# Patient Record
Sex: FEMALE | Employment: FULL TIME | URBAN - METROPOLITAN AREA
[De-identification: names, ages, dates, MRNs, and addresses within clinical notes are randomized per-mention and may not be internally consistent; named-entity substitution may affect disease eponyms.]

---

## 2024-07-18 ENCOUNTER — HOSPITAL ENCOUNTER (OUTPATIENT)
Dept: RADIOLOGY | Facility: EXTERNAL LOCATION | Age: 49
Discharge: HOME | End: 2024-07-18

## 2024-08-13 ENCOUNTER — HOSPITAL ENCOUNTER (OUTPATIENT)
Dept: RADIOLOGY | Facility: CLINIC | Age: 49
Discharge: HOME | End: 2024-08-13
Payer: COMMERCIAL

## 2024-08-13 VITALS — WEIGHT: 293 LBS | HEIGHT: 66 IN | BODY MASS INDEX: 47.09 KG/M2

## 2024-08-13 DIAGNOSIS — R92.8 OTHER ABNORMAL AND INCONCLUSIVE FINDINGS ON DIAGNOSTIC IMAGING OF BREAST: ICD-10-CM

## 2024-08-13 PROCEDURE — 77062 BREAST TOMOSYNTHESIS BI: CPT

## 2024-08-13 PROCEDURE — 76983 USE EA ADDL TARGET LESION: CPT | Mod: LT

## 2024-08-13 PROCEDURE — 77066 DX MAMMO INCL CAD BI: CPT | Mod: LEFT SIDE | Performed by: STUDENT IN AN ORGANIZED HEALTH CARE EDUCATION/TRAINING PROGRAM

## 2024-08-13 PROCEDURE — 77062 BREAST TOMOSYNTHESIS BI: CPT | Mod: LEFT SIDE | Performed by: STUDENT IN AN ORGANIZED HEALTH CARE EDUCATION/TRAINING PROGRAM

## 2024-08-13 PROCEDURE — 76642 ULTRASOUND BREAST LIMITED: CPT | Mod: LT

## 2024-08-13 PROCEDURE — 76642 ULTRASOUND BREAST LIMITED: CPT | Mod: LEFT SIDE | Performed by: STUDENT IN AN ORGANIZED HEALTH CARE EDUCATION/TRAINING PROGRAM

## 2024-08-27 ENCOUNTER — HOSPITAL ENCOUNTER (EMERGENCY)
Facility: HOSPITAL | Age: 49
Discharge: HOME | End: 2024-08-27
Attending: EMERGENCY MEDICINE
Payer: COMMERCIAL

## 2024-08-27 ENCOUNTER — APPOINTMENT (OUTPATIENT)
Dept: RADIOLOGY | Facility: HOSPITAL | Age: 49
End: 2024-08-27
Payer: COMMERCIAL

## 2024-08-27 ENCOUNTER — APPOINTMENT (OUTPATIENT)
Dept: CARDIOLOGY | Facility: HOSPITAL | Age: 49
End: 2024-08-27
Payer: COMMERCIAL

## 2024-08-27 VITALS
HEIGHT: 66 IN | DIASTOLIC BLOOD PRESSURE: 70 MMHG | OXYGEN SATURATION: 99 % | HEART RATE: 86 BPM | SYSTOLIC BLOOD PRESSURE: 134 MMHG | WEIGHT: 293 LBS | RESPIRATION RATE: 20 BRPM | BODY MASS INDEX: 47.09 KG/M2 | TEMPERATURE: 97.2 F

## 2024-08-27 DIAGNOSIS — R06.02 SHORTNESS OF BREATH: Primary | ICD-10-CM

## 2024-08-27 LAB
ALBUMIN SERPL BCP-MCNC: 4 G/DL (ref 3.4–5)
ALP SERPL-CCNC: 66 U/L (ref 33–110)
ALT SERPL W P-5'-P-CCNC: 8 U/L (ref 7–45)
ANION GAP SERPL CALC-SCNC: 16 MMOL/L (ref 10–20)
AST SERPL W P-5'-P-CCNC: 8 U/L (ref 9–39)
ATRIAL RATE: 116 BPM
BASOPHILS # BLD AUTO: 0.03 X10*3/UL (ref 0–0.1)
BASOPHILS NFR BLD AUTO: 0.4 %
BILIRUB SERPL-MCNC: 0.6 MG/DL (ref 0–1.2)
BNP SERPL-MCNC: 9 PG/ML (ref 0–99)
BUN SERPL-MCNC: 14 MG/DL (ref 6–23)
CALCIUM SERPL-MCNC: 9.3 MG/DL (ref 8.6–10.3)
CARDIAC TROPONIN I PNL SERPL HS: 3 NG/L (ref 0–13)
CARDIAC TROPONIN I PNL SERPL HS: <3 NG/L (ref 0–13)
CHLORIDE SERPL-SCNC: 103 MMOL/L (ref 98–107)
CO2 SERPL-SCNC: 21 MMOL/L (ref 21–32)
CREAT SERPL-MCNC: 0.86 MG/DL (ref 0.5–1.05)
D DIMER PPP FEU-MCNC: 499 NG/ML FEU
EGFRCR SERPLBLD CKD-EPI 2021: 83 ML/MIN/1.73M*2
EOSINOPHIL # BLD AUTO: 0.18 X10*3/UL (ref 0–0.7)
EOSINOPHIL NFR BLD AUTO: 2.2 %
ERYTHROCYTE [DISTWIDTH] IN BLOOD BY AUTOMATED COUNT: 14.6 % (ref 11.5–14.5)
GLUCOSE SERPL-MCNC: 147 MG/DL (ref 74–99)
HCT VFR BLD AUTO: 36.7 % (ref 36–46)
HGB BLD-MCNC: 11.8 G/DL (ref 12–16)
IMM GRANULOCYTES # BLD AUTO: 0.03 X10*3/UL (ref 0–0.7)
IMM GRANULOCYTES NFR BLD AUTO: 0.4 % (ref 0–0.9)
LYMPHOCYTES # BLD AUTO: 2.54 X10*3/UL (ref 1.2–4.8)
LYMPHOCYTES NFR BLD AUTO: 31.5 %
MAGNESIUM SERPL-MCNC: 1.81 MG/DL (ref 1.6–2.4)
MCH RBC QN AUTO: 26.2 PG (ref 26–34)
MCHC RBC AUTO-ENTMCNC: 32.2 G/DL (ref 32–36)
MCV RBC AUTO: 81 FL (ref 80–100)
MONOCYTES # BLD AUTO: 0.63 X10*3/UL (ref 0.1–1)
MONOCYTES NFR BLD AUTO: 7.8 %
NEUTROPHILS # BLD AUTO: 4.65 X10*3/UL (ref 1.2–7.7)
NEUTROPHILS NFR BLD AUTO: 57.7 %
NRBC BLD-RTO: 0 /100 WBCS (ref 0–0)
P AXIS: 43 DEGREES
P OFFSET: 193 MS
P ONSET: 137 MS
PLATELET # BLD AUTO: 202 X10*3/UL (ref 150–450)
POTASSIUM SERPL-SCNC: 3.2 MMOL/L (ref 3.5–5.3)
PR INTERVAL: 158 MS
PROT SERPL-MCNC: 7.1 G/DL (ref 6.4–8.2)
Q ONSET: 216 MS
QRS COUNT: 20 BEATS
QRS DURATION: 80 MS
QT INTERVAL: 328 MS
QTC CALCULATION(BAZETT): 455 MS
QTC FREDERICIA: 408 MS
R AXIS: -32 DEGREES
RBC # BLD AUTO: 4.51 X10*6/UL (ref 4–5.2)
SARS-COV-2 RNA RESP QL NAA+PROBE: NOT DETECTED
SODIUM SERPL-SCNC: 137 MMOL/L (ref 136–145)
T AXIS: 64 DEGREES
T OFFSET: 380 MS
VENTRICULAR RATE: 116 BPM
WBC # BLD AUTO: 8.1 X10*3/UL (ref 4.4–11.3)

## 2024-08-27 PROCEDURE — 83880 ASSAY OF NATRIURETIC PEPTIDE: CPT

## 2024-08-27 PROCEDURE — 84484 ASSAY OF TROPONIN QUANT: CPT

## 2024-08-27 PROCEDURE — 85379 FIBRIN DEGRADATION QUANT: CPT

## 2024-08-27 PROCEDURE — 71045 X-RAY EXAM CHEST 1 VIEW: CPT | Performed by: RADIOLOGY

## 2024-08-27 PROCEDURE — 87635 SARS-COV-2 COVID-19 AMP PRB: CPT

## 2024-08-27 PROCEDURE — 36415 COLL VENOUS BLD VENIPUNCTURE: CPT

## 2024-08-27 PROCEDURE — 93005 ELECTROCARDIOGRAM TRACING: CPT

## 2024-08-27 PROCEDURE — 99284 EMERGENCY DEPT VISIT MOD MDM: CPT

## 2024-08-27 PROCEDURE — 80053 COMPREHEN METABOLIC PANEL: CPT

## 2024-08-27 PROCEDURE — 85025 COMPLETE CBC W/AUTO DIFF WBC: CPT

## 2024-08-27 PROCEDURE — 71045 X-RAY EXAM CHEST 1 VIEW: CPT

## 2024-08-27 PROCEDURE — 2500000002 HC RX 250 W HCPCS SELF ADMINISTERED DRUGS (ALT 637 FOR MEDICARE OP, ALT 636 FOR OP/ED): Performed by: EMERGENCY MEDICINE

## 2024-08-27 PROCEDURE — 83735 ASSAY OF MAGNESIUM: CPT

## 2024-08-27 RX ORDER — POTASSIUM CHLORIDE 20 MEQ/1
40 TABLET, EXTENDED RELEASE ORAL ONCE
Status: COMPLETED | OUTPATIENT
Start: 2024-08-27 | End: 2024-08-27

## 2024-08-27 RX ORDER — PREDNISONE 50 MG/1
50 TABLET ORAL DAILY
Qty: 5 TABLET | Refills: 0 | Status: SHIPPED | OUTPATIENT
Start: 2024-08-27 | End: 2024-09-01

## 2024-08-27 ASSESSMENT — PAIN SCALES - GENERAL
PAINLEVEL_OUTOF10: 0 - NO PAIN
PAINLEVEL_OUTOF10: 0 - NO PAIN

## 2024-08-27 ASSESSMENT — LIFESTYLE VARIABLES
EVER HAD A DRINK FIRST THING IN THE MORNING TO STEADY YOUR NERVES TO GET RID OF A HANGOVER: NO
TOTAL SCORE: 0
HAVE PEOPLE ANNOYED YOU BY CRITICIZING YOUR DRINKING: NO
HAVE YOU EVER FELT YOU SHOULD CUT DOWN ON YOUR DRINKING: NO
EVER FELT BAD OR GUILTY ABOUT YOUR DRINKING: NO

## 2024-08-27 ASSESSMENT — PAIN - FUNCTIONAL ASSESSMENT: PAIN_FUNCTIONAL_ASSESSMENT: 0-10

## 2024-08-27 ASSESSMENT — COLUMBIA-SUICIDE SEVERITY RATING SCALE - C-SSRS
6. HAVE YOU EVER DONE ANYTHING, STARTED TO DO ANYTHING, OR PREPARED TO DO ANYTHING TO END YOUR LIFE?: NO
2. HAVE YOU ACTUALLY HAD ANY THOUGHTS OF KILLING YOURSELF?: NO
1. IN THE PAST MONTH, HAVE YOU WISHED YOU WERE DEAD OR WISHED YOU COULD GO TO SLEEP AND NOT WAKE UP?: NO

## 2024-08-27 NOTE — DISCHARGE INSTRUCTIONS
You were seen in the emergency department today for shortness of breath.  Labs show no acute concerning findings.  Please follow with your primary care provider within the week.  If you develop any shortness of breath chest pain nausea or vomiting please return back to emergency department soon as possible.

## 2024-08-27 NOTE — ED PROVIDER NOTES
EMERGENCY DEPARTMENT ENCOUNTER      Pt Name: Samuel Mims  MRN: [id number]  Birthdate 1975  Date of evaluation: 8/27/2024  Provider: Randell Ford MD    CHIEF COMPLAINT       Chief Complaint   Patient presents with    Shortness of Breath     PT. ARRIVED VIA EMS TO ED FROM HOME FOR SOB. PT. STATES HX OF ASTHMA. PT. STATES TONIGHT SHE WAS LAYING FLAT AND WOKE UP FEELING SHORT OF BREATH. PT. STATES SHE TOOK 2 ALBUTEROL TREATMENTS PRIOR TO EMS ARRIVAL. PER EMS REPORT PT. WAS 99% ON RA, NO WHEEZING. PT. A&O X4, C/O SOB, B/L LUNG SOUNDS CLEAR/DIMINISHED, PULSE OX READING 99% ON RA, W/ DRY COUGH. PT. ON CARDIAC MONITOR READING ST W/ HR . PT. DENIES CP, N/V/D, HA, NUMBNESS/TINGLING, DIZZINESS/LIGHTHEADEDNESS.     HISTORY OF PRESENT ILLNESS    HPI  48-year-old female presents emergency department chief complaint of shortness of breath.  Patient claims that she woke up and felt as if she has been stuck in her throat and was choking.  She was unaware if it was reflux or GERD but she mainly took her nebulizer treatment.  She took 2 nebulizer treatments and claimed that she had burning sensation in her chest as well as pressure.  EMS picked her up and placed her on 4 L nasal cannula.  Patient was brought in to the emergency department for evaluation.  Nursing Notes were reviewed.    PAST MEDICAL HISTORY     Past Medical History:   Diagnosis Date    Asthma ([facility])     GERD (gastroesophageal reflux disease)     Hypertension          SURGICAL HISTORY       Past Surgical History:   Procedure Laterality Date    CHOLECYSTECTOMY           CURRENT MEDICATIONS       Previous Medications    No medications on file       ALLERGIES     Patient has no known allergies.    FAMILY HISTORY       Family History   Problem Relation Name Age of Onset    Breast cancer Mother  63    Breast cancer Sister  63          SOCIAL HISTORY       Social History     Socioeconomic History    Marital status:    Tobacco Use    Smoking status:  Former     Current packs/day: 1.00     Average packs/day: 1 pack/day for 4.7 years (4.7 ttl pk-yrs)     Types: Cigarettes     Start date: 2020    Smokeless tobacco: Never   Vaping Use    Vaping status: Never Used   Substance and Sexual Activity    Alcohol use: Never    Drug use: Never       SCREENINGS                        PHYSICAL EXAM    (up to 7 for level 4, 8 or more for level 5)     ED Triage Vitals [08/27/24 0254]   Temperature Heart Rate Respirations BP   36.2 °C (97.2 °F) (!) 118 20 (!) 198/100      Pulse Ox Temp Source Heart Rate Source Patient Position   99 % Temporal Monitor Sitting      BP Location FiO2 (%)     Right arm --       Physical Exam  Constitutional:       Appearance: She is well-developed.   HENT:      Head: Normocephalic and atraumatic.      Right Ear: Tympanic membrane normal.      Left Ear: Tympanic membrane normal.      Nose: Nose normal.      Mouth/Throat:      Mouth: Mucous membranes are moist.      Pharynx: Oropharynx is clear.   Eyes:      Extraocular Movements: Extraocular movements intact.      Pupils: Pupils are equal, round, and reactive to light.   Cardiovascular:      Rate and Rhythm: Normal rate and regular rhythm.      Pulses: Normal pulses.      Heart sounds: Normal heart sounds.   Pulmonary:      Effort: Pulmonary effort is normal.      Breath sounds: Decreased breath sounds present. No wheezing, rhonchi or rales.   Abdominal:      General: Abdomen is flat.      Palpations: Abdomen is soft.   Musculoskeletal:         General: Normal range of motion.      Cervical back: Normal range of motion and neck supple.   Skin:     General: Skin is warm.      Findings: Erythema present.   Neurological:      General: No focal deficit present.      Mental Status: She is alert and oriented to person, place, and time.   Psychiatric:         Mood and Affect: Mood normal.         Behavior: Behavior normal.          DIAGNOSTIC RESULTS     LABS:  Labs Reviewed   CBC WITH AUTO DIFFERENTIAL -  Abnormal       Result Value    WBC 8.1      nRBC 0.0      RBC 4.51      Hemoglobin 11.8 (*)     Hematocrit 36.7      MCV 81      MCH 26.2      MCHC 32.2      RDW 14.6 (*)     Platelets 202      Neutrophils % 57.7      Immature Granulocytes %, Automated 0.4      Lymphocytes % 31.5      Monocytes % 7.8      Eosinophils % 2.2      Basophils % 0.4      Neutrophils Absolute 4.65      Immature Granulocytes Absolute, Automated 0.03      Lymphocytes Absolute 2.54      Monocytes Absolute 0.63      Eosinophils Absolute 0.18      Basophils Absolute 0.03     D-DIMER, VTE EXCLUSION - Normal    D-Dimer, Quantitative VTE Exclusion 499      Narrative:     The VTE Exclusion D-Dimer assay is reported in ng/mL Fibrinogen Equivalent Units (FEU).    Per 's instructions for use, a value of less than 500 ng/mL (FEU) may help to exclude DVT or PE in outpatients when the assay is used with a clinical pretest probability assessment.([facility] must utilize and document eCalc 'Wells Score Deep Vein Thrombosis Risk' for DVT exclusion only. Emergency Department should utilize  Guidelines for Emergency Department Use of the VTE Exclusion D-Dimer and Clinical Pretest probability assessment model for DVT or PE exclusion.)   COMPREHENSIVE METABOLIC PANEL   MAGNESIUM   TROPONIN SERIES- (INITIAL, 1 HR)    Narrative:     The following orders were created for panel order Troponin I Series, High Sensitivity (0, 1 HR).  Procedure                               Abnormality         Status                     ---------                               -----------         ------                     Troponin I, High Sensiti...[[id number]]                      In process                 Troponin, High Sensitivi...[[id number]]                                                   Please view results for these tests on the individual orders.   B-TYPE NATRIURETIC PEPTIDE   SARS-COV-2 PCR   SERIAL TROPONIN-INITIAL   SERIAL TROPONIN, 1 HOUR       All other labs were  within normal range or not returned as of this dictation.    Imaging  XR chest 1 view   Final Result   1.  Mild central vascular congestion.                  MACRO:   None.        Signed by: Iris Trujillo 8/27/2024 3:25 AM   Dictation workstation:   [id number]           Procedures  Procedures     EMERGENCY DEPARTMENT COURSE/MDM:   Medical Decision Making  48-year-old female presents emergency department chief complaint of shortness of breath.  Workup will include ACS rule out with cardiovascular workup.  Initial Trope is normal labs are unremarkable.  Patient is not COVID-positive we have given her a GI cocktail and attempt to alleviate her sensation of GERD.  Chest x-ray shows  IMPRESSION:  1.  Mild central vascular congestion.  The patient has a mild hand GI cocktail she indicates that she is feeling better.  Will be discharging the patient home with prednisone 50 mg for 5 days.  Labs are unremarkable.  The patient does feel better and claims that she wants to go home.  Diagnoses as of 08/27/24 0518   Shortness of breath        Patient and or family in agreement and understanding of treatment plan.  All questions answered.      I reviewed the case with the attending ED physician. The attending ED physician agrees with the plan. Patient and/or patient´s representative was counseled regarding labs, imaging, likely diagnosis, and plan. All questions were answered.    ED Medications administered this visit:    Medications   lidocaine-diphenhydrAMINE-Maalox 1:1:1 Magic Mouthwash (has no administration in time range)       New Prescriptions from this visit:    New Prescriptions    No medications on file       Follow-up:  No follow-up provider specified.      Final Impression: No diagnosis found.      (Please note that portions of this note were completed with a voice recognition program.  Efforts were made to edit the dictations but occasionally words are mis-transcribed.)     Randell Ford MD  Resident  08/27/24  [phone]

## 2024-09-02 LAB
ATRIAL RATE: 116 BPM
P AXIS: 43 DEGREES
P OFFSET: 193 MS
P ONSET: 137 MS
PR INTERVAL: 158 MS
Q ONSET: 216 MS
QRS COUNT: 20 BEATS
QRS DURATION: 80 MS
QT INTERVAL: 328 MS
QTC CALCULATION(BAZETT): 455 MS
QTC FREDERICIA: 408 MS
R AXIS: -32 DEGREES
T AXIS: 64 DEGREES
T OFFSET: 380 MS
VENTRICULAR RATE: 116 BPM

## 2024-10-10 ENCOUNTER — HOSPITAL ENCOUNTER (EMERGENCY)
Facility: HOSPITAL | Age: 49
Discharge: HOME | End: 2024-10-10
Payer: COMMERCIAL

## 2024-10-10 VITALS
OXYGEN SATURATION: 98 % | HEIGHT: 66 IN | WEIGHT: 272 LBS | DIASTOLIC BLOOD PRESSURE: 67 MMHG | SYSTOLIC BLOOD PRESSURE: 131 MMHG | TEMPERATURE: 97.2 F | RESPIRATION RATE: 18 BRPM | HEART RATE: 82 BPM | BODY MASS INDEX: 43.71 KG/M2

## 2024-10-10 DIAGNOSIS — N39.0 UTI (URINARY TRACT INFECTION), UNCOMPLICATED: ICD-10-CM

## 2024-10-10 DIAGNOSIS — R11.2 NAUSEA AND VOMITING, UNSPECIFIED VOMITING TYPE: Primary | ICD-10-CM

## 2024-10-10 LAB
ALBUMIN SERPL BCP-MCNC: 4.3 G/DL (ref 3.4–5)
ALP SERPL-CCNC: 56 U/L (ref 33–110)
ALT SERPL W P-5'-P-CCNC: 22 U/L (ref 7–45)
ANION GAP SERPL CALC-SCNC: 16 MMOL/L (ref 10–20)
APPEARANCE UR: ABNORMAL
AST SERPL W P-5'-P-CCNC: 19 U/L (ref 9–39)
BASOPHILS # BLD AUTO: 0.02 X10*3/UL (ref 0–0.1)
BASOPHILS NFR BLD AUTO: 0.3 %
BILIRUB SERPL-MCNC: 1.5 MG/DL (ref 0–1.2)
BILIRUB UR STRIP.AUTO-MCNC: ABNORMAL MG/DL
BUN SERPL-MCNC: 11 MG/DL (ref 6–23)
CALCIUM SERPL-MCNC: 9.5 MG/DL (ref 8.6–10.3)
CHLORIDE SERPL-SCNC: 102 MMOL/L (ref 98–107)
CO2 SERPL-SCNC: 24 MMOL/L (ref 21–32)
COLOR UR: YELLOW
CREAT SERPL-MCNC: 0.95 MG/DL (ref 0.5–1.05)
EGFRCR SERPLBLD CKD-EPI 2021: 74 ML/MIN/1.73M*2
EOSINOPHIL # BLD AUTO: 0.09 X10*3/UL (ref 0–0.7)
EOSINOPHIL NFR BLD AUTO: 1.1 %
ERYTHROCYTE [DISTWIDTH] IN BLOOD BY AUTOMATED COUNT: 14.7 % (ref 11.5–14.5)
GLUCOSE SERPL-MCNC: 95 MG/DL (ref 74–99)
GLUCOSE UR STRIP.AUTO-MCNC: NORMAL MG/DL
HCG UR QL IA.RAPID: NEGATIVE
HCT VFR BLD AUTO: 37.5 % (ref 36–46)
HGB BLD-MCNC: 12 G/DL (ref 12–16)
HOLD SPECIMEN: NORMAL
HOLD SPECIMEN: NORMAL
IMM GRANULOCYTES # BLD AUTO: 0.04 X10*3/UL (ref 0–0.7)
IMM GRANULOCYTES NFR BLD AUTO: 0.5 % (ref 0–0.9)
KETONES UR STRIP.AUTO-MCNC: ABNORMAL MG/DL
LACTATE SERPL-SCNC: 1.1 MMOL/L (ref 0.4–2)
LEUKOCYTE ESTERASE UR QL STRIP.AUTO: ABNORMAL
LIPASE SERPL-CCNC: 33 U/L (ref 9–82)
LYMPHOCYTES # BLD AUTO: 1.09 X10*3/UL (ref 1.2–4.8)
LYMPHOCYTES NFR BLD AUTO: 13.7 %
MAGNESIUM SERPL-MCNC: 1.62 MG/DL (ref 1.6–2.4)
MCH RBC QN AUTO: 26.8 PG (ref 26–34)
MCHC RBC AUTO-ENTMCNC: 32 G/DL (ref 32–36)
MCV RBC AUTO: 84 FL (ref 80–100)
MONOCYTES # BLD AUTO: 0.59 X10*3/UL (ref 0.1–1)
MONOCYTES NFR BLD AUTO: 7.4 %
MUCOUS THREADS #/AREA URNS AUTO: ABNORMAL /LPF
NEUTROPHILS # BLD AUTO: 6.1 X10*3/UL (ref 1.2–7.7)
NEUTROPHILS NFR BLD AUTO: 77 %
NITRITE UR QL STRIP.AUTO: NEGATIVE
NRBC BLD-RTO: 0 /100 WBCS (ref 0–0)
PH UR STRIP.AUTO: 6 [PH]
PLATELET # BLD AUTO: 196 X10*3/UL (ref 150–450)
POTASSIUM SERPL-SCNC: 3.3 MMOL/L (ref 3.5–5.3)
PROT SERPL-MCNC: 7.3 G/DL (ref 6.4–8.2)
PROT UR STRIP.AUTO-MCNC: ABNORMAL MG/DL
RBC # BLD AUTO: 4.47 X10*6/UL (ref 4–5.2)
RBC # UR STRIP.AUTO: ABNORMAL /UL
RBC #/AREA URNS AUTO: ABNORMAL /HPF
SODIUM SERPL-SCNC: 139 MMOL/L (ref 136–145)
SP GR UR STRIP.AUTO: 1.03
SQUAMOUS #/AREA URNS AUTO: ABNORMAL /HPF
UROBILINOGEN UR STRIP.AUTO-MCNC: ABNORMAL MG/DL
WBC # BLD AUTO: 7.9 X10*3/UL (ref 4.4–11.3)
WBC #/AREA URNS AUTO: >50 /HPF

## 2024-10-10 PROCEDURE — 99284 EMERGENCY DEPT VISIT MOD MDM: CPT | Performed by: PHYSICIAN ASSISTANT

## 2024-10-10 PROCEDURE — 81025 URINE PREGNANCY TEST: CPT | Performed by: PHYSICIAN ASSISTANT

## 2024-10-10 PROCEDURE — 96365 THER/PROPH/DIAG IV INF INIT: CPT

## 2024-10-10 PROCEDURE — 36415 COLL VENOUS BLD VENIPUNCTURE: CPT | Performed by: PHYSICIAN ASSISTANT

## 2024-10-10 PROCEDURE — 83735 ASSAY OF MAGNESIUM: CPT | Performed by: PHYSICIAN ASSISTANT

## 2024-10-10 PROCEDURE — 96375 TX/PRO/DX INJ NEW DRUG ADDON: CPT

## 2024-10-10 PROCEDURE — 81001 URINALYSIS AUTO W/SCOPE: CPT | Performed by: PHYSICIAN ASSISTANT

## 2024-10-10 PROCEDURE — 2500000004 HC RX 250 GENERAL PHARMACY W/ HCPCS (ALT 636 FOR OP/ED): Performed by: PHYSICIAN ASSISTANT

## 2024-10-10 PROCEDURE — 85025 COMPLETE CBC W/AUTO DIFF WBC: CPT | Performed by: PHYSICIAN ASSISTANT

## 2024-10-10 PROCEDURE — 80053 COMPREHEN METABOLIC PANEL: CPT | Performed by: PHYSICIAN ASSISTANT

## 2024-10-10 PROCEDURE — 83690 ASSAY OF LIPASE: CPT | Performed by: PHYSICIAN ASSISTANT

## 2024-10-10 PROCEDURE — 83605 ASSAY OF LACTIC ACID: CPT | Performed by: PHYSICIAN ASSISTANT

## 2024-10-10 PROCEDURE — 87086 URINE CULTURE/COLONY COUNT: CPT | Mod: STJLAB | Performed by: PHYSICIAN ASSISTANT

## 2024-10-10 PROCEDURE — 99284 EMERGENCY DEPT VISIT MOD MDM: CPT

## 2024-10-10 RX ORDER — CEPHALEXIN 500 MG/1
500 CAPSULE ORAL 4 TIMES DAILY
Qty: 28 CAPSULE | Refills: 0 | Status: SHIPPED | OUTPATIENT
Start: 2024-10-10 | End: 2024-10-14 | Stop reason: ALTCHOICE

## 2024-10-10 RX ORDER — ONDANSETRON 4 MG/1
4 TABLET, ORALLY DISINTEGRATING ORAL EVERY 8 HOURS PRN
Qty: 21 TABLET | Refills: 0 | Status: SHIPPED | OUTPATIENT
Start: 2024-10-10

## 2024-10-10 RX ORDER — FAMOTIDINE 10 MG/ML
20 INJECTION INTRAVENOUS ONCE
Status: COMPLETED | OUTPATIENT
Start: 2024-10-10 | End: 2024-10-10

## 2024-10-10 RX ORDER — CEFTRIAXONE 1 G/50ML
1 INJECTION, SOLUTION INTRAVENOUS ONCE
Status: COMPLETED | OUTPATIENT
Start: 2024-10-10 | End: 2024-10-10

## 2024-10-10 RX ORDER — POTASSIUM CHLORIDE 20 MEQ/1
20 TABLET, EXTENDED RELEASE ORAL ONCE
Status: DISCONTINUED | OUTPATIENT
Start: 2024-10-10 | End: 2024-10-10 | Stop reason: HOSPADM

## 2024-10-10 RX ORDER — METOCLOPRAMIDE HYDROCHLORIDE 5 MG/ML
10 INJECTION INTRAMUSCULAR; INTRAVENOUS ONCE
Status: COMPLETED | OUTPATIENT
Start: 2024-10-10 | End: 2024-10-10

## 2024-10-10 RX ORDER — METOCLOPRAMIDE 10 MG/1
10 TABLET ORAL 4 TIMES DAILY PRN
Qty: 20 TABLET | Refills: 0 | Status: SHIPPED | OUTPATIENT
Start: 2024-10-10

## 2024-10-10 RX ORDER — ONDANSETRON HYDROCHLORIDE 2 MG/ML
4 INJECTION, SOLUTION INTRAVENOUS ONCE
Status: COMPLETED | OUTPATIENT
Start: 2024-10-10 | End: 2024-10-10

## 2024-10-10 RX ADMIN — CEFTRIAXONE SODIUM 1 G: 1 INJECTION, SOLUTION INTRAVENOUS at 13:14

## 2024-10-10 RX ADMIN — FAMOTIDINE 20 MG: 10 INJECTION, SOLUTION INTRAVENOUS at 12:11

## 2024-10-10 RX ADMIN — METOCLOPRAMIDE 10 MG: 5 INJECTION, SOLUTION INTRAMUSCULAR; INTRAVENOUS at 13:14

## 2024-10-10 RX ADMIN — ONDANSETRON 4 MG: 2 INJECTION INTRAMUSCULAR; INTRAVENOUS at 12:11

## 2024-10-10 ASSESSMENT — PAIN SCALES - GENERAL: PAINLEVEL_OUTOF10: 4

## 2024-10-10 ASSESSMENT — LIFESTYLE VARIABLES
EVER HAD A DRINK FIRST THING IN THE MORNING TO STEADY YOUR NERVES TO GET RID OF A HANGOVER: NO
HAVE PEOPLE ANNOYED YOU BY CRITICIZING YOUR DRINKING: NO
EVER FELT BAD OR GUILTY ABOUT YOUR DRINKING: NO
HAVE YOU EVER FELT YOU SHOULD CUT DOWN ON YOUR DRINKING: NO
TOTAL SCORE: 0

## 2024-10-10 ASSESSMENT — COLUMBIA-SUICIDE SEVERITY RATING SCALE - C-SSRS
2. HAVE YOU ACTUALLY HAD ANY THOUGHTS OF KILLING YOURSELF?: NO
1. IN THE PAST MONTH, HAVE YOU WISHED YOU WERE DEAD OR WISHED YOU COULD GO TO SLEEP AND NOT WAKE UP?: NO
6. HAVE YOU EVER DONE ANYTHING, STARTED TO DO ANYTHING, OR PREPARED TO DO ANYTHING TO END YOUR LIFE?: NO

## 2024-10-10 ASSESSMENT — PAIN - FUNCTIONAL ASSESSMENT: PAIN_FUNCTIONAL_ASSESSMENT: 0-10

## 2024-10-10 NOTE — ED PROVIDER NOTES
"This is a 48-year-old female with past medical history of GERD, asthma, and hypertension who presents to the ED with nausea and vomiting.  She states that approximate 1 month ago she had a flareup of her GERD symptoms for which she was prescribed Zofran with some relief.  She states that 2 weeks ago she began having the symptoms again and saw her primary care provider earlier this week.  She states that her symptoms were intermittent, however today her nausea and vomiting got much worse.  She is endorsing epigastric abdominal discomfort as well as left-sided abdominal discomfort.  Her symptoms are worse in the morning and are worse with lying flat.  She denies any urinary or vaginal symptoms at this time.  She denies any flank pain.  She denies any fevers or chills.  She denies any diarrhea.  She denies any blood in her vomit.  She does have an appointment scheduled with GI for tomorrow.  She does have that she is prescribed a PPI, however due to the nausea and vomiting she has not been taking this.      History provided by:  Patient   used: No             Visit Vitals  /67 (BP Location: Right arm, Patient Position: Sitting)   Pulse 82   Temp 36.2 °C (97.2 °F)   Resp 18   Ht 1.676 m (5' 6\")   Wt 123 kg (272 lb)   SpO2 98%   BMI 43.90 kg/m²   OB Status Implant   Smoking Status Former   BSA 2.39 m²          Physical Exam     Physical exam:   General: Vitals noted, no distress. Afebrile.   EENT:  Hearing grossly intact. Normal phonation. MMM. Airway patient. PERRL. EOMI.   Neck: No midline tenderness or paraspinal tenderness. FROM.   Cardiac: Regular, rate, rhythm. Normal S1 and S2.  No murmurs, gallops, rubs.   Pulmonary: Good air exchange. Lungs clear bilaterally. No wheezes, rhonchi, rales. No accessory muscle use.   Abdomen: Soft, nonsurgical.  Mild tenderness palpation epigastric region and left lower quadrant. No peritoneal signs. Normoactive bowel sounds.   Back: No CVA tenderness. No " midline tenderness or paraspinal tenderness. No obvious deformity.   Extremities: No peripheral edema.  Full range of motion. Moves all extremities freely. No tenderness throughout extremities.   Skin: No rash. Warm and Dry.   Neuro: No focal neurologic deficits. CN 2-12 grossly intact. Sensation equal bilaterally. No weakness.         Labs Reviewed   CBC WITH AUTO DIFFERENTIAL - Abnormal       Result Value    WBC 7.9      nRBC 0.0      RBC 4.47      Hemoglobin 12.0      Hematocrit 37.5      MCV 84      MCH 26.8      MCHC 32.0      RDW 14.7 (*)     Platelets 196      Neutrophils % 77.0      Immature Granulocytes %, Automated 0.5      Lymphocytes % 13.7      Monocytes % 7.4      Eosinophils % 1.1      Basophils % 0.3      Neutrophils Absolute 6.10      Immature Granulocytes Absolute, Automated 0.04      Lymphocytes Absolute 1.09 (*)     Monocytes Absolute 0.59      Eosinophils Absolute 0.09      Basophils Absolute 0.02     COMPREHENSIVE METABOLIC PANEL - Abnormal    Glucose 95      Sodium 139      Potassium 3.3 (*)     Chloride 102      Bicarbonate 24      Anion Gap 16      Urea Nitrogen 11      Creatinine 0.95      eGFR 74      Calcium 9.5      Albumin 4.3      Alkaline Phosphatase 56      Total Protein 7.3      AST 19      Bilirubin, Total 1.5 (*)     ALT 22     URINALYSIS WITH REFLEX CULTURE AND MICROSCOPIC - Abnormal    Color, Urine Yellow      Appearance, Urine Turbid (*)     Specific Gravity, Urine 1.028      pH, Urine 6.0      Protein, Urine 100 (2+) (*)     Glucose, Urine Normal      Blood, Urine 0.03 (TRACE) (*)     Ketones, Urine OVER (4+) (*)     Bilirubin, Urine 1 (1+) (*)     Urobilinogen, Urine 6 (2+) (*)     Nitrite, Urine NEGATIVE      Leukocyte Esterase, Urine 500 Tika/µL (*)     Narrative:     OVER is reported when the result is greater than the clinically reportable range.   MICROSCOPIC ONLY, URINE - Abnormal    WBC, Urine >50 (*)     RBC, Urine 6-10 (*)     Squamous Epithelial Cells, Urine 10-25  (FEW)      Mucus, Urine 4+     LIPASE - Normal    Lipase 33      Narrative:     Venipuncture immediately after or during the administration of Metamizole may lead to falsely low results. Testing should be performed immediately prior to Metamizole dosing.   MAGNESIUM - Normal    Magnesium 1.62     LACTATE - Normal    Lactate 1.1      Narrative:     Venipuncture immediately after or during the administration of Metamizole may lead to falsely low results. Testing should be performed immediately prior to Metamizole dosing.   HCG, URINE, QUALITATIVE - Normal    HCG, Urine NEGATIVE     URINE CULTURE   URINALYSIS WITH REFLEX CULTURE AND MICROSCOPIC    Narrative:     The following orders were created for panel order Urinalysis with Reflex Culture and Microscopic.  Procedure                               Abnormality         Status                     ---------                               -----------         ------                     Urinalysis with Reflex C...[[id number]]  Abnormal            Final result               Extra Urine Gray Tube[[id number]]                            In process                   Please view results for these tests on the individual orders.   EXTRA URINE GRAY TUBE       No orders to display           ED Course & MDM     Medical Decision Making  This is a 48-year-old female past medical history GERD, asthma, and hypertension who presents to the ED with intermittent nausea and vomiting that got worse today.  Vital stable upon arrival to the ED.  On physical examination she was overall well-appearing.  Abdomen soft with minimal tenderness palpation of the epigastric region and left lower quadrant.  No rebound or guarding.  No CVA tenderness.  IV established laboratory studies obtained.  Patient ordered Zofran and Pepcid for her symptoms.  On my reassessment she was feeling mildly improved but was still endorsing nausea so she was ordered Reglan.  Additionally patient's urinalysis was concerning for  infection.  She was ordered Rocephin for this at this time due to her abdominal pain with this urinalysis result.  Patient CBC grossly unremarkable, specifically no leukocytosis.  Lactate normal at 1.1.  Magnesium normal 1.62, CMP overall grossly unremarkable other than slight hypokalemia at 3.3.  She was ordered oral potassium replacement.  On my reassessment again she was feeling significantly improved.  Based on patient's benign abdominal examination, symptomatic improvement, and normal vitals as well as unremarkable laboratory studies emergent imaging such as CT abdomen pelvis not indicated at this point in time.  Patient was agreeable to not obtaining imaging of her abdomen/pelvis.  She was able to tolerate p.o. intake.  She felt comfortable being discharged home.  She does have an appointment scheduled with GI for tomorrow and felt comfortable being discharged from the ED to follow-up with GI tomorrow.  She was given signs and symptoms that she should return to the ED with.  She was agreeable to this plan.  She was written prescriptions for Zofran, Reglan, and Keflex for her symptoms at home.  She was provided with a note for her job.  She was agreeable to this plan and had no questions at time of discharge.    Amount and/or Complexity of Data Reviewed  Labs: ordered.    Risk  Prescription drug management.         Diagnoses as of 10/10/24 1421   Nausea and vomiting, unspecified vomiting type   UTI (urinary tract infection), uncomplicated       Patient was seen independently    Procedures    TIM Sinha, JOSEF Trevino PA-C  10/10/24 1422

## 2024-10-10 NOTE — Clinical Note
Samuel Mims was seen and treated in our emergency department on 10/10/2024.  She may return to work on 10/12/2024.       If you have any questions or concerns, please don't hesitate to call.      Traci Trevino PA-C

## 2024-10-11 ENCOUNTER — HOSPITAL ENCOUNTER (OUTPATIENT)
Dept: RADIOLOGY | Facility: CLINIC | Age: 49
Discharge: HOME | End: 2024-10-11
Payer: COMMERCIAL

## 2024-10-11 DIAGNOSIS — R11.0 NAUSEA: ICD-10-CM

## 2024-10-11 LAB — BACTERIA UR CULT: ABNORMAL

## 2024-10-11 PROCEDURE — 2550000001 HC RX 255 CONTRASTS: Performed by: INTERNAL MEDICINE

## 2024-10-11 PROCEDURE — 74177 CT ABD & PELVIS W/CONTRAST: CPT

## 2024-10-14 ENCOUNTER — TELEPHONE (OUTPATIENT)
Dept: PHARMACY | Facility: HOSPITAL | Age: 49
End: 2024-10-14
Payer: COMMERCIAL

## 2024-10-14 NOTE — PROGRESS NOTES
EDPD Note: Rapid Result Review    I reviewed Samuel Mims 's chart regarding a contaminated urine culture/result that was taken during their recent emergency room visit. The patient was not told about these results prior to leaving the emergency department. Therefore, patient was contacted and given proper education.     Patient presented to the ED with nausea and vomiting, and denied urinary symptoms. They were discharged with Keflex 500mg QID x7.  At time of call, patient reports she has been unable to take the antibiotic due to her continued nausea, and denies any current urinary symptoms. Advised patient to discontinue antibiotic, due to no urinary symptoms, and do follow up with PCP or urgent care if symptoms develop.     Urine Culture Multiple organisms present, probable contamination. Repeat culture if clinically indicated. Abnormal            Resulting Agency: [facility]           Specimen Collected: 10/10/24 12:07 Last Resulted: 10/11/24 19:06          No further follow up needed from EDPD Team.     If there are any other questions for the ED Post-Discharge Culture Follow Up Team, please contact [phone]. Fax: [phone].    Brandee Durbin, BethelD

## 2024-10-17 ENCOUNTER — HOSPITAL ENCOUNTER (EMERGENCY)
Facility: HOSPITAL | Age: 49
Discharge: HOME | End: 2024-10-18
Attending: EMERGENCY MEDICINE
Payer: COMMERCIAL

## 2024-10-17 ENCOUNTER — APPOINTMENT (OUTPATIENT)
Dept: RADIOLOGY | Facility: HOSPITAL | Age: 49
End: 2024-10-17
Payer: COMMERCIAL

## 2024-10-17 DIAGNOSIS — R10.84 GENERALIZED ABDOMINAL PAIN: Primary | ICD-10-CM

## 2024-10-17 LAB
ALBUMIN SERPL BCP-MCNC: 4.4 G/DL (ref 3.4–5)
ALP SERPL-CCNC: 74 U/L (ref 33–110)
ALT SERPL W P-5'-P-CCNC: 33 U/L (ref 7–45)
ANION GAP SERPL CALC-SCNC: 22 MMOL/L (ref 10–20)
AST SERPL W P-5'-P-CCNC: 27 U/L (ref 9–39)
BASOPHILS # BLD AUTO: 0.03 X10*3/UL (ref 0–0.1)
BASOPHILS NFR BLD AUTO: 0.3 %
BILIRUB SERPL-MCNC: 1.8 MG/DL (ref 0–1.2)
BUN SERPL-MCNC: 15 MG/DL (ref 6–23)
CALCIUM SERPL-MCNC: 9.8 MG/DL (ref 8.6–10.3)
CHLORIDE SERPL-SCNC: 101 MMOL/L (ref 98–107)
CO2 SERPL-SCNC: 20 MMOL/L (ref 21–32)
CREAT SERPL-MCNC: 0.94 MG/DL (ref 0.5–1.05)
EGFRCR SERPLBLD CKD-EPI 2021: 75 ML/MIN/1.73M*2
EOSINOPHIL # BLD AUTO: 0.13 X10*3/UL (ref 0–0.7)
EOSINOPHIL NFR BLD AUTO: 1.5 %
ERYTHROCYTE [DISTWIDTH] IN BLOOD BY AUTOMATED COUNT: 14.6 % (ref 11.5–14.5)
GLUCOSE SERPL-MCNC: 102 MG/DL (ref 74–99)
HCT VFR BLD AUTO: 41.7 % (ref 36–46)
HGB BLD-MCNC: 13.1 G/DL (ref 12–16)
IMM GRANULOCYTES # BLD AUTO: 0.05 X10*3/UL (ref 0–0.7)
IMM GRANULOCYTES NFR BLD AUTO: 0.6 % (ref 0–0.9)
LIPASE SERPL-CCNC: 40 U/L (ref 9–82)
LYMPHOCYTES # BLD AUTO: 1.41 X10*3/UL (ref 1.2–4.8)
LYMPHOCYTES NFR BLD AUTO: 16.4 %
MCH RBC QN AUTO: 27.1 PG (ref 26–34)
MCHC RBC AUTO-ENTMCNC: 31.4 G/DL (ref 32–36)
MCV RBC AUTO: 86 FL (ref 80–100)
MONOCYTES # BLD AUTO: 0.81 X10*3/UL (ref 0.1–1)
MONOCYTES NFR BLD AUTO: 9.4 %
NEUTROPHILS # BLD AUTO: 6.19 X10*3/UL (ref 1.2–7.7)
NEUTROPHILS NFR BLD AUTO: 71.8 %
NRBC BLD-RTO: 0 /100 WBCS (ref 0–0)
PLATELET # BLD AUTO: 204 X10*3/UL (ref 150–450)
POTASSIUM SERPL-SCNC: 3.6 MMOL/L (ref 3.5–5.3)
PROT SERPL-MCNC: 7.1 G/DL (ref 6.4–8.2)
RBC # BLD AUTO: 4.83 X10*6/UL (ref 4–5.2)
SODIUM SERPL-SCNC: 139 MMOL/L (ref 136–145)
WBC # BLD AUTO: 8.6 X10*3/UL (ref 4.4–11.3)

## 2024-10-17 PROCEDURE — 80053 COMPREHEN METABOLIC PANEL: CPT | Performed by: EMERGENCY MEDICINE

## 2024-10-17 PROCEDURE — 2500000004 HC RX 250 GENERAL PHARMACY W/ HCPCS (ALT 636 FOR OP/ED)

## 2024-10-17 PROCEDURE — 99285 EMERGENCY DEPT VISIT HI MDM: CPT | Performed by: EMERGENCY MEDICINE

## 2024-10-17 PROCEDURE — 71045 X-RAY EXAM CHEST 1 VIEW: CPT

## 2024-10-17 PROCEDURE — 85025 COMPLETE CBC W/AUTO DIFF WBC: CPT | Performed by: EMERGENCY MEDICINE

## 2024-10-17 PROCEDURE — 96374 THER/PROPH/DIAG INJ IV PUSH: CPT | Mod: 59

## 2024-10-17 PROCEDURE — 74177 CT ABD & PELVIS W/CONTRAST: CPT | Mod: FOREIGN READ | Performed by: RADIOLOGY

## 2024-10-17 PROCEDURE — 96375 TX/PRO/DX INJ NEW DRUG ADDON: CPT

## 2024-10-17 PROCEDURE — 99284 EMERGENCY DEPT VISIT MOD MDM: CPT | Mod: 25

## 2024-10-17 PROCEDURE — 74177 CT ABD & PELVIS W/CONTRAST: CPT

## 2024-10-17 PROCEDURE — 83690 ASSAY OF LIPASE: CPT | Performed by: EMERGENCY MEDICINE

## 2024-10-17 PROCEDURE — 71045 X-RAY EXAM CHEST 1 VIEW: CPT | Mod: FOREIGN READ | Performed by: RADIOLOGY

## 2024-10-17 PROCEDURE — 36415 COLL VENOUS BLD VENIPUNCTURE: CPT | Performed by: EMERGENCY MEDICINE

## 2024-10-17 PROCEDURE — 2550000001 HC RX 255 CONTRASTS: Performed by: EMERGENCY MEDICINE

## 2024-10-17 RX ORDER — PANTOPRAZOLE SODIUM 40 MG/1
40 TABLET, DELAYED RELEASE ORAL
Qty: 30 TABLET | Refills: 0 | Status: SHIPPED | OUTPATIENT
Start: 2024-10-17 | End: 2024-10-17 | Stop reason: SINTOL

## 2024-10-17 RX ORDER — METOCLOPRAMIDE HYDROCHLORIDE 5 MG/ML
5 INJECTION INTRAMUSCULAR; INTRAVENOUS ONCE
Status: COMPLETED | OUTPATIENT
Start: 2024-10-17 | End: 2024-10-17

## 2024-10-17 RX ORDER — ONDANSETRON HYDROCHLORIDE 2 MG/ML
4 INJECTION, SOLUTION INTRAVENOUS ONCE
Status: COMPLETED | OUTPATIENT
Start: 2024-10-17 | End: 2024-10-17

## 2024-10-17 ASSESSMENT — PAIN DESCRIPTION - PAIN TYPE: TYPE: ACUTE PAIN

## 2024-10-17 ASSESSMENT — PAIN DESCRIPTION - DESCRIPTORS: DESCRIPTORS: SHARP

## 2024-10-17 ASSESSMENT — PAIN DESCRIPTION - LOCATION: LOCATION: ABDOMEN

## 2024-10-17 ASSESSMENT — LIFESTYLE VARIABLES
EVER FELT BAD OR GUILTY ABOUT YOUR DRINKING: NO
EVER HAD A DRINK FIRST THING IN THE MORNING TO STEADY YOUR NERVES TO GET RID OF A HANGOVER: NO
HAVE PEOPLE ANNOYED YOU BY CRITICIZING YOUR DRINKING: NO
HAVE YOU EVER FELT YOU SHOULD CUT DOWN ON YOUR DRINKING: NO
TOTAL SCORE: 0

## 2024-10-17 ASSESSMENT — PAIN SCALES - GENERAL
PAINLEVEL_OUTOF10: 5 - MODERATE PAIN
PAINLEVEL_OUTOF10: 0 - NO PAIN
PAINLEVEL_OUTOF10: 5 - MODERATE PAIN

## 2024-10-17 ASSESSMENT — PAIN - FUNCTIONAL ASSESSMENT: PAIN_FUNCTIONAL_ASSESSMENT: 0-10

## 2024-10-17 ASSESSMENT — PAIN DESCRIPTION - ORIENTATION: ORIENTATION: MID

## 2024-10-18 VITALS
SYSTOLIC BLOOD PRESSURE: 157 MMHG | HEART RATE: 78 BPM | RESPIRATION RATE: 20 BRPM | HEIGHT: 66 IN | TEMPERATURE: 97.2 F | WEIGHT: 260 LBS | BODY MASS INDEX: 41.78 KG/M2 | OXYGEN SATURATION: 99 % | DIASTOLIC BLOOD PRESSURE: 87 MMHG

## 2024-10-18 ASSESSMENT — PAIN - FUNCTIONAL ASSESSMENT: PAIN_FUNCTIONAL_ASSESSMENT: 0-10

## 2024-10-18 ASSESSMENT — PAIN DESCRIPTION - LOCATION: LOCATION: ABDOMEN

## 2024-10-18 ASSESSMENT — PAIN SCALES - GENERAL: PAINLEVEL_OUTOF10: 2

## 2024-10-18 NOTE — DISCHARGE INSTRUCTIONS
Please follow-up with your primary care provider as well as your gastroenterology team.  Return to the emergency department if you develop any recurrent nausea, vomiting, bloody vomiting, black stool, bloody stool, dizziness, lightheadedness, abdominal pain, or if concerns arise.

## 2024-10-18 NOTE — ED PROVIDER NOTES
HPI   Chief Complaint   Patient presents with    Abdominal Pain     Pt presents to the ED with nausea, vomiting, and abdominal pain x 3 weeks - pt states what is bringing her to the ED today is that her emesis has been brown. Pt followed up with GI doctor on Tuesday and had an endoscopy and the only finding was stomach inflammation. Pt states every hour she has had emesis today.       This is a 48 years old female patient presented to the emergency department with a chief complaint of nausea, vomiting, and abdominal pain.  Recently had upper GI endoscopy and in the process of having a colonoscopy recently.  Reported brownish emesis.  Denies any chest pain, shortness of breath, urinary symptoms, fever, chills, or bodyaches.  Reported redness/concern of bleeding in the right eye after multiple episodes of vomiting.    Review of system: As above in the HPI section.            Patient History   Past Medical History:   Diagnosis Date    Asthma     GERD (gastroesophageal reflux disease)     Hypertension      Past Surgical History:   Procedure Laterality Date    CHOLECYSTECTOMY       Family History   Problem Relation Name Age of Onset    Breast cancer Mother  63    Breast cancer Sister  63     Social History     Tobacco Use    Smoking status: Former     Current packs/day: 1.00     Average packs/day: 1 pack/day for 4.8 years (4.8 ttl pk-yrs)     Types: Cigarettes     Start date: 2020    Smokeless tobacco: Never   Vaping Use    Vaping status: Never Used   Substance Use Topics    Alcohol use: Never    Drug use: Never       Physical Exam   ED Triage Vitals [10/17/24 1815]   Temperature Heart Rate Respirations BP   36.2 °C (97.2 °F) 66 18 (!) 163/97      Pulse Ox Temp Source Heart Rate Source Patient Position   99 % Temporal Monitor Sitting      BP Location FiO2 (%)     Right arm --       Physical Exam  Vitals and nursing note reviewed.   Constitutional:       General: She is not in acute distress.     Appearance: She is  well-developed. She is obese.   HENT:      Head: Normocephalic and atraumatic.   Eyes:      Conjunctiva/sclera: Conjunctivae normal.      Comments: Right sided inferior subconjunctival hemorrhage   Cardiovascular:      Rate and Rhythm: Normal rate and regular rhythm.      Heart sounds: No murmur heard.  Pulmonary:      Effort: Pulmonary effort is normal. No respiratory distress.      Breath sounds: Normal breath sounds.   Abdominal:      General: There is distension.      Palpations: Abdomen is soft.      Tenderness: There is generalized abdominal tenderness. There is no guarding or rebound.   Musculoskeletal:         General: No swelling.      Cervical back: Neck supple.   Skin:     General: Skin is warm and dry.      Capillary Refill: Capillary refill takes less than 2 seconds.   Neurological:      Mental Status: She is alert.   Psychiatric:         Mood and Affect: Mood normal.         ED Course & MDM   Diagnoses as of 10/17/24 2325   Generalized abdominal pain                 No data recorded     [place] Coma Scale Score: 15 (10/17/24 1819 : Delisa Petersen RN)                           Medical Decision Making  Patient seen and examined, exam is unremarkable, reported improvement in her symptoms, stated that she is scheduled to see GI soon for outpatient follow-up and possibly to proceed with colonoscopy.  CT scan is unremarkable as well as workup.  Patient is discharged in stable condition.        Procedure  Procedures     Cordell Friedman, DO  10/18/24 0003

## 2024-10-31 ENCOUNTER — HOSPITAL ENCOUNTER (INPATIENT)
Facility: HOSPITAL | Age: 49
LOS: 2 days | Discharge: HOME | End: 2024-11-02
Attending: STUDENT IN AN ORGANIZED HEALTH CARE EDUCATION/TRAINING PROGRAM | Admitting: FAMILY MEDICINE
Payer: COMMERCIAL

## 2024-10-31 DIAGNOSIS — R11.10 VOMITING: Primary | ICD-10-CM

## 2024-10-31 LAB
ALBUMIN SERPL BCP-MCNC: 4.2 G/DL (ref 3.4–5)
ALP SERPL-CCNC: 64 U/L (ref 33–110)
ALT SERPL W P-5'-P-CCNC: 33 U/L (ref 7–45)
ANION GAP SERPL CALC-SCNC: 18 MMOL/L (ref 10–20)
AST SERPL W P-5'-P-CCNC: 24 U/L (ref 9–39)
BASOPHILS # BLD AUTO: 0.03 X10*3/UL (ref 0–0.1)
BASOPHILS NFR BLD AUTO: 0.4 %
BILIRUB SERPL-MCNC: 1.9 MG/DL (ref 0–1.2)
BUN SERPL-MCNC: 10 MG/DL (ref 6–23)
CALCIUM SERPL-MCNC: 9.6 MG/DL (ref 8.6–10.3)
CHLORIDE SERPL-SCNC: 91 MMOL/L (ref 98–107)
CO2 SERPL-SCNC: 28 MMOL/L (ref 21–32)
CREAT SERPL-MCNC: 1 MG/DL (ref 0.5–1.05)
EGFRCR SERPLBLD CKD-EPI 2021: 69 ML/MIN/1.73M*2
EOSINOPHIL # BLD AUTO: 0.1 X10*3/UL (ref 0–0.7)
EOSINOPHIL NFR BLD AUTO: 1.4 %
ERYTHROCYTE [DISTWIDTH] IN BLOOD BY AUTOMATED COUNT: 14.6 % (ref 11.5–14.5)
GLUCOSE SERPL-MCNC: 132 MG/DL (ref 74–99)
HCT VFR BLD AUTO: 39.1 % (ref 36–46)
HGB BLD-MCNC: 13.2 G/DL (ref 12–16)
IMM GRANULOCYTES # BLD AUTO: 0.04 X10*3/UL (ref 0–0.7)
IMM GRANULOCYTES NFR BLD AUTO: 0.6 % (ref 0–0.9)
LYMPHOCYTES # BLD AUTO: 1.39 X10*3/UL (ref 1.2–4.8)
LYMPHOCYTES NFR BLD AUTO: 19.3 %
MAGNESIUM SERPL-MCNC: 1.88 MG/DL (ref 1.6–2.4)
MCH RBC QN AUTO: 27.4 PG (ref 26–34)
MCHC RBC AUTO-ENTMCNC: 33.8 G/DL (ref 32–36)
MCV RBC AUTO: 81 FL (ref 80–100)
MONOCYTES # BLD AUTO: 1.07 X10*3/UL (ref 0.1–1)
MONOCYTES NFR BLD AUTO: 14.9 %
NEUTROPHILS # BLD AUTO: 4.56 X10*3/UL (ref 1.2–7.7)
NEUTROPHILS NFR BLD AUTO: 63.4 %
NRBC BLD-RTO: 0 /100 WBCS (ref 0–0)
PLATELET # BLD AUTO: 221 X10*3/UL (ref 150–450)
POTASSIUM SERPL-SCNC: 2.8 MMOL/L (ref 3.5–5.3)
PROT SERPL-MCNC: 7.2 G/DL (ref 6.4–8.2)
RBC # BLD AUTO: 4.82 X10*6/UL (ref 4–5.2)
SODIUM SERPL-SCNC: 134 MMOL/L (ref 136–145)
WBC # BLD AUTO: 7.2 X10*3/UL (ref 4.4–11.3)

## 2024-10-31 PROCEDURE — 2500000004 HC RX 250 GENERAL PHARMACY W/ HCPCS (ALT 636 FOR OP/ED): Performed by: STUDENT IN AN ORGANIZED HEALTH CARE EDUCATION/TRAINING PROGRAM

## 2024-10-31 PROCEDURE — 99285 EMERGENCY DEPT VISIT HI MDM: CPT | Performed by: STUDENT IN AN ORGANIZED HEALTH CARE EDUCATION/TRAINING PROGRAM

## 2024-10-31 PROCEDURE — 85025 COMPLETE CBC W/AUTO DIFF WBC: CPT

## 2024-10-31 PROCEDURE — 2500000004 HC RX 250 GENERAL PHARMACY W/ HCPCS (ALT 636 FOR OP/ED)

## 2024-10-31 PROCEDURE — 96366 THER/PROPH/DIAG IV INF ADDON: CPT

## 2024-10-31 PROCEDURE — 99285 EMERGENCY DEPT VISIT HI MDM: CPT | Mod: 25

## 2024-10-31 PROCEDURE — 96365 THER/PROPH/DIAG IV INF INIT: CPT

## 2024-10-31 PROCEDURE — 2500000001 HC RX 250 WO HCPCS SELF ADMINISTERED DRUGS (ALT 637 FOR MEDICARE OP): Performed by: STUDENT IN AN ORGANIZED HEALTH CARE EDUCATION/TRAINING PROGRAM

## 2024-10-31 PROCEDURE — 36415 COLL VENOUS BLD VENIPUNCTURE: CPT

## 2024-10-31 PROCEDURE — 96375 TX/PRO/DX INJ NEW DRUG ADDON: CPT

## 2024-10-31 PROCEDURE — 80053 COMPREHEN METABOLIC PANEL: CPT

## 2024-10-31 PROCEDURE — 1100000001 HC PRIVATE ROOM DAILY

## 2024-10-31 PROCEDURE — 83735 ASSAY OF MAGNESIUM: CPT | Performed by: STUDENT IN AN ORGANIZED HEALTH CARE EDUCATION/TRAINING PROGRAM

## 2024-10-31 RX ORDER — FLUTICASONE PROPIONATE AND SALMETEROL XINAFOATE 115; 21 UG/1; UG/1
2 AEROSOL, METERED RESPIRATORY (INHALATION)
COMMUNITY

## 2024-10-31 RX ORDER — POTASSIUM CHLORIDE 1.5 G/1.58G
40 POWDER, FOR SOLUTION ORAL ONCE
Status: COMPLETED | OUTPATIENT
Start: 2024-10-31 | End: 2024-10-31

## 2024-10-31 RX ORDER — PANTOPRAZOLE SODIUM 40 MG/1
40 TABLET, DELAYED RELEASE ORAL
COMMUNITY

## 2024-10-31 RX ORDER — VERAPAMIL HCL 240 MG
240 TABLET, EXTENDED RELEASE ORAL NIGHTLY
COMMUNITY

## 2024-10-31 RX ORDER — ALBUTEROL SULFATE 90 UG/1
2 INHALANT RESPIRATORY (INHALATION) EVERY 4 HOURS PRN
COMMUNITY

## 2024-10-31 RX ORDER — POTASSIUM CHLORIDE 20 MEQ/1
40 TABLET, EXTENDED RELEASE ORAL ONCE
Status: DISCONTINUED | OUTPATIENT
Start: 2024-10-31 | End: 2024-10-31

## 2024-10-31 RX ORDER — ESOMEPRAZOLE MAGNESIUM 40 MG/1
40 CAPSULE, DELAYED RELEASE ORAL 2 TIMES DAILY
COMMUNITY

## 2024-10-31 RX ORDER — LOSARTAN POTASSIUM 100 MG/1
100 TABLET ORAL DAILY
COMMUNITY

## 2024-10-31 RX ORDER — BUSPIRONE HYDROCHLORIDE 10 MG/1
10 TABLET ORAL 2 TIMES DAILY
COMMUNITY

## 2024-10-31 RX ORDER — SPIRONOLACTONE 25 MG/1
12.5 TABLET ORAL DAILY
COMMUNITY

## 2024-10-31 RX ORDER — ONDANSETRON HYDROCHLORIDE 2 MG/ML
4 INJECTION, SOLUTION INTRAVENOUS ONCE
Status: COMPLETED | OUTPATIENT
Start: 2024-10-31 | End: 2024-10-31

## 2024-10-31 RX ORDER — MAGNESIUM SULFATE HEPTAHYDRATE 40 MG/ML
2 INJECTION, SOLUTION INTRAVENOUS ONCE
Status: COMPLETED | OUTPATIENT
Start: 2024-10-31 | End: 2024-10-31

## 2024-10-31 RX ORDER — CLONAZEPAM 0.5 MG/1
0.5 TABLET ORAL DAILY PRN
COMMUNITY

## 2024-10-31 SDOH — SOCIAL STABILITY: SOCIAL INSECURITY: WERE YOU ABLE TO COMPLETE ALL THE BEHAVIORAL HEALTH SCREENINGS?: YES

## 2024-10-31 SDOH — ECONOMIC STABILITY: INCOME INSECURITY: IN THE PAST 12 MONTHS HAS THE ELECTRIC, GAS, OIL, OR WATER COMPANY THREATENED TO SHUT OFF SERVICES IN YOUR HOME?: NO

## 2024-10-31 SDOH — SOCIAL STABILITY: SOCIAL INSECURITY: HAS ANYONE EVER THREATENED TO HURT YOUR FAMILY OR YOUR PETS?: NO

## 2024-10-31 SDOH — ECONOMIC STABILITY: FOOD INSECURITY: WITHIN THE PAST 12 MONTHS, THE FOOD YOU BOUGHT JUST DIDN'T LAST AND YOU DIDN'T HAVE MONEY TO GET MORE.: NEVER TRUE

## 2024-10-31 SDOH — SOCIAL STABILITY: SOCIAL INSECURITY: HAVE YOU HAD THOUGHTS OF HARMING ANYONE ELSE?: NO

## 2024-10-31 SDOH — SOCIAL STABILITY: SOCIAL INSECURITY
WITHIN THE LAST YEAR, HAVE YOU BEEN KICKED, HIT, SLAPPED, OR OTHERWISE PHYSICALLY HURT BY YOUR PARTNER OR EX-PARTNER?: NO

## 2024-10-31 SDOH — SOCIAL STABILITY: SOCIAL INSECURITY
WITHIN THE LAST YEAR, HAVE YOU BEEN RAPED OR FORCED TO HAVE ANY KIND OF SEXUAL ACTIVITY BY YOUR PARTNER OR EX-PARTNER?: NO

## 2024-10-31 SDOH — SOCIAL STABILITY: SOCIAL INSECURITY: ABUSE: ADULT

## 2024-10-31 SDOH — ECONOMIC STABILITY: FOOD INSECURITY: WITHIN THE PAST 12 MONTHS, YOU WORRIED THAT YOUR FOOD WOULD RUN OUT BEFORE YOU GOT THE MONEY TO BUY MORE.: NEVER TRUE

## 2024-10-31 SDOH — SOCIAL STABILITY: SOCIAL INSECURITY: DO YOU FEEL UNSAFE GOING BACK TO THE PLACE WHERE YOU ARE LIVING?: NO

## 2024-10-31 SDOH — SOCIAL STABILITY: SOCIAL INSECURITY: HAVE YOU HAD ANY THOUGHTS OF HARMING ANYONE ELSE?: NO

## 2024-10-31 SDOH — SOCIAL STABILITY: SOCIAL INSECURITY: DOES ANYONE TRY TO KEEP YOU FROM HAVING/CONTACTING OTHER FRIENDS OR DOING THINGS OUTSIDE YOUR HOME?: NO

## 2024-10-31 SDOH — SOCIAL STABILITY: SOCIAL INSECURITY: ARE YOU OR HAVE YOU BEEN THREATENED OR ABUSED PHYSICALLY, EMOTIONALLY, OR SEXUALLY BY ANYONE?: NO

## 2024-10-31 SDOH — SOCIAL STABILITY: SOCIAL INSECURITY: ARE THERE ANY APPARENT SIGNS OF INJURIES/BEHAVIORS THAT COULD BE RELATED TO ABUSE/NEGLECT?: NO

## 2024-10-31 ASSESSMENT — LIFESTYLE VARIABLES
HOW OFTEN DO YOU HAVE 6 OR MORE DRINKS ON ONE OCCASION: NEVER
HOW OFTEN DO YOU HAVE A DRINK CONTAINING ALCOHOL: NEVER
AUDIT-C TOTAL SCORE: 0
SKIP TO QUESTIONS 9-10: 1
AUDIT-C TOTAL SCORE: 0
HOW MANY STANDARD DRINKS CONTAINING ALCOHOL DO YOU HAVE ON A TYPICAL DAY: PATIENT DOES NOT DRINK

## 2024-10-31 ASSESSMENT — COGNITIVE AND FUNCTIONAL STATUS - GENERAL
DAILY ACTIVITIY SCORE: 24
PATIENT BASELINE BEDBOUND: NO
MOBILITY SCORE: 24

## 2024-10-31 ASSESSMENT — PAIN SCALES - GENERAL
PAINLEVEL_OUTOF10: 4
PAINLEVEL_OUTOF10: 5 - MODERATE PAIN

## 2024-10-31 ASSESSMENT — ACTIVITIES OF DAILY LIVING (ADL)
JUDGMENT_ADEQUATE_SAFELY_COMPLETE_DAILY_ACTIVITIES: YES
PATIENT'S MEMORY ADEQUATE TO SAFELY COMPLETE DAILY ACTIVITIES?: YES
FEEDING YOURSELF: INDEPENDENT
GROOMING: INDEPENDENT
LACK_OF_TRANSPORTATION: NO
WALKS IN HOME: INDEPENDENT
BATHING: INDEPENDENT
TOILETING: INDEPENDENT
HEARING - LEFT EAR: FUNCTIONAL
ADEQUATE_TO_COMPLETE_ADL: YES

## 2024-10-31 ASSESSMENT — PAIN DESCRIPTION - LOCATION: LOCATION: ABDOMEN

## 2024-10-31 ASSESSMENT — PATIENT HEALTH QUESTIONNAIRE - PHQ9
SUM OF ALL RESPONSES TO PHQ9 QUESTIONS 1 & 2: 0
2. FEELING DOWN, DEPRESSED OR HOPELESS: NOT AT ALL
1. LITTLE INTEREST OR PLEASURE IN DOING THINGS: NOT AT ALL

## 2024-10-31 ASSESSMENT — PAIN - FUNCTIONAL ASSESSMENT
PAIN_FUNCTIONAL_ASSESSMENT: 0-10
PAIN_FUNCTIONAL_ASSESSMENT: 0-10

## 2024-10-31 ASSESSMENT — PAIN DESCRIPTION - PAIN TYPE: TYPE: ACUTE PAIN

## 2024-11-01 PROBLEM — E87.6 HYPOKALEMIA: Status: ACTIVE | Noted: 2024-11-01

## 2024-11-01 PROBLEM — R19.7 DIARRHEA: Status: ACTIVE | Noted: 2024-11-01

## 2024-11-01 LAB
ANION GAP SERPL CALC-SCNC: 15 MMOL/L (ref 10–20)
ANION GAP SERPL CALC-SCNC: 16 MMOL/L (ref 10–20)
ANION GAP SERPL CALC-SCNC: 18 MMOL/L (ref 10–20)
BUN SERPL-MCNC: 9 MG/DL (ref 6–23)
CALCIUM SERPL-MCNC: 9 MG/DL (ref 8.6–10.3)
CALCIUM SERPL-MCNC: 9.1 MG/DL (ref 8.6–10.3)
CALCIUM SERPL-MCNC: 9.1 MG/DL (ref 8.6–10.3)
CHLORIDE SERPL-SCNC: 92 MMOL/L (ref 98–107)
CHLORIDE SERPL-SCNC: 92 MMOL/L (ref 98–107)
CHLORIDE SERPL-SCNC: 94 MMOL/L (ref 98–107)
CO2 SERPL-SCNC: 27 MMOL/L (ref 21–32)
CO2 SERPL-SCNC: 27 MMOL/L (ref 21–32)
CO2 SERPL-SCNC: 30 MMOL/L (ref 21–32)
CREAT SERPL-MCNC: 0.83 MG/DL (ref 0.5–1.05)
CREAT SERPL-MCNC: 0.86 MG/DL (ref 0.5–1.05)
CREAT SERPL-MCNC: 0.93 MG/DL (ref 0.5–1.05)
EGFRCR SERPLBLD CKD-EPI 2021: 76 ML/MIN/1.73M*2
EGFRCR SERPLBLD CKD-EPI 2021: 83 ML/MIN/1.73M*2
EGFRCR SERPLBLD CKD-EPI 2021: 87 ML/MIN/1.73M*2
ERYTHROCYTE [DISTWIDTH] IN BLOOD BY AUTOMATED COUNT: 14.6 % (ref 11.5–14.5)
GLUCOSE SERPL-MCNC: 103 MG/DL (ref 74–99)
GLUCOSE SERPL-MCNC: 107 MG/DL (ref 74–99)
GLUCOSE SERPL-MCNC: 122 MG/DL (ref 74–99)
HCT VFR BLD AUTO: 38.5 % (ref 36–46)
HGB BLD-MCNC: 13 G/DL (ref 12–16)
MAGNESIUM SERPL-MCNC: 2.3 MG/DL (ref 1.6–2.4)
MCH RBC QN AUTO: 27.2 PG (ref 26–34)
MCHC RBC AUTO-ENTMCNC: 33.8 G/DL (ref 32–36)
MCV RBC AUTO: 81 FL (ref 80–100)
NRBC BLD-RTO: 0 /100 WBCS (ref 0–0)
PHOSPHATE SERPL-MCNC: 3 MG/DL (ref 2.5–4.9)
PLATELET # BLD AUTO: 209 X10*3/UL (ref 150–450)
POTASSIUM SERPL-SCNC: 2.4 MMOL/L (ref 3.5–5.3)
POTASSIUM SERPL-SCNC: 2.5 MMOL/L (ref 3.5–5.3)
POTASSIUM SERPL-SCNC: 2.7 MMOL/L (ref 3.5–5.3)
RBC # BLD AUTO: 4.78 X10*6/UL (ref 4–5.2)
SODIUM SERPL-SCNC: 134 MMOL/L (ref 136–145)
SODIUM SERPL-SCNC: 134 MMOL/L (ref 136–145)
SODIUM SERPL-SCNC: 135 MMOL/L (ref 136–145)
WBC # BLD AUTO: 6.1 X10*3/UL (ref 4.4–11.3)

## 2024-11-01 PROCEDURE — 1200000002 HC GENERAL ROOM WITH TELEMETRY DAILY

## 2024-11-01 PROCEDURE — 2500000004 HC RX 250 GENERAL PHARMACY W/ HCPCS (ALT 636 FOR OP/ED): Performed by: FAMILY MEDICINE

## 2024-11-01 PROCEDURE — 80048 BASIC METABOLIC PNL TOTAL CA: CPT | Performed by: NURSE PRACTITIONER

## 2024-11-01 PROCEDURE — 94640 AIRWAY INHALATION TREATMENT: CPT

## 2024-11-01 PROCEDURE — 36415 COLL VENOUS BLD VENIPUNCTURE: CPT | Performed by: NURSE PRACTITIONER

## 2024-11-01 PROCEDURE — 80048 BASIC METABOLIC PNL TOTAL CA: CPT | Performed by: PHYSICIAN ASSISTANT

## 2024-11-01 PROCEDURE — 84100 ASSAY OF PHOSPHORUS: CPT | Performed by: NURSE PRACTITIONER

## 2024-11-01 PROCEDURE — 2500000002 HC RX 250 W HCPCS SELF ADMINISTERED DRUGS (ALT 637 FOR MEDICARE OP, ALT 636 FOR OP/ED): Performed by: NURSE PRACTITIONER

## 2024-11-01 PROCEDURE — 36415 COLL VENOUS BLD VENIPUNCTURE: CPT | Performed by: PHYSICIAN ASSISTANT

## 2024-11-01 PROCEDURE — 2500000004 HC RX 250 GENERAL PHARMACY W/ HCPCS (ALT 636 FOR OP/ED): Performed by: PHYSICIAN ASSISTANT

## 2024-11-01 PROCEDURE — 85027 COMPLETE CBC AUTOMATED: CPT | Performed by: NURSE PRACTITIONER

## 2024-11-01 PROCEDURE — 99221 1ST HOSP IP/OBS SF/LOW 40: CPT | Performed by: NURSE PRACTITIONER

## 2024-11-01 PROCEDURE — 2500000002 HC RX 250 W HCPCS SELF ADMINISTERED DRUGS (ALT 637 FOR MEDICARE OP, ALT 636 FOR OP/ED)

## 2024-11-01 PROCEDURE — 99221 1ST HOSP IP/OBS SF/LOW 40: CPT | Performed by: PHYSICIAN ASSISTANT

## 2024-11-01 PROCEDURE — 2500000001 HC RX 250 WO HCPCS SELF ADMINISTERED DRUGS (ALT 637 FOR MEDICARE OP): Performed by: NURSE PRACTITIONER

## 2024-11-01 PROCEDURE — 83735 ASSAY OF MAGNESIUM: CPT | Performed by: NURSE PRACTITIONER

## 2024-11-01 PROCEDURE — 2500000004 HC RX 250 GENERAL PHARMACY W/ HCPCS (ALT 636 FOR OP/ED): Performed by: NURSE PRACTITIONER

## 2024-11-01 RX ORDER — POTASSIUM CHLORIDE 14.9 MG/ML
20 INJECTION INTRAVENOUS
Status: COMPLETED | OUTPATIENT
Start: 2024-11-01 | End: 2024-11-01

## 2024-11-01 RX ORDER — ONDANSETRON HYDROCHLORIDE 2 MG/ML
8 INJECTION, SOLUTION INTRAVENOUS EVERY 6 HOURS PRN
Status: DISCONTINUED | OUTPATIENT
Start: 2024-11-01 | End: 2024-11-02 | Stop reason: HOSPADM

## 2024-11-01 RX ORDER — LOSARTAN POTASSIUM 100 MG/1
100 TABLET ORAL DAILY
Status: DISCONTINUED | OUTPATIENT
Start: 2024-11-01 | End: 2024-11-02 | Stop reason: HOSPADM

## 2024-11-01 RX ORDER — BUDESONIDE 0.5 MG/2ML
0.5 INHALANT ORAL
Status: DISCONTINUED | OUTPATIENT
Start: 2024-11-01 | End: 2024-11-02 | Stop reason: HOSPADM

## 2024-11-01 RX ORDER — VERAPAMIL HCL 240 MG
240 TABLET, EXTENDED RELEASE ORAL NIGHTLY
Status: DISCONTINUED | OUTPATIENT
Start: 2024-11-01 | End: 2024-11-02 | Stop reason: HOSPADM

## 2024-11-01 RX ORDER — ONDANSETRON HYDROCHLORIDE 2 MG/ML
4 INJECTION, SOLUTION INTRAVENOUS EVERY 8 HOURS PRN
Status: DISCONTINUED | OUTPATIENT
Start: 2024-11-01 | End: 2024-11-01 | Stop reason: SDUPTHER

## 2024-11-01 RX ORDER — FLUTICASONE FUROATE AND VILANTEROL 200; 25 UG/1; UG/1
1 POWDER RESPIRATORY (INHALATION)
Status: DISCONTINUED | OUTPATIENT
Start: 2024-11-01 | End: 2024-11-01

## 2024-11-01 RX ORDER — POTASSIUM CHLORIDE 1.5 G/1.58G
40 POWDER, FOR SOLUTION ORAL ONCE
Status: COMPLETED | OUTPATIENT
Start: 2024-11-01 | End: 2024-11-01

## 2024-11-01 RX ORDER — ALBUTEROL SULFATE 0.83 MG/ML
1.25 SOLUTION RESPIRATORY (INHALATION) EVERY 4 HOURS PRN
Status: DISCONTINUED | OUTPATIENT
Start: 2024-11-01 | End: 2024-11-02 | Stop reason: HOSPADM

## 2024-11-01 RX ORDER — ENOXAPARIN SODIUM 100 MG/ML
40 INJECTION SUBCUTANEOUS EVERY 12 HOURS SCHEDULED
Status: DISCONTINUED | OUTPATIENT
Start: 2024-11-01 | End: 2024-11-02 | Stop reason: HOSPADM

## 2024-11-01 RX ORDER — BUSPIRONE HYDROCHLORIDE 10 MG/1
10 TABLET ORAL 2 TIMES DAILY
Status: DISCONTINUED | OUTPATIENT
Start: 2024-11-01 | End: 2024-11-02 | Stop reason: HOSPADM

## 2024-11-01 RX ORDER — CLONAZEPAM 0.5 MG/1
0.5 TABLET ORAL DAILY PRN
Status: DISCONTINUED | OUTPATIENT
Start: 2024-11-01 | End: 2024-11-02 | Stop reason: HOSPADM

## 2024-11-01 RX ORDER — ONDANSETRON 4 MG/1
4 TABLET, FILM COATED ORAL EVERY 8 HOURS PRN
Status: DISCONTINUED | OUTPATIENT
Start: 2024-11-01 | End: 2024-11-02 | Stop reason: HOSPADM

## 2024-11-01 RX ORDER — FORMOTEROL FUMARATE DIHYDRATE 20 UG/2ML
20 SOLUTION RESPIRATORY (INHALATION)
Status: DISCONTINUED | OUTPATIENT
Start: 2024-11-01 | End: 2024-11-02 | Stop reason: HOSPADM

## 2024-11-01 RX ORDER — POTASSIUM CHLORIDE 1.5 G/1.58G
40 POWDER, FOR SOLUTION ORAL ONCE
Status: DISCONTINUED | OUTPATIENT
Start: 2024-11-01 | End: 2024-11-01

## 2024-11-01 RX ORDER — SPIRONOLACTONE 25 MG/1
12.5 TABLET ORAL DAILY
Status: DISCONTINUED | OUTPATIENT
Start: 2024-11-01 | End: 2024-11-02 | Stop reason: HOSPADM

## 2024-11-01 RX ORDER — PANTOPRAZOLE SODIUM 40 MG/1
40 TABLET, DELAYED RELEASE ORAL
Status: DISCONTINUED | OUTPATIENT
Start: 2024-11-01 | End: 2024-11-02 | Stop reason: HOSPADM

## 2024-11-01 RX ORDER — VANCOMYCIN HYDROCHLORIDE 125 MG/1
125 CAPSULE ORAL EVERY 6 HOURS SCHEDULED
Status: DISCONTINUED | OUTPATIENT
Start: 2024-11-01 | End: 2024-11-02 | Stop reason: HOSPADM

## 2024-11-01 SDOH — SOCIAL STABILITY: SOCIAL INSECURITY: WITHIN THE LAST YEAR, HAVE YOU BEEN HUMILIATED OR EMOTIONALLY ABUSED IN OTHER WAYS BY YOUR PARTNER OR EX-PARTNER?: NO

## 2024-11-01 SDOH — SOCIAL STABILITY: SOCIAL INSECURITY: DO YOU FEEL ANYONE HAS EXPLOITED OR TAKEN ADVANTAGE OF YOU FINANCIALLY OR OF YOUR PERSONAL PROPERTY?: NO

## 2024-11-01 SDOH — SOCIAL STABILITY: SOCIAL INSECURITY: WITHIN THE LAST YEAR, HAVE YOU BEEN AFRAID OF YOUR PARTNER OR EX-PARTNER?: NO

## 2024-11-01 SDOH — SOCIAL STABILITY: SOCIAL INSECURITY: ARE THERE ANY APPARENT SIGNS OF INJURIES/BEHAVIORS THAT COULD BE RELATED TO ABUSE/NEGLECT?: NO

## 2024-11-01 ASSESSMENT — PAIN SCALES - GENERAL
PAINLEVEL_OUTOF10: 0 - NO PAIN
PAINLEVEL_OUTOF10: 0 - NO PAIN

## 2024-11-01 ASSESSMENT — PAIN - FUNCTIONAL ASSESSMENT
PAIN_FUNCTIONAL_ASSESSMENT: 0-10
PAIN_FUNCTIONAL_ASSESSMENT: 0-10

## 2024-11-01 ASSESSMENT — ACTIVITIES OF DAILY LIVING (ADL)
DRESSING YOURSELF: INDEPENDENT
HEARING - RIGHT EAR: FUNCTIONAL

## 2024-11-02 VITALS
DIASTOLIC BLOOD PRESSURE: 81 MMHG | WEIGHT: 250 LBS | SYSTOLIC BLOOD PRESSURE: 121 MMHG | HEIGHT: 66 IN | RESPIRATION RATE: 16 BRPM | HEART RATE: 74 BPM | TEMPERATURE: 96.8 F | OXYGEN SATURATION: 98 % | BODY MASS INDEX: 40.18 KG/M2

## 2024-11-02 LAB
ALBUMIN SERPL BCP-MCNC: 3.8 G/DL (ref 3.4–5)
ALP SERPL-CCNC: 52 U/L (ref 33–110)
ALT SERPL W P-5'-P-CCNC: 25 U/L (ref 7–45)
ANION GAP SERPL CALC-SCNC: 12 MMOL/L (ref 10–20)
ANION GAP SERPL CALC-SCNC: 16 MMOL/L (ref 10–20)
ANION GAP SERPL CALC-SCNC: 17 MMOL/L (ref 10–20)
AST SERPL W P-5'-P-CCNC: 21 U/L (ref 9–39)
BILIRUB SERPL-MCNC: 1.8 MG/DL (ref 0–1.2)
BUN SERPL-MCNC: 8 MG/DL (ref 6–23)
BUN SERPL-MCNC: 8 MG/DL (ref 6–23)
BUN SERPL-MCNC: 9 MG/DL (ref 6–23)
CALCIUM SERPL-MCNC: 8.9 MG/DL (ref 8.6–10.3)
CALCIUM SERPL-MCNC: 8.9 MG/DL (ref 8.6–10.3)
CALCIUM SERPL-MCNC: 9.2 MG/DL (ref 8.6–10.3)
CHLORIDE SERPL-SCNC: 95 MMOL/L (ref 98–107)
CHLORIDE SERPL-SCNC: 96 MMOL/L (ref 98–107)
CHLORIDE SERPL-SCNC: 98 MMOL/L (ref 98–107)
CO2 SERPL-SCNC: 25 MMOL/L (ref 21–32)
CO2 SERPL-SCNC: 26 MMOL/L (ref 21–32)
CO2 SERPL-SCNC: 28 MMOL/L (ref 21–32)
CREAT SERPL-MCNC: 0.93 MG/DL (ref 0.5–1.05)
CREAT SERPL-MCNC: 0.96 MG/DL (ref 0.5–1.05)
CREAT SERPL-MCNC: 0.96 MG/DL (ref 0.5–1.05)
EGFRCR SERPLBLD CKD-EPI 2021: 73 ML/MIN/1.73M*2
EGFRCR SERPLBLD CKD-EPI 2021: 73 ML/MIN/1.73M*2
EGFRCR SERPLBLD CKD-EPI 2021: 76 ML/MIN/1.73M*2
ERYTHROCYTE [DISTWIDTH] IN BLOOD BY AUTOMATED COUNT: 15.3 % (ref 11.5–14.5)
GLUCOSE SERPL-MCNC: 104 MG/DL (ref 74–99)
GLUCOSE SERPL-MCNC: 106 MG/DL (ref 74–99)
GLUCOSE SERPL-MCNC: 95 MG/DL (ref 74–99)
HCT VFR BLD AUTO: 35.8 % (ref 36–46)
HGB BLD-MCNC: 11.7 G/DL (ref 12–16)
MAGNESIUM SERPL-MCNC: 2.33 MG/DL (ref 1.6–2.4)
MCH RBC QN AUTO: 27.2 PG (ref 26–34)
MCHC RBC AUTO-ENTMCNC: 32.7 G/DL (ref 32–36)
MCV RBC AUTO: 83 FL (ref 80–100)
NRBC BLD-RTO: 0 /100 WBCS (ref 0–0)
PLATELET # BLD AUTO: 176 X10*3/UL (ref 150–450)
POTASSIUM SERPL-SCNC: 2.9 MMOL/L (ref 3.5–5.3)
POTASSIUM SERPL-SCNC: 3.2 MMOL/L (ref 3.5–5.3)
POTASSIUM SERPL-SCNC: 3.5 MMOL/L (ref 3.5–5.3)
PROT SERPL-MCNC: 6.4 G/DL (ref 6.4–8.2)
RBC # BLD AUTO: 4.3 X10*6/UL (ref 4–5.2)
SODIUM SERPL-SCNC: 134 MMOL/L (ref 136–145)
SODIUM SERPL-SCNC: 134 MMOL/L (ref 136–145)
SODIUM SERPL-SCNC: 135 MMOL/L (ref 136–145)
WBC # BLD AUTO: 5.1 X10*3/UL (ref 4.4–11.3)

## 2024-11-02 PROCEDURE — 80053 COMPREHEN METABOLIC PANEL: CPT | Performed by: NURSE PRACTITIONER

## 2024-11-02 PROCEDURE — 2500000002 HC RX 250 W HCPCS SELF ADMINISTERED DRUGS (ALT 637 FOR MEDICARE OP, ALT 636 FOR OP/ED): Performed by: NURSE PRACTITIONER

## 2024-11-02 PROCEDURE — 80048 BASIC METABOLIC PNL TOTAL CA: CPT | Mod: CCI | Performed by: NURSE PRACTITIONER

## 2024-11-02 PROCEDURE — 36415 COLL VENOUS BLD VENIPUNCTURE: CPT | Performed by: NURSE PRACTITIONER

## 2024-11-02 PROCEDURE — 2500000004 HC RX 250 GENERAL PHARMACY W/ HCPCS (ALT 636 FOR OP/ED): Performed by: NURSE PRACTITIONER

## 2024-11-02 PROCEDURE — 2500000002 HC RX 250 W HCPCS SELF ADMINISTERED DRUGS (ALT 637 FOR MEDICARE OP, ALT 636 FOR OP/ED)

## 2024-11-02 PROCEDURE — 2500000004 HC RX 250 GENERAL PHARMACY W/ HCPCS (ALT 636 FOR OP/ED): Performed by: PHYSICIAN ASSISTANT

## 2024-11-02 PROCEDURE — 85027 COMPLETE CBC AUTOMATED: CPT | Performed by: NURSE PRACTITIONER

## 2024-11-02 PROCEDURE — 94640 AIRWAY INHALATION TREATMENT: CPT

## 2024-11-02 PROCEDURE — 83735 ASSAY OF MAGNESIUM: CPT | Performed by: NURSE PRACTITIONER

## 2024-11-02 PROCEDURE — 2500000001 HC RX 250 WO HCPCS SELF ADMINISTERED DRUGS (ALT 637 FOR MEDICARE OP): Performed by: NURSE PRACTITIONER

## 2024-11-02 RX ORDER — POTASSIUM CHLORIDE 14.9 MG/ML
20 INJECTION INTRAVENOUS
Status: DISCONTINUED | OUTPATIENT
Start: 2024-11-02 | End: 2024-11-02

## 2024-11-02 RX ORDER — POTASSIUM CHLORIDE 20 MEQ/1
40 TABLET, EXTENDED RELEASE ORAL ONCE
Status: COMPLETED | OUTPATIENT
Start: 2024-11-02 | End: 2024-11-02

## 2024-11-02 RX ORDER — POTASSIUM CHLORIDE 20 MEQ/1
40 TABLET, EXTENDED RELEASE ORAL ONCE
Status: DISCONTINUED | OUTPATIENT
Start: 2024-11-02 | End: 2024-11-02

## 2024-11-02 RX ORDER — POTASSIUM CHLORIDE 14.9 MG/ML
20 INJECTION INTRAVENOUS
Status: DISPENSED | OUTPATIENT
Start: 2024-11-02 | End: 2024-11-02

## 2024-11-02 ASSESSMENT — COGNITIVE AND FUNCTIONAL STATUS - GENERAL
DAILY ACTIVITIY SCORE: 24
MOBILITY SCORE: 24

## 2024-11-02 ASSESSMENT — PAIN SCALES - GENERAL: PAINLEVEL_OUTOF10: 0 - NO PAIN

## 2024-11-06 ENCOUNTER — APPOINTMENT (OUTPATIENT)
Dept: RADIOLOGY | Facility: HOSPITAL | Age: 49
End: 2024-11-06
Payer: COMMERCIAL

## 2024-11-06 ENCOUNTER — HOSPITAL ENCOUNTER (OUTPATIENT)
Dept: RADIOLOGY | Facility: HOSPITAL | Age: 49
Discharge: HOME | End: 2024-11-06
Payer: COMMERCIAL

## 2024-11-06 DIAGNOSIS — R11.2 NAUSEA WITH VOMITING, UNSPECIFIED: ICD-10-CM

## 2024-11-06 PROCEDURE — 3430000001 HC RX 343 DIAGNOSTIC RADIOPHARMACEUTICALS: Performed by: INTERNAL MEDICINE

## 2024-11-06 PROCEDURE — 78264 GASTRIC EMPTYING IMG STUDY: CPT

## 2024-11-06 PROCEDURE — 78264 GASTRIC EMPTYING IMG STUDY: CPT | Performed by: STUDENT IN AN ORGANIZED HEALTH CARE EDUCATION/TRAINING PROGRAM

## 2024-11-16 ENCOUNTER — HOSPITAL ENCOUNTER (INPATIENT)
Facility: HOSPITAL | Age: 49
End: 2024-11-16
Attending: STUDENT IN AN ORGANIZED HEALTH CARE EDUCATION/TRAINING PROGRAM | Admitting: FAMILY MEDICINE
Payer: COMMERCIAL

## 2024-11-16 ENCOUNTER — APPOINTMENT (OUTPATIENT)
Dept: RADIOLOGY | Facility: HOSPITAL | Age: 49
End: 2024-11-16
Payer: COMMERCIAL

## 2024-11-16 ENCOUNTER — APPOINTMENT (OUTPATIENT)
Dept: CARDIOLOGY | Facility: HOSPITAL | Age: 49
End: 2024-11-16
Payer: COMMERCIAL

## 2024-11-16 DIAGNOSIS — I10 PRIMARY HYPERTENSION: ICD-10-CM

## 2024-11-16 DIAGNOSIS — L03.115 CELLULITIS AND ABSCESS OF RIGHT LOWER EXTREMITY: ICD-10-CM

## 2024-11-16 DIAGNOSIS — L02.415 CELLULITIS AND ABSCESS OF RIGHT LOWER EXTREMITY: ICD-10-CM

## 2024-11-16 DIAGNOSIS — L03.115 CELLULITIS OF RIGHT LEG: ICD-10-CM

## 2024-11-16 DIAGNOSIS — R65.20 SEPSIS WITH ENCEPHALOPATHY WITHOUT SEPTIC SHOCK, DUE TO UNSPECIFIED ORGANISM (MULTI): Primary | ICD-10-CM

## 2024-11-16 DIAGNOSIS — F51.02 ADJUSTMENT INSOMNIA: ICD-10-CM

## 2024-11-16 DIAGNOSIS — R41.89 COGNITIVE CHANGES: ICD-10-CM

## 2024-11-16 DIAGNOSIS — K21.9 GERD WITHOUT ESOPHAGITIS: ICD-10-CM

## 2024-11-16 DIAGNOSIS — G93.41 SEPSIS WITH ENCEPHALOPATHY WITHOUT SEPTIC SHOCK, DUE TO UNSPECIFIED ORGANISM (MULTI): Primary | ICD-10-CM

## 2024-11-16 DIAGNOSIS — L30.9 DERMATITIS: ICD-10-CM

## 2024-11-16 DIAGNOSIS — R45.1 RESTLESSNESS AND AGITATION: ICD-10-CM

## 2024-11-16 DIAGNOSIS — N30.01 ACUTE CYSTITIS WITH HEMATURIA: ICD-10-CM

## 2024-11-16 DIAGNOSIS — A41.9 SEPSIS WITH ENCEPHALOPATHY WITHOUT SEPTIC SHOCK, DUE TO UNSPECIFIED ORGANISM (MULTI): Primary | ICD-10-CM

## 2024-11-16 DIAGNOSIS — B37.2 YEAST DERMATITIS: ICD-10-CM

## 2024-11-16 PROBLEM — N39.0 UTI (URINARY TRACT INFECTION): Status: ACTIVE | Noted: 2024-11-16

## 2024-11-16 PROBLEM — K52.9 COLITIS: Status: ACTIVE | Noted: 2024-11-16

## 2024-11-16 PROBLEM — R94.31 PROLONGED Q-T INTERVAL ON ECG: Status: ACTIVE | Noted: 2024-11-16

## 2024-11-16 PROBLEM — R00.0 SINUS TACHYCARDIA: Status: ACTIVE | Noted: 2024-11-16

## 2024-11-16 LAB
ALBUMIN SERPL BCP-MCNC: 4.3 G/DL (ref 3.4–5)
ALP SERPL-CCNC: 68 U/L (ref 33–110)
ALT SERPL W P-5'-P-CCNC: 42 U/L (ref 7–45)
AMPHETAMINES UR QL SCN: NORMAL
ANION GAP BLDV CALCULATED.4IONS-SCNC: 12 MMOL/L (ref 10–25)
ANION GAP SERPL CALC-SCNC: 19 MMOL/L (ref 10–20)
APAP SERPL-MCNC: <10 UG/ML
APPEARANCE UR: ABNORMAL
AST SERPL W P-5'-P-CCNC: 27 U/L (ref 9–39)
BACTERIA #/AREA URNS AUTO: ABNORMAL /HPF
BARBITURATES UR QL SCN: NORMAL
BASE EXCESS BLDV CALC-SCNC: -2.2 MMOL/L (ref -2–3)
BASOPHILS # BLD AUTO: 0.05 X10*3/UL (ref 0–0.1)
BASOPHILS NFR BLD AUTO: 0.5 %
BENZODIAZ UR QL SCN: NORMAL
BILIRUB SERPL-MCNC: 1.6 MG/DL (ref 0–1.2)
BILIRUB UR STRIP.AUTO-MCNC: NEGATIVE MG/DL
BODY TEMPERATURE: ABNORMAL
BUN SERPL-MCNC: 22 MG/DL (ref 6–23)
BZE UR QL SCN: NORMAL
CA-I BLDV-SCNC: 1.18 MMOL/L (ref 1.1–1.33)
CALCIUM SERPL-MCNC: 9.7 MG/DL (ref 8.6–10.3)
CANNABINOIDS UR QL SCN: NORMAL
CHLORIDE BLDV-SCNC: 107 MMOL/L (ref 98–107)
CHLORIDE SERPL-SCNC: 106 MMOL/L (ref 98–107)
CO2 SERPL-SCNC: 21 MMOL/L (ref 21–32)
COLOR UR: YELLOW
CREAT SERPL-MCNC: 0.95 MG/DL (ref 0.5–1.05)
EGFRCR SERPLBLD CKD-EPI 2021: 74 ML/MIN/1.73M*2
EOSINOPHIL # BLD AUTO: 0.14 X10*3/UL (ref 0–0.7)
EOSINOPHIL NFR BLD AUTO: 1.4 %
ERYTHROCYTE [DISTWIDTH] IN BLOOD BY AUTOMATED COUNT: 16 % (ref 11.5–14.5)
ETHANOL SERPL-MCNC: <10 MG/DL
FENTANYL+NORFENTANYL UR QL SCN: NORMAL
GLUCOSE BLDV-MCNC: 146 MG/DL (ref 74–99)
GLUCOSE SERPL-MCNC: 165 MG/DL (ref 74–99)
GLUCOSE UR STRIP.AUTO-MCNC: NORMAL MG/DL
HCO3 BLDV-SCNC: 21.1 MMOL/L (ref 22–26)
HCT VFR BLD AUTO: 41.5 % (ref 36–46)
HCT VFR BLD EST: 39 % (ref 36–46)
HGB BLD-MCNC: 13.3 G/DL (ref 12–16)
HGB BLDV-MCNC: 13.1 G/DL (ref 12–16)
HOLD SPECIMEN: NORMAL
HOLD SPECIMEN: NORMAL
IMM GRANULOCYTES # BLD AUTO: 0.07 X10*3/UL (ref 0–0.7)
IMM GRANULOCYTES NFR BLD AUTO: 0.7 % (ref 0–0.9)
INHALED O2 CONCENTRATION: 21 %
KETONES UR STRIP.AUTO-MCNC: ABNORMAL MG/DL
LACTATE BLDV-SCNC: 3.3 MMOL/L (ref 0.4–2)
LACTATE SERPL-SCNC: 1.8 MMOL/L (ref 0.4–2)
LEUKOCYTE ESTERASE UR QL STRIP.AUTO: ABNORMAL
LYMPHOCYTES # BLD AUTO: 1.74 X10*3/UL (ref 1.2–4.8)
LYMPHOCYTES NFR BLD AUTO: 17.5 %
MCH RBC QN AUTO: 27 PG (ref 26–34)
MCHC RBC AUTO-ENTMCNC: 32 G/DL (ref 32–36)
MCV RBC AUTO: 84 FL (ref 80–100)
METHADONE UR QL SCN: NORMAL
MONOCYTES # BLD AUTO: 0.92 X10*3/UL (ref 0.1–1)
MONOCYTES NFR BLD AUTO: 9.2 %
MUCOUS THREADS #/AREA URNS AUTO: ABNORMAL /LPF
NEUTROPHILS # BLD AUTO: 7.04 X10*3/UL (ref 1.2–7.7)
NEUTROPHILS NFR BLD AUTO: 70.7 %
NITRITE UR QL STRIP.AUTO: ABNORMAL
NRBC BLD-RTO: 0 /100 WBCS (ref 0–0)
OPIATES UR QL SCN: NORMAL
OXYCODONE+OXYMORPHONE UR QL SCN: NORMAL
OXYHGB MFR BLDV: 81.7 % (ref 45–75)
PCO2 BLDV: 31 MM HG (ref 41–51)
PCP UR QL SCN: NORMAL
PH BLDV: 7.44 PH (ref 7.33–7.43)
PH UR STRIP.AUTO: 5.5 [PH]
PLATELET # BLD AUTO: 262 X10*3/UL (ref 150–450)
PO2 BLDV: 53 MM HG (ref 35–45)
POTASSIUM BLDV-SCNC: 4.3 MMOL/L (ref 3.5–5.3)
POTASSIUM SERPL-SCNC: 4.1 MMOL/L (ref 3.5–5.3)
PROT SERPL-MCNC: 7.6 G/DL (ref 6.4–8.2)
PROT UR STRIP.AUTO-MCNC: ABNORMAL MG/DL
RBC # BLD AUTO: 4.93 X10*6/UL (ref 4–5.2)
RBC # UR STRIP.AUTO: NEGATIVE /UL
RBC #/AREA URNS AUTO: ABNORMAL /HPF
SALICYLATES SERPL-MCNC: <3 MG/DL
SAO2 % BLDV: 84 % (ref 45–75)
SODIUM BLDV-SCNC: 136 MMOL/L (ref 136–145)
SODIUM SERPL-SCNC: 142 MMOL/L (ref 136–145)
SP GR UR STRIP.AUTO: 1.03
SQUAMOUS #/AREA URNS AUTO: ABNORMAL /HPF
UROBILINOGEN UR STRIP.AUTO-MCNC: NORMAL MG/DL
WBC # BLD AUTO: 10 X10*3/UL (ref 4.4–11.3)
WBC #/AREA URNS AUTO: ABNORMAL /HPF

## 2024-11-16 PROCEDURE — 80143 DRUG ASSAY ACETAMINOPHEN: CPT | Performed by: EMERGENCY MEDICINE

## 2024-11-16 PROCEDURE — 2500000001 HC RX 250 WO HCPCS SELF ADMINISTERED DRUGS (ALT 637 FOR MEDICARE OP): Performed by: EMERGENCY MEDICINE

## 2024-11-16 PROCEDURE — 87040 BLOOD CULTURE FOR BACTERIA: CPT | Mod: STJLAB | Performed by: STUDENT IN AN ORGANIZED HEALTH CARE EDUCATION/TRAINING PROGRAM

## 2024-11-16 PROCEDURE — 87086 URINE CULTURE/COLONY COUNT: CPT | Mod: STJLAB | Performed by: STUDENT IN AN ORGANIZED HEALTH CARE EDUCATION/TRAINING PROGRAM

## 2024-11-16 PROCEDURE — P9612 CATHETERIZE FOR URINE SPEC: HCPCS

## 2024-11-16 PROCEDURE — 80053 COMPREHEN METABOLIC PANEL: CPT | Performed by: STUDENT IN AN ORGANIZED HEALTH CARE EDUCATION/TRAINING PROGRAM

## 2024-11-16 PROCEDURE — 96367 TX/PROPH/DG ADDL SEQ IV INF: CPT

## 2024-11-16 PROCEDURE — 96361 HYDRATE IV INFUSION ADD-ON: CPT

## 2024-11-16 PROCEDURE — 84132 ASSAY OF SERUM POTASSIUM: CPT | Performed by: EMERGENCY MEDICINE

## 2024-11-16 PROCEDURE — 70450 CT HEAD/BRAIN W/O DYE: CPT | Performed by: RADIOLOGY

## 2024-11-16 PROCEDURE — 36415 COLL VENOUS BLD VENIPUNCTURE: CPT | Performed by: STUDENT IN AN ORGANIZED HEALTH CARE EDUCATION/TRAINING PROGRAM

## 2024-11-16 PROCEDURE — 83605 ASSAY OF LACTIC ACID: CPT | Performed by: STUDENT IN AN ORGANIZED HEALTH CARE EDUCATION/TRAINING PROGRAM

## 2024-11-16 PROCEDURE — 70450 CT HEAD/BRAIN W/O DYE: CPT

## 2024-11-16 PROCEDURE — 81003 URINALYSIS AUTO W/O SCOPE: CPT | Performed by: STUDENT IN AN ORGANIZED HEALTH CARE EDUCATION/TRAINING PROGRAM

## 2024-11-16 PROCEDURE — 81001 URINALYSIS AUTO W/SCOPE: CPT | Performed by: STUDENT IN AN ORGANIZED HEALTH CARE EDUCATION/TRAINING PROGRAM

## 2024-11-16 PROCEDURE — 80307 DRUG TEST PRSMV CHEM ANLYZR: CPT | Performed by: EMERGENCY MEDICINE

## 2024-11-16 PROCEDURE — 85025 COMPLETE CBC W/AUTO DIFF WBC: CPT | Performed by: STUDENT IN AN ORGANIZED HEALTH CARE EDUCATION/TRAINING PROGRAM

## 2024-11-16 PROCEDURE — 2500000004 HC RX 250 GENERAL PHARMACY W/ HCPCS (ALT 636 FOR OP/ED): Performed by: EMERGENCY MEDICINE

## 2024-11-16 PROCEDURE — 96365 THER/PROPH/DIAG IV INF INIT: CPT

## 2024-11-16 PROCEDURE — 99285 EMERGENCY DEPT VISIT HI MDM: CPT | Mod: 25

## 2024-11-16 PROCEDURE — 74177 CT ABD & PELVIS W/CONTRAST: CPT

## 2024-11-16 PROCEDURE — 96375 TX/PRO/DX INJ NEW DRUG ADDON: CPT

## 2024-11-16 PROCEDURE — 96366 THER/PROPH/DIAG IV INF ADDON: CPT

## 2024-11-16 PROCEDURE — 93005 ELECTROCARDIOGRAM TRACING: CPT

## 2024-11-16 PROCEDURE — 99285 EMERGENCY DEPT VISIT HI MDM: CPT | Performed by: STUDENT IN AN ORGANIZED HEALTH CARE EDUCATION/TRAINING PROGRAM

## 2024-11-16 PROCEDURE — 2550000001 HC RX 255 CONTRASTS: Performed by: STUDENT IN AN ORGANIZED HEALTH CARE EDUCATION/TRAINING PROGRAM

## 2024-11-16 PROCEDURE — 99223 1ST HOSP IP/OBS HIGH 75: CPT | Performed by: NURSE PRACTITIONER

## 2024-11-16 PROCEDURE — 2060000001 HC INTERMEDIATE ICU ROOM DAILY

## 2024-11-16 PROCEDURE — 74177 CT ABD & PELVIS W/CONTRAST: CPT | Performed by: RADIOLOGY

## 2024-11-16 PROCEDURE — 2500000004 HC RX 250 GENERAL PHARMACY W/ HCPCS (ALT 636 FOR OP/ED): Performed by: STUDENT IN AN ORGANIZED HEALTH CARE EDUCATION/TRAINING PROGRAM

## 2024-11-16 RX ORDER — CLONAZEPAM 0.5 MG/1
0.5 TABLET ORAL DAILY PRN
Status: ACTIVE | OUTPATIENT
Start: 2024-11-16

## 2024-11-16 RX ORDER — ACETAMINOPHEN 325 MG/1
650 TABLET ORAL EVERY 6 HOURS PRN
Status: CANCELLED | OUTPATIENT
Start: 2024-11-16

## 2024-11-16 RX ORDER — PANTOPRAZOLE SODIUM 40 MG/1
40 TABLET, DELAYED RELEASE ORAL
Status: DISPENSED | OUTPATIENT
Start: 2024-11-17

## 2024-11-16 RX ORDER — VANCOMYCIN HYDROCHLORIDE 1 G/20ML
INJECTION, POWDER, LYOPHILIZED, FOR SOLUTION INTRAVENOUS DAILY PRN
Status: DISCONTINUED | OUTPATIENT
Start: 2024-11-16 | End: 2024-11-18

## 2024-11-16 RX ORDER — FLUTICASONE FUROATE AND VILANTEROL 200; 25 UG/1; UG/1
1 POWDER RESPIRATORY (INHALATION)
Status: DISCONTINUED | OUTPATIENT
Start: 2024-11-17 | End: 2024-11-16

## 2024-11-16 RX ORDER — METRONIDAZOLE 500 MG/100ML
500 INJECTION, SOLUTION INTRAVENOUS ONCE
Status: COMPLETED | OUTPATIENT
Start: 2024-11-16 | End: 2024-11-16

## 2024-11-16 RX ORDER — LOSARTAN POTASSIUM 100 MG/1
100 TABLET ORAL DAILY
Status: DISCONTINUED | OUTPATIENT
Start: 2024-11-17 | End: 2024-11-20

## 2024-11-16 RX ORDER — SPIRONOLACTONE 25 MG/1
12.5 TABLET ORAL DAILY
Status: DISPENSED | OUTPATIENT
Start: 2024-11-17

## 2024-11-16 RX ORDER — BUSPIRONE HYDROCHLORIDE 10 MG/1
10 TABLET ORAL 2 TIMES DAILY
Status: DISCONTINUED | OUTPATIENT
Start: 2024-11-17 | End: 2024-11-29

## 2024-11-16 RX ORDER — ACETAMINOPHEN 650 MG/1
650 SUPPOSITORY RECTAL EVERY 6 HOURS PRN
Status: CANCELLED | OUTPATIENT
Start: 2024-11-16

## 2024-11-16 RX ORDER — VERAPAMIL HCL 240 MG
240 TABLET, EXTENDED RELEASE ORAL NIGHTLY
Status: DISCONTINUED | OUTPATIENT
Start: 2024-11-16 | End: 2024-11-20

## 2024-11-16 RX ORDER — VANCOMYCIN HYDROCHLORIDE 1.25 G/250ML
1250 INJECTION, SOLUTION INTRAVITREAL EVERY 12 HOURS
Status: DISCONTINUED | OUTPATIENT
Start: 2024-11-17 | End: 2024-11-18

## 2024-11-16 RX ORDER — SODIUM CHLORIDE 9 MG/ML
100 INJECTION, SOLUTION INTRAVENOUS CONTINUOUS
Status: DISCONTINUED | OUTPATIENT
Start: 2024-11-16 | End: 2024-11-17

## 2024-11-16 RX ORDER — VANCOMYCIN 2 GRAM/500 ML IN 0.9 % SODIUM CHLORIDE INTRAVENOUS
2 ONCE
Status: COMPLETED | OUTPATIENT
Start: 2024-11-16 | End: 2024-11-16

## 2024-11-16 RX ORDER — VANCOMYCIN HYDROCHLORIDE 125 MG/1
125 CAPSULE ORAL 4 TIMES DAILY
Status: ON HOLD | COMMUNITY

## 2024-11-16 RX ORDER — BUDESONIDE 0.5 MG/2ML
0.5 INHALANT ORAL
Status: DISPENSED | OUTPATIENT
Start: 2024-11-16

## 2024-11-16 RX ORDER — ACETAMINOPHEN 160 MG/5ML
650 SOLUTION ORAL EVERY 6 HOURS PRN
Status: CANCELLED | OUTPATIENT
Start: 2024-11-16

## 2024-11-16 RX ORDER — LOSARTAN POTASSIUM 50 MG/1
100 TABLET ORAL ONCE
Status: COMPLETED | OUTPATIENT
Start: 2024-11-16 | End: 2024-11-16

## 2024-11-16 RX ORDER — POLYETHYLENE GLYCOL 3350 17 G/17G
17 POWDER, FOR SOLUTION ORAL DAILY PRN
Status: CANCELLED | OUTPATIENT
Start: 2024-11-16

## 2024-11-16 RX ORDER — KETOROLAC TROMETHAMINE 15 MG/ML
15 INJECTION, SOLUTION INTRAMUSCULAR; INTRAVENOUS ONCE
Status: DISCONTINUED | OUTPATIENT
Start: 2024-11-16 | End: 2024-11-16

## 2024-11-16 RX ORDER — FORMOTEROL FUMARATE DIHYDRATE 20 UG/2ML
20 SOLUTION RESPIRATORY (INHALATION)
Status: DISPENSED | OUTPATIENT
Start: 2024-11-16

## 2024-11-16 RX ORDER — LABETALOL HYDROCHLORIDE 5 MG/ML
20 INJECTION, SOLUTION INTRAVENOUS ONCE
Status: COMPLETED | OUTPATIENT
Start: 2024-11-16 | End: 2024-11-16

## 2024-11-16 RX ORDER — HEPARIN SODIUM 5000 [USP'U]/ML
7500 INJECTION, SOLUTION INTRAVENOUS; SUBCUTANEOUS EVERY 8 HOURS SCHEDULED
Status: DISCONTINUED | OUTPATIENT
Start: 2024-11-16 | End: 2024-11-17

## 2024-11-16 RX ORDER — ALBUTEROL SULFATE 0.83 MG/ML
2.5 SOLUTION RESPIRATORY (INHALATION) EVERY 4 HOURS PRN
Status: DISPENSED | OUTPATIENT
Start: 2024-11-16

## 2024-11-16 RX ORDER — METRONIDAZOLE 500 MG/100ML
500 INJECTION, SOLUTION INTRAVENOUS EVERY 8 HOURS
Status: DISCONTINUED | OUTPATIENT
Start: 2024-11-17 | End: 2024-11-17

## 2024-11-16 RX ORDER — VANCOMYCIN HYDROCHLORIDE 125 MG/1
125 CAPSULE ORAL 4 TIMES DAILY
Status: DISCONTINUED | OUTPATIENT
Start: 2024-11-16 | End: 2024-11-18

## 2024-11-16 RX ORDER — TALC
3 POWDER (GRAM) TOPICAL NIGHTLY PRN
Status: DISPENSED | OUTPATIENT
Start: 2024-11-16

## 2024-11-16 RX ADMIN — LOSARTAN POTASSIUM 100 MG: 50 TABLET, FILM COATED ORAL at 22:05

## 2024-11-16 RX ADMIN — LABETALOL HYDROCHLORIDE 20 MG: 5 INJECTION, SOLUTION INTRAVENOUS at 21:08

## 2024-11-16 RX ADMIN — SODIUM CHLORIDE, POTASSIUM CHLORIDE, SODIUM LACTATE AND CALCIUM CHLORIDE 2000 ML: 600; 310; 30; 20 INJECTION, SOLUTION INTRAVENOUS at 18:08

## 2024-11-16 RX ADMIN — METRONIDAZOLE 500 MG: 500 INJECTION, SOLUTION INTRAVENOUS at 18:52

## 2024-11-16 RX ADMIN — Medication 2 G: at 20:09

## 2024-11-16 RX ADMIN — CEFEPIME 2 G: 2 INJECTION, POWDER, FOR SOLUTION INTRAVENOUS at 18:13

## 2024-11-16 RX ADMIN — IOHEXOL 75 ML: 350 INJECTION, SOLUTION INTRAVENOUS at 19:40

## 2024-11-16 ASSESSMENT — LIFESTYLE VARIABLES
TOTAL SCORE: 0
HAVE YOU EVER FELT YOU SHOULD CUT DOWN ON YOUR DRINKING: NO
EVER FELT BAD OR GUILTY ABOUT YOUR DRINKING: NO
EVER HAD A DRINK FIRST THING IN THE MORNING TO STEADY YOUR NERVES TO GET RID OF A HANGOVER: NO
HAVE PEOPLE ANNOYED YOU BY CRITICIZING YOUR DRINKING: NO

## 2024-11-16 ASSESSMENT — PAIN SCALES - GENERAL
PAINLEVEL_OUTOF10: 0 - NO PAIN

## 2024-11-16 ASSESSMENT — PAIN - FUNCTIONAL ASSESSMENT
PAIN_FUNCTIONAL_ASSESSMENT: 0-10

## 2024-11-16 NOTE — ED PROVIDER NOTES
EMERGENCY DEPARTMENT ENCOUNTER      Pt Name: Samuel Mims  MRN: [id number]  Birthdate 1975  Date of evaluation: 11/16/2024  Provider: Timothy Cooepr DO    CHIEF COMPLAINT       Chief Complaint   Patient presents with    Altered Mental Status     LKW last night, state pt was diagnosed with c-diff 3 weeks ago, has been lethargic and not taking her meds     HISTORY OF PRESENT ILLNESS    Samuel Mims is a 48 y.o. year old female who presents to the ER for altered mental status.  She does endorse vomiting, however history significantly limited due to patient's altered status.    Additional History per spouse: the patient may have a retained IUD, inserted 17 years ago.  Was diagnosed with C-Diff 10/19, finished meds this past week. Over the last two days has been increasingly sleepy, confused to day and time. Has been intermittently vomiting, last vomited Monday. Has had poor PO intake for the last two months. Has not had much to drink over the last few days.     PMH HTN, asthma, gerd  PSH none  No alcohol tobacco or drug use     PAST MEDICAL HISTORY     Past Medical History:   Diagnosis Date    Asthma     GERD (gastroesophageal reflux disease)     Hypertension      CURRENT MEDICATIONS       Previous Medications    ALBUTEROL 90 MCG/ACTUATION INHALER    Inhale 2 puffs every 4 hours if needed for wheezing.    BUSPIRONE (BUSPAR) 10 MG TABLET    Take 1 tablet (10 mg) by mouth 2 times a day.    CLONAZEPAM (KLONOPIN) 0.5 MG TABLET    Take 1 tablet (0.5 mg) by mouth once daily as needed for anxiety.    ESOMEPRAZOLE (NEXIUM) 40 MG DR CAPSULE    Take 1 capsule (40 mg) by mouth 2 times a day. Do not open capsule.    FLUTICASONE PROPION-SALMETEROL (ADVAIR) 115-21 MCG/ACTUATION INHALER    Inhale 2 puffs 2 times a day. Rinse mouth with water after use to reduce aftertaste and incidence of candidiasis. Do not swallow.    LOSARTAN (COZAAR) 100 MG TABLET    Take 1 tablet (100 mg) by mouth once daily.    METOCLOPRAMIDE  (REGLAN) 10 MG TABLET    Take 1 tablet (10 mg) by mouth 4 times a day as needed (nausea/vomiting) for up to 20 doses. As needed for nausea/vomiting    ONDANSETRON ODT (ZOFRAN-ODT) 4 MG DISINTEGRATING TABLET    Take 1 tablet (4 mg) by mouth every 8 hours if needed for nausea or vomiting.    PANTOPRAZOLE (PROTONIX) 40 MG EC TABLET    Take 1 tablet (40 mg) by mouth once daily in the morning. Take before meals. Do not crush, chew, or split.    SPIRONOLACTONE (ALDACTONE) 25 MG TABLET    Take 0.5 tablets (12.5 mg) by mouth once daily.    VANCOMYCIN (VANCOCIN) 125 MG CAPSULE    Take 1 capsule (125 mg) by mouth 4 times a day. 11-5-24 x 14 days    VERAPAMIL SR (CALAN-SR) 240 MG ER TABLET    Take 1 tablet (240 mg) by mouth once daily at bedtime. Do not crush or chew.     SURGICAL HISTORY       Past Surgical History:   Procedure Laterality Date    CHOLECYSTECTOMY       ALLERGIES     Tiotropium bromide, Ace inhibitors, Escitalopram, Hydrochlorothiazide, and House dust mite  FAMILY HISTORY       Family History   Problem Relation Name Age of Onset    Breast cancer Mother  63    Breast cancer Sister  63     SOCIAL HISTORY       Social History     Tobacco Use    Smoking status: Former     Current packs/day: 1.00     Average packs/day: 1 pack/day for 4.9 years (4.9 ttl pk-yrs)     Types: Cigarettes     Start date: 2020    Smokeless tobacco: Never   Vaping Use    Vaping status: Never Used   Substance Use Topics    Alcohol use: Never    Drug use: Never     PHYSICAL EXAM  (up to 7 for level 4, 8 or more for level 5)     ED Triage Vitals [11/16/24 1755]   Temperature Heart Rate Respirations BP   36.9 °C (98.4 °F) (!) 144 (!) 22 (!) 167/109      Pulse Ox Temp Source Heart Rate Source Patient Position   98 % Temporal Monitor Lying      BP Location FiO2 (%)     Right arm --       Physical Exam  Vitals and nursing note reviewed.   Constitutional:       General: She is not in acute distress.     Appearance: Normal appearance. She is obese.  She is ill-appearing.   HENT:      Head: Normocephalic and atraumatic. No raccoon eyes, Johnson's sign, contusion or laceration.      Jaw: No trismus or malocclusion.      Right Ear: External ear normal.      Left Ear: External ear normal.      Mouth/Throat:      Mouth: Mucous membranes are dry.      Pharynx: Oropharynx is clear.      Comments: No sublingual jaundice  Eyes:      Extraocular Movements: Extraocular movements intact.      Pupils: Pupils are equal, round, and reactive to light.   Neck:      Trachea: No tracheal deviation.   Cardiovascular:      Rate and Rhythm: Regular rhythm. Tachycardia present.      Pulses: Normal pulses.   Pulmonary:      Effort: No respiratory distress.      Breath sounds: No wheezing, rhonchi or rales.   Chest:      Chest wall: No tenderness.   Abdominal:      General: Abdomen is flat.      Palpations: Abdomen is soft. There is no mass.      Tenderness: There is abdominal tenderness (Generalized).   Musculoskeletal:         General: No tenderness or signs of injury.      Right lower leg: No edema.      Left lower leg: No edema.   Skin:     Coloration: Skin is not jaundiced or pale.      Findings: No petechiae, rash or wound.   Neurological:      Mental Status: She is lethargic.      GCS: GCS eye subscore is 4. GCS verbal subscore is 4. GCS motor subscore is 6.   Psychiatric:         Speech: Speech normal.         Thought Content: Thought content does not include homicidal or suicidal ideation.        DIAGNOSTIC RESULTS   LABS:  Labs Reviewed   CBC WITH AUTO DIFFERENTIAL - Abnormal       Result Value    WBC 10.0      nRBC 0.0      RBC 4.93      Hemoglobin 13.3      Hematocrit 41.5      MCV 84      MCH 27.0      MCHC 32.0      RDW 16.0 (*)     Platelets 262      Neutrophils % 70.7      Immature Granulocytes %, Automated 0.7      Lymphocytes % 17.5      Monocytes % 9.2      Eosinophils % 1.4      Basophils % 0.5      Neutrophils Absolute 7.04      Immature Granulocytes Absolute,  Automated 0.07      Lymphocytes Absolute 1.74      Monocytes Absolute 0.92      Eosinophils Absolute 0.14      Basophils Absolute 0.05     COMPREHENSIVE METABOLIC PANEL - Abnormal    Glucose 165 (*)     Sodium 142      Potassium 4.1      Chloride 106      Bicarbonate 21      Anion Gap 19      Urea Nitrogen 22      Creatinine 0.95      eGFR 74      Calcium 9.7      Albumin 4.3      Alkaline Phosphatase 68      Total Protein 7.6      AST 27      Bilirubin, Total 1.6 (*)     ALT 42     URINALYSIS WITH REFLEX CULTURE AND MICROSCOPIC - Abnormal    Color, Urine Yellow      Appearance, Urine Turbid (*)     Specific Gravity, Urine 1.026      pH, Urine 5.5      Protein, Urine 50 (1+) (*)     Glucose, Urine Normal      Blood, Urine NEGATIVE      Ketones, Urine 20 (1+) (*)     Bilirubin, Urine NEGATIVE      Urobilinogen, Urine Normal      Nitrite, Urine 2+ (*)     Leukocyte Esterase, Urine 250 Tika/µL (*)    BLOOD GAS VENOUS FULL PANEL - Abnormal    POCT pH, Venous 7.44 (*)     POCT pCO2, Venous 31 (*)     POCT pO2, Venous 53 (*)     POCT SO2, Venous 84 (*)     POCT Oxy Hemoglobin, Venous 81.7 (*)     POCT Hematocrit Calculated, Venous 39.0      POCT Sodium, Venous 136      POCT Potassium, Venous 4.3      POCT Chloride, Venous 107      POCT Ionized Calicum, Venous 1.18      POCT Glucose, Venous 146 (*)     POCT Lactate, Venous 3.3 (*)     POCT Base Excess, Venous -2.2 (*)     POCT HCO3 Calculated, Venous 21.1 (*)     POCT Hemoglobin, Venous 13.1      POCT Anion Gap, Venous 12.0      Patient Temperature        FiO2 21     MICROSCOPIC ONLY, URINE - Abnormal    WBC, Urine 21-50 (*)     RBC, Urine 11-20 (*)     Squamous Epithelial Cells, Urine 1-9 (SPARSE)      Bacteria, Urine 4+ (*)     Mucus, Urine 4+     LACTATE - Normal    Lactate 1.8      Narrative:     Venipuncture immediately after or during the administration of Metamizole may lead to falsely low results. Testing should be performed immediately prior to Metamizole dosing.    DRUG SCREEN,URINE - Normal    Amphetamine Screen, Urine Presumptive Negative      Barbiturate Screen, Urine Presumptive Negative      Benzodiazepines Screen, Urine Presumptive Negative      Cannabinoid Screen, Urine Presumptive Negative      Cocaine Metabolite Screen, Urine Presumptive Negative      Fentanyl Screen, Urine Presumptive Negative      Opiate Screen, Urine Presumptive Negative      Oxycodone Screen, Urine Presumptive Negative      PCP Screen, Urine Presumptive Negative      Methadone Screen, Urine Presumptive Negative      Narrative:     Drug screen results are presumptive and should not be used to assess   compliance with prescribed medication. Contact the performing [facility] laboratory   to add-on definitive confirmatory testing if clinically indicated.    Toxicology screening results are reported qualitatively. The concentration must   be greater than or equal to the cutoff to be reported as positive. The concentration   at which the screening test can detect an individual drug or metabolite varies.   The absence of expected drug(s) and/or drug metabolite(s) may indicate non-compliance,   inappropriate timing of specimen collection relative to drug administration, poor drug   absorption, diluted/adulterated urine, or limitations of testing. For medical purposes   only; not valid for forensic use.    Interpretive questions should be directed to the laboratory medical directors.   ACUTE TOXICOLOGY PANEL, BLOOD - Normal    Acetaminophen <10.0      Salicylate  <3      Alcohol <10     BLOOD CULTURE   BLOOD CULTURE   URINE CULTURE   C. DIFFICILE, PCR   URINALYSIS WITH REFLEX CULTURE AND MICROSCOPIC    Narrative:     The following orders were created for panel order Urinalysis with Reflex Culture and Microscopic.  Procedure                               Abnormality         Status                     ---------                               -----------         ------                     Urinalysis with Reflex  C...[[id number]]  Abnormal            Final result               Extra Urine Gray Tube[[id number]]                            In process                   Please view results for these tests on the individual orders.   EXTRA URINE GRAY TUBE   BLOOD GAS LACTIC ACID, VENOUS     All other labs were within normal range or not returned as of this dictation.  Imaging  CT head wo IV contrast   Final Result   No evidence of acute intracranial hemorrhage or mass effect.                  MACRO:   None        Signed by: Boogie Nance 11/16/2024 8:11 PM   Dictation workstation:   [id number]      CT abdomen pelvis w IV contrast   Final Result   Fluid-filled segments of colon; please correlate for   diarrhea/colitis. There is underdistention versus mild wall   thickening and submucosal fat deposition in loops of ileum in the   right lower quadrant which may relate to chronic inflammatory process.                  MACRO:   None        Signed by: Boogie Nance 11/16/2024 8:16 PM   Dictation workstation:   [id number]         Procedure  Procedures  EMERGENCY DEPARTMENT COURSE/MDM:   Medical Decision Making    Vitals:    Vitals:    11/16/24 1915 11/16/24 1930 11/16/24 2015 11/16/24 2030   BP:  (!) 162/113  (!) 194/129   BP Location:  Right arm     Patient Position:  Lying     Pulse: (!) 132 (!) 132 (!) 128 (!) 126   Resp: (!) 23 (!) 22 (!) 23 (!) 22   Temp:  36.5 °C (97.7 °F)     TempSrc:  Temporal     SpO2: 100% 100% 100% 99%   Weight:       Height:         Samuel Mims is a female 48 y.o. who presents to the ER for altered mental status. On arrival the patients vital signs were: Afebrile, Tachycardic, Normotensive, Tachypneic, and Normoxic on room air. History obtained from: Spouse. Given tachycardia, tachypnea, AMS, abdominal ttp, septic workup initiated with empiric cefepime/flagyl/vanc for unknown source. Will obtain CT abdomen given ttp, CT head, blood tox, and UDS given AMS.    ED Course as of 11/16/24 2041   Sat Nov 16,  2024 1826 CBC with Differential(!)  No acute leukocytosis, leukopenia, thrombocytosis, thrombocytopenia, or anemia [CB]   2020 CT head wo IV contrast  No evidence of acute intracranial hemorrhage or mass effect. [CB]   2020 CT abdomen pelvis w IV contrast  Fluid-filled segments of colon; please correlate for  diarrhea/colitis. There is underdistention versus mild wall  thickening and submucosal fat deposition in loops of ileum in the  right lower quadrant which may relate to chronic inflammatory process.   [CB]   2021 Microscopic Only, Urine(!)  4+ bacteria 2+ nitrite consistent with UTI [CB]   2021 Comprehensive Metabolic Panel(!)  Normoglycemic, no acute electrolyte or hepatorenal abnormality [CB]   2021 Acute Toxicology Panel, Blood  No elevation of alcohol, salicylate, acetaminophen [CB]   2021 Drug Screen, Urine  No detected illicit substances [CB]   2026 Lactate: 1.8  Not greater than 4 doubt occult shock [CB]   2027 BLOOD GAS VENOUS FULL PANEL(!)  Not hypercapnic encephalopathy [CB]   2039 Discussed with OB, Dr. Razo, no emergent removal of IUD at this time. May follow up outpatient.  [CB]      ED Course User Index  [CB] Timothy Cooper,          Diagnoses as of 11/16/24 2041   Sepsis with encephalopathy without septic shock, due to unspecified organism (Multi)   Acute cystitis with hematuria     External Records Reviewed: I reviewed recent and relevant outside records including inpatient notes, outpatient records    Shared decision making for disposition  Patient and/or patient´s representative was counseled regarding labs, imaging, likely diagnosis. All questions were answered. Recommendation was made   for Admission given the need for further escalation of care to inpatient management. Patient agreed and was admitted in stable condition. Admitting team was notified of any pending labs or imaging to ensure continuity of care.     ED Medications administered this visit:    Medications   vancomycin  (Vancocin) 2 g in sodium chloride 0.9%  mL (2 g intravenous New Bag 11/16/24 2009)   ketorolac (Toradol) injection 15 mg (15 mg intravenous Not Given 11/16/24 2012)   losartan (Cozaar) tablet 100 mg (has no administration in time range)   labetaloL (Normodyne,Trandate) injection 20 mg (has no administration in time range)   lactated Ringer's bolus 2,000 mL (2,000 mL intravenous New Bag 11/16/24 1808)   cefepime (Maxipime) 2 g in sodium chloride 0.9%  mL (0 g intravenous Stopped 11/16/24 2006)   metroNIDAZOLE (Flagyl) 500 mg in sodium chloride (iso)  mL (0 mg intravenous Stopped 11/16/24 2006)   iohexol (OMNIPaque) 350 mg iodine/mL solution 75 mL (75 mL intravenous Given 11/16/24 1940)        Final Impression:   1. Sepsis with encephalopathy without septic shock, due to unspecified organism (Multi)    2. Acute cystitis with hematuria          Please excuse any misspellings or unintended errors related to the Dragon speech recognition software used to dictate this note.    I reviewed the case with the attending ED physician. The attending ED physician agrees with the plan.      Timothy Cooper DO  Resident  11/16/24 2042

## 2024-11-17 ENCOUNTER — APPOINTMENT (OUTPATIENT)
Dept: RADIOLOGY | Facility: HOSPITAL | Age: 49
End: 2024-11-17
Payer: COMMERCIAL

## 2024-11-17 VITALS
SYSTOLIC BLOOD PRESSURE: 169 MMHG | HEIGHT: 66 IN | WEIGHT: 240.08 LBS | HEART RATE: 104 BPM | OXYGEN SATURATION: 98 % | TEMPERATURE: 99 F | DIASTOLIC BLOOD PRESSURE: 93 MMHG | BODY MASS INDEX: 38.58 KG/M2 | RESPIRATION RATE: 25 BRPM

## 2024-11-17 LAB
ALBUMIN SERPL BCP-MCNC: 3.6 G/DL (ref 3.4–5)
ALP SERPL-CCNC: 61 U/L (ref 33–110)
ALT SERPL W P-5'-P-CCNC: 30 U/L (ref 7–45)
AMMONIA PLAS-SCNC: 58 UMOL/L (ref 16–53)
ANION GAP SERPL CALC-SCNC: 17 MMOL/L (ref 10–20)
AST SERPL W P-5'-P-CCNC: 24 U/L (ref 9–39)
ATRIAL RATE: 145 BPM
BILIRUB SERPL-MCNC: 1.4 MG/DL (ref 0–1.2)
BUN SERPL-MCNC: 19 MG/DL (ref 6–23)
CALCIUM SERPL-MCNC: 9 MG/DL (ref 8.6–10.3)
CHLORIDE SERPL-SCNC: 110 MMOL/L (ref 98–107)
CO2 SERPL-SCNC: 19 MMOL/L (ref 21–32)
CREAT SERPL-MCNC: 0.84 MG/DL (ref 0.5–1.05)
EGFRCR SERPLBLD CKD-EPI 2021: 85 ML/MIN/1.73M*2
ERYTHROCYTE [DISTWIDTH] IN BLOOD BY AUTOMATED COUNT: 16.2 % (ref 11.5–14.5)
GLUCOSE SERPL-MCNC: 135 MG/DL (ref 74–99)
HCT VFR BLD AUTO: 35.8 % (ref 36–46)
HGB BLD-MCNC: 11.7 G/DL (ref 12–16)
HOLD SPECIMEN: NORMAL
MAGNESIUM SERPL-MCNC: 1.91 MG/DL (ref 1.6–2.4)
MCH RBC QN AUTO: 27.7 PG (ref 26–34)
MCHC RBC AUTO-ENTMCNC: 32.7 G/DL (ref 32–36)
MCV RBC AUTO: 85 FL (ref 80–100)
NRBC BLD-RTO: 0 /100 WBCS (ref 0–0)
P AXIS: 33 DEGREES
P OFFSET: 201 MS
P ONSET: 155 MS
PHOSPHATE SERPL-MCNC: 3.5 MG/DL (ref 2.5–4.9)
PLATELET # BLD AUTO: 190 X10*3/UL (ref 150–450)
POTASSIUM SERPL-SCNC: 4.3 MMOL/L (ref 3.5–5.3)
PR INTERVAL: 138 MS
PROT SERPL-MCNC: 6.4 G/DL (ref 6.4–8.2)
Q ONSET: 224 MS
QRS COUNT: 23 BEATS
QRS DURATION: 68 MS
QT INTERVAL: 342 MS
QTC CALCULATION(BAZETT): 531 MS
QTC FREDERICIA: 458 MS
R AXIS: -52 DEGREES
RBC # BLD AUTO: 4.22 X10*6/UL (ref 4–5.2)
SODIUM SERPL-SCNC: 142 MMOL/L (ref 136–145)
T AXIS: 89 DEGREES
T OFFSET: 395 MS
VENTRICULAR RATE: 145 BPM
WBC # BLD AUTO: 6.3 X10*3/UL (ref 4.4–11.3)

## 2024-11-17 PROCEDURE — 2060000001 HC INTERMEDIATE ICU ROOM DAILY

## 2024-11-17 PROCEDURE — 84100 ASSAY OF PHOSPHORUS: CPT | Performed by: NURSE PRACTITIONER

## 2024-11-17 PROCEDURE — 99222 1ST HOSP IP/OBS MODERATE 55: CPT | Performed by: STUDENT IN AN ORGANIZED HEALTH CARE EDUCATION/TRAINING PROGRAM

## 2024-11-17 PROCEDURE — 2500000004 HC RX 250 GENERAL PHARMACY W/ HCPCS (ALT 636 FOR OP/ED): Performed by: NURSE PRACTITIONER

## 2024-11-17 PROCEDURE — 82140 ASSAY OF AMMONIA: CPT | Performed by: NURSE PRACTITIONER

## 2024-11-17 PROCEDURE — 36415 COLL VENOUS BLD VENIPUNCTURE: CPT | Performed by: NURSE PRACTITIONER

## 2024-11-17 PROCEDURE — 83735 ASSAY OF MAGNESIUM: CPT | Performed by: NURSE PRACTITIONER

## 2024-11-17 PROCEDURE — 70450 CT HEAD/BRAIN W/O DYE: CPT

## 2024-11-17 PROCEDURE — 80053 COMPREHEN METABOLIC PANEL: CPT | Performed by: NURSE PRACTITIONER

## 2024-11-17 PROCEDURE — 85027 COMPLETE CBC AUTOMATED: CPT | Performed by: NURSE PRACTITIONER

## 2024-11-17 PROCEDURE — 2500000005 HC RX 250 GENERAL PHARMACY W/O HCPCS

## 2024-11-17 PROCEDURE — 2500000001 HC RX 250 WO HCPCS SELF ADMINISTERED DRUGS (ALT 637 FOR MEDICARE OP): Performed by: NURSE PRACTITIONER

## 2024-11-17 PROCEDURE — 92610 EVALUATE SWALLOWING FUNCTION: CPT | Mod: GN | Performed by: SPEECH-LANGUAGE PATHOLOGIST

## 2024-11-17 PROCEDURE — 2500000002 HC RX 250 W HCPCS SELF ADMINISTERED DRUGS (ALT 637 FOR MEDICARE OP, ALT 636 FOR OP/ED): Performed by: NURSE PRACTITIONER

## 2024-11-17 PROCEDURE — 2500000004 HC RX 250 GENERAL PHARMACY W/ HCPCS (ALT 636 FOR OP/ED): Performed by: INTERNAL MEDICINE

## 2024-11-17 PROCEDURE — 70450 CT HEAD/BRAIN W/O DYE: CPT | Performed by: RADIOLOGY

## 2024-11-17 RX ORDER — PANTOPRAZOLE SODIUM 40 MG/10ML
40 INJECTION, POWDER, LYOPHILIZED, FOR SOLUTION INTRAVENOUS
Status: DISCONTINUED | OUTPATIENT
Start: 2024-11-17 | End: 2024-11-26

## 2024-11-17 RX ORDER — HEPARIN SODIUM 5000 [USP'U]/ML
5000 INJECTION, SOLUTION INTRAVENOUS; SUBCUTANEOUS EVERY 8 HOURS SCHEDULED
Status: DISPENSED | OUTPATIENT
Start: 2024-11-17

## 2024-11-17 RX ORDER — SODIUM CHLORIDE, SODIUM LACTATE, POTASSIUM CHLORIDE, CALCIUM CHLORIDE 600; 310; 30; 20 MG/100ML; MG/100ML; MG/100ML; MG/100ML
125 INJECTION, SOLUTION INTRAVENOUS CONTINUOUS
Status: DISCONTINUED | OUTPATIENT
Start: 2024-11-17 | End: 2024-11-18

## 2024-11-17 RX ORDER — HYDRALAZINE HYDROCHLORIDE 20 MG/ML
5 INJECTION INTRAMUSCULAR; INTRAVENOUS ONCE
Status: COMPLETED | OUTPATIENT
Start: 2024-11-17 | End: 2024-11-17

## 2024-11-17 RX ORDER — SODIUM CHLORIDE 9 MG/ML
INJECTION, SOLUTION INTRAMUSCULAR; INTRAVENOUS; SUBCUTANEOUS
Status: COMPLETED
Start: 2024-11-17 | End: 2024-11-17

## 2024-11-17 RX ORDER — METOPROLOL TARTRATE 1 MG/ML
5 INJECTION, SOLUTION INTRAVENOUS EVERY 6 HOURS PRN
Status: DISCONTINUED | OUTPATIENT
Start: 2024-11-17 | End: 2024-11-18

## 2024-11-17 RX ORDER — LABETALOL HYDROCHLORIDE 5 MG/ML
20 INJECTION, SOLUTION INTRAVENOUS ONCE
Status: COMPLETED | OUTPATIENT
Start: 2024-11-17 | End: 2024-11-17

## 2024-11-17 RX ADMIN — VANCOMYCIN HYDROCHLORIDE 125 MG: 125 CAPSULE ORAL at 13:21

## 2024-11-17 RX ADMIN — CEFEPIME 2 G: 2 INJECTION, POWDER, FOR SOLUTION INTRAVENOUS at 01:58

## 2024-11-17 RX ADMIN — PANTOPRAZOLE SODIUM 40 MG: 40 INJECTION, POWDER, FOR SOLUTION INTRAVENOUS at 06:22

## 2024-11-17 RX ADMIN — LABETALOL HYDROCHLORIDE 20 MG: 5 INJECTION, SOLUTION INTRAVENOUS at 01:58

## 2024-11-17 RX ADMIN — PANTOPRAZOLE SODIUM 40 MG: 40 INJECTION, POWDER, FOR SOLUTION INTRAVENOUS at 17:29

## 2024-11-17 RX ADMIN — HEPARIN SODIUM 7500 UNITS: 5000 INJECTION, SOLUTION INTRAVENOUS; SUBCUTANEOUS at 00:18

## 2024-11-17 RX ADMIN — SODIUM CHLORIDE, POTASSIUM CHLORIDE, SODIUM LACTATE AND CALCIUM CHLORIDE 125 ML/HR: 600; 310; 30; 20 INJECTION, SOLUTION INTRAVENOUS at 14:16

## 2024-11-17 RX ADMIN — HEPARIN SODIUM 5000 UNITS: 5000 INJECTION INTRAVENOUS; SUBCUTANEOUS at 22:29

## 2024-11-17 RX ADMIN — METRONIDAZOLE 500 MG: 5 INJECTION, SOLUTION INTRAVENOUS at 03:31

## 2024-11-17 RX ADMIN — METOPROLOL TARTRATE 5 MG: 5 INJECTION INTRAVENOUS at 20:10

## 2024-11-17 RX ADMIN — LABETALOL HYDROCHLORIDE 20 MG: 5 INJECTION, SOLUTION INTRAVENOUS at 23:43

## 2024-11-17 RX ADMIN — LOSARTAN POTASSIUM 100 MG: 100 TABLET, FILM COATED ORAL at 10:45

## 2024-11-17 RX ADMIN — PIPERACILLIN SODIUM AND TAZOBACTAM SODIUM 3.38 G: 3; .375 INJECTION, SOLUTION INTRAVENOUS at 09:56

## 2024-11-17 RX ADMIN — HEPARIN SODIUM 7500 UNITS: 5000 INJECTION, SOLUTION INTRAVENOUS; SUBCUTANEOUS at 06:23

## 2024-11-17 RX ADMIN — HEPARIN SODIUM 5000 UNITS: 5000 INJECTION INTRAVENOUS; SUBCUTANEOUS at 13:21

## 2024-11-17 RX ADMIN — PIPERACILLIN SODIUM AND TAZOBACTAM SODIUM 3.38 G: 3; .375 INJECTION, SOLUTION INTRAVENOUS at 17:29

## 2024-11-17 RX ADMIN — SODIUM CHLORIDE, POTASSIUM CHLORIDE, SODIUM LACTATE AND CALCIUM CHLORIDE 125 ML/HR: 600; 310; 30; 20 INJECTION, SOLUTION INTRAVENOUS at 22:29

## 2024-11-17 RX ADMIN — VANCOMYCIN HYDROCHLORIDE 1250 MG: 1.25 INJECTION, SOLUTION INTRAVITREAL at 08:35

## 2024-11-17 RX ADMIN — HYDRALAZINE HYDROCHLORIDE 5 MG: 20 INJECTION INTRAMUSCULAR; INTRAVENOUS at 00:39

## 2024-11-17 RX ADMIN — SODIUM CHLORIDE 10 ML: 9 INJECTION, SOLUTION INTRAMUSCULAR; INTRAVENOUS; SUBCUTANEOUS at 06:33

## 2024-11-17 RX ADMIN — METOPROLOL TARTRATE 5 MG: 5 INJECTION INTRAVENOUS at 14:17

## 2024-11-17 RX ADMIN — SODIUM CHLORIDE 100 ML/HR: 9 INJECTION, SOLUTION INTRAVENOUS at 00:19

## 2024-11-17 RX ADMIN — VANCOMYCIN HYDROCHLORIDE 1250 MG: 1.25 INJECTION, SOLUTION INTRAVITREAL at 20:44

## 2024-11-17 SDOH — SOCIAL STABILITY: SOCIAL INSECURITY: WITHIN THE LAST YEAR, HAVE YOU BEEN AFRAID OF YOUR PARTNER OR EX-PARTNER?: PATIENT UNABLE TO ANSWER

## 2024-11-17 SDOH — SOCIAL STABILITY: SOCIAL INSECURITY
WITHIN THE LAST YEAR, HAVE YOU BEEN RAPED OR FORCED TO HAVE ANY KIND OF SEXUAL ACTIVITY BY YOUR PARTNER OR EX-PARTNER?: PATIENT UNABLE TO ANSWER

## 2024-11-17 SDOH — SOCIAL STABILITY: SOCIAL INSECURITY: ABUSE: ADULT

## 2024-11-17 SDOH — SOCIAL STABILITY: SOCIAL INSECURITY: DOES ANYONE TRY TO KEEP YOU FROM HAVING/CONTACTING OTHER FRIENDS OR DOING THINGS OUTSIDE YOUR HOME?: NO

## 2024-11-17 SDOH — SOCIAL STABILITY: SOCIAL INSECURITY
WITHIN THE LAST YEAR, HAVE YOU BEEN HUMILIATED OR EMOTIONALLY ABUSED IN OTHER WAYS BY YOUR PARTNER OR EX-PARTNER?: PATIENT UNABLE TO ANSWER

## 2024-11-17 SDOH — SOCIAL STABILITY: SOCIAL INSECURITY: HAS ANYONE EVER THREATENED TO HURT YOUR FAMILY OR YOUR PETS?: NO

## 2024-11-17 SDOH — SOCIAL STABILITY: SOCIAL INSECURITY: ARE YOU OR HAVE YOU BEEN THREATENED OR ABUSED PHYSICALLY, EMOTIONALLY, OR SEXUALLY BY ANYONE?: NO

## 2024-11-17 SDOH — SOCIAL STABILITY: SOCIAL INSECURITY: DO YOU FEEL ANYONE HAS EXPLOITED OR TAKEN ADVANTAGE OF YOU FINANCIALLY OR OF YOUR PERSONAL PROPERTY?: NO

## 2024-11-17 SDOH — SOCIAL STABILITY: SOCIAL INSECURITY: HAVE YOU HAD ANY THOUGHTS OF HARMING ANYONE ELSE?: NO

## 2024-11-17 SDOH — ECONOMIC STABILITY: FOOD INSECURITY
WITHIN THE PAST 12 MONTHS, THE FOOD YOU BOUGHT JUST DIDN'T LAST AND YOU DIDN'T HAVE MONEY TO GET MORE.: PATIENT UNABLE TO ANSWER

## 2024-11-17 SDOH — SOCIAL STABILITY: SOCIAL INSECURITY: ARE THERE ANY APPARENT SIGNS OF INJURIES/BEHAVIORS THAT COULD BE RELATED TO ABUSE/NEGLECT?: NO

## 2024-11-17 SDOH — SOCIAL STABILITY: SOCIAL INSECURITY: HAVE YOU HAD THOUGHTS OF HARMING ANYONE ELSE?: NO

## 2024-11-17 SDOH — SOCIAL STABILITY: SOCIAL INSECURITY
WITHIN THE LAST YEAR, HAVE YOU BEEN KICKED, HIT, SLAPPED, OR OTHERWISE PHYSICALLY HURT BY YOUR PARTNER OR EX-PARTNER?: PATIENT UNABLE TO ANSWER

## 2024-11-17 SDOH — ECONOMIC STABILITY: INCOME INSECURITY
IN THE PAST 12 MONTHS HAS THE ELECTRIC, GAS, OIL, OR WATER COMPANY THREATENED TO SHUT OFF SERVICES IN YOUR HOME?: PATIENT UNABLE TO ANSWER

## 2024-11-17 SDOH — SOCIAL STABILITY: SOCIAL INSECURITY: DO YOU FEEL UNSAFE GOING BACK TO THE PLACE WHERE YOU ARE LIVING?: NO

## 2024-11-17 SDOH — ECONOMIC STABILITY: FOOD INSECURITY
WITHIN THE PAST 12 MONTHS, YOU WORRIED THAT YOUR FOOD WOULD RUN OUT BEFORE YOU GOT THE MONEY TO BUY MORE.: PATIENT UNABLE TO ANSWER

## 2024-11-17 SDOH — ECONOMIC STABILITY: HOUSING INSECURITY

## 2024-11-17 ASSESSMENT — PAIN - FUNCTIONAL ASSESSMENT
PAIN_FUNCTIONAL_ASSESSMENT: 0-10
PAIN_FUNCTIONAL_ASSESSMENT: PAINAD (PAIN ASSESSMENT IN ADVANCED DEMENTIA SCALE)
PAIN_FUNCTIONAL_ASSESSMENT: 0-10

## 2024-11-17 ASSESSMENT — COGNITIVE AND FUNCTIONAL STATUS - GENERAL
MOVING TO AND FROM BED TO CHAIR: TOTAL
DAILY ACTIVITIY SCORE: 7
CLIMB 3 TO 5 STEPS WITH RAILING: TOTAL
STANDING UP FROM CHAIR USING ARMS: TOTAL
DAILY ACTIVITIY SCORE: 6
EATING MEALS: A LOT
TOILETING: TOTAL
CLIMB 3 TO 5 STEPS WITH RAILING: TOTAL
PERSONAL GROOMING: TOTAL
WALKING IN HOSPITAL ROOM: TOTAL
DRESSING REGULAR UPPER BODY CLOTHING: TOTAL
PATIENT BASELINE BEDBOUND: NO
HELP NEEDED FOR BATHING: TOTAL
DRESSING REGULAR LOWER BODY CLOTHING: TOTAL
MOVING FROM LYING ON BACK TO SITTING ON SIDE OF FLAT BED WITH BEDRAILS: TOTAL
DRESSING REGULAR LOWER BODY CLOTHING: TOTAL
EATING MEALS: TOTAL
MOVING TO AND FROM BED TO CHAIR: TOTAL
PERSONAL GROOMING: TOTAL
DRESSING REGULAR UPPER BODY CLOTHING: TOTAL
HELP NEEDED FOR BATHING: TOTAL
TURNING FROM BACK TO SIDE WHILE IN FLAT BAD: TOTAL
TURNING FROM BACK TO SIDE WHILE IN FLAT BAD: TOTAL
MOBILITY SCORE: 6
MOBILITY SCORE: 6
TOILETING: TOTAL
STANDING UP FROM CHAIR USING ARMS: TOTAL
MOVING FROM LYING ON BACK TO SITTING ON SIDE OF FLAT BED WITH BEDRAILS: TOTAL
WALKING IN HOSPITAL ROOM: TOTAL

## 2024-11-17 ASSESSMENT — PAIN SCALES - PAIN ASSESSMENT IN ADVANCED DEMENTIA (PAINAD)
TOTALSCORE: 1
BREATHING: NORMAL
CONSOLABILITY: NO NEED TO CONSOLE
NEGVOCALIZATION: OCCASIONAL MOAN/GROAN, LOW SPEECH, NEGATIVE/DISAPPROVING QUALITY
FACIALEXPRESSION: SMILING OR INEXPRESSIVE
BODYLANGUAGE: RELAXED

## 2024-11-17 ASSESSMENT — LIFESTYLE VARIABLES
AUDIT-C TOTAL SCORE: 0
HOW OFTEN DO YOU HAVE 6 OR MORE DRINKS ON ONE OCCASION: NEVER
SKIP TO QUESTIONS 9-10: 1
HOW OFTEN DO YOU HAVE A DRINK CONTAINING ALCOHOL: NEVER
AUDIT-C TOTAL SCORE: 0
HOW MANY STANDARD DRINKS CONTAINING ALCOHOL DO YOU HAVE ON A TYPICAL DAY: PATIENT DOES NOT DRINK

## 2024-11-17 ASSESSMENT — COLUMBIA-SUICIDE SEVERITY RATING SCALE - C-SSRS
2. HAVE YOU ACTUALLY HAD ANY THOUGHTS OF KILLING YOURSELF?: NO
6. HAVE YOU EVER DONE ANYTHING, STARTED TO DO ANYTHING, OR PREPARED TO DO ANYTHING TO END YOUR LIFE?: NO
1. IN THE PAST MONTH, HAVE YOU WISHED YOU WERE DEAD OR WISHED YOU COULD GO TO SLEEP AND NOT WAKE UP?: NO

## 2024-11-17 ASSESSMENT — ACTIVITIES OF DAILY LIVING (ADL)
BATHING: NEEDS ASSISTANCE
WALKS IN HOME: NEEDS ASSISTANCE
LACK_OF_TRANSPORTATION: PATIENT UNABLE TO ANSWER
DRESSING YOURSELF: NEEDS ASSISTANCE
ADEQUATE_TO_COMPLETE_ADL: YES
FEEDING YOURSELF: NEEDS ASSISTANCE
TOILETING: NEEDS ASSISTANCE
HEARING - LEFT EAR: FUNCTIONAL
PATIENT'S MEMORY ADEQUATE TO SAFELY COMPLETE DAILY ACTIVITIES?: NO
HEARING - RIGHT EAR: FUNCTIONAL
JUDGMENT_ADEQUATE_SAFELY_COMPLETE_DAILY_ACTIVITIES: NO
ASSISTIVE_DEVICE: EYEGLASSES
GROOMING: NEEDS ASSISTANCE

## 2024-11-17 ASSESSMENT — PAIN SCALES - GENERAL
PAINLEVEL_OUTOF10: 0 - NO PAIN

## 2024-11-17 ASSESSMENT — PATIENT HEALTH QUESTIONNAIRE - PHQ9
SUM OF ALL RESPONSES TO PHQ9 QUESTIONS 1 & 2: 0
1. LITTLE INTEREST OR PLEASURE IN DOING THINGS: NOT AT ALL
2. FEELING DOWN, DEPRESSED OR HOPELESS: NOT AT ALL

## 2024-11-17 NOTE — CONSULTS
Inpatient consult to Neurology  Consult performed by: Tiffanie Tracey MD  Consult ordered by: JOSE Marino-CNP      History Of Present Illness  Samuel Mims is a 48 y.o. female presenting with a history of HTN obesity, and recent cdiff infection who presented for altered mental status, nausea, vomiting and diarrhea.    She was recently admitted for community acquired C. Diff infection. Treated with oral vancomycin and discharged home. She completed the 3 weeks of vancomycin.     She is a limited historian but per chart review, she presented with increase in confusion, lethargy and decreased oral intake since Thursday. She was reportedly sitting in her urine for a couple of days and had diarrhea.     On initial evaluation, she was tachycardic to 140s, UA was positive for nitrates and had 21-50 WBC. CT head showed irina cute findings.     She has a history of hypertension on losartan, spironolactone, and verapamil. She also has generalized anxiety disorder on buspirone. She is a former smoker, and is exposed to second hand smoke by her .    Past Medical History  Past Medical History:   Diagnosis Date    Asthma     Clostridium difficile infection     9/2024    GERD (gastroesophageal reflux disease)     Hypertension      Surgical History  Past Surgical History:   Procedure Laterality Date    CHOLECYSTECTOMY      COLONOSCOPY      ESOPHAGOGASTRODUODENOSCOPY       Social History  Social History     Tobacco Use    Smoking status: Former     Current packs/day: 1.00     Average packs/day: 1 pack/day for 4.9 years (4.9 ttl pk-yrs)     Types: Cigarettes     Start date: 2020    Smokeless tobacco: Never   Vaping Use    Vaping status: Never Used   Substance Use Topics    Alcohol use: Never    Drug use: Never     Allergies  Tiotropium bromide, Ace inhibitors, Escitalopram, Hydrochlorothiazide, and House dust mite  Medications Prior to Admission   Medication Sig Dispense Refill Last Dose/Taking    albuterol 90  "mcg/actuation inhaler Inhale 2 puffs every 4 hours if needed for wheezing.       busPIRone (Buspar) 10 mg tablet Take 1 tablet (10 mg) by mouth 2 times a day.       clonazePAM (KlonoPIN) 0.5 mg tablet Take 1 tablet (0.5 mg) by mouth once daily as needed for anxiety.       esomeprazole (NexIUM) 40 mg DR capsule Take 1 capsule (40 mg) by mouth 2 times a day. Do not open capsule.       fluticasone propion-salmeteroL (Advair) 115-21 mcg/actuation inhaler Inhale 2 puffs 2 times a day. Rinse mouth with water after use to reduce aftertaste and incidence of candidiasis. Do not swallow.       losartan (Cozaar) 100 mg tablet Take 1 tablet (100 mg) by mouth once daily.       metoclopramide (Reglan) 10 mg tablet Take 1 tablet (10 mg) by mouth 4 times a day as needed (nausea/vomiting) for up to 20 doses. As needed for nausea/vomiting (Patient not taking: Reported on 11/16/2024) 20 tablet 0 Not Taking    ondansetron ODT (Zofran-ODT) 4 mg disintegrating tablet Take 1 tablet (4 mg) by mouth every 8 hours if needed for nausea or vomiting. (Patient not taking: Reported on 11/16/2024) 21 tablet 0 Not Taking    pantoprazole (ProtoNix) 40 mg EC tablet Take 1 tablet (40 mg) by mouth once daily in the morning. Take before meals. Do not crush, chew, or split.       spironolactone (Aldactone) 25 mg tablet Take 0.5 tablets (12.5 mg) by mouth once daily.       vancomycin (Vancocin) 125 mg capsule Take 1 capsule (125 mg) by mouth 4 times a day. 11-5-24 x 14 days       verapamil SR (Calan-SR) 240 mg ER tablet Take 1 tablet (240 mg) by mouth once daily at bedtime. Do not crush or chew.          Review of Systems  Neurological Exam  Mental Status  Arousable to verbal stimuli. Oriented only to person.  Arousable to voice, required repeated verbal commands to answer questions  Oriented to self, stated \"I have not made a follow-up appoinmtent yet\"  Was able to state her name  Follow midline and 1 step appendicular commands .    Cranial Nerves  CN " "III, IV, VI: Extraocular movements intact bilaterally.  CN VII: Full and symmetric facial movement.  CN VIII: Hearing is normal.  Blinks to threat bilaterally  Tracks examiner  Facial expression symmetric .    Motor   Strength is 5/5 throughout all four extremities.    Gait  Casual gait is normal including stance, stride, and arm swing.    Physical Exam  Eyes:      Extraocular Movements: Extraocular movements intact.   Neurological:      Motor: Motor strength is normal.      Last Recorded Vitals  Blood pressure 123/84, pulse 110, temperature 36.8 °C (98.2 °F), temperature source Temporal, resp. rate 24, height 1.676 m (5' 6\"), weight 109 kg (240 lb 1.3 oz), SpO2 98%.    Relevant Results      [place] Coma Scale  Best Eye Response: To verbal stimuli  Best Verbal Response: Confused  Best Motor Response: Follows commands  [place] Coma Scale Score: 13                 CMP - Na 142, K 4.3, BUN 19, Cr 0.84   pH 7.44, pCO2 31, pO2 53  UDS negative, toxicology panel negative  UA nitrite 2+, 250 leuk esterase    Lactate 1.8    Assessment/Plan   Assessment & Plan  Sepsis with encephalopathy without septic shock, due to unspecified organism (Multi)    Diarrhea    UTI (urinary tract infection)    Hypertension    Colitis    Sinus tachycardia    Prolonged Q-T interval on ECG    Clostridium difficile infection      Metabolic encephalopathy 2/2 UTI and recent C. Diff infection    No further neurological workup needed at this time  Neurology will continue to follow     Tiffanie Tracey MD  "

## 2024-11-17 NOTE — PROGRESS NOTES
Speech-Language Pathology    Inpatient Clinical Swallow Evaluation    Patient Name: Samuel Mims  MRN: [id number]  Today's Date: 11/17/2024   Time Calculation  Start Time: 1310  Stop Time: 1325  Time Calculation (min): 15 min          Current Problem:   1. Sepsis with encephalopathy without septic shock, due to unspecified organism (Multi)        2. Acute cystitis with hematuria              Recommendations:  Recommendations  Risk for Aspiration: Yes  Additional Recommendations: Dysphagia treatment  Solid Diet Recommendations : Minced & moist/ground (IDDSI Level 5)  Liquid Diet Recommendations: Thin (IDDSI Level 0)  Compensatory Swallowing Strategies: Alternate solids and liquids, Decrease distractions during eating/feeding, Upright 90 degrees as possible for all oral intake, Full supervision with meals, Small bites/sips, Eat/feed slowly  Duration of Treatment: 1 week  Follow up treatments: Diet tolerance monitoring  Dysphagia Goals: Patient will tolerate recommended diet without observed clinical signs of aspiration      Assessment:  Assessment  Assessment Results: Clinical indicators of oral dysphagia likely acute due to increased confusion and AMS. Pharyngeal dysphagia not suspected but will continue to monitor. Prognosis for diet tolerance and advancement is good given age, PLOF, and current admission following medication. ST to follow to assess diet tolerance and readiness for solid upgrade.   Prognosis: Good   Medical Staff Made Aware: Yes      Plan:  Plan  Treatment/Interventions: Bolus trials, Assess diet tolerance, Diet recommendations  SLP Plan: Skilled SLP  SLP Frequency: 2x per week  Duration: 1 week  Next Treatment Priority: diet tolerance and advancment  Discussed POC: Nursing  Discussed Risks/Benefits: Nursing, Yes      Subjective   Current Problem:  Pt admitted with AMS, vomiting, and decreased PO intake. Waxing and waning alertness. UTI.     General Visit Information:  General  Information  Patient Class: Inpatient  Reason for Referral: Assess swallow function  Past Medical History Relevant to Rehab: Samuel Mims is a 48 y.o. female presenting to [facility] on 11/16/2024 with pertinent medical history of HTN, asthma, GERD, IUD (placed 17 years ago per ), and c-diff (9/2024) who presents to the ED with confusion, increased sleeping, decreased oral intake, nausea with intermittent vomiting, and diarrhea on Thursday from home. Per her  she was sitting in her urine for a couple days and had diarrhea on Thursday. Her son also reports that she has been confused and not eating or drinking for a couple days and they decided to bring her to the hospital today as she was not getting better. HPI and history obtained from the patient's , ED physician, and previous medical records as patient is lethargic and confused.  Of note, the patient was hospitalized on 11/1-11/2/24 with hypokalemia, vomiting, and c-diff (tx with oral vancomycin 11/5-11/18/24).  Of note, ED physician talked to OB/GYN on-call regarding IUD, he reports they said she could follow-up outpatient for removal.  Limited ROS due to patient's confusion, she does deny any pain.     ED Course:  Vital signs: Temp. 36.9, -102, RR 28-22, /109> 141/90, SPO2 98% on room air  CBC: Unremarkable  Lactate: 1.8  CMP: Total bilirubin 1.6  UA: 50 protein, 20 ketones, 2+ nitrates, WBC 21-50, RBC 11-20, 4+ bacteria  Urine toxicology: Negative  Ethanol level, acetaminophen level, and acetylsalicylic acid level: negative.  EKG: Sinus tachycardia, ventricular rate 145, , QRS 68, QTc 531. No ST elevation or depression noted.   CT abdomen/pelvis: Fluid-filled segments of colon, correlate with diarrhea/colitis.  CT head: No acute intercranial hemorrhage or mass effect.  Medications: Flagyl 500 mg IV, vancomycin 2 g IV, cefepime 2 g IV, Cozaar 100 mg p.o., Toradol 15 mg IV, labetalol 20 mg IV, LR x 2 L.  Disposition: Patient  admitted for sepsis with encephalopathy without septic shock, UTI, HTN, colitis, sinus tachycardia, and diarrhea.      Past Medical History  She has a past medical history of Asthma, Clostridium difficile infection, GERD (gastroesophageal reflux disease), and Hypertension.     Surgical History  She has a past surgical history that includes Cholecystectomy; Esophagogastroduodenoscopy; and Colonoscopy.  Prior to Session Communication: Bedside nurse  Base[employer] Diet: REGULAR AND THIN  Current Diet : NPO  Dysphagia Diagnosis: Mild to moderate oral stage dysphagia      Objective       Baseline Assessment:  Baseline Assessment  Behavior/Cognition: Alert, Confused, Requires cueing  Baseline Vocal Quality: Normal  Pain:  Pain Assessment  Pain Assessment: 0-10  0-10 (Numeric) Pain Score: 0 - No pain      Oral/Motor Assessment:  Oral/Motor Assessment  Oral Hygiene: Adequate  Dentition: Some Missing Teeth  Oral Motor: Unable to Complete      Consistencies Trialed:  Regular solids  Pureed solids  Thin liquids       Clinical Observations:  Clinical Observations  Patient Positioning: Upright in Bed  Was The 3 oz Swallow Protocol Completed: Yes  Prolonged Oral Manipulation: Regular (IDDSI Level 7)  Oral Residue: Regular (IDDSI Level 7)  Clinical Observation Comment: Pt presents with mild-mod oral dysphagia characterized by reduced OM coordination, decreased AP transfer, and prolonged mastication of regular solids. Premature spillage and delayed swallow initiation suspected. Pharyngeal dysphagia not suspected with no overt s.s of aspiration. Pt passed 3oz swallow screen. Will continue to assess.  Pt is a low risk for aspiration given cognition, pulmonary status, and oral health.      Goals:   Pt will tolerate recommended diet with no overt s/s of difficulty or aspiration.  Pt will tolerate advanced solid trials with no overt s/s of difficulty or aspiration to assess readiness for diet upgrade.         Education:  Learner patient;     Barriers to Learning  acuteness of illness barrier; cognitive limitations barrier   Method demonstration; verbal;    Education - Topic ST provided patient education regarding role of ST, purpose of assessment, clinical impressions, goals of treatment, and plan of care. Patient verbalized questionable full comprehension, consistent with cognitive status. Education will be reinforced. ST further coordinated with RN regarding recommendations and precautions per this assessment, with RN verbalizing understanding.     Outcome    Verbalized understanding and agreement; needs instruction; needs review/reinforcement; teach back/return demonstration; unable to meet; partially meets; meets goals/outcomes

## 2024-11-17 NOTE — H&P
History Of Present Illness  Samuel Mims is a 48 y.o. female presenting to [facility] on 11/16/2024 with pertinent medical history of HTN, asthma, GERD, IUD (placed 17 years ago per ), and c-diff (9/2024) who presents to the ED with confusion, increased sleeping, decreased oral intake, nausea with intermittent vomiting, and diarrhea on Thursday from home. Per her  she was sitting in her urine for a couple days and had diarrhea on Thursday. Her son also reports that she has been confused and not eating or drinking for a couple days and they decided to bring her to the hospital today as she was not getting better. HPI and history obtained from the patient's , ED physician, and previous medical records as patient is lethargic and confused.  Of note, the patient was hospitalized on 11/1-11/2/24 with hypokalemia, vomiting, and c-diff (tx with oral vancomycin 11/5-11/18/24).  Of note, ED physician talked to OB/GYN on-call regarding IUD, he reports they said she could follow-up outpatient for removal.  Limited ROS due to patient's confusion, she does deny any pain.    ED Course:  Vital signs: Temp. 36.9, -102, RR 28-22, /109> 141/90, SPO2 98% on room air  CBC: Unremarkable  Lactate: 1.8  CMP: Total bilirubin 1.6  UA: 50 protein, 20 ketones, 2+ nitrates, WBC 21-50, RBC 11-20, 4+ bacteria  Urine toxicology: Negative  Ethanol level, acetaminophen level, and acetylsalicylic acid level: negative.  EKG: Sinus tachycardia, ventricular rate 145, , QRS 68, QTc 531. No ST elevation or depression noted.   CT abdomen/pelvis: Fluid-filled segments of colon, correlate with diarrhea/colitis.  CT head: No acute intercranial hemorrhage or mass effect.  Medications: Flagyl 500 mg IV, vancomycin 2 g IV, cefepime 2 g IV, Cozaar 100 mg p.o., Toradol 15 mg IV, labetalol 20 mg IV, LR x 2 L.  Disposition: Patient admitted for sepsis with encephalopathy without septic shock, UTI, HTN, colitis, sinus  tachycardia, and diarrhea.     Past Medical History  She has a past medical history of Asthma, Clostridium difficile infection, GERD (gastroesophageal reflux disease), and Hypertension.    Surgical History  She has a past surgical history that includes Cholecystectomy; Esophagogastroduodenoscopy; and Colonoscopy.     Social History  She reports that she has quit smoking. Her smoking use included cigarettes. She started smoking about 4 years ago. She has a 4.9 pack-year smoking history. She has never used smokeless tobacco. She reports that she does not drink alcohol and does not use drugs.    Family History  Family History   Problem Relation Name Age of Onset    Breast cancer Mother  63    Breast cancer Sister  63        Allergies  Tiotropium bromide, Ace inhibitors, Escitalopram, Hydrochlorothiazide, and House dust mite    Review of Systems   Reason unable to perform ROS: Limited ROS as patient lethargic with confusion.  Patient denies having any pain.           Physical Exam  Constitutional: Lethargic, Calm, and Cooperative. Speech clear-delayed responses. No acute distress.  Skin: Pink, warm, and dry. No lesions or rashes.  Eyes: PERRL, sclera clear, No swelling or drainage noted  ENMT: Mucous membranes pink and dry, no apparent injury, no lesions seen  Head/Neck: Neck Supple, no apparent injury, trachea midline, no palpable masses on head  Cardiac: Regular rhythm and rate, Auscultated S1 & S2, no murmurs  Lungs: CTAB, symmetrical chest rise, no accessory muscle usage, unlabored, room air Abdomen: Soft, non-tender, no rebound tenderness or guarding, nondistended, positive bowel sounds in all quadrants  Musculoskeletal: ROM intact, no joint swelling, normal strength  Extremities: BLE NP edema, No cyanosis, 1+ pulses of the extremities, see skin assessment for wounds  Neurological: Lethargic and Oriented x 1-2-delayed responses, intact senses, bilateral hand grasps and foot push/pulls equal.  Psychological: Unable  "to assess      Last Recorded Vitals  Blood pressure (!) 174/113, pulse (!) 131, temperature 36.5 °C (97.7 °F), temperature source Temporal, resp. rate (!) 22, height 1.676 m (5' 6\"), weight 127 kg (280 lb), SpO2 99%.  Visit Vitals  BP (!) 174/113   Pulse (!) 131   Temp 36.5 °C (97.7 °F) (Temporal)   Resp (!) 22   Ht 1.676 m (5' 6\")   Wt 127 kg (280 lb)   SpO2 99%   BMI 45.19 kg/m²   OB Status Implant   Smoking Status Former   BSA 2.43 m²     Weight: 127 kg (280 lb)   Pain Assessment: 0-10  0-10 (Numeric) Pain Score: 0 - No pain        Relevant Results  Results for orders placed or performed during the hospital encounter of 11/16/24 (from the past 24 hours)   CBC with Differential   Result Value Ref Range    WBC 10.0 4.4 - 11.3 x10*3/uL    nRBC 0.0 0.0 - 0.0 /100 WBCs    RBC 4.93 4.00 - 5.20 x10*6/uL    Hemoglobin 13.3 12.0 - 16.0 g/dL    Hematocrit 41.5 36.0 - 46.0 %    MCV 84 80 - 100 fL    MCH 27.0 26.0 - 34.0 pg    MCHC 32.0 32.0 - 36.0 g/dL    RDW 16.0 (H) 11.5 - 14.5 %    Platelets 262 150 - 450 x10*3/uL    Neutrophils % 70.7 40.0 - 80.0 %    Immature Granulocytes %, Automated 0.7 0.0 - 0.9 %    Lymphocytes % 17.5 13.0 - 44.0 %    Monocytes % 9.2 2.0 - 10.0 %    Eosinophils % 1.4 0.0 - 6.0 %    Basophils % 0.5 0.0 - 2.0 %    Neutrophils Absolute 7.04 1.20 - 7.70 x10*3/uL    Immature Granulocytes Absolute, Automated 0.07 0.00 - 0.70 x10*3/uL    Lymphocytes Absolute 1.74 1.20 - 4.80 x10*3/uL    Monocytes Absolute 0.92 0.10 - 1.00 x10*3/uL    Eosinophils Absolute 0.14 0.00 - 0.70 x10*3/uL    Basophils Absolute 0.05 0.00 - 0.10 x10*3/uL   Comprehensive Metabolic Panel   Result Value Ref Range    Glucose 165 (H) 74 - 99 mg/dL    Sodium 142 136 - 145 mmol/L    Potassium 4.1 3.5 - 5.3 mmol/L    Chloride 106 98 - 107 mmol/L    Bicarbonate 21 21 - 32 mmol/L    Anion Gap 19 10 - 20 mmol/L    Urea Nitrogen 22 6 - 23 mg/dL    Creatinine 0.95 0.50 - 1.05 mg/dL    eGFR 74 >60 mL/min/1.73m*2    Calcium 9.7 8.6 - 10.3 mg/dL    " Albumin 4.3 3.4 - 5.0 g/dL    Alkaline Phosphatase 68 33 - 110 U/L    Total Protein 7.6 6.4 - 8.2 g/dL    AST 27 9 - 39 U/L    Bilirubin, Total 1.6 (H) 0.0 - 1.2 mg/dL    ALT 42 7 - 45 U/L   Lactate   Result Value Ref Range    Lactate 1.8 0.4 - 2.0 mmol/L   Acute Toxicology Panel, Blood   Result Value Ref Range    Acetaminophen <10.0 10.0 - 30.0 ug/mL    Salicylate  <3 4 - 20 mg/dL    Alcohol <10 <=10 mg/dL   Urinalysis with Reflex Culture and Microscopic   Result Value Ref Range    Color, Urine Yellow Light-Yellow, Yellow, Dark-Yellow    Appearance, Urine Turbid (N) Clear    Specific Gravity, Urine 1.026 1.005 - 1.035    pH, Urine 5.5 5.0, 5.5, 6.0, 6.5, 7.0, 7.5, 8.0    Protein, Urine 50 (1+) (A) NEGATIVE, 10 (TRACE), 20 (TRACE) mg/dL    Glucose, Urine Normal Normal mg/dL    Blood, Urine NEGATIVE NEGATIVE    Ketones, Urine 20 (1+) (A) NEGATIVE mg/dL    Bilirubin, Urine NEGATIVE NEGATIVE    Urobilinogen, Urine Normal Normal mg/dL    Nitrite, Urine 2+ (A) NEGATIVE    Leukocyte Esterase, Urine 250 Tika/µL (A) NEGATIVE   Drug Screen, Urine   Result Value Ref Range    Amphetamine Screen, Urine Presumptive Negative Presumptive Negative    Barbiturate Screen, Urine Presumptive Negative Presumptive Negative    Benzodiazepines Screen, Urine Presumptive Negative Presumptive Negative    Cannabinoid Screen, Urine Presumptive Negative Presumptive Negative    Cocaine Metabolite Screen, Urine Presumptive Negative Presumptive Negative    Fentanyl Screen, Urine Presumptive Negative Presumptive Negative    Opiate Screen, Urine Presumptive Negative Presumptive Negative    Oxycodone Screen, Urine Presumptive Negative Presumptive Negative    PCP Screen, Urine Presumptive Negative Presumptive Negative    Methadone Screen, Urine Presumptive Negative Presumptive Negative   Microscopic Only, Urine   Result Value Ref Range    WBC, Urine 21-50 (A) 1-5, NONE /HPF    RBC, Urine 11-20 (A) NONE, 1-2, 3-5 /HPF    Squamous Epithelial Cells, Urine  1-9 (SPARSE) Reference range not established. /HPF    Bacteria, Urine 4+ (A) NONE SEEN /HPF    Mucus, Urine 4+ Reference range not established. /LPF   BLOOD GAS VENOUS FULL PANEL   Result Value Ref Range    POCT pH, Venous 7.44 (H) 7.33 - 7.43 pH    POCT pCO2, Venous 31 (L) 41 - 51 mm Hg    POCT pO2, Venous 53 (H) 35 - 45 mm Hg    POCT SO2, Venous 84 (H) 45 - 75 %    POCT Oxy Hemoglobin, Venous 81.7 (H) 45.0 - 75.0 %    POCT Hematocrit Calculated, Venous 39.0 36.0 - 46.0 %    POCT Sodium, Venous 136 136 - 145 mmol/L    POCT Potassium, Venous 4.3 3.5 - 5.3 mmol/L    POCT Chloride, Venous 107 98 - 107 mmol/L    POCT Ionized Calicum, Venous 1.18 1.10 - 1.33 mmol/L    POCT Glucose, Venous 146 (H) 74 - 99 mg/dL    POCT Lactate, Venous 3.3 (H) 0.4 - 2.0 mmol/L    POCT Base Excess, Venous -2.2 (L) -2.0 - 3.0 mmol/L    POCT HCO3 Calculated, Venous 21.1 (L) 22.0 - 26.0 mmol/L    POCT Hemoglobin, Venous 13.1 12.0 - 16.0 g/dL    POCT Anion Gap, Venous 12.0 10.0 - 25.0 mmol/L    Patient Temperature      FiO2 21 %   Lavender Top   Result Value Ref Range    Extra Tube Hold for add-ons.    SST TOP   Result Value Ref Range    Extra Tube Hold for add-ons.       CT abdomen pelvis w IV contrast    Result Date: 11/16/2024  Interpreted By:  Boogie Nance, STUDY: CT ABDOMEN PELVIS W IV CONTRAST;  11/16/2024 7:54 pm   INDICATION: Signs/Symptoms:generalized ttp, sepsis.   COMPARISON: None.   ACCESSION NUMBER(S): [id number]   ORDERING CLINICIAN: ZAHRA MATTHEW   TECHNIQUE: Contiguous axial images of the abdomen and pelvis were obtained after the intravenous administration of  contrast. Coronal and sagittal reformatted images were obtained from the axial images.   FINDINGS: There is limited evaluation the lung bases. Mild basilar atelectasis. No pleural effusion.   Evaluation of the abdomen and pelvis is limited secondary to patient body habitus, motion, and beam hardening artifact secondary to inferior position of the patient's arms. Mild  hypodensity near the falciform ligament may correspond to focal fatty infiltration. The gallbladder surgically absent.   The pancreas, spleen, and adrenal glands appear unremarkable.   Symmetric enhancement of the kidneys. No hydronephrosis.   No evidence of bowel obstruction. Fluid-filled segments of colon. There is underdistention versus mild wall thickening and submucosal fat deposition in loops of ileum in the right lower quadrant which may relate to chronic inflammatory process.   Urinary bladder appears unremarkable.   Limited evaluation of the uterus and adnexa. Intrauterine device. 2.4 cm x 1.8 cm cystic lesion in the right adnexa.   Multilevel degenerative change of the lumbar spine.       Fluid-filled segments of colon; please correlate for diarrhea/colitis. There is underdistention versus mild wall thickening and submucosal fat deposition in loops of ileum in the right lower quadrant which may relate to chronic inflammatory process.       MACRO: None   Signed by: Boogie Nance 11/16/2024 8:16 PM Dictation workstation:   [id number]    CT head wo IV contrast    Result Date: 11/16/2024  Interpreted By:  Boogie Nance, STUDY: CT HEAD WO IV CONTRAST;  11/16/2024 7:54 pm   INDICATION: Signs/Symptoms:AMS.   COMPARISON: None   ACCESSION NUMBER(S): [id number]   ORDERING CLINICIAN: ZAHRA MATTHEW   TECHNIQUE: Contiguous axial images of the head were obtained without intravenous contrast.   FINDINGS: BRAIN PARENCHYMA:   The gray white matter differentiation is preserved.  No mass effect or midline shift.   HEMORRHAGE:  No evidence of acute intracranial hemorrhage. VENTRICLES AND EXTRA-AXIAL SPACES:  The ventricles are within normal limits in size for brain volume.  No evidence of abnormal extraaxial fluid collection. EXTRACRANIAL SOFT TISSUES:  Within normal limits. PARANASAL SINUSES/MASTOIDS:  The visualized paranasal sinuses and mastoid air cells are clear and well pneumatized. CALVARIUM:  No evidence of  depressed calvarial fracture.   OTHER FINDINGS:  None       No evidence of acute intracranial hemorrhage or mass effect.       MACRO: None   Signed by: Boogie Nance 11/16/2024 8:11 PM Dictation workstation:   [id number]        Home Medications  Prior to Admission medications    Medication Sig Start Date End Date Taking? Authorizing Provider   albuterol 90 mcg/actuation inhaler Inhale 2 puffs every 4 hours if needed for wheezing.    Historical Provider, MD   busPIRone (Buspar) 10 mg tablet Take 1 tablet (10 mg) by mouth 2 times a day.    Historical Provider, MD   clonazePAM (KlonoPIN) 0.5 mg tablet Take 1 tablet (0.5 mg) by mouth once daily as needed for anxiety.    Historical Provider, MD   esomeprazole (NexIUM) 40 mg DR capsule Take 1 capsule (40 mg) by mouth 2 times a day. Do not open capsule.    Historical Provider, MD   fluticasone propion-salmeteroL (Advair) 115-21 mcg/actuation inhaler Inhale 2 puffs 2 times a day. Rinse mouth with water after use to reduce aftertaste and incidence of candidiasis. Do not swallow.    Historical Provider, MD   losartan (Cozaar) 100 mg tablet Take 1 tablet (100 mg) by mouth once daily.    Historical Provider, MD   metoclopramide (Reglan) 10 mg tablet Take 1 tablet (10 mg) by mouth 4 times a day as needed (nausea/vomiting) for up to 20 doses. As needed for nausea/vomiting  Patient not taking: Reported on 11/16/2024 10/10/24   Traci Trevino PA-C   ondansetron ODT (Zofran-ODT) 4 mg disintegrating tablet Take 1 tablet (4 mg) by mouth every 8 hours if needed for nausea or vomiting.  Patient not taking: Reported on 11/16/2024 10/10/24   Traci Trevino PA-C   pantoprazole (ProtoNix) 40 mg EC tablet Take 1 tablet (40 mg) by mouth once daily in the morning. Take before meals. Do not crush, chew, or split.    Historical Provider, MD   spironolactone (Aldactone) 25 mg tablet Take 0.5 tablets (12.5 mg) by mouth once daily.    Historical Provider, MD   vancomycin (Vancocin) 125 mg  capsule Take 1 capsule (125 mg) by mouth 4 times a day. 11-5-24 x 14 days    Historical Provider, MD   verapamil SR (Calan-SR) 240 mg ER tablet Take 1 tablet (240 mg) by mouth once daily at bedtime. Do not crush or chew.    Historical Provider, MD       Medications  Scheduled medications  budesonide, 0.5 mg, nebulization, BID  [START ON 11/17/2024] busPIRone, 10 mg, oral, BID  [START ON 11/17/2024] cefepime, 2 g, intravenous, q8h  formoterol, 20 mcg, nebulization, BID  heparin (porcine), 7,500 Units, subcutaneous, q8h SANTIAGO  [START ON 11/17/2024] losartan, 100 mg, oral, Daily  [START ON 11/17/2024] metroNIDAZOLE, 500 mg, intravenous, q8h  [START ON 11/17/2024] pantoprazole, 40 mg, oral, BID AC  [START ON 11/17/2024] spironolactone, 12.5 mg, oral, Daily  vancomycin, 125 mg, oral, 4x daily  verapamil SR, 240 mg, oral, Nightly      Continuous medications  sodium chloride 0.9%, 100 mL/hr      PRN medications  PRN medications: albuterol, [Held by provider] clonazePAM, melatonin, vancomycin            Assessment & Plan  Sepsis with encephalopathy without septic shock, due to unspecified organism (Multi)    UTI (urinary tract infection)    Hypertension    Colitis    Sinus tachycardia    Diarrhea    Prolonged Q-T interval on ECG    Clostridium difficile infection                Plan:  Code Status: Full Code   Consult: GI, ID  ATB: Vancomycin 125 mg p.o. 4 times daily until 11/18/2024, cefepime 2 g IV every 8 hours, vancomycin pharmacy to dose, Flagyl 500 mg IV every 8 hours  IVFs: Normal saline at 100 mL/h x 10 hours  Meds:  melatonin 3 milligrams p.o. daily as needed for insomnia, MiraLAX 17 gms p.o daily as needed for constipation.  Home meds resumed as appropriate.  Klonopin placed on hold due to patient's lethargy.  Parameters added to verapamil and losartan.  Vancomycin p.o. continued through 11/18/2024  Avoid hepatic toxic medications   Avoid medication that will prolong the QT  Diet: N.p.o. except meds with sips until  more awake  Telemetry monitoring   Vital signs with BP, HR, & RR Q 4 hours.  Pulse ox Q 4 hours.   Strict I&Os every shift  Neuro checks  Admission height and weight.  Labs ordered: CBC, CMP due to elevated total bilirubin, Mg, Phos, C. difficile PCR, Urine culture and Blood cultures x 2 obtained in the ED, results pending  Test ordered:  Defer to attending and/or consults  Monitor / trend labs while inpatient.  Replace electrolytes as needed.  Aspiration precautions.  Fall precautions  Out of bed with assistance   Call physician for temperature < 36.5 or >38.0 degrees celsius.  Call physician for pulse <50 or >120.  Call physician for respiratory rate <10 or >22.  Call physician for systolic blood pressure <90 or >170.  Call physician for diastolic blood pressure < 50 or >100.  Call physician for pulse ox <92%.  See orders for further plan of care ordered.     VTE prophylaxis:   Heparin SQ  BLE SCDs.  Monitor for s/s of bleeding    Further evaluation and management per attending and consulting physicians.      This note has been transcribed using Dragon voice recognition system and there is a possibility of unintentional typing misprints.  Any information found to be copied from previous providers is done in the best interest of the patient to provide accurate, quality, and continuity of care.     I spent 45 minutes in the professional and overall care of this patient.      Darling Benítez, [facility]  Med. [place]

## 2024-11-17 NOTE — CARE PLAN
Pt did not show signs of improving cognition/mentation during this shift. Pt AO x1-2 upon arrival from ER (oriented to person and place). Very delayed responses and pt lethargic. Takes repeated stimulation to get responses/answers to questions. Pupils equal and reactive, follows most commands. Mentation waxes and wanes, sometimes pt more alert and answers questions relatively quickly and correctly. Other times, pt stares at nurse when asked questions and either does not respond or is very delayed in responses. Provider aware of all cognitive changes/status. This am, pt stared when asked questions and did not respond to questions, provider notified and at bedside to evaluate. Agreed that pt waxes and wanes upon assessment. BP elevated during the shift several times, IV hydralazine and IV labetalol given per provider order, IV labetalol more effective for this pt's BP. Remained ST during the shift (100s-120). Provider aware of ST. PO meds held per provider order, pt too lethargic to swallow PO meds at this time. Needs speech eval. VS otherwise stable. No complaints of pain, pt in no apparent distress during the shift. Isolation maintained for C. Diff. Safety maintained.    Problem: Skin  Goal: Prevent/manage excess moisture  11/17/2024 0640 by Rosaura Diop RN  Outcome: Progressing     Problem: Skin  Goal: Prevent/minimize sheer/friction injuries  11/17/2024 0640 by Rosaura Diop RN  Outcome: Progressing     Problem: Skin  Goal: Promote/optimize nutrition  11/17/2024 0640 by Rosaura Diop RN  Outcome: Progressing     Problem: Skin  Goal: Promote skin healing  11/17/2024 0640 by Rosaura Diop RN  Outcome: Progressing     Problem: Pain - Adult  Goal: Verbalizes/displays adequate comfort level or baseline comfort level  11/17/2024 0640 by Rosaura Diop RN  Outcome: Progressing     Problem: Safety - Adult  Goal: Free from fall injury  11/17/2024 0640 by Rosaura Diop RN  Outcome: Met     Problem:  Discharge Planning  Goal: Discharge to home or other facility with appropriate resources  11/17/2024 0640 by Rosaura Diop RN  Outcome: Progressing     Problem: Chronic Conditions and Co-morbidities  Goal: Patient's chronic conditions and co-morbidity symptoms are monitored and maintained or improved  11/17/2024 0640 by Rosaura Diop RN  Outcome: Progressing     Problem: Fall/Injury  Goal: Not fall by end of shift  Outcome: Met     Problem: Fall/Injury  Goal: Be free from injury by end of the shift  Outcome: Met     Problem: Fall/Injury  Goal: Verbalize understanding of risk factor reduction measures to prevent injury from fall in the home  Outcome: Progressing     Problem: Pain  Goal: Takes deep breaths with improved pain control throughout the shift  Outcome: Progressing     Problem: Pain  Goal: Turns in bed with improved pain control throughout the shift  Outcome: Progressing     Problem: Arrythmia/Dysrhythmia  Goal: Promote self management  11/17/2024 0640 by Rosaura Diop RN  Outcome: Progressing     Problem: Arrythmia/Dysrhythmia  Goal: Serial ECG will return to baseline  11/17/2024 0640 by Rosaura Diop RN  Outcome: Progressing     Problem: Arrythmia/Dysrhythmia  Goal: Vital signs return to baseline  11/17/2024 0640 by Rosaura Diop RN  Outcome: Progressing     Problem: Arrythmia/Dysrhythmia  Goal: Lab values return to normal range  11/17/2024 0640 by Rosaura Diop RN  Outcome: Progressing   The patient's goals for the shift include The patient is too lethargic at this time to participate in her care plan    The clinical goals for the shift include The patient will have improved mentation and concentration by 11/18 0700  The patient will not show worsening signs of Sepsis by 11/18 0700

## 2024-11-17 NOTE — PROGRESS NOTES
"Samuel Mims is a 48 y.o. female on day 1 of admission presenting with Sepsis with encephalopathy without septic shock, due to unspecified organism (Multi).    Subjective   Patient well known to me with medical issues including HTN, Asthma, GERD and Morbid Obesity.  Was treated for severe C. Difficile colitis with 45 pound weight loss over one month period of time and was admitted 11/1-2/24 for associated hypokalemia and vomiting. Sent home on 10 days of oral Vancomycin but per  never took the pills as the bottle of Vancomyin is still full. Her  states that she \"has been not with it\" and never returned to work.  She has been laying around in bed, not doing housework, she c/o ringing in the ears since discharge on 11/2/24.  The last time she ate was a muffin on 11/11/24.  He is not sure if she has had diarrhea but on 11/14 she stayed in the bathroom for most of the day.    Finally Patient brought in by family due to confusion, and worsening lethargy on 11/16/24.  Work up in ED showed no fever, normal WBC count, normal CMP, normal lactate, negative urine toxicology, sinus tachycardia, elevated BP as non compliant with oral antihypertensives for a few days prior to admission, CT head normal, CT a/p fluid filled segments of colon with possible colitis.      Pt admitted for presumed sepsis with encephalopathy and started on IV fluids, IV Flagyl, Vancomycin and cefepime.  Appreciate ID physician Dr Stubbs's consult recommending to continue with IV Vancomycin and oral Vancomycin, change Cefepime and Flagyl to Zosyn.  Neuro Consult felt that this confusion was due to metabolic encephalopathy and no further neurological intervention recommended.       Objective     Last Recorded Vitals  BP (!) 178/106 (BP Location: Right arm, Patient Position: Lying)   Pulse (!) 114   Temp 36.3 °C (97.3 °F) (Temporal)   Resp 16   Wt 109 kg (240 lb 1.3 oz)   SpO2 98%   Intake/Output last 3 Shifts:    Intake/Output Summary " (Last 24 hours) at 11/17/2024 1302  Last data filed at 11/17/2024 1000  Gross per 24 hour   Intake 1718.33 ml   Output 300 ml   Net 1418.33 ml       Admission Weight  Weight: 127 kg (280 lb) (11/16/24 1755)    Daily Weight  11/17/24 : 109 kg (240 lb 1.3 oz)      Physical Exam:  General: Not in acute distress, alert  HEENT: PERRLA, head intact and normocephalic  Neck: Normal to inspection  Lungs: Clear to auscultation, work of breathing within normal limit  Cardiac: Regular rate and rhythm  Abdomen: Soft nontender, positive bowel sounds  : Exam deferred  Skin: Intact  Hematology: No petechia or excessive ecchymosis  Musculoskeletal: Without significant trauma  Neurological: Alert awake not oriented to person, place or time. Unable to answer questions, very confused. Very delayed response.     Relevant Results  Scheduled medications  budesonide, 0.5 mg, nebulization, BID  busPIRone, 10 mg, oral, BID  influenza, 0.5 mL, intramuscular, During hospitalization  formoterol, 20 mcg, nebulization, BID  heparin (porcine), 5,000 Units, subcutaneous, q8h SANTIAGO  losartan, 100 mg, oral, Daily  [Held by provider] pantoprazole, 40 mg, oral, BID AC  pantoprazole, 40 mg, intravenous, BID AC  piperacillin-tazobactam, 3.375 g, intravenous, q8h  spironolactone, 12.5 mg, oral, Daily  vancomycin, 125 mg, oral, 4x daily  vancomycin, 1,250 mg, intravenous, q12h  verapamil SR, 240 mg, oral, Nightly      Continuous medications     PRN medications  PRN medications: albuterol, [Held by provider] clonazePAM, melatonin, vancomycin   Results for orders placed or performed during the hospital encounter of 11/16/24 (from the past 24 hours)   ECG 12 lead   Result Value Ref Range    Ventricular Rate 145 BPM    Atrial Rate 145 BPM    ME Interval 138 ms    QRS Duration 68 ms    QT Interval 342 ms    QTC Calculation(Bazett) 531 ms    P Axis 33 degrees    R Axis -52 degrees    T Axis 89 degrees    QRS Count 23 beats    Q Onset 224 ms    P Onset 155 ms     P Offset 201 ms    T Offset 395 ms    QTC Fredericia 458 ms   CBC with Differential   Result Value Ref Range    WBC 10.0 4.4 - 11.3 x10*3/uL    nRBC 0.0 0.0 - 0.0 /100 WBCs    RBC 4.93 4.00 - 5.20 x10*6/uL    Hemoglobin 13.3 12.0 - 16.0 g/dL    Hematocrit 41.5 36.0 - 46.0 %    MCV 84 80 - 100 fL    MCH 27.0 26.0 - 34.0 pg    MCHC 32.0 32.0 - 36.0 g/dL    RDW 16.0 (H) 11.5 - 14.5 %    Platelets 262 150 - 450 x10*3/uL    Neutrophils % 70.7 40.0 - 80.0 %    Immature Granulocytes %, Automated 0.7 0.0 - 0.9 %    Lymphocytes % 17.5 13.0 - 44.0 %    Monocytes % 9.2 2.0 - 10.0 %    Eosinophils % 1.4 0.0 - 6.0 %    Basophils % 0.5 0.0 - 2.0 %    Neutrophils Absolute 7.04 1.20 - 7.70 x10*3/uL    Immature Granulocytes Absolute, Automated 0.07 0.00 - 0.70 x10*3/uL    Lymphocytes Absolute 1.74 1.20 - 4.80 x10*3/uL    Monocytes Absolute 0.92 0.10 - 1.00 x10*3/uL    Eosinophils Absolute 0.14 0.00 - 0.70 x10*3/uL    Basophils Absolute 0.05 0.00 - 0.10 x10*3/uL   Comprehensive Metabolic Panel   Result Value Ref Range    Glucose 165 (H) 74 - 99 mg/dL    Sodium 142 136 - 145 mmol/L    Potassium 4.1 3.5 - 5.3 mmol/L    Chloride 106 98 - 107 mmol/L    Bicarbonate 21 21 - 32 mmol/L    Anion Gap 19 10 - 20 mmol/L    Urea Nitrogen 22 6 - 23 mg/dL    Creatinine 0.95 0.50 - 1.05 mg/dL    eGFR 74 >60 mL/min/1.73m*2    Calcium 9.7 8.6 - 10.3 mg/dL    Albumin 4.3 3.4 - 5.0 g/dL    Alkaline Phosphatase 68 33 - 110 U/L    Total Protein 7.6 6.4 - 8.2 g/dL    AST 27 9 - 39 U/L    Bilirubin, Total 1.6 (H) 0.0 - 1.2 mg/dL    ALT 42 7 - 45 U/L   Lactate   Result Value Ref Range    Lactate 1.8 0.4 - 2.0 mmol/L   Blood Culture    Specimen: Peripheral Venipuncture; Blood culture   Result Value Ref Range    Blood Culture Loaded on Instrument - Culture in progress    Blood Culture    Specimen: Peripheral Venipuncture; Blood culture   Result Value Ref Range    Blood Culture Loaded on Instrument - Culture in progress    Acute Toxicology Panel, Blood   Result  Value Ref Range    Acetaminophen <10.0 10.0 - 30.0 ug/mL    Salicylate  <3 4 - 20 mg/dL    Alcohol <10 <=10 mg/dL   Urinalysis with Reflex Culture and Microscopic   Result Value Ref Range    Color, Urine Yellow Light-Yellow, Yellow, Dark-Yellow    Appearance, Urine Turbid (N) Clear    Specific Gravity, Urine 1.026 1.005 - 1.035    pH, Urine 5.5 5.0, 5.5, 6.0, 6.5, 7.0, 7.5, 8.0    Protein, Urine 50 (1+) (A) NEGATIVE, 10 (TRACE), 20 (TRACE) mg/dL    Glucose, Urine Normal Normal mg/dL    Blood, Urine NEGATIVE NEGATIVE    Ketones, Urine 20 (1+) (A) NEGATIVE mg/dL    Bilirubin, Urine NEGATIVE NEGATIVE    Urobilinogen, Urine Normal Normal mg/dL    Nitrite, Urine 2+ (A) NEGATIVE    Leukocyte Esterase, Urine 250 Tika/µL (A) NEGATIVE   Extra Urine Gray Tube   Result Value Ref Range    Extra Tube Hold for add-ons.    Drug Screen, Urine   Result Value Ref Range    Amphetamine Screen, Urine Presumptive Negative Presumptive Negative    Barbiturate Screen, Urine Presumptive Negative Presumptive Negative    Benzodiazepines Screen, Urine Presumptive Negative Presumptive Negative    Cannabinoid Screen, Urine Presumptive Negative Presumptive Negative    Cocaine Metabolite Screen, Urine Presumptive Negative Presumptive Negative    Fentanyl Screen, Urine Presumptive Negative Presumptive Negative    Opiate Screen, Urine Presumptive Negative Presumptive Negative    Oxycodone Screen, Urine Presumptive Negative Presumptive Negative    PCP Screen, Urine Presumptive Negative Presumptive Negative    Methadone Screen, Urine Presumptive Negative Presumptive Negative   Microscopic Only, Urine   Result Value Ref Range    WBC, Urine 21-50 (A) 1-5, NONE /HPF    RBC, Urine 11-20 (A) NONE, 1-2, 3-5 /HPF    Squamous Epithelial Cells, Urine 1-9 (SPARSE) Reference range not established. /HPF    Bacteria, Urine 4+ (A) NONE SEEN /HPF    Mucus, Urine 4+ Reference range not established. /LPF   BLOOD GAS VENOUS FULL PANEL   Result Value Ref Range    POCT pH,  Venous 7.44 (H) 7.33 - 7.43 pH    POCT pCO2, Venous 31 (L) 41 - 51 mm Hg    POCT pO2, Venous 53 (H) 35 - 45 mm Hg    POCT SO2, Venous 84 (H) 45 - 75 %    POCT Oxy Hemoglobin, Venous 81.7 (H) 45.0 - 75.0 %    POCT Hematocrit Calculated, Venous 39.0 36.0 - 46.0 %    POCT Sodium, Venous 136 136 - 145 mmol/L    POCT Potassium, Venous 4.3 3.5 - 5.3 mmol/L    POCT Chloride, Venous 107 98 - 107 mmol/L    POCT Ionized Calicum, Venous 1.18 1.10 - 1.33 mmol/L    POCT Glucose, Venous 146 (H) 74 - 99 mg/dL    POCT Lactate, Venous 3.3 (H) 0.4 - 2.0 mmol/L    POCT Base Excess, Venous -2.2 (L) -2.0 - 3.0 mmol/L    POCT HCO3 Calculated, Venous 21.1 (L) 22.0 - 26.0 mmol/L    POCT Hemoglobin, Venous 13.1 12.0 - 16.0 g/dL    POCT Anion Gap, Venous 12.0 10.0 - 25.0 mmol/L    Patient Temperature      FiO2 21 %   Lavender Top   Result Value Ref Range    Extra Tube Hold for add-ons.    SST TOP   Result Value Ref Range    Extra Tube Hold for add-ons.    Magnesium   Result Value Ref Range    Magnesium 1.91 1.60 - 2.40 mg/dL   Phosphorus   Result Value Ref Range    Phosphorus 3.5 2.5 - 4.9 mg/dL   Comprehensive Metabolic Panel   Result Value Ref Range    Glucose 135 (H) 74 - 99 mg/dL    Sodium 142 136 - 145 mmol/L    Potassium 4.3 3.5 - 5.3 mmol/L    Chloride 110 (H) 98 - 107 mmol/L    Bicarbonate 19 (L) 21 - 32 mmol/L    Anion Gap 17 10 - 20 mmol/L    Urea Nitrogen 19 6 - 23 mg/dL    Creatinine 0.84 0.50 - 1.05 mg/dL    eGFR 85 >60 mL/min/1.73m*2    Calcium 9.0 8.6 - 10.3 mg/dL    Albumin 3.6 3.4 - 5.0 g/dL    Alkaline Phosphatase 61 33 - 110 U/L    Total Protein 6.4 6.4 - 8.2 g/dL    AST 24 9 - 39 U/L    Bilirubin, Total 1.4 (H) 0.0 - 1.2 mg/dL    ALT 30 7 - 45 U/L   CBC   Result Value Ref Range    WBC 6.3 4.4 - 11.3 x10*3/uL    nRBC 0.0 0.0 - 0.0 /100 WBCs    RBC 4.22 4.00 - 5.20 x10*6/uL    Hemoglobin 11.7 (L) 12.0 - 16.0 g/dL    Hematocrit 35.8 (L) 36.0 - 46.0 %    MCV 85 80 - 100 fL    MCH 27.7 26.0 - 34.0 pg    MCHC 32.7 32.0 - 36.0  g/dL    RDW 16.2 (H) 11.5 - 14.5 %    Platelets 190 150 - 450 x10*3/uL      Lab Results   Component Value Date    BLOODCULT Loaded on Instrument - Culture in progress 11/16/2024    BLOODCULT Loaded on Instrument - Culture in progress 11/16/2024    URINECULTURE (A) 10/10/2024     Multiple organisms present, probable contamination. Repeat culture if clinically indicated.       ECG 12 lead    Result Date: 11/17/2024  Sinus tachycardia Left axis deviation Left ventricular hypertrophy with repolarization abnormality Inferior infarct (cited on or before 27-AUG-2024) Anteroseptal infarct (cited on or before 27-AUG-2024) Abnormal ECG When compared with ECG of 27-AUG-2024 02:40, ST now depressed in Lateral leads    CT abdomen pelvis w IV contrast    Result Date: 11/16/2024  Interpreted By:  Boogie Nance, STUDY: CT ABDOMEN PELVIS W IV CONTRAST;  11/16/2024 7:54 pm   INDICATION: Signs/Symptoms:generalized ttp, sepsis.   COMPARISON: None.   ACCESSION NUMBER(S): [id number]   ORDERING CLINICIAN: ZAHRA MATTHEW   TECHNIQUE: Contiguous axial images of the abdomen and pelvis were obtained after the intravenous administration of  contrast. Coronal and sagittal reformatted images were obtained from the axial images.   FINDINGS: There is limited evaluation the lung bases. Mild basilar atelectasis. No pleural effusion.   Evaluation of the abdomen and pelvis is limited secondary to patient body habitus, motion, and beam hardening artifact secondary to inferior position of the patient's arms. Mild hypodensity near the falciform ligament may correspond to focal fatty infiltration. The gallbladder surgically absent.   The pancreas, spleen, and adrenal glands appear unremarkable.   Symmetric enhancement of the kidneys. No hydronephrosis.   No evidence of bowel obstruction. Fluid-filled segments of colon. There is underdistention versus mild wall thickening and submucosal fat deposition in loops of ileum in the right lower quadrant which may  relate to chronic inflammatory process.   Urinary bladder appears unremarkable.   Limited evaluation of the uterus and adnexa. Intrauterine device. 2.4 cm x 1.8 cm cystic lesion in the right adnexa.   Multilevel degenerative change of the lumbar spine.       Fluid-filled segments of colon; please correlate for diarrhea/colitis. There is underdistention versus mild wall thickening and submucosal fat deposition in loops of ileum in the right lower quadrant which may relate to chronic inflammatory process.       MACRO: None   Signed by: Boogie Nance 11/16/2024 8:16 PM Dictation workstation:   [id number]    CT head wo IV contrast    Result Date: 11/16/2024  Interpreted By:  Boogie Nance, STUDY: CT HEAD WO IV CONTRAST;  11/16/2024 7:54 pm   INDICATION: Signs/Symptoms:AMS.   COMPARISON: None   ACCESSION NUMBER(S): [id number]   ORDERING CLINICIAN: ZAHRA MATTHEW   TECHNIQUE: Contiguous axial images of the head were obtained without intravenous contrast.   FINDINGS: BRAIN PARENCHYMA:   The gray white matter differentiation is preserved.  No mass effect or midline shift.   HEMORRHAGE:  No evidence of acute intracranial hemorrhage. VENTRICLES AND EXTRA-AXIAL SPACES:  The ventricles are within normal limits in size for brain volume.  No evidence of abnormal extraaxial fluid collection. EXTRACRANIAL SOFT TISSUES:  Within normal limits. PARANASAL SINUSES/MASTOIDS:  The visualized paranasal sinuses and mastoid air cells are clear and well pneumatized. CALVARIUM:  No evidence of depressed calvarial fracture.   OTHER FINDINGS:  None       No evidence of acute intracranial hemorrhage or mass effect.       MACRO: None   Signed by: Boogie Nance 11/16/2024 8:11 PM Dictation workstation:   [id number]    NM gastric emptying solid    Result Date: 11/6/2024  Interpreted By:  Laila Lugo, STUDY: NM GASTRIC EMPTYING SOLID; 11/6/2024 2:00 pm   INDICATION: Signs/Symptoms:NAUSEA.   COMPARISON: None.   ACCESSION NUMBER(S): [id number]    ORDERING CLINICIAN: JOSE VARGAS   TECHNIQUE: DIVISION OF NUCLEAR MEDICINE GASTRIC EMPTYING QUANTIFICATION   The patient received an oral radiolabeled solid-phase meal utilizing 1.0 mCi of Tc-99m sulfur colloid in cooked egg. Sequential anterior and posterior images of the abdomen were then acquired over the next four hours. Computer quantification of gastric emptying (using geometric mean activity) was performed.   FINDINGS: The image series shows prompt antegrade transit of radiolabeled solids from stomach to small bowel. Computer quantification demonstrates gastric retention as follows: 45 %  at 1 hr    (normal max 90%) 23 %  at 2 hr    (normal max 60%) 12 %  at 3 hr    (normal max 30%) 1 %  at 4 hr    (normal max 10%)       1. Normal gastric emptying of solids without evidence of gastroparesis.   I personally reviewed the images/study. This study was interpreted at [place], [place].   MACRO: None   Signed by: Laila Lugo 11/6/2024 2:04 PM Dictation workstation:   [id number]    Flexible Sigmoidoscopy    Result Date: 10/29/2024  [facility] Patient Name: Samuel ORTIZ Procedure Date: 10/29/2024 8:47 AM MRN: [id number] YOB: 1975 Age: 48 Gender: Female Race: Unknown Attending MD: Jose Vargas MD, [id number] Procedure:             Colonoscopy Referring MD:          PIPO JAMISON MD Providers:      Jose Vargas MD Indications:           Abnormal CT of the GI tract, Weight loss Findings:              The perianal and digital rectal examinations were                        normal.                        Multiple small and large-mouthed diverticula were                        found in the sigmoid colon.                        The terminal ileum appeared normal. Biopsies were                        taken with a cold forceps for histology. Verification                        of patient identification for the  specimen was done by                        the nurse using the patient's name, birth date and                        medical record number. Estimated blood loss was                        minimal.                        A 2 mm polyp was found in the ileocecal valve. The                        polyp was sessile. The polyp was removed with a cold                        biopsy forceps. Resection and retrieval were complete.                        Verification of patient identification for the                        specimen was done by the nurse using the patient's                        name, birth date and medical record number. Estimated                        blood loss was minimal.                        A 4 mm polyp was found in the cecum. The polyp was                        sessile. The polyp was removed with a cold snare.                        Resection and retrieval were complete. Verification of                        patient identification for the specimen was done by                        the nurse using the patient's name, birth date and                        medical record number. Estimated blood loss was                        minimal.                        Two sessile polyps were found in the ascending colon.                        The polyps were 5 to 7 mm in size. These polyps were                        removed with a cold snare. Resection and retrieval                        were complete. Verification of patient identification                        for the specimen was done by the nurse using the                        patient's name, birth date and medical record number.                        Estimated blood loss was minimal.                        The colon (entire examined portion) appeared otherwise                        normal. Biopsies were taken with a cold forceps for                        histology. Verification of patient identification for                        the specimen  was done by the nurse using the patient's                        name, birth date and medical record number. Estimated                        blood loss was minimal.                        Stool sample collected and submitted for C diff PCR                        and culture and fecal calprotectin.                        The retroflexed view of the distal rectum and anal                        verge was normal and showed no anal or rectal                        abnormalities. Patient Profile:       This is a 48 year old female. Refer to note in patient                        chart for documentation of history and physical. Impression:            - Diverticulosis in the sigmoid colon.                        - The examined portion of the ileum was normal.                        Biopsied.                        - One 2 mm polyp at the ileocecal valve, removed with                        a cold biopsy forceps. Resected and retrieved.                        - One 4 mm polyp in the cecum, removed with a cold                        snare. Resected and retrieved.                        - Two 5 to 7 mm polyps in the ascending colon, removed                        with a cold snare. Resected and retrieved.                        - The entire examined colon is otherwise normal.                        Biopsied.                        - The distal rectum and anal verge are normal on                        retroflexion view. Recommendation:        - Await pathology results.                        - Patient has a contact number available for                        emergencies. The signs and symptoms of potential                        delayed complications were discussed with the patient.                        Return to normal activities tomorrow. Written                        discharge instructions were provided to the patient.                        - High fiber diet.                        - Continue present medications.                         - Schedule gastric emptying study as previously                        recommended.                        - Repeat colonoscopy in 3 years for surveillance. Medicines:             Propofol per Anesthesia Procedure:             Pre-Anesthesia Assessment:                        - Prior to the procedure, a History and Physical was                        performed, and patient medications and allergies were                        reviewed. The patient is competent. The risks and                        benefits of the procedure and the sedation options and                        risks were discussed with the patient. All questions                        were answered and informed consent was obtained.                        Patient identification and proposed procedure were                        verified by the physician and the nurse in the                        pre-procedure area in the procedure room. Mental                        Status Examination: alert and oriented. Airway                        Examination: normal oropharyngeal airway and neck                        mobility. Respiratory Examination: clear to                        auscultation. CV Examination: normal. Prophylactic                        Antibiotics: The patient does not require prophylactic                        antibiotics. Prior Anticoagulants: The patient has                        taken no anticoagulant or antiplatelet agents. ASA                        Grade Assessment: III - A patient with severe systemic                        disease. After reviewing the risks and benefits, the                        patient was deemed in satisfactory condition to                        undergo the procedure. The anesthesia plan was to use                        deep sedation / analgesia. Immediately prior to                        administration of medications, the patient was                        re-assessed for adequacy to  receive sedatives. The                        heart rate, respiratory rate, oxygen saturations,                        blood pressure, adequacy of pulmonary ventilation, and                        response to care were monitored throughout the                        procedure. The physical status of the patient was                        re-assessed after the procedure.                        After I obtained informed consent, the scope was                        passed under direct vision. Throughout the procedure,                        the patient's blood pressure, pulse, and oxygen                        saturations were monitored continuously. Provation                        AI/GI Genius was used during withdrawal. The                        COLONOSCOPE was introduced through the anus and                        advanced to the terminal ileum. The colonoscopy was                        performed without difficulty. The patient tolerated                        the procedure well. The quality of the bowel                        preparation was good. The terminal ileum, ileocecal                        valve, appendiceal orifice, and rectum were                        photographed. The quality of the bowel preparation was                        evaluated using the BBPS (Carney Bowel Preparation                        Scale) with scores of: Right Colon = 2 (minor amount                        of residual staining, small fragments of stool and/or                        opaque liquid, but mucosa seen well), Transverse Colon                        = 3 (entire mucosa seen well with no residual                        staining, small fragments of stool or opaque liquid)                        and Left Colon = 3 (entire mucosa seen well with no                        residual staining, small fragments of stool or opaque                        liquid). The total BBPS score equals 8. Complications:         No immediate  complications. Procedure Code(s):     --- Professional ---                        27434, Colonoscopy, flexible; with removal of                        tumor(s), polyp(s), or other lesion(s) by snare                        technique                        23984, 59, Colonoscopy, flexible; with biopsy, single                        or multiple Diagnosis Code(s):     --- Professional ---                        D12.0, Benign neoplasm of cecum                        D12.2, Benign neoplasm of ascending colon                        R63.4, Abnormal weight loss                        K57.30, Diverticulosis of large intestine without                        perforation or abscess without bleeding                        R93.3, Abnormal findings on diagnostic imaging of                        other parts of digestive tract CPT copyright 2021 [employer]. All rights reserved. The codes documented in this report are preliminary and upon  review may be revised to meet current compliance requirements. Jose Fernandez MD 10/29/2024 9:42:34 AM Number of Addenda: 0 Note Initiated On: 10/29/2024 8:47 AM          Assessment/Plan   Samuel Mims is a 48 y.o. female on day 1 of admission presenting with Sepsis with encephalopathy without septic shock, due to unspecified organism (Multi).  Principal Problem:    Sepsis with encephalopathy without septic shock, due to unspecified organism (Multi)  Active Problems:    Diarrhea    UTI (urinary tract infection)    Hypertension    Colitis    Sinus tachycardia    Prolonged Q-T interval on ECG    Clostridium difficile infection    Altered mental status due to Metabolic encephalopathy  Possibly due to untreated C difficile infection and UTI.  CT head with no acute changes. Urine Tox screen negative.  Speech therapy started.     Untreated C. Difficile Colitis  Per  she has not taken the oral Vancomycin prescribed at discharge on 11/2/24.  Started on oral and IV  Vancomycin.  CT a/p confirms she continues to have colitis    Presumed UTI  Urine culture pending.  On Zosyn    HTN and Sinus Tachycardia  Continue Losartan 100 mg,  Verapamil 240 mg and  Spironolactone 25 mg     Asthma  Budesonide 0.5 mg and Formoterol nebs bid.    CHET  Continue Buspirone 10 mg bid.  Klonopin on hold due to encephalopathy    GERD  Pantoprazole 40 mg bid.    IUD in place for  17 years  Requires removal as out patient.  Will encourage pt to see me or her gyne for removal upon discharge.     Plan discussed with patient at bedside and  via phone.      Nell Green MD

## 2024-11-17 NOTE — CONSULTS
Vancomycin Dosing by Pharmacy- INITIAL    Samuel Mims is a 48 y.o. year old female who Pharmacy has been consulted for vancomycin dosing for other (abdominal infection and UTI) . Based on the patient's indication and renal status this patient will be dosed based on a goal AUC of 400-600.     Renal function is currently stable.    Visit Vitals  BP (!) 151/103 (BP Location: Right arm, Patient Position: Lying)   Pulse (!) 106   Temp 36.5 °C (97.7 °F) (Temporal)   Resp 20        Lab Results   Component Value Date    CREATININE 0.95 2024    CREATININE 0.96 2024    CREATININE 0.93 2024    CREATININE 0.96 2024        Patient weight is as follows:   Vitals:    24 1755   Weight: 127 kg (280 lb)       Cultures:  No results found for the encounter in last 14 days.        No intake/output data recorded.  I/O during current shift:  I/O this shift:  In: 500 [IV Piggyback:500]  Out: -     Temp (24hrs), Av.7 °C (98 °F), Min:36.5 °C (97.7 °F), Max:36.9 °C (98.4 °F)         Assessment/Plan     Patient has already been given a loading dose of 2000 mg.  Will initiate vancomycin maintenance,  1250 mg every 12 hours.    This dosing regimen is predicted by [employer] to result in the following pharmacokinetic parameters:  Loading dose: N/A  Regimen: 1250 mg IV every 12 hours.  Start time: 08:00 on 2024  Exposure target: AUC24 (range)400-600 mg/L.hr   [id number]: 472 mg/L.hr  AUC24,ss: 472 mg/L.hr  Probability of AUC24 > 400: 62 %  Ctrough,ss: 12.2 mg/L  Probability of Ctrough,ss > 20: 28 %    Follow-up level will be ordered on  at 0500 unless clinically indicated sooner.  Will continue to monitor renal function daily while on vancomycin and order serum creatinine at least every 48 hours if not already ordered.  Follow for continued vancomycin needs, clinical response, and signs/symptoms of toxicity.       JULIET SCHMIDT, PharmD

## 2024-11-17 NOTE — CONSULTS
PATIENT NAME: Samuel Mims       MRN: [id number]     SERVICE DATE:  11/17/2024          SERVICE TIME:  9:30 AM          SIGNATURE: Ale Stubbs MD, MS          PRIMARY CARE PHYSICIAN: Nell Green MD     REASON FOR CONSULT: ? Sepsis, colitis, C. Diff infection, UTI     REQUESTING PHYSICIAN: Dr. Green          ASSESSMENT AND PLAN      Change of mental status, C. Diff infection finishing vancomycin 11/18/24 in 47 y/o female with IUD, HTN, asthma    Has no diarrhea, supposed to finish vancomycin tomorrow  UA has pyuria  CT abdomen has concern for colitis  No leukocytosis or NIDA  Lactate normal  Drug screen negative  No fever  Neurology is on board  CT head noncontributory  Cefepime has cause encephalopathy    Continue IV vancomycin and oral vancomycin  Change cefepime and flagyl to Zosyn  Await urine and blood cultures               HPI     Samuel Mims is a 48 y.o. female presenting to [facility] on 11/16/2024 with pertinent medical history of HTN, asthma, GERD, IUD (placed 17 years ago per ), and c-diff (9/2024) who presents to the ED with confusion, increased sleeping, decreased oral intake, nausea with intermittent vomiting, and diarrhea on Thursday from home. Per her  she was sitting in her urine for a couple days and had diarrhea on Thursday. Her son also reports that she has been confused and not eating or drinking for a couple days and they decided to bring her to the hospital today as she was not getting better. HPI and history obtained from the patient's , ED physician, and previous medical records as patient is lethargic and confused.  Of note, the patient was hospitalized on 11/1-11/2/24 with hypokalemia, vomiting, and c-diff (tx with oral vancomycin 11/5-11/18/24).         COMPLETE REVIEW OF SYSTEMS:       Unable due to lethargy         PAST MEDICAL HISTORY:   Past Medical History:   Diagnosis Date    Asthma     Clostridium difficile infection     9/2024    GERD (gastroesophageal  reflux disease)     Hypertension         PAST SURGICAL HISTORY:   Past Surgical History:   Procedure Laterality Date    CHOLECYSTECTOMY      COLONOSCOPY      ESOPHAGOGASTRODUODENOSCOPY          FAMILY HISTORY:   Family History   Problem Relation Name Age of Onset    Breast cancer Mother  63    Breast cancer Sister  63        SOCIAL HISTORY:   Social History     Tobacco Use    Smoking status: Former     Current packs/day: 1.00     Average packs/day: 1 pack/day for 4.9 years (4.9 ttl pk-yrs)     Types: Cigarettes     Start date: 2020    Smokeless tobacco: Never   Vaping Use    Vaping status: Never Used   Substance Use Topics    Alcohol use: Never    Drug use: Never        CURRENT ALLERGIES:   Allergies as of 11/16/2024 - Reviewed 11/16/2024   Allergen Reaction Noted    Tiotropium bromide Shortness of breath 03/17/2021    Ace inhibitors Cough 02/28/2020    Escitalopram Other 02/24/2021    Hydrochlorothiazide Other 02/24/2021    House dust mite Rash 03/23/2021        MEDICATIONS:     Prior to Admission Medications:     Medications Prior to Admission   Medication Sig Dispense Refill Last Dose/Taking    albuterol 90 mcg/actuation inhaler Inhale 2 puffs every 4 hours if needed for wheezing.       busPIRone (Buspar) 10 mg tablet Take 1 tablet (10 mg) by mouth 2 times a day.       clonazePAM (KlonoPIN) 0.5 mg tablet Take 1 tablet (0.5 mg) by mouth once daily as needed for anxiety.       esomeprazole (NexIUM) 40 mg DR capsule Take 1 capsule (40 mg) by mouth 2 times a day. Do not open capsule.       fluticasone propion-salmeteroL (Advair) 115-21 mcg/actuation inhaler Inhale 2 puffs 2 times a day. Rinse mouth with water after use to reduce aftertaste and incidence of candidiasis. Do not swallow.       losartan (Cozaar) 100 mg tablet Take 1 tablet (100 mg) by mouth once daily.       metoclopramide (Reglan) 10 mg tablet Take 1 tablet (10 mg) by mouth 4 times a day as needed (nausea/vomiting) for up to 20 doses. As needed for  nausea/vomiting (Patient not taking: Reported on 11/16/2024) 20 tablet 0 Not Taking    ondansetron ODT (Zofran-ODT) 4 mg disintegrating tablet Take 1 tablet (4 mg) by mouth every 8 hours if needed for nausea or vomiting. (Patient not taking: Reported on 11/16/2024) 21 tablet 0 Not Taking    pantoprazole (ProtoNix) 40 mg EC tablet Take 1 tablet (40 mg) by mouth once daily in the morning. Take before meals. Do not crush, chew, or split.       spironolactone (Aldactone) 25 mg tablet Take 0.5 tablets (12.5 mg) by mouth once daily.       vancomycin (Vancocin) 125 mg capsule Take 1 capsule (125 mg) by mouth 4 times a day. 11-5-24 x 14 days       verapamil SR (Calan-SR) 240 mg ER tablet Take 1 tablet (240 mg) by mouth once daily at bedtime. Do not crush or chew.           Scheduled medications  budesonide, 0.5 mg, nebulization, BID  busPIRone, 10 mg, oral, BID  cefepime, 2 g, intravenous, q8h  influenza, 0.5 mL, intramuscular, During hospitalization  formoterol, 20 mcg, nebulization, BID  heparin (porcine), 7,500 Units, subcutaneous, q8h SANTIAGO  losartan, 100 mg, oral, Daily  metroNIDAZOLE, 500 mg, intravenous, q8h  [Held by provider] pantoprazole, 40 mg, oral, BID AC  pantoprazole, 40 mg, intravenous, BID AC  spironolactone, 12.5 mg, oral, Daily  vancomycin, 125 mg, oral, 4x daily  vancomycin, 1,250 mg, intravenous, q12h  verapamil SR, 240 mg, oral, Nightly      Continuous medications  sodium chloride 0.9%, 100 mL/hr, Last Rate: 100 mL/hr (11/17/24 0019)      PRN medications  PRN medications: albuterol, [Held by provider] clonazePAM, melatonin, vancomycin         PHYSICAL EXAM:     Patient Vitals for the past 24 hrs:   BP Temp Temp src Pulse Resp SpO2 Height Weight   11/17/24 0829 (!) 148/97 36.4 °C (97.5 °F) Temporal (!) 116 16 98 % -- --   11/17/24 0404 123/84 36.8 °C (98.2 °F) Temporal 110 24 98 % -- 109 kg (240 lb 1.3 oz)   11/17/24 0335 -- -- -- 108 23 98 % -- --   11/17/24 0250 -- -- -- 106 21 99 % -- --   11/17/24  "0229 121/77 -- -- 102 23 99 % -- --   11/17/24 0200 (!) 158/98 -- -- (!) 118 25 99 % -- --   11/17/24 0158 (!) 158/98 -- -- -- -- -- -- --   11/17/24 0032 (!) 149/97 36.3 °C (97.3 °F) Temporal (!) 114 22 98 % -- --   11/16/24 2330 (!) 130/97 36.6 °C (97.9 °F) Temporal (!) 113 24 98 % 1.676 m (5' 6\") 108 kg (238 lb 15.7 oz)   11/16/24 2328 (!) 130/97 36.6 °C (97.9 °F) Temporal (!) 114 22 98 % -- --   11/16/24 2215 (!) 151/103 -- -- (!) 106 20 99 % -- --   11/16/24 2200 (!) 153/97 -- -- (!) 106 (!) 22 99 % -- --   11/16/24 2130 141/90 -- -- (!) 102 (!) 23 99 % -- --   11/16/24 2108 (!) 174/113 -- -- (!) 131 -- -- -- --   11/16/24 2100 (!) 174/113 -- -- (!) 126 (!) 23 99 % -- --   11/16/24 2030 (!) 194/129 -- -- (!) 126 (!) 22 99 % -- --   11/16/24 2015 -- -- -- (!) 128 (!) 23 100 % -- --   11/16/24 1930 (!) 162/113 36.5 °C (97.7 °F) Temporal (!) 132 (!) 22 100 % -- --   11/16/24 1915 -- -- -- (!) 132 (!) 23 100 % -- --   11/16/24 1900 (!) 142/107 -- -- (!) 132 (!) 22 100 % -- --   11/16/24 1845 -- -- -- (!) 132 (!) 26 99 % -- --   11/16/24 1830 (!) 168/106 36.6 °C (97.9 °F) Temporal (!) 132 (!) 24 99 % -- --   11/16/24 1815 -- -- -- (!) 142 (!) 24 98 % -- --   11/16/24 1800 (!) 167/113 -- -- (!) 142 (!) 24 98 % -- --   11/16/24 1755 (!) 167/109 36.9 °C (98.4 °F) Temporal (!) 144 (!) 22 98 % 1.676 m (5' 6\") 127 kg (280 lb)   11/16/24 1745 (!) 167/109 -- -- (!) 144 (!) 28 98 % -- --        Body mass index is 38.75 kg/m².     Eyes: no conjunctiva erythema     ENMT: oral mucosa moist     Neck: symmetric, no mass     Cardiovascular: RRR     Respiratory: CTA     GI: soft,     musculoskeletal: no leg edema,     Skin: Warm and dry.     psychiatric: lethargic         DATA:      Diagnostic tests reviewed for today's visit:       Labs from this admission reviewed     Imagines from this admission reviewed     Cultures: Reviewed   "

## 2024-11-18 ENCOUNTER — APPOINTMENT (OUTPATIENT)
Dept: CARDIOLOGY | Facility: HOSPITAL | Age: 49
End: 2024-11-18
Payer: COMMERCIAL

## 2024-11-18 ENCOUNTER — APPOINTMENT (OUTPATIENT)
Dept: RADIOLOGY | Facility: HOSPITAL | Age: 49
End: 2024-11-18
Payer: COMMERCIAL

## 2024-11-18 LAB
ALBUMIN SERPL BCP-MCNC: 3.6 G/DL (ref 3.4–5)
ALBUMIN SERPL BCP-MCNC: 3.8 G/DL (ref 3.4–5)
ALP SERPL-CCNC: 59 U/L (ref 33–110)
ALP SERPL-CCNC: 60 U/L (ref 33–110)
ALT SERPL W P-5'-P-CCNC: 23 U/L (ref 7–45)
ALT SERPL W P-5'-P-CCNC: 24 U/L (ref 7–45)
AMMONIA PLAS-SCNC: 33 UMOL/L (ref 16–53)
ANION GAP BLDA CALCULATED.4IONS-SCNC: 13 MMO/L (ref 10–25)
ANION GAP BLDA CALCULATED.4IONS-SCNC: 15 MMO/L (ref 10–25)
ANION GAP SERPL CALC-SCNC: 12 MMOL/L (ref 10–20)
ANION GAP SERPL CALC-SCNC: 17 MMOL/L (ref 10–20)
ANION GAP SERPL CALC-SCNC: 17 MMOL/L (ref 10–20)
APPEARANCE UR: CLEAR
ARTERIAL PATENCY WRIST A: POSITIVE
AST SERPL W P-5'-P-CCNC: 15 U/L (ref 9–39)
AST SERPL W P-5'-P-CCNC: 15 U/L (ref 9–39)
ATRIAL RATE: 127 BPM
BACTERIA UR CULT: ABNORMAL
BASE EXCESS BLDA CALC-SCNC: -0.3 MMOL/L (ref -2–3)
BASE EXCESS BLDA CALC-SCNC: -0.4 MMOL/L (ref -2–3)
BILIRUB SERPL-MCNC: 1.4 MG/DL (ref 0–1.2)
BILIRUB SERPL-MCNC: 1.7 MG/DL (ref 0–1.2)
BILIRUB UR STRIP.AUTO-MCNC: NEGATIVE MG/DL
BODY TEMPERATURE: ABNORMAL
BODY TEMPERATURE: ABNORMAL
BUN SERPL-MCNC: 12 MG/DL (ref 6–23)
BUN SERPL-MCNC: 8 MG/DL (ref 6–23)
BUN SERPL-MCNC: 8 MG/DL (ref 6–23)
C DIF TOX TCDA+TCDB STL QL NAA+PROBE: NOT DETECTED
CA-I BLDA-SCNC: 1.27 MMOL/L (ref 1.1–1.33)
CA-I BLDA-SCNC: 1.29 MMOL/L (ref 1.1–1.33)
CALCIUM SERPL-MCNC: 9.1 MG/DL (ref 8.6–10.3)
CALCIUM SERPL-MCNC: 9.1 MG/DL (ref 8.6–10.3)
CALCIUM SERPL-MCNC: 9.2 MG/DL (ref 8.6–10.3)
CARDIAC TROPONIN I PNL SERPL HS: 15 NG/L (ref 0–13)
CHLORIDE BLDA-SCNC: 112 MMOL/L (ref 98–107)
CHLORIDE BLDA-SCNC: 113 MMOL/L (ref 98–107)
CHLORIDE SERPL-SCNC: 112 MMOL/L (ref 98–107)
CHLORIDE SERPL-SCNC: 114 MMOL/L (ref 98–107)
CHLORIDE SERPL-SCNC: 114 MMOL/L (ref 98–107)
CO2 SERPL-SCNC: 19 MMOL/L (ref 21–32)
CO2 SERPL-SCNC: 20 MMOL/L (ref 21–32)
CO2 SERPL-SCNC: 23 MMOL/L (ref 21–32)
COLOR UR: COLORLESS
CREAT SERPL-MCNC: 0.85 MG/DL (ref 0.5–1.05)
CREAT SERPL-MCNC: 0.86 MG/DL (ref 0.5–1.05)
CREAT SERPL-MCNC: 0.88 MG/DL (ref 0.5–1.05)
CRP SERPL-MCNC: 2.52 MG/DL
EGFRCR SERPLBLD CKD-EPI 2021: 81 ML/MIN/1.73M*2
EGFRCR SERPLBLD CKD-EPI 2021: 83 ML/MIN/1.73M*2
EGFRCR SERPLBLD CKD-EPI 2021: 84 ML/MIN/1.73M*2
ERYTHROCYTE [DISTWIDTH] IN BLOOD BY AUTOMATED COUNT: 16.3 % (ref 11.5–14.5)
ERYTHROCYTE [DISTWIDTH] IN BLOOD BY AUTOMATED COUNT: 16.6 % (ref 11.5–14.5)
ERYTHROCYTE [SEDIMENTATION RATE] IN BLOOD BY WESTERGREN METHOD: 18 MM/H (ref 0–20)
GLUCOSE BLDA-MCNC: 148 MG/DL (ref 74–99)
GLUCOSE BLDA-MCNC: 152 MG/DL (ref 74–99)
GLUCOSE SERPL-MCNC: 146 MG/DL (ref 74–99)
GLUCOSE SERPL-MCNC: 150 MG/DL (ref 74–99)
GLUCOSE SERPL-MCNC: 160 MG/DL (ref 74–99)
GLUCOSE UR STRIP.AUTO-MCNC: NORMAL MG/DL
HCO3 BLDA-SCNC: 21.5 MMOL/L (ref 22–26)
HCO3 BLDA-SCNC: 22.1 MMOL/L (ref 22–26)
HCT VFR BLD AUTO: 38 % (ref 36–46)
HCT VFR BLD AUTO: 39.3 % (ref 36–46)
HCT VFR BLD EST: 37 % (ref 36–46)
HCT VFR BLD EST: 38 % (ref 36–46)
HGB BLD-MCNC: 12.1 G/DL (ref 12–16)
HGB BLD-MCNC: 12.1 G/DL (ref 12–16)
HGB BLDA-MCNC: 12.3 G/DL (ref 12–16)
HGB BLDA-MCNC: 12.7 G/DL (ref 12–16)
HOLD SPECIMEN: NORMAL
INHALED O2 CONCENTRATION: 21 %
INHALED O2 CONCENTRATION: 21 %
INR PPP: 1.3 (ref 0.9–1.1)
KETONES UR STRIP.AUTO-MCNC: NEGATIVE MG/DL
LACTATE BLDA-SCNC: 1.4 MMOL/L (ref 0.4–2)
LACTATE BLDA-SCNC: 1.4 MMOL/L (ref 0.4–2)
LACTATE SERPL-SCNC: 1.3 MMOL/L (ref 0.4–2)
LEUKOCYTE ESTERASE UR QL STRIP.AUTO: NEGATIVE
MAGNESIUM SERPL-MCNC: 1.83 MG/DL (ref 1.6–2.4)
MAGNESIUM SERPL-MCNC: 1.85 MG/DL (ref 1.6–2.4)
MCH RBC QN AUTO: 27.8 PG (ref 26–34)
MCH RBC QN AUTO: 27.9 PG (ref 26–34)
MCHC RBC AUTO-ENTMCNC: 30.8 G/DL (ref 32–36)
MCHC RBC AUTO-ENTMCNC: 31.8 G/DL (ref 32–36)
MCV RBC AUTO: 88 FL (ref 80–100)
MCV RBC AUTO: 90 FL (ref 80–100)
NITRITE UR QL STRIP.AUTO: NEGATIVE
NRBC BLD-RTO: 0 /100 WBCS (ref 0–0)
NRBC BLD-RTO: 0 /100 WBCS (ref 0–0)
OXYHGB MFR BLDA: 96.2 % (ref 94–98)
OXYHGB MFR BLDA: 96.5 % (ref 94–98)
P AXIS: 20 DEGREES
P OFFSET: 200 MS
P ONSET: 151 MS
PCO2 BLDA: 27 MM HG (ref 38–42)
PCO2 BLDA: 29 MM HG (ref 38–42)
PH BLDA: 7.49 PH (ref 7.38–7.42)
PH BLDA: 7.51 PH (ref 7.38–7.42)
PH UR STRIP.AUTO: 7 [PH]
PLATELET # BLD AUTO: 171 X10*3/UL (ref 150–450)
PLATELET # BLD AUTO: 205 X10*3/UL (ref 150–450)
PO2 BLDA: 100 MM HG (ref 85–95)
PO2 BLDA: 104 MM HG (ref 85–95)
POTASSIUM BLDA-SCNC: 3.4 MMOL/L (ref 3.5–5.3)
POTASSIUM BLDA-SCNC: 3.5 MMOL/L (ref 3.5–5.3)
POTASSIUM SERPL-SCNC: 3.3 MMOL/L (ref 3.5–5.3)
POTASSIUM SERPL-SCNC: 3.5 MMOL/L (ref 3.5–5.3)
POTASSIUM SERPL-SCNC: 3.6 MMOL/L (ref 3.5–5.3)
PR INTERVAL: 144 MS
PROCALCITONIN SERPL-MCNC: 0.1 NG/ML
PROT SERPL-MCNC: 6.3 G/DL (ref 6.4–8.2)
PROT SERPL-MCNC: 6.7 G/DL (ref 6.4–8.2)
PROT UR STRIP.AUTO-MCNC: NEGATIVE MG/DL
PROTHROMBIN TIME: 15.2 SECONDS (ref 9.8–12.8)
Q ONSET: 223 MS
QRS COUNT: 21 BEATS
QRS DURATION: 70 MS
QT INTERVAL: 314 MS
QTC CALCULATION(BAZETT): 456 MS
QTC FREDERICIA: 403 MS
R AXIS: -49 DEGREES
RBC # BLD AUTO: 4.33 X10*6/UL (ref 4–5.2)
RBC # BLD AUTO: 4.35 X10*6/UL (ref 4–5.2)
RBC # UR STRIP.AUTO: NEGATIVE /UL
SAO2 % BLDA: 99 % (ref 94–100)
SAO2 % BLDA: 99 % (ref 94–100)
SODIUM BLDA-SCNC: 144 MMOL/L (ref 136–145)
SODIUM BLDA-SCNC: 146 MMOL/L (ref 136–145)
SODIUM SERPL-SCNC: 144 MMOL/L (ref 136–145)
SODIUM SERPL-SCNC: 146 MMOL/L (ref 136–145)
SODIUM SERPL-SCNC: 147 MMOL/L (ref 136–145)
SP GR UR STRIP.AUTO: 1.01
SPECIMEN DRAWN FROM PATIENT: ABNORMAL
T AXIS: 77 DEGREES
T OFFSET: 380 MS
UROBILINOGEN UR STRIP.AUTO-MCNC: NORMAL MG/DL
VANCOMYCIN SERPL-MCNC: 24.8 UG/ML (ref 5–20)
VENTRICULAR RATE: 127 BPM
WBC # BLD AUTO: 5.3 X10*3/UL (ref 4.4–11.3)
WBC # BLD AUTO: 6.4 X10*3/UL (ref 4.4–11.3)

## 2024-11-18 PROCEDURE — 74018 RADEX ABDOMEN 1 VIEW: CPT

## 2024-11-18 PROCEDURE — 99222 1ST HOSP IP/OBS MODERATE 55: CPT | Performed by: NURSE PRACTITIONER

## 2024-11-18 PROCEDURE — 70450 CT HEAD/BRAIN W/O DYE: CPT | Performed by: RADIOLOGY

## 2024-11-18 PROCEDURE — 83735 ASSAY OF MAGNESIUM: CPT | Performed by: NURSE PRACTITIONER

## 2024-11-18 PROCEDURE — 94640 AIRWAY INHALATION TREATMENT: CPT

## 2024-11-18 PROCEDURE — 87493 C DIFF AMPLIFIED PROBE: CPT | Mod: STJLAB | Performed by: FAMILY MEDICINE

## 2024-11-18 PROCEDURE — 2500000004 HC RX 250 GENERAL PHARMACY W/ HCPCS (ALT 636 FOR OP/ED): Performed by: NURSE PRACTITIONER

## 2024-11-18 PROCEDURE — 84132 ASSAY OF SERUM POTASSIUM: CPT | Performed by: NURSE PRACTITIONER

## 2024-11-18 PROCEDURE — 84484 ASSAY OF TROPONIN QUANT: CPT | Performed by: NURSE PRACTITIONER

## 2024-11-18 PROCEDURE — 84145 PROCALCITONIN (PCT): CPT | Mod: STJLAB | Performed by: NURSE PRACTITIONER

## 2024-11-18 PROCEDURE — 2500000001 HC RX 250 WO HCPCS SELF ADMINISTERED DRUGS (ALT 637 FOR MEDICARE OP): Performed by: NURSE PRACTITIONER

## 2024-11-18 PROCEDURE — 74018 RADEX ABDOMEN 1 VIEW: CPT | Performed by: RADIOLOGY

## 2024-11-18 PROCEDURE — 2500000001 HC RX 250 WO HCPCS SELF ADMINISTERED DRUGS (ALT 637 FOR MEDICARE OP)

## 2024-11-18 PROCEDURE — 36415 COLL VENOUS BLD VENIPUNCTURE: CPT | Performed by: NURSE PRACTITIONER

## 2024-11-18 PROCEDURE — 86140 C-REACTIVE PROTEIN: CPT | Performed by: NURSE PRACTITIONER

## 2024-11-18 PROCEDURE — 85027 COMPLETE CBC AUTOMATED: CPT | Performed by: NURSE PRACTITIONER

## 2024-11-18 PROCEDURE — 2500000005 HC RX 250 GENERAL PHARMACY W/O HCPCS

## 2024-11-18 PROCEDURE — 2500000005 HC RX 250 GENERAL PHARMACY W/O HCPCS: Performed by: INTERNAL MEDICINE

## 2024-11-18 PROCEDURE — 93005 ELECTROCARDIOGRAM TRACING: CPT

## 2024-11-18 PROCEDURE — 81003 URINALYSIS AUTO W/O SCOPE: CPT | Performed by: NURSE PRACTITIONER

## 2024-11-18 PROCEDURE — 70450 CT HEAD/BRAIN W/O DYE: CPT

## 2024-11-18 PROCEDURE — 2500000002 HC RX 250 W HCPCS SELF ADMINISTERED DRUGS (ALT 637 FOR MEDICARE OP, ALT 636 FOR OP/ED): Performed by: INTERNAL MEDICINE

## 2024-11-18 PROCEDURE — 99232 SBSQ HOSP IP/OBS MODERATE 35: CPT | Performed by: NURSE PRACTITIONER

## 2024-11-18 PROCEDURE — 2500000004 HC RX 250 GENERAL PHARMACY W/ HCPCS (ALT 636 FOR OP/ED): Performed by: INTERNAL MEDICINE

## 2024-11-18 PROCEDURE — 85652 RBC SED RATE AUTOMATED: CPT | Performed by: NURSE PRACTITIONER

## 2024-11-18 PROCEDURE — 82947 ASSAY GLUCOSE BLOOD QUANT: CPT | Performed by: NURSE PRACTITIONER

## 2024-11-18 PROCEDURE — 2060000001 HC INTERMEDIATE ICU ROOM DAILY

## 2024-11-18 PROCEDURE — 85610 PROTHROMBIN TIME: CPT | Performed by: NURSE PRACTITIONER

## 2024-11-18 PROCEDURE — 80202 ASSAY OF VANCOMYCIN: CPT | Performed by: NURSE PRACTITIONER

## 2024-11-18 PROCEDURE — 36415 COLL VENOUS BLD VENIPUNCTURE: CPT | Mod: STJLAB | Performed by: NURSE PRACTITIONER

## 2024-11-18 PROCEDURE — 36600 WITHDRAWAL OF ARTERIAL BLOOD: CPT

## 2024-11-18 PROCEDURE — 2500000002 HC RX 250 W HCPCS SELF ADMINISTERED DRUGS (ALT 637 FOR MEDICARE OP, ALT 636 FOR OP/ED)

## 2024-11-18 PROCEDURE — 2500000004 HC RX 250 GENERAL PHARMACY W/ HCPCS (ALT 636 FOR OP/ED)

## 2024-11-18 PROCEDURE — 93010 ELECTROCARDIOGRAM REPORT: CPT | Performed by: INTERNAL MEDICINE

## 2024-11-18 PROCEDURE — 85018 HEMOGLOBIN: CPT | Performed by: NURSE PRACTITIONER

## 2024-11-18 PROCEDURE — 82140 ASSAY OF AMMONIA: CPT | Performed by: NURSE PRACTITIONER

## 2024-11-18 PROCEDURE — 99222 1ST HOSP IP/OBS MODERATE 55: CPT

## 2024-11-18 PROCEDURE — 83605 ASSAY OF LACTIC ACID: CPT | Performed by: NURSE PRACTITIONER

## 2024-11-18 RX ORDER — METOPROLOL TARTRATE 50 MG/1
50 TABLET ORAL
Status: DISCONTINUED | OUTPATIENT
Start: 2024-11-18 | End: 2024-11-18

## 2024-11-18 RX ORDER — SODIUM CHLORIDE 9 MG/ML
INJECTION, SOLUTION INTRAMUSCULAR; INTRAVENOUS; SUBCUTANEOUS
Status: COMPLETED
Start: 2024-11-18 | End: 2024-11-18

## 2024-11-18 RX ORDER — HYDRALAZINE HYDROCHLORIDE 20 MG/ML
20 INJECTION INTRAMUSCULAR; INTRAVENOUS EVERY 4 HOURS PRN
Status: ACTIVE | OUTPATIENT
Start: 2024-11-18

## 2024-11-18 RX ORDER — ENALAPRILAT 1.25 MG/ML
1.25 INJECTION INTRAVENOUS EVERY 6 HOURS
Status: DISCONTINUED | OUTPATIENT
Start: 2024-11-18 | End: 2024-11-20

## 2024-11-18 RX ORDER — VANCOMYCIN HCL 50 MG/ML
125 SOLUTION, RECONSTITUTED, ORAL ORAL 4 TIMES DAILY
Status: DISCONTINUED | OUTPATIENT
Start: 2024-11-18 | End: 2024-11-20

## 2024-11-18 RX ORDER — SODIUM CHLORIDE, SODIUM LACTATE, POTASSIUM CHLORIDE, CALCIUM CHLORIDE 600; 310; 30; 20 MG/100ML; MG/100ML; MG/100ML; MG/100ML
75 INJECTION, SOLUTION INTRAVENOUS CONTINUOUS
Status: DISCONTINUED | OUTPATIENT
Start: 2024-11-18 | End: 2024-11-18

## 2024-11-18 RX ORDER — VANCOMYCIN HYDROCHLORIDE 1 G/200ML
1000 INJECTION, SOLUTION INTRAVENOUS EVERY 12 HOURS
Status: DISCONTINUED | OUTPATIENT
Start: 2024-11-18 | End: 2024-11-18

## 2024-11-18 RX ORDER — POTASSIUM CHLORIDE 20 MEQ/1
40 TABLET, EXTENDED RELEASE ORAL ONCE
Status: DISCONTINUED | OUTPATIENT
Start: 2024-11-18 | End: 2024-11-18

## 2024-11-18 RX ORDER — DEXTROSE MONOHYDRATE 50 MG/ML
50 INJECTION, SOLUTION INTRAVENOUS CONTINUOUS
Status: DISCONTINUED | OUTPATIENT
Start: 2024-11-18 | End: 2024-11-20

## 2024-11-18 RX ORDER — METOPROLOL TARTRATE 1 MG/ML
7.5 INJECTION, SOLUTION INTRAVENOUS EVERY 6 HOURS PRN
Status: DISPENSED | OUTPATIENT
Start: 2024-11-18

## 2024-11-18 RX ORDER — VANCOMYCIN HYDROCHLORIDE 125 MG/1
125 CAPSULE ORAL 4 TIMES DAILY
Status: DISCONTINUED | OUTPATIENT
Start: 2024-11-18 | End: 2024-11-18

## 2024-11-18 RX ORDER — METOPROLOL TARTRATE 50 MG/1
50 TABLET ORAL EVERY 12 HOURS
Status: DISCONTINUED | OUTPATIENT
Start: 2024-11-18 | End: 2024-11-25

## 2024-11-18 RX ORDER — HYDRALAZINE HYDROCHLORIDE 20 MG/ML
10 INJECTION INTRAMUSCULAR; INTRAVENOUS ONCE
Status: COMPLETED | OUTPATIENT
Start: 2024-11-18 | End: 2024-11-18

## 2024-11-18 RX ORDER — NICARDIPINE HYDROCHLORIDE 0.2 MG/ML
2.5-15 INJECTION INTRAVENOUS CONTINUOUS
Status: DISCONTINUED | OUTPATIENT
Start: 2024-11-18 | End: 2024-11-18

## 2024-11-18 RX ORDER — POTASSIUM CHLORIDE 1.5 G/1.58G
40 POWDER, FOR SOLUTION ORAL ONCE
Status: COMPLETED | OUTPATIENT
Start: 2024-11-18 | End: 2024-11-18

## 2024-11-18 RX ORDER — POTASSIUM CHLORIDE 14.9 MG/ML
20 INJECTION INTRAVENOUS ONCE
Status: DISCONTINUED | OUTPATIENT
Start: 2024-11-18 | End: 2024-11-18

## 2024-11-18 RX ORDER — METOPROLOL TARTRATE 1 MG/ML
7.5 INJECTION, SOLUTION INTRAVENOUS EVERY 6 HOURS
Status: DISCONTINUED | OUTPATIENT
Start: 2024-11-18 | End: 2024-11-18

## 2024-11-18 RX ORDER — POTASSIUM CHLORIDE 14.9 MG/ML
20 INJECTION INTRAVENOUS ONCE
Status: COMPLETED | OUTPATIENT
Start: 2024-11-18 | End: 2024-11-18

## 2024-11-18 RX ORDER — MULTIVIT-MIN/IRON FUM/FOLIC AC 7.5 MG-4
1 TABLET ORAL DAILY
Status: DISCONTINUED | OUTPATIENT
Start: 2024-11-18 | End: 2024-11-21

## 2024-11-18 RX ORDER — LANOLIN ALCOHOL/MO/W.PET/CERES
100 CREAM (GRAM) TOPICAL DAILY
Status: DISCONTINUED | OUTPATIENT
Start: 2024-11-18 | End: 2024-11-19

## 2024-11-18 RX ADMIN — THIAMINE HCL TAB 100 MG 100 MG: 100 TAB at 17:12

## 2024-11-18 RX ADMIN — POTASSIUM CHLORIDE 40 MEQ: 1.5 POWDER, FOR SOLUTION ORAL at 21:16

## 2024-11-18 RX ADMIN — VANCOMYCIN HYDROCHLORIDE 125 MG: KIT at 21:16

## 2024-11-18 RX ADMIN — NICARDIPINE HYDROCHLORIDE 2.5 MG/HR: 0.2 INJECTION, SOLUTION INTRAVENOUS at 05:28

## 2024-11-18 RX ADMIN — PANTOPRAZOLE SODIUM 40 MG: 40 INJECTION, POWDER, FOR SOLUTION INTRAVENOUS at 15:14

## 2024-11-18 RX ADMIN — LACTULOSE 300 ML: 10 SOLUTION ORAL at 00:52

## 2024-11-18 RX ADMIN — SODIUM CHLORIDE, POTASSIUM CHLORIDE, SODIUM LACTATE AND CALCIUM CHLORIDE 75 ML/HR: 600; 310; 30; 20 INJECTION, SOLUTION INTRAVENOUS at 11:00

## 2024-11-18 RX ADMIN — POTASSIUM CHLORIDE 20 MEQ: 14.9 INJECTION, SOLUTION INTRAVENOUS at 01:27

## 2024-11-18 RX ADMIN — VANCOMYCIN HYDROCHLORIDE 125 MG: 125 CAPSULE ORAL at 17:12

## 2024-11-18 RX ADMIN — METOPROLOL TARTRATE 50 MG: 50 TABLET, FILM COATED ORAL at 21:16

## 2024-11-18 RX ADMIN — PIPERACILLIN SODIUM AND TAZOBACTAM SODIUM 3.38 G: 3; .375 INJECTION, SOLUTION INTRAVENOUS at 03:26

## 2024-11-18 RX ADMIN — METOPROLOL TARTRATE 7.5 MG: 5 INJECTION INTRAVENOUS at 15:14

## 2024-11-18 RX ADMIN — HYDRALAZINE HYDROCHLORIDE 10 MG: 20 INJECTION INTRAMUSCULAR; INTRAVENOUS at 01:01

## 2024-11-18 RX ADMIN — DEXTROSE MONOHYDRATE 50 ML/HR: 50 INJECTION, SOLUTION INTRAVENOUS at 15:14

## 2024-11-18 RX ADMIN — ENALAPRILAT 1.25 MG: 1.25 INJECTION INTRAVENOUS at 17:12

## 2024-11-18 RX ADMIN — HEPARIN SODIUM 5000 UNITS: 5000 INJECTION INTRAVENOUS; SUBCUTANEOUS at 06:25

## 2024-11-18 RX ADMIN — PIPERACILLIN SODIUM AND TAZOBACTAM SODIUM 3.38 G: 3; .375 INJECTION, SOLUTION INTRAVENOUS at 10:01

## 2024-11-18 RX ADMIN — ENALAPRILAT 1.25 MG: 1.25 INJECTION INTRAVENOUS at 11:55

## 2024-11-18 RX ADMIN — HEPARIN SODIUM 5000 UNITS: 5000 INJECTION INTRAVENOUS; SUBCUTANEOUS at 14:24

## 2024-11-18 RX ADMIN — SODIUM CHLORIDE 10 ML: 9 INJECTION, SOLUTION INTRAMUSCULAR; INTRAVENOUS; SUBCUTANEOUS at 06:54

## 2024-11-18 RX ADMIN — HEPARIN SODIUM 5000 UNITS: 5000 INJECTION INTRAVENOUS; SUBCUTANEOUS at 21:16

## 2024-11-18 RX ADMIN — FORMOTEROL FUMARATE 20 MCG: 20 SOLUTION RESPIRATORY (INHALATION) at 21:42

## 2024-11-18 RX ADMIN — LACTULOSE 300 ML: 10 SOLUTION ORAL at 11:55

## 2024-11-18 RX ADMIN — VANCOMYCIN HYDROCHLORIDE 1250 MG: 1.25 INJECTION, SOLUTION INTRAVITREAL at 08:23

## 2024-11-18 RX ADMIN — Medication 1 TABLET: at 17:12

## 2024-11-18 RX ADMIN — HYDRALAZINE HYDROCHLORIDE 10 MG: 20 INJECTION INTRAMUSCULAR; INTRAVENOUS at 01:27

## 2024-11-18 RX ADMIN — METOPROLOL TARTRATE 5 MG: 5 INJECTION INTRAVENOUS at 03:50

## 2024-11-18 RX ADMIN — METOPROLOL TARTRATE 5 MG: 5 INJECTION INTRAVENOUS at 10:01

## 2024-11-18 RX ADMIN — BUDESONIDE 0.5 MG: 0.5 INHALANT RESPIRATORY (INHALATION) at 21:42

## 2024-11-18 RX ADMIN — PANTOPRAZOLE SODIUM 40 MG: 40 INJECTION, POWDER, FOR SOLUTION INTRAVENOUS at 06:50

## 2024-11-18 ASSESSMENT — PAIN SCALES - PAIN ASSESSMENT IN ADVANCED DEMENTIA (PAINAD)
BODYLANGUAGE: RELAXED
CONSOLABILITY: NO NEED TO CONSOLE
BREATHING: NORMAL
FACIALEXPRESSION: SMILING OR INEXPRESSIVE
NEGVOCALIZATION: OCCASIONAL MOAN/GROAN, LOW SPEECH, NEGATIVE/DISAPPROVING QUALITY
BREATHING: NORMAL
TOTALSCORE: 2
NEGVOCALIZATION: OCCASIONAL MOAN/GROAN, LOW SPEECH, NEGATIVE/DISAPPROVING QUALITY
BODYLANGUAGE: TENSE, DISTRESSED PACING, FIDGETING
FACIALEXPRESSION: SMILING OR INEXPRESSIVE
TOTALSCORE: 1
CONSOLABILITY: NO NEED TO CONSOLE

## 2024-11-18 ASSESSMENT — PAIN - FUNCTIONAL ASSESSMENT
PAIN_FUNCTIONAL_ASSESSMENT: PAINAD (PAIN ASSESSMENT IN ADVANCED DEMENTIA SCALE)
PAIN_FUNCTIONAL_ASSESSMENT: WONG-BAKER FACES
PAIN_FUNCTIONAL_ASSESSMENT: 0-10
PAIN_FUNCTIONAL_ASSESSMENT: PAINAD (PAIN ASSESSMENT IN ADVANCED DEMENTIA SCALE)
PAIN_FUNCTIONAL_ASSESSMENT: CPOT (CRITICAL CARE PAIN OBSERVATION TOOL)

## 2024-11-18 ASSESSMENT — COGNITIVE AND FUNCTIONAL STATUS - GENERAL
DRESSING REGULAR UPPER BODY CLOTHING: TOTAL
MOVING FROM LYING ON BACK TO SITTING ON SIDE OF FLAT BED WITH BEDRAILS: TOTAL
TOILETING: TOTAL
HELP NEEDED FOR BATHING: TOTAL
WALKING IN HOSPITAL ROOM: TOTAL
CLIMB 3 TO 5 STEPS WITH RAILING: TOTAL
DRESSING REGULAR LOWER BODY CLOTHING: TOTAL
EATING MEALS: TOTAL
STANDING UP FROM CHAIR USING ARMS: TOTAL
MOVING TO AND FROM BED TO CHAIR: TOTAL
DAILY ACTIVITIY SCORE: 6
PERSONAL GROOMING: TOTAL
TURNING FROM BACK TO SIDE WHILE IN FLAT BAD: TOTAL
MOBILITY SCORE: 6

## 2024-11-18 ASSESSMENT — PAIN SCALES - WONG BAKER: WONGBAKER_NUMERICALRESPONSE: NO HURT

## 2024-11-18 ASSESSMENT — PAIN SCALES - GENERAL
PAINLEVEL_OUTOF10: 0 - NO PAIN
PAINLEVEL_OUTOF10: 0 - NO PAIN

## 2024-11-18 NOTE — PROGRESS NOTES
Samuel Mims is a 48 y.o. female on day 2 of admission presenting with Sepsis with encephalopathy without septic shock, due to unspecified organism (Multi).    Subjective   Med house staff evaluated patient last night due to minimal responsiveness to sternal rub.  Stat CT was normal.  This am patient is very drowsy, only responsive to sternal rub, not able to respond verbally to questions.       Objective     Last Recorded Vitals  /77   Pulse (!) 122   Temp 36.8 °C (98.2 °F) (Temporal)   Resp 26   Wt 109 kg (240 lb 1.3 oz)   SpO2 96%   Intake/Output last 3 Shifts:    Intake/Output Summary (Last 24 hours) at 11/18/2024 0733  Last data filed at 11/18/2024 0343  Gross per 24 hour   Intake 1268.33 ml   Output 1200 ml   Net 68.33 ml       Admission Weight  Weight: 127 kg (280 lb) (11/16/24 1755)    Daily Weight  11/18/24 : 109 kg (240 lb 1.3 oz)      Physical Exam:  General: Not in acute distress  HEENT: PERRLA, head intact and normocephalic  Neck: Normal to inspection  Lungs: Clear to auscultation, work of breathing within normal limit  Cardiac: Regular rate and rhythm  Abdomen: Soft nontender, positive bowel sounds  : Exam deferred  Skin: Intact  Hematology: No petechia or excessive ecchymosis  Musculoskeletal: Without significant trauma  Neurological:  Unable to answer questions, Lethargic.   Responsive to sternal rub.      Relevant Results  Scheduled medications  budesonide, 0.5 mg, nebulization, BID  busPIRone, 10 mg, oral, BID  influenza, 0.5 mL, intramuscular, During hospitalization  formoterol, 20 mcg, nebulization, BID  heparin (porcine), 5,000 Units, subcutaneous, q8h SANTIAGO  losartan, 100 mg, oral, Daily  [Held by provider] pantoprazole, 40 mg, oral, BID AC  pantoprazole, 40 mg, intravenous, BID AC  piperacillin-tazobactam, 3.375 g, intravenous, q8h  [Held by provider] spironolactone, 12.5 mg, oral, Daily  vancomycin, 125 mg, oral, 4x daily  vancomycin, 1,250 mg, intravenous, q12h  verapamil SR,  240 mg, oral, Nightly      Continuous medications  lactated Ringer's, 125 mL/hr, Last Rate: 125 mL/hr (11/18/24 0655)  [Held by provider] niCARdipine, 2.5-15 mg/hr, Last Rate: Stopped (11/18/24 0615)      PRN medications  PRN medications: albuterol, [Held by provider] clonazePAM, melatonin, metoprolol, vancomycin   Results for orders placed or performed during the hospital encounter of 11/16/24 (from the past 24 hours)   CBC   Result Value Ref Range    WBC 6.3 4.4 - 11.3 x10*3/uL    nRBC 0.0 0.0 - 0.0 /100 WBCs    RBC 4.22 4.00 - 5.20 x10*6/uL    Hemoglobin 11.7 (L) 12.0 - 16.0 g/dL    Hematocrit 35.8 (L) 36.0 - 46.0 %    MCV 85 80 - 100 fL    MCH 27.7 26.0 - 34.0 pg    MCHC 32.7 32.0 - 36.0 g/dL    RDW 16.2 (H) 11.5 - 14.5 %    Platelets 190 150 - 450 x10*3/uL   Ammonia   Result Value Ref Range    Ammonia 58 (H) 16 - 53 umol/L   Blood Gas Arterial Full Panel   Result Value Ref Range    POCT pH, Arterial 7.49 (H) 7.38 - 7.42 pH    POCT pCO2, Arterial 29 (L) 38 - 42 mm Hg    POCT pO2, Arterial 100 (H) 85 - 95 mm Hg    POCT SO2, Arterial 99 94 - 100 %    POCT Oxy Hemoglobin, Arterial 96.5 94.0 - 98.0 %    POCT Hematocrit Calculated, Arterial 38.0 36.0 - 46.0 %    POCT Sodium, Arterial 144 136 - 145 mmol/L    POCT Potassium, Arterial 3.4 (L) 3.5 - 5.3 mmol/L    POCT Chloride, Arterial 112 (H) 98 - 107 mmol/L    POCT Ionized Calcium, Arterial 1.29 1.10 - 1.33 mmol/L    POCT Glucose, Arterial 148 (H) 74 - 99 mg/dL    POCT Lactate, Arterial 1.4 0.4 - 2.0 mmol/L    POCT Base Excess, Arterial -0.3 -2.0 - 3.0 mmol/L    POCT HCO3 Calculated, Arterial 22.1 22.0 - 26.0 mmol/L    POCT Hemoglobin, Arterial 12.7 12.0 - 16.0 g/dL    POCT Anion Gap, Arterial 13 10 - 25 mmo/L    Patient Temperature      FiO2 21 %    Site of Arterial Puncture Radial Right     Harry's Test Positive    Magnesium   Result Value Ref Range    Magnesium 1.85 1.60 - 2.40 mg/dL   CBC   Result Value Ref Range    WBC 5.3 4.4 - 11.3 x10*3/uL    nRBC 0.0 0.0 -  0.0 /100 WBCs    RBC 4.35 4.00 - 5.20 x10*6/uL    Hemoglobin 12.1 12.0 - 16.0 g/dL    Hematocrit 39.3 36.0 - 46.0 %    MCV 90 80 - 100 fL    MCH 27.8 26.0 - 34.0 pg    MCHC 30.8 (L) 32.0 - 36.0 g/dL    RDW 16.3 (H) 11.5 - 14.5 %    Platelets 171 150 - 450 x10*3/uL   Comprehensive Metabolic Panel   Result Value Ref Range    Glucose 150 (H) 74 - 99 mg/dL    Sodium 146 (H) 136 - 145 mmol/L    Potassium 3.6 3.5 - 5.3 mmol/L    Chloride 114 (H) 98 - 107 mmol/L    Bicarbonate 19 (L) 21 - 32 mmol/L    Anion Gap 17 10 - 20 mmol/L    Urea Nitrogen 12 6 - 23 mg/dL    Creatinine 0.88 0.50 - 1.05 mg/dL    eGFR 81 >60 mL/min/1.73m*2    Calcium 9.1 8.6 - 10.3 mg/dL    Albumin 3.6 3.4 - 5.0 g/dL    Alkaline Phosphatase 59 33 - 110 U/L    Total Protein 6.3 (L) 6.4 - 8.2 g/dL    AST 15 9 - 39 U/L    Bilirubin, Total 1.4 (H) 0.0 - 1.2 mg/dL    ALT 24 7 - 45 U/L   Vancomycin   Result Value Ref Range    Vancomycin 24.8 (H) 5.0 - 20.0 ug/mL      Lab Results   Component Value Date    BLOODCULT No growth at 1 day 11/16/2024    BLOODCULT No growth at 1 day 11/16/2024    URINECULTURE (A) 10/10/2024     Multiple organisms present, probable contamination. Repeat culture if clinically indicated.       CT head wo IV contrast    Result Date: 11/17/2024  Interpreted By:  Boogie Nance, STUDY: CT HEAD WO IV CONTRAST;  11/17/2024 10:04 pm   INDICATION: Signs/Symptoms:Altered mental status.   COMPARISON: 11/16/2024   ACCESSION NUMBER(S): [id number]   ORDERING CLINICIAN: CRYSTAL WEINBERG   TECHNIQUE: Contiguous axial images of the head were obtained without intravenous contrast.   FINDINGS: BRAIN PARENCHYMA:   The gray white matter differentiation is preserved.  No mass effect or midline shift.   HEMORRHAGE:  No evidence of acute intracranial hemorrhage. VENTRICLES AND EXTRA-AXIAL SPACES:  The ventricles are within normal limits in size for brain volume.  No evidence of abnormal extraaxial fluid collection. EXTRACRANIAL SOFT TISSUES:  Within normal  limits. PARANASAL SINUSES/MASTOIDS:  The visualized paranasal sinuses and mastoid air cells are clear and well pneumatized. CALVARIUM:  No evidence of depressed calvarial fracture.   OTHER FINDINGS:  None       No evidence of acute intracranial hemorrhage or mass effect.       MACRO: None   Signed by: Boogie Nance 11/17/2024 11:05 PM Dictation workstation:   [id number]    ECG 12 lead    Result Date: 11/17/2024  Sinus tachycardia Left axis deviation Left ventricular hypertrophy with repolarization abnormality Inferior infarct (cited on or before 27-AUG-2024) Anteroseptal infarct (cited on or before 27-AUG-2024) Abnormal ECG When compared with ECG of 27-AUG-2024 02:40, ST now depressed in Lateral leads    CT abdomen pelvis w IV contrast    Result Date: 11/16/2024  Interpreted By:  Boogie Nance, STUDY: CT ABDOMEN PELVIS W IV CONTRAST;  11/16/2024 7:54 pm   INDICATION: Signs/Symptoms:generalized ttp, sepsis.   COMPARISON: None.   ACCESSION NUMBER(S): [id number]   ORDERING CLINICIAN: ZAHRA MATTHEW   TECHNIQUE: Contiguous axial images of the abdomen and pelvis were obtained after the intravenous administration of  contrast. Coronal and sagittal reformatted images were obtained from the axial images.   FINDINGS: There is limited evaluation the lung bases. Mild basilar atelectasis. No pleural effusion.   Evaluation of the abdomen and pelvis is limited secondary to patient body habitus, motion, and beam hardening artifact secondary to inferior position of the patient's arms. Mild hypodensity near the falciform ligament may correspond to focal fatty infiltration. The gallbladder surgically absent.   The pancreas, spleen, and adrenal glands appear unremarkable.   Symmetric enhancement of the kidneys. No hydronephrosis.   No evidence of bowel obstruction. Fluid-filled segments of colon. There is underdistention versus mild wall thickening and submucosal fat deposition in loops of ileum in the right lower quadrant which may  relate to chronic inflammatory process.   Urinary bladder appears unremarkable.   Limited evaluation of the uterus and adnexa. Intrauterine device. 2.4 cm x 1.8 cm cystic lesion in the right adnexa.   Multilevel degenerative change of the lumbar spine.       Fluid-filled segments of colon; please correlate for diarrhea/colitis. There is underdistention versus mild wall thickening and submucosal fat deposition in loops of ileum in the right lower quadrant which may relate to chronic inflammatory process.       MACRO: None   Signed by: Boogie Nance 11/16/2024 8:16 PM Dictation workstation:   [id number]    CT head wo IV contrast    Result Date: 11/16/2024  Interpreted By:  Boogie Nance, STUDY: CT HEAD WO IV CONTRAST;  11/16/2024 7:54 pm   INDICATION: Signs/Symptoms:AMS.   COMPARISON: None   ACCESSION NUMBER(S): [id number]   ORDERING CLINICIAN: ZAHRA MATTHEW   TECHNIQUE: Contiguous axial images of the head were obtained without intravenous contrast.   FINDINGS: BRAIN PARENCHYMA:   The gray white matter differentiation is preserved.  No mass effect or midline shift.   HEMORRHAGE:  No evidence of acute intracranial hemorrhage. VENTRICLES AND EXTRA-AXIAL SPACES:  The ventricles are within normal limits in size for brain volume.  No evidence of abnormal extraaxial fluid collection. EXTRACRANIAL SOFT TISSUES:  Within normal limits. PARANASAL SINUSES/MASTOIDS:  The visualized paranasal sinuses and mastoid air cells are clear and well pneumatized. CALVARIUM:  No evidence of depressed calvarial fracture.   OTHER FINDINGS:  None       No evidence of acute intracranial hemorrhage or mass effect.       MACRO: None   Signed by: Boogie Nance 11/16/2024 8:11 PM Dictation workstation:   [id number]    NM gastric emptying solid    Result Date: 11/6/2024  Interpreted By:  Laila Lugo, STUDY: NM GASTRIC EMPTYING SOLID; 11/6/2024 2:00 pm   INDICATION: Signs/Symptoms:NAUSEA.   COMPARISON: None.   ACCESSION NUMBER(S): [id number]    ORDERING CLINICIAN: JOSE VARGAS   TECHNIQUE: DIVISION OF NUCLEAR MEDICINE GASTRIC EMPTYING QUANTIFICATION   The patient received an oral radiolabeled solid-phase meal utilizing 1.0 mCi of Tc-99m sulfur colloid in cooked egg. Sequential anterior and posterior images of the abdomen were then acquired over the next four hours. Computer quantification of gastric emptying (using geometric mean activity) was performed.   FINDINGS: The image series shows prompt antegrade transit of radiolabeled solids from stomach to small bowel. Computer quantification demonstrates gastric retention as follows: 45 %  at 1 hr    (normal max 90%) 23 %  at 2 hr    (normal max 60%) 12 %  at 3 hr    (normal max 30%) 1 %  at 4 hr    (normal max 10%)       1. Normal gastric emptying of solids without evidence of gastroparesis.   I personally reviewed the images/study. This study was interpreted at [place], [place].   MACRO: None   Signed by: Laila Lugo 11/6/2024 2:04 PM Dictation workstation:   [id number]    Flexible Sigmoidoscopy    Result Date: 10/29/2024  [facility] Patient Name: Samuel ORTIZ Procedure Date: 10/29/2024 8:47 AM MRN: [id number] YOB: 1975 Age: 48 Gender: Female Race: Unknown Attending MD: Jose Vargas MD, [id number] Procedure:             Colonoscopy Referring MD:          PIPO JAMISON MD Providers:      Jose Vargas MD Indications:           Abnormal CT of the GI tract, Weight loss Findings:              The perianal and digital rectal examinations were                        normal.                        Multiple small and large-mouthed diverticula were                        found in the sigmoid colon.                        The terminal ileum appeared normal. Biopsies were                        taken with a cold forceps for histology. Verification                        of patient identification for the  specimen was done by                        the nurse using the patient's name, birth date and                        medical record number. Estimated blood loss was                        minimal.                        A 2 mm polyp was found in the ileocecal valve. The                        polyp was sessile. The polyp was removed with a cold                        biopsy forceps. Resection and retrieval were complete.                        Verification of patient identification for the                        specimen was done by the nurse using the patient's                        name, birth date and medical record number. Estimated                        blood loss was minimal.                        A 4 mm polyp was found in the cecum. The polyp was                        sessile. The polyp was removed with a cold snare.                        Resection and retrieval were complete. Verification of                        patient identification for the specimen was done by                        the nurse using the patient's name, birth date and                        medical record number. Estimated blood loss was                        minimal.                        Two sessile polyps were found in the ascending colon.                        The polyps were 5 to 7 mm in size. These polyps were                        removed with a cold snare. Resection and retrieval                        were complete. Verification of patient identification                        for the specimen was done by the nurse using the                        patient's name, birth date and medical record number.                        Estimated blood loss was minimal.                        The colon (entire examined portion) appeared otherwise                        normal. Biopsies were taken with a cold forceps for                        histology. Verification of patient identification for                        the specimen  was done by the nurse using the patient's                        name, birth date and medical record number. Estimated                        blood loss was minimal.                        Stool sample collected and submitted for C diff PCR                        and culture and fecal calprotectin.                        The retroflexed view of the distal rectum and anal                        verge was normal and showed no anal or rectal                        abnormalities. Patient Profile:       This is a 48 year old female. Refer to note in patient                        chart for documentation of history and physical. Impression:            - Diverticulosis in the sigmoid colon.                        - The examined portion of the ileum was normal.                        Biopsied.                        - One 2 mm polyp at the ileocecal valve, removed with                        a cold biopsy forceps. Resected and retrieved.                        - One 4 mm polyp in the cecum, removed with a cold                        snare. Resected and retrieved.                        - Two 5 to 7 mm polyps in the ascending colon, removed                        with a cold snare. Resected and retrieved.                        - The entire examined colon is otherwise normal.                        Biopsied.                        - The distal rectum and anal verge are normal on                        retroflexion view. Recommendation:        - Await pathology results.                        - Patient has a contact number available for                        emergencies. The signs and symptoms of potential                        delayed complications were discussed with the patient.                        Return to normal activities tomorrow. Written                        discharge instructions were provided to the patient.                        - High fiber diet.                        - Continue present medications.                         - Schedule gastric emptying study as previously                        recommended.                        - Repeat colonoscopy in 3 years for surveillance. Medicines:             Propofol per Anesthesia Procedure:             Pre-Anesthesia Assessment:                        - Prior to the procedure, a History and Physical was                        performed, and patient medications and allergies were                        reviewed. The patient is competent. The risks and                        benefits of the procedure and the sedation options and                        risks were discussed with the patient. All questions                        were answered and informed consent was obtained.                        Patient identification and proposed procedure were                        verified by the physician and the nurse in the                        pre-procedure area in the procedure room. Mental                        Status Examination: alert and oriented. Airway                        Examination: normal oropharyngeal airway and neck                        mobility. Respiratory Examination: clear to                        auscultation. CV Examination: normal. Prophylactic                        Antibiotics: The patient does not require prophylactic                        antibiotics. Prior Anticoagulants: The patient has                        taken no anticoagulant or antiplatelet agents. ASA                        Grade Assessment: III - A patient with severe systemic                        disease. After reviewing the risks and benefits, the                        patient was deemed in satisfactory condition to                        undergo the procedure. The anesthesia plan was to use                        deep sedation / analgesia. Immediately prior to                        administration of medications, the patient was                        re-assessed for adequacy to  receive sedatives. The                        heart rate, respiratory rate, oxygen saturations,                        blood pressure, adequacy of pulmonary ventilation, and                        response to care were monitored throughout the                        procedure. The physical status of the patient was                        re-assessed after the procedure.                        After I obtained informed consent, the scope was                        passed under direct vision. Throughout the procedure,                        the patient's blood pressure, pulse, and oxygen                        saturations were monitored continuously. Provation                        AI/GI Genius was used during withdrawal. The                        COLONOSCOPE was introduced through the anus and                        advanced to the terminal ileum. The colonoscopy was                        performed without difficulty. The patient tolerated                        the procedure well. The quality of the bowel                        preparation was good. The terminal ileum, ileocecal                        valve, appendiceal orifice, and rectum were                        photographed. The quality of the bowel preparation was                        evaluated using the BBPS ([place] Bowel Preparation                        Scale) with scores of: Right Colon = 2 (minor amount                        of residual staining, small fragments of stool and/or                        opaque liquid, but mucosa seen well), Transverse Colon                        = 3 (entire mucosa seen well with no residual                        staining, small fragments of stool or opaque liquid)                        and Left Colon = 3 (entire mucosa seen well with no                        residual staining, small fragments of stool or opaque                        liquid). The total BBPS score equals 8. Complications:         No immediate  complications. Procedure Code(s):     --- Professional ---                        24134, Colonoscopy, flexible; with removal of                        tumor(s), polyp(s), or other lesion(s) by snare                        technique                        35422, 59, Colonoscopy, flexible; with biopsy, single                        or multiple Diagnosis Code(s):     --- Professional ---                        D12.0, Benign neoplasm of cecum                        D12.2, Benign neoplasm of ascending colon                        R63.4, Abnormal weight loss                        K57.30, Diverticulosis of large intestine without                        perforation or abscess without bleeding                        R93.3, Abnormal findings on diagnostic imaging of                        other parts of digestive tract CPT copyright 2021 [employer]. All rights reserved. The codes documented in this report are preliminary and upon  review may be revised to meet current compliance requirements. Jose Fernandez MD 10/29/2024 9:42:34 AM Number of Addenda: 0 Note Initiated On: 10/29/2024 8:47 AM          Assessment/Plan   Samuel Mims is a 48 y.o. female on day 2 of admission presenting with Sepsis with encephalopathy without septic shock, due to unspecified organism (Multi).  Principal Problem:    Sepsis with encephalopathy without septic shock, due to unspecified organism (Multi)  Active Problems:    Diarrhea    UTI (urinary tract infection)    Hypertension    Colitis    Sinus tachycardia    Prolonged Q-T interval on ECG    Clostridium difficile infection    Altered mental status due to Metabolic encephalopathy  Possibly due to untreated C difficile infection and UTI.  CT head with no acute changes. Urine Tox screen negative.  SLP evaluated and deemed safe to eat.  No WBC count.  Ammonia mildly elevated.  No improvement in AMS since admission.      Untreated C. Difficile Colitis  Per  she  has not taken the oral Vancomycin prescribed at discharge on 11/2/24.   C. Diff PCR not collected as patient has not had BM.  Started on oral and IV Vancomycin.  CT a/p confirms she continues to have colitis.       Presumed UTI  Urine culture pending.  On Zosyn     HTN and Sinus Tachycardia  Continue Losartan 100 mg,  Verapamil 240 mg and  Spironolactone 25 mg.  Blood pressure controlled. Continues to be tachycardic.     Asthma  Budesonide 0.5 mg and Formoterol nebs bid.     CHET  Continue Buspirone 10 mg bid.  Klonopin on hold due to encephalopathy     GERD  Pantoprazole 40 mg bid.     IUD in place for  17 years  Requires removal as out patient.  Will encourage pt to see me or her gyne for removal upon discharge.        Plan discussed with patient at bedside. Left Voicemail for  with update on patients condition.     Nell Green MD

## 2024-11-18 NOTE — CARE PLAN
No improvement in cognition during the shift. Upon first assessment, pt unable to answer any of my questions, only responsive to sternal rubbing, pt not following commands. Provider notified and at bedside to evaluate. Pt taken down for stat CT of head, CT negative. No improvement in cognition during the shift, withdraws to painful stimuli but pt unable to answer any orientation questions and does not respond to verbal stimuli. Provider aware of cognition. During the night, pt hypertensive. IV labetalol x1, IV hydralazine x2, and PRN IV lopressor x2 given for hypertensive episodes. Pt not responsive to these IV BP meds, provider aware of BP throughout episodes. Cardene gtt ordered, started per provider ordered. Gtt up to 12.5mg/hr, BP dropped to 118/70s, provider notified, cardene gtt stopped per provider order. BP that followed gtt being turned off 108/70s, 120s/70s. While cardene gtt running, pt HR 120s-130, provider aware, continued to monitor per provider order. Lactulose enema administered, 1 BM in the am. All PO meds held per provider order, pt too lethargic to take PO meds at this time. K replaced IV x1. Temp, resps, and SpO2 stable during the night. No complaints/observations of pain. Safety maintained.    When cardene ordered, pt only had 1 IV with zosyn and LR running. Pt needed ultrasound start IV for a second IV, 2 nurses on IMCU tried to start IV with ultrasound, no success. Messaged provider, stated it was okay to stop the LR and zosyn and put the cardene in that IV.     Problem: Skin  Goal: Prevent/minimize sheer/friction injuries  11/18/2024 0749 by Rosaura Diop RN  Outcome: Progressing     Problem: Skin  Goal: Prevent/manage excess moisture  11/18/2024 0749 by Rosaura Diop RN  Outcome: Progressing     Problem: Skin  Goal: Promote/optimize nutrition  11/18/2024 0749 by Rosaura iDop RN  Outcome: Progressing     Problem: Skin  Goal: Promote skin healing  11/18/2024 0749 by Rosaura Diop  RN  Outcome: Progressing       Problem: Discharge Planning  Goal: Discharge to home or other facility with appropriate resources  11/18/2024 0749 by Rosaura Diop RN  Outcome: Progressing     Problem: Chronic Conditions and Co-morbidities  Goal: Patient's chronic conditions and co-morbidity symptoms are monitored and maintained or improved  11/18/2024 0749 by Rosaura Diop RN  Outcome: Progressing     Problem: Fall/Injury  Goal: Verbalize understanding of personal risk factors for fall in the hospital  Outcome: Progressing     Problem: Fall/Injury  Goal: Verbalize understanding of risk factor reduction measures to prevent injury from fall in the home  Outcome: Progressing     Problem: Pain  Goal: Turns in bed with improved pain control throughout the shift  Outcome: Progressing     Problem: Pain  Goal: Takes deep breaths with improved pain control throughout the shift  Outcome: Progressing     Problem: Arrythmia/Dysrhythmia  Goal: Lab values return to normal range  11/18/2024 0749 by Rosaura Diop RN  Outcome: Progressing     Problem: Arrythmia/Dysrhythmia  Goal: Serial ECG will return to baseline  11/18/2024 0749 by Rosaura Diop RN  Outcome: Progressing     Problem: Arrythmia/Dysrhythmia  Goal: Vital signs return to baseline  11/18/2024 0749 by Rosaura Diop RN  Outcome: Progressing      The patient's goals for the shift include The patient is too lethargic at this time to participate in her care plan    The clinical goals for the shift include Pt will have improved cognition during the shift.

## 2024-11-18 NOTE — CONSULTS
Inpatient consult to Cardiology  Consult performed by: Hailey Ang DO  Consult ordered by: JOSE Marino-CNP  Reason for consult: HTN, tachycardia          Reason For Consult  HTN, tachycardia    History Of Present Illness  Samuel Mims is a 48 y.o. female with a past medical history of hypertension, asthma, GERD, C. difficile (9/2024, 11/2024) who initially presented to the [facility] emergency department for confusion with associated nausea/vomiting/diarrhea.  Patient was found to have a urinary tract infection as well as a concomitant C. difficile infection.  Neurology was consulted who confirmed metabolic encephalopathy secondary to active infections.  Patient is currently on Zosyn, IV vancomycin for infection control.  Cardiology consultation was placed due to persistent hypertension and tachycardia.  Current home medications include losartan 100 mg daily, verapamil 240 mg daily, and spironolactone 12.5 mg which has been held inpatient.  Throughout patient's hospitalization blood pressure has been elevated between 130 to190 systolic, tachycardic between 90 and 144.  EKG showing sinus tachycardia with left axis deviation.  Per chart review, patient has been seen by cardiology in the past due to history of uncontrolled hypertension and sinus tachycardia.  Patient was previously on Norvasc however switched to verapamil plus spironolactone in 2023.  Echocardiogram in 2023 with EF 60%, mild left atrial dilatation otherwise unremarkable.     Past Medical History  She has a past medical history of Asthma, Clostridium difficile infection, GERD (gastroesophageal reflux disease), and Hypertension.    Surgical History  She has a past surgical history that includes Cholecystectomy; Esophagogastroduodenoscopy; and Colonoscopy.     Social History  She reports that she has quit smoking. Her smoking use included cigarettes. She started smoking about 4 years ago. She has a 4.9 pack-year smoking  history. She has never used smokeless tobacco. She reports that she does not drink alcohol and does not use drugs.    Family History  Family History   Problem Relation Name Age of Onset    Breast cancer Mother  63    Breast cancer Sister  63        Allergies  Tiotropium bromide, Ace inhibitors, Escitalopram, Hydrochlorothiazide, and House dust mite    Review of Systems  Unable to obtain d/t encephalopathy     Physical Exam  General: Decreased responsiveness, grimaces to pain (unchanged per nursing staff)  Head: Atraumatic/Normocephalic  Cardiovascular: tachycardic, S1/S2, no murmurs, rubs, or gallops  Pulmonary: CTAB, no respiratory distress. No wheezes, rales, or ronchi  Abdomen: tender, grimaces to pain, active bowel sounds  Skin- No lesions, contusions, or erythema.  Neuro: Decreased responsiveness, grimaces to pain           Last Recorded Vitals  BP (!) 169/105   Pulse (!) 118   Temp 36.7 °C (98.1 °F) (Temporal)   Resp 24   Wt 109 kg (240 lb 1.3 oz)   SpO2 99%     Relevant Results  Results for orders placed or performed during the hospital encounter of 11/16/24 (from the past 24 hours)   Ammonia   Result Value Ref Range    Ammonia 58 (H) 16 - 53 umol/L   Blood Gas Arterial Full Panel   Result Value Ref Range    POCT pH, Arterial 7.49 (H) 7.38 - 7.42 pH    POCT pCO2, Arterial 29 (L) 38 - 42 mm Hg    POCT pO2, Arterial 100 (H) 85 - 95 mm Hg    POCT SO2, Arterial 99 94 - 100 %    POCT Oxy Hemoglobin, Arterial 96.5 94.0 - 98.0 %    POCT Hematocrit Calculated, Arterial 38.0 36.0 - 46.0 %    POCT Sodium, Arterial 144 136 - 145 mmol/L    POCT Potassium, Arterial 3.4 (L) 3.5 - 5.3 mmol/L    POCT Chloride, Arterial 112 (H) 98 - 107 mmol/L    POCT Ionized Calcium, Arterial 1.29 1.10 - 1.33 mmol/L    POCT Glucose, Arterial 148 (H) 74 - 99 mg/dL    POCT Lactate, Arterial 1.4 0.4 - 2.0 mmol/L    POCT Base Excess, Arterial -0.3 -2.0 - 3.0 mmol/L    POCT HCO3 Calculated, Arterial 22.1 22.0 - 26.0 mmol/L    POCT  Hemoglobin, Arterial 12.7 12.0 - 16.0 g/dL    POCT Anion Gap, Arterial 13 10 - 25 mmo/L    Patient Temperature      FiO2 21 %    Site of Arterial Puncture Radial Right     Harry's Test Positive    Magnesium   Result Value Ref Range    Magnesium 1.85 1.60 - 2.40 mg/dL   CBC   Result Value Ref Range    WBC 5.3 4.4 - 11.3 x10*3/uL    nRBC 0.0 0.0 - 0.0 /100 WBCs    RBC 4.35 4.00 - 5.20 x10*6/uL    Hemoglobin 12.1 12.0 - 16.0 g/dL    Hematocrit 39.3 36.0 - 46.0 %    MCV 90 80 - 100 fL    MCH 27.8 26.0 - 34.0 pg    MCHC 30.8 (L) 32.0 - 36.0 g/dL    RDW 16.3 (H) 11.5 - 14.5 %    Platelets 171 150 - 450 x10*3/uL   Comprehensive Metabolic Panel   Result Value Ref Range    Glucose 150 (H) 74 - 99 mg/dL    Sodium 146 (H) 136 - 145 mmol/L    Potassium 3.6 3.5 - 5.3 mmol/L    Chloride 114 (H) 98 - 107 mmol/L    Bicarbonate 19 (L) 21 - 32 mmol/L    Anion Gap 17 10 - 20 mmol/L    Urea Nitrogen 12 6 - 23 mg/dL    Creatinine 0.88 0.50 - 1.05 mg/dL    eGFR 81 >60 mL/min/1.73m*2    Calcium 9.1 8.6 - 10.3 mg/dL    Albumin 3.6 3.4 - 5.0 g/dL    Alkaline Phosphatase 59 33 - 110 U/L    Total Protein 6.3 (L) 6.4 - 8.2 g/dL    AST 15 9 - 39 U/L    Bilirubin, Total 1.4 (H) 0.0 - 1.2 mg/dL    ALT 24 7 - 45 U/L   Vancomycin   Result Value Ref Range    Vancomycin 24.8 (H) 5.0 - 20.0 ug/mL   ECG 12 Lead   Result Value Ref Range    Ventricular Rate 127 BPM    Atrial Rate 127 BPM    FL Interval 144 ms    QRS Duration 70 ms    QT Interval 314 ms    QTC Calculation(Bazett) 456 ms    P Axis 20 degrees    R Axis -49 degrees    T Axis 77 degrees    QRS Count 21 beats    Q Onset 223 ms    P Onset 151 ms    P Offset 200 ms    T Offset 380 ms    QTC Fredericia 403 ms         Assessment/Plan     #HTN  #Sinus tachycardia  #Metabolic encephalopathy 2/2 UTI + Untreated C. Diff colitis  -Home medications: Losartan 100mg, Verapamil 240mg, Spironolactone 25mg  -Patient has had hx of HTN and sinus tachycardia, started on Verapamil by her cardiologist outpatient,  Dr. Weinstein. I suspect her uncontrolled HTN and sinus tach are likely in the setting of her current illness. Patient admitted for metabolic encephalopathy 2/2 UTI + c diff colitis and has been unable to take oral medications.   -Can start Metoprolol 7.5mg IV q6h, Enalaprilat 1.25mg IV q6h until her encephalopathy improves and she is able to take PO      Assessment and plan discussed with Dr. Kim Ang, DO  Internal Medicine, PGY3

## 2024-11-18 NOTE — CARE PLAN
Problem: Skin  Goal: Participates in plan/prevention/treatment measures  Outcome: Progressing  Goal: Prevent/manage excess moisture  Outcome: Progressing  Goal: Prevent/minimize sheer/friction injuries  Outcome: Progressing  Goal: Promote/optimize nutrition  Outcome: Progressing  Goal: Promote skin healing  Outcome: Progressing     Problem: Pain - Adult  Goal: Verbalizes/displays adequate comfort level or baseline comfort level  Outcome: Progressing     Problem: Discharge Planning  Goal: Discharge to home or other facility with appropriate resources  Outcome: Progressing     Problem: Chronic Conditions and Co-morbidities  Goal: Patient's chronic conditions and co-morbidity symptoms are monitored and maintained or improved  Outcome: Progressing     Problem: Fall/Injury  Goal: Verbalize understanding of personal risk factors for fall in the hospital  Outcome: Progressing  Goal: Verbalize understanding of risk factor reduction measures to prevent injury from fall in the home  Outcome: Progressing  Goal: Use assistive devices by end of the shift  Outcome: Progressing  Goal: Pace activities to prevent fatigue by end of the shift  Outcome: Progressing     Problem: Pain  Goal: Takes deep breaths with improved pain control throughout the shift  Outcome: Progressing  Goal: Turns in bed with improved pain control throughout the shift  Outcome: Progressing  Goal: Walks with improved pain control throughout the shift  Outcome: Progressing  Goal: Performs ADL's with improved pain control throughout shift  Outcome: Progressing     Problem: Arrythmia/Dysrhythmia  Goal: Lab values return to normal range  Outcome: Progressing  Goal: Promote self management  Outcome: Progressing  Goal: Serial ECG will return to baseline  Outcome: Progressing  Goal: Vital signs return to baseline  Outcome: Progressing     Problem: Nutrition  Goal: Nutrition support goals are met within 48 hrs  Outcome: Progressing  Goal: Nutrition support is meeting  75% of nutrient needs  Outcome: Progressing  Goal: Tube feed tolerance  Outcome: Progressing  Goal: BG  mg/dL  Outcome: Progressing  Goal: Electrolytes WNL  Outcome: Progressing   The patient's goals for the shift include The patient is too lethargic at this time to participate in her care plan    The clinical goals for the shift include patient will become more alert this shift    Patient lethargic all shift.  Brief periods with eyes open.  Responisve to painful stimuli and occasional loud voice.  Unable to follow commands this shift.  CT of head performed.  Patient to have MRI this evening.  Remained hypertensive this shift.  IV vasotec Q 6hr started as well as 7.5mg IV metoprolol. Remains unable to take PO medications.  Cor lesley placed to R nare.  PO meds and tube feedings started.

## 2024-11-18 NOTE — PROGRESS NOTES
Vancomycin Dosing by Pharmacy- FOLLOW UP    Samuel Mims is a 48 y.o. year old female who Pharmacy has been consulted for vancomycin dosing for other (intra-abdominal infection and UTI . Based on the patient's indication and renal status this patient is being dosed based on a goal AUC of 400-600.     Renal function is currently stable.    Current vancomycin dose: 1250 mg given every 12 hours    Estimated vancomycin AUC on current dose: 654 mg/L.hr . Vancomycin level 24.8 micrograms/mL.    Visit Vitals  /87 (BP Location: Right arm, Patient Position: Lying)   Pulse (!) 116   Temp 36.7 °C (98.1 °F) (Temporal)   Resp 24        Lab Results   Component Value Date    CREATININE 0.88 2024    CREATININE 0.84 2024    CREATININE 0.95 2024    CREATININE 0.96 2024        Patient weight is as follows:   Vitals:    24 0343   Weight: 109 kg (240 lb 1.3 oz)       Cultures:  No results found for the encounter in last 14 days.       I/O last 3 completed shifts:  In: 1768.3 (16.2 mL/kg) [I.V.:968.3 (8.9 mL/kg); IV Piggyback:800]  Out: 1500 (13.8 mL/kg) [Urine:1500 (0.4 mL/kg/hr)]  Weight: 108.9 kg   I/O during current shift:  No intake/output data recorded.    Temp (24hrs), Av.7 °C (98 °F), Min:36.3 °C (97.3 °F), Max:37.2 °C (99 °F)      Assessment/Plan    Predicted AUC would be above goal. Adjust dose to 1 g q12h.    This dosing regimen is predicted by [employer] to result in the following pharmacokinetic parameters:  Loading dose: N/A  Regimen: 1000 mg IV every 12 hours.  Start time: 08:44 on 2024  Exposure target: AUC24 (range)400-600 mg/L.hr   ALT40-92: 548 mg/L.hr  AUC24,ss: 549 mg/L.hr  Probability of AUC24 > 400: 98 %  Ctrough,ss: 17.6 mg/L  Probability of Ctrough,ss > 20: 29 %    The next level will be obtained on  at 0700. May be obtained sooner if clinically indicated.   Will continue to monitor renal function daily while on vancomycin and order serum creatinine at least  every 48 hours if not already ordered.  Follow for continued vancomycin needs, clinical response, and signs/symptoms of toxicity.       Aster Sweeney, PharmD

## 2024-11-18 NOTE — PROGRESS NOTES
INPATIENT PROGRESS NOTES    PATIENT NAME: Samuel Mims    MRN: [id number]  SERVICE DATE:  11/18/2024   SERVICE TIME:  12:47 PM    SIGNATURE: Valery Chandler MD      SUBJECTIVE  Afebrile        OBJECTIVE  PHYSICAL EXAM:   Patient Vitals for the past 24 hrs:   BP Temp Temp src Pulse Resp SpO2 Weight   11/18/24 1230 (!) 175/107 -- -- (!) 114 23 98 % --   11/18/24 1200 (!) 197/124 37.3 °C (99.1 °F) Temporal (!) 112 16 98 % --   11/18/24 1100 (!) 192/101 -- -- 108 24 98 % --   11/18/24 1000 (!) 169/105 -- -- (!) 118 24 99 % --   11/18/24 0900 (!) 164/97 -- -- (!) 116 (!) 27 99 % --   11/18/24 0800 154/87 36.7 °C (98.1 °F) Temporal (!) 116 -- 96 % --   11/18/24 0700 134/86 -- -- (!) 118 24 96 % --   11/18/24 0628 125/77 -- -- (!) 122 26 96 % --   11/18/24 0615 108/62 -- -- (!) 122 26 96 % --   11/18/24 0612 118/77 -- -- -- -- -- --   11/18/24 0600 151/86 -- -- (!) 126 (!) 27 97 % --   11/18/24 0557 151/84 -- -- (!) 124 25 97 % --   11/18/24 0551 168/90 -- -- (!) 126 24 99 % --   11/18/24 0546 168/83 -- -- (!) 120 26 98 % --   11/18/24 0541 165/89 -- -- (!) 118 26 98 % --   11/18/24 0536 173/82 -- -- (!) 116 18 99 % --   11/18/24 0528 (!) 180/111 -- -- -- -- -- --   11/18/24 0441 (!) 192/98 -- -- (!) 112 20 99 % --   11/18/24 0400 (!) 173/99 -- -- 104 24 99 % --   11/18/24 0343 (!) 182/109 36.8 °C (98.2 °F) Temporal (!) 118 22 99 % 109 kg (240 lb 1.3 oz)   11/18/24 0339 (!) 148/112 -- -- (!) 116 21 98 % --   11/18/24 0230 140/83 -- -- (!) 116 23 98 % --   11/18/24 0140 (!) 170/91 -- -- (!) 112 (!) 27 99 % --   11/18/24 0127 (!) 154/101 -- -- 108 23 99 % --   11/18/24 0120 (!) 154/101 -- -- 108 25 99 % --   11/18/24 0101 (!) 179/100 -- -- -- -- -- --   11/18/24 0100 (!) 179/100 -- -- 100 23 98 % --   11/18/24 0019 176/90 36.7 °C (98.1 °F) Temporal 96 22 98 % --   11/18/24 0000 (!) 182/94 -- -- 96 24 96 % --   11/17/24 2043 (!) 169/93 -- -- 104 25 98 % --   11/17/24 1948 (!) 155/100 37.2 °C (99 °F) Temporal 110 21 98 %  "--   11/17/24 1505 (!) 177/100 36.3 °C (97.3 °F) Temporal 108 18 98 % --         Gen: Patient is obtunded  Respiratory: No distress   GI: Abd soft  Extremities: no leg edema      Labs:  Lab Results   Component Value Date    WBC 5.3 11/18/2024    HGB 12.1 11/18/2024    HCT 39.3 11/18/2024    MCV 90 11/18/2024     11/18/2024     Lab Results   Component Value Date    GLUCOSE 150 (H) 11/18/2024    CALCIUM 9.1 11/18/2024     (H) 11/18/2024    K 3.6 11/18/2024    CO2 19 (L) 11/18/2024     (H) 11/18/2024    BUN 12 11/18/2024    CREATININE 0.88 11/18/2024   ESR: --No results found for: \"SEDRATE\"No results found for: \"CRP\"  Lab Results   Component Value Date    ALT 24 11/18/2024    AST 15 11/18/2024    ALKPHOS 59 11/18/2024    BILITOT 1.4 (H) 11/18/2024         DATA:   Diagnostic tests reviewed for today's visit:    Labs this admission reviewed  Imagings this admission reviewed  Cultures: Reviewed        ASSESSMENT :   -Acute encephalopathy--> ?hypertensive urgency/emergency  -Recent C. difficile infection  -Pyuria and bacteriuria with possible UTI.  Urine culture grew multiple organisms  -Hypokalemia likely secondary to diarrhea  -History of hypertension, asthma, GERD    PLAN:  -Discontinue IV vancomycin and Zosyn  -Discontinue vancomycin enema  -Continue p.o. vancomycin  -Consider MRI.      Valery Chandler MD.   Infectious Disease Attending            This note was partially created using voice recognition software and is inherently subject to errors including those of syntax and \"sound-alike\" substitutions which may escape proofreading. In such instances, original meaning may be extrapolated by contextual derivation       "

## 2024-11-18 NOTE — PROGRESS NOTES
"Samuel Mims is a 48 y.o. female on day 2 of admission presenting with Sepsis with encephalopathy without septic shock, due to unspecified organism (Multi).      Subjective   Remains encephalopathic. ICU nurse at bedside inserting Corpak and pt was reaching both hands to her nose to try to pull it out and moving her head back and forth to resist insertion. Eyes remained closed and she was mumbling.   Pt's brother was at bedside and states pt was not taking her po Vanco at home due to nausea and inability to hold it down.       Objective     Last Recorded Vitals  Blood pressure 156/87, pulse (!) 112, temperature 37.3 °C (99.1 °F), temperature source Temporal, resp. rate 21, height 1.676 m (5' 6\"), weight 109 kg (240 lb 1.3 oz), SpO2 97%.    Physical Exam  Eyes:      Pupils: Pupils are equal, round, and reactive to light.       Neurological Exam  Mental Status  Responsive to painful stimuli.  Moans/mumbles. No following commands.  Moving head back and forth to resist Corpak insertion.   .    Cranial Nerves  CN III, IV, VI: Pupils equal round and reactive to light bilaterally.  Facial expression appears symmetrical. .    Motor    Moving all extremities with no focal weakness observed.  Reflexively squeezes examiners fingers-  is strong bilaterally.   No abnormal movements noted..    Sensory  Localizes to painful stimulation in upper extremities. .    Coordination    Unable to assess- not following commands. .    Relevant Results    Assessment/Plan   This patient currently has cardiac telemetry ordered; if you would like to modify or discontinue the telemetry order, click here to go to the orders activity to modify/discontinue the order.  Assessment & Plan  Sepsis with encephalopathy without septic shock, due to unspecified organism (Multi)    Diarrhea    UTI (urinary tract infection)    Hypertension    Colitis    Sinus tachycardia    Prolonged Q-T interval on ECG    Clostridium difficile infection    Metabolic " encephalopathy 2/2 C-diff infection (pt was not taking her oral vanco at home per spouse) and possible cefipime toxicity. Urinalysis from today is unremarkable. Also may be superimposed on dehydration given nausea, vomiting and diarrhea as well as mild hyperammonemia and hypertensive encephalopathy. Ddx: PRES, viral encephalitis     Recommend:    MRI brain without contrast  ID following; cardiology and gastroenterology as well.   Continue supportive care.    Case/plan discussed with Dr. Tracey.      Nelly Manley, JOSE-CNP

## 2024-11-18 NOTE — SIGNIFICANT EVENT
2000: Nursing messaged [facility] house nurse practitioner that the patient is minimally responsive to sternal rub, but not answering any questions. Per nursing, she does withdrawal to painful stimuli. Vital signs: Temperature 37.2, , RR 21, /100, SpO2 98% on room air.     Upon my evaluation, the patient opens eyes with sternal rub, but will not answer any questions nor follow commands. She keeps moving her head back and forth. She is unable to hold arms up when lifted in the air. She had similar episodes last night, but would have a delay in response to follow commands and with answers. However, earlier in the day she was able to participate to do a SLP evaluation. Dr. Tracey, neurologist saw the patient earlier today reported that metabolic encephalopathy related to UTI and recent C-Diff infection, no further neurological work-up needed at this time. Brother at bedside, updated him on plan of care.     Physical Exam:  Constitutional: lethargic, not answering questions, moving head back and forth, not following commands, No acute distress.  HEENT: PERRL, sclera clear, No swelling  Cardiac: Auscultated S1 & S2, regular rhythm with tachycardia, no murmurs  Lungs: CTAB, symmetrical chest rise, no accessory muscle usage, unlabored  Abdomen: Soft, no grimacing noted, nondistended, positive bowel sounds in all quadrants  Extremities: BLE 2+  edema, No cyanosis    Assessment  Metabolic encephalopathy  UTI  Recent C-diff infection    Plan  STAT CT head  Ammonia level - treat as indicated if abnormal  Continue neuro checks  Monitor vital signs  Continue telemetry

## 2024-11-18 NOTE — CONSULTS
Reason for consult  Refractory C. difficile    HPI  Samuel Mims is a 48 y.o. female with PMH of HTN, asthma, GERD, C. difficile (9/2024) presenting with altered mental status.  Patient remains obtunded at time of consult with difficulty opening her eyes to stimulation.  Per her 's report to staff, she was incontinent of urine and diarrhea at home. He reported that she had been confused with poor oral intake for a couple of days as well though her appetite had been poor for a couple of months. She had had a 45 pound weight loss over the course of a month. She also had a admission 11/1 to 11/2 for hypokalemia secondary to vomiting. In addition she was diagnosed with C. difficile 10/19 though per patient's  she was noncompliant with vancomycin.  She had become increasingly lethargic and progressively more confused.  She has continued to be tachycardic and hypertensive with recent /124.  Patient follows with Dr. Jose Fernandez for outpatient GI care.      CT head with no acute findings.  CT A/P noting fluid-filled segments of colon with under distention and mild wall thickening of the ileum.  Tox screen negative.  Current C. difficile pending.  Ammonia 58.    Patient had a colonoscopy with Dr. Fernandez on 10/29/2024 noting polyps at the ileocecal valve, cecum, ascending colon, a total of 4 polyps removed.  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 She had a gastric emptying study 11/6/2024 with normal findings.    PMH  She has a past medical history of Asthma, Clostridium difficile infection, GERD (gastroesophageal reflux disease), and Hypertension.    PSH  She has a past surgical history that includes Cholecystectomy; Esophagogastroduodenoscopy; and Colonoscopy.    Family  Family History   Problem Relation Name Age of Onset    Breast cancer Mother  63    Breast  "cancer Sister  63       Social  She reports that she has quit smoking. Her smoking use included cigarettes. She started smoking about 4 years ago. She has a 4.9 pack-year smoking history. She has never used smokeless tobacco. She reports that she does not drink alcohol and does not use drugs.      Review of Systems  ROS negative unless stated otherwise in HPI     Objective  BP (!) 169/105   Pulse (!) 118   Temp 36.7 °C (98.1 °F) (Temporal)   Resp 24   Ht 1.676 m (5' 6\")   Wt 109 kg (240 lb 1.3 oz)   SpO2 99%   BMI 38.75 kg/m²     Physical Exam  Constitutional: Obtunded, in NAD  Eyes: PERRL, sclera clear, no conjunctival injection  Skin: Warm and dry, no rash or ecchymosis  ENMT: Mucous membranes moist, no lesions noted  Resp: CTAB, even and unlabored  CV: Tachycardic, normal S1, S2, no m,r,g  GI: +BS, soft, round, NT, no rebound tenderness or guarding, no palpable masses or organomegaly  Extremities: Extremities warm, no edema, contusions, wounds or cyanosis  Neuro: ANGEL  Psych: Obtunded    Medications  Scheduled medications  budesonide, 0.5 mg, nebulization, BID  busPIRone, 10 mg, oral, BID  enalaprilat, 1.25 mg, intravenous, q6h  influenza, 0.5 mL, intramuscular, During hospitalization  formoterol, 20 mcg, nebulization, BID  heparin (porcine), 5,000 Units, subcutaneous, q8h SANTIAGO  lactulose, 300 mL, rectal, Once  losartan, 100 mg, oral, Daily  metoprolol, 7.5 mg, intravenous, q6h  [Held by provider] pantoprazole, 40 mg, oral, BID AC  pantoprazole, 40 mg, intravenous, BID AC  piperacillin-tazobactam, 3.375 g, intravenous, q8h  [Held by provider] spironolactone, 12.5 mg, oral, Daily  vancomycin, 1,000 mg, intravenous, q12h  vancomycin, 125 mg, oral, 4x daily  verapamil SR, 240 mg, oral, Nightly      Continuous medications  lactated Ringer's, 75 mL/hr  [Held by provider] niCARdipine, 2.5-15 mg/hr, Last Rate: Stopped (11/18/24 0615)      PRN medications  PRN medications: albuterol, [Held by provider] clonazePAM, " "melatonin, vancomycin     Labs  Lab Results   Component Value Date    WBC 5.3 11/18/2024    HGB 12.1 11/18/2024    HCT 39.3 11/18/2024    MCV 90 11/18/2024     11/18/2024     Lab Results   Component Value Date    GLUCOSE 150 (H) 11/18/2024    CALCIUM 9.1 11/18/2024     (H) 11/18/2024    K 3.6 11/18/2024    CO2 19 (L) 11/18/2024     (H) 11/18/2024    BUN 12 11/18/2024    CREATININE 0.88 11/18/2024     Lab Results   Component Value Date    ALT 24 11/18/2024    AST 15 11/18/2024    ALKPHOS 59 11/18/2024    BILITOT 1.4 (H) 11/18/2024     No results found for: \"IRON\", \"TIBC\", \"FERRITIN\"  No results found for: \"INR\", \"PROTIME\"    Radiology  CT A/P with IV contrast 11/16/2024 noting:  IMPRESSION:  Fluid-filled segments of colon; please correlate for  diarrhea/colitis. There is underdistention versus mild wall  thickening and submucosal fat deposition in loops of ileum in the  right lower quadrant which may relate to chronic inflammatory process.      Signed by: Boogie Nance 11/16/2024 8:16 PM  Dictation workstation:   [id number]    Assessment:  Samuel Mims is a 48 y.o. female with PMH of HTN, GERD, C. difficile 9/2024, 10/2024 presenting with altered mental status.  Patient incontinent of urine and stool at home with poor oral intake, 45 pound weight loss over the past month.  Noncompliant with recent dosing of oral vancomycin.  Repeat C. difficile pending.  Patient follows with Dr. Fernandez for outpatient GI care.  Patient remains obtunded, tachycardic and hypertensive to 197/124.  No leukocytosis.  Ammonia 58    # altered mental status  # recent c diff x2  # GERD  # accelerated HTN  # weight loss    Plan:  - continue supportive care  - follow up c diff  - follow up infectious work up  - follow up ID recs  - continue PPI BID per previous GI recs  - pt will continue to follow up with Dr. Fernandez in clinic for GI care    Plan has been discussed with Dr. Downing. GI will continue to follow. "     Pili Boles, APRN/CNP

## 2024-11-18 NOTE — PROGRESS NOTES
"Nutrition Initial Assessment:   Nutrition Assessment    Reason for Assessment: Provider consult order    Nutrition Note:  Samuel Mims is a 48 y.o. female presenting 11/16 confusion, increased sleeping, decreased oral intake, nausea with intermittent vomiting, and diarrhea. Patient was hospitalized on 10/31-11/2/24 with hypokalemia, vomiting, and c-diff (tx with oral vancomycin 11/5-11/18/24); it was reported that pt family found multiple vancomycin pills at home--likely pt not taking medication. Work up revealing r/o continued C diff, metabolic encephalopathy (NH3 58umol/L), and UTI.  Cardiology involved in pt care due to prolonged QT interval, HTN, and sinus tachycardia; ID and Neurology also involved in pt care.  ST eval 11/17 suggesting minced moist foods level 5 with thin liquids and 1:1 feeding however 11/18, pt only responsive to pain therefore to place dobhoff for medications and to start enteral nutrition.    Past Medical History   has a past medical history of Asthma, Clostridium difficile infection, GERD (gastroesophageal reflux disease), and Hypertension.IUD (placed 17 years ago per ), and c-diff (9/2024)   Surgical History   has a past surgical history that includes Cholecystectomy; Esophagogastroduodenoscopy; and Colonoscopy.       Nutrition History:  Energy Intake: Poor < 50 %  Food and Nutrient History: 11/18: Pt from home with ; per nurse, pt only responsive to pain today; + encephalopathy. Family reported pt with very poor oral intakes PTA--did not take all antibiotic for recent C diff dx.  Per archive MNT note, pt reported last admit she was only able to tolerate tea/water/Gatorade x3 weeks. Spoke with pt  at bedside whom verfied above.  reports the last time pt had solid food was Monday, 11/11/2024, \"a small chocolate chip muffin.\"  Vitamin/Herbal Supplement Use: none reported  Food Allergies/Intolerances:  None  GI Symptoms: diarrhea and poor oral intakes.   Oral " "Problems: Chewing difficulty, Swallowing difficulty, and ST eval 11/17 suggesting minced/moist level 5 with thin liquids and feeding strategies including 1:1 feed. Pt presently NPO due to lethargy; to place dobbhoff today.       Anthropometrics:  Height: 167.6 cm (5' 6\")   Weight: 109 kg (240 lb 1.3 oz)   BMI (Calculated): 38.77   IBW 59kg          Weight History:   10/31/2024: 113 kg 3.5% ~19 days  10/17/2024 118 kg  7.6% x1 month  8/2024: 143kg 24% x 3 months  2/2023: 128kg   Weight Change %: significant unintentional loss through 3 months   0-10 (Numeric) Pain Score: 0 - No pain  Garrido-Baker FACES Pain Rating: No hurt  PAINAD Score:  [1-2]      Nutrition Focused Physical Exam Findings:  defer: ICU nurse attempting IV via ultrasound visually noted prominent orbital and pale skin  Subcutaneous Fat Loss:      Muscle Wasting:     Edema:  Edema: +2 mild  Edema Location: nonpitting BUE  Physical Findings:  Skin: Positive (pale;  sacum red/blanchable)    Nutrition Significant Labs:  BMP Trend:   Results from last 7 days   Lab Units 11/18/24  1405 11/18/24  0336 11/17/24 0441 11/16/24  1804   GLUCOSE mg/dL 146* 150* 135* 165*   CALCIUM mg/dL 9.1 9.1 9.0 9.7   SODIUM mmol/L 147* 146* 142 142   POTASSIUM mmol/L 3.5 3.6 4.3 4.1   CO2 mmol/L 20* 19* 19* 21   CHLORIDE mmol/L 114* 114* 110* 106   BUN mg/dL 8 12 19 22   CREATININE mg/dL 0.86 0.88 0.84 0.95    , A1C:  Lab Results   Component Value Date    HGBA1C 5.3 01/23/2021   , BG POCT trend:    , Liver Function Trend:   Results from last 7 days   Lab Units 11/18/24  1405 11/18/24  0336 11/17/24 0441 11/16/24  1804   ALK PHOS U/L 60 59 61 68   AST U/L 15 15 24 27   ALT U/L 23 24 30 42   BILIRUBIN TOTAL mg/dL 1.7* 1.4* 1.4* 1.6*    , Vit D: No results found for: \"VITD25\" , Vit B12: No results found for: \"YGWVWHCE41\" , Folate: No results found for: \"FOLATE\"     Nutrition Specific Medications:  Scheduled medications  budesonide, 0.5 mg, nebulization, BID  [Held by provider] " busPIRone, 10 mg, oral, BID  enalaprilat, 1.25 mg, intravenous, q6h  influenza, 0.5 mL, intramuscular, During hospitalization  formoterol, 20 mcg, nebulization, BID  heparin (porcine), 5,000 Units, subcutaneous, q8h SANTIAGO  [Held by provider] losartan, 100 mg, oral, Daily  metoprolol, 7.5 mg, intravenous, q6h  [Held by provider] pantoprazole, 40 mg, oral, BID AC  pantoprazole, 40 mg, intravenous, BID AC  [Held by provider] spironolactone, 12.5 mg, oral, Daily  vancomycin, 125 mg, oral, 4x daily  [Held by provider] verapamil SR, 240 mg, oral, Nightly      Continuous medications  dextrose 5%, 50 mL/hr  [Held by provider] niCARdipine, 2.5-15 mg/hr, Last Rate: Stopped (11/18/24 0615)      PRN medications  PRN medications: albuterol, [Held by provider] clonazePAM, melatonin     I/O:   Last BM Date: 11/18/24; Stool Appearance: Liquid (11/18/24 0823)    Dietary Orders (From admission, onward)       Start     Ordered    11/17/24 1343  Adult diet Regular; Minced and moist 5; Thin 0; 1:1 Feeding  Diet effective now        Question Answer Comment   Diet type Regular    Texture Minced and moist 5    Fluid consistency Thin 0    Select tray type: 1:1 Feeding        11/17/24 1343    11/17/24 0020  May Participate in Room Service With Assistance  ( ROOM SERVICE MAY PARTICIPATE WITH ASSISTANCE)  Once        Question:  .  Answer:  Yes    11/17/24 0019                     Estimated Needs:   Total Energy Estimated Needs (kCal):  (1440-1745kcal (13-16kcal/kg of 109kg))     Total Protein Estimated Needs (g):  (76-95g (1.3-1.6g/kg IBW of 59kg))     Total Fluid Estimated Needs (mL):  (1mL/kcal/d or as per physician)           Nutrition Diagnosis   Malnutrition Diagnosis  Patient has Malnutrition Diagnosis: Yes  Diagnosis Status: New  Malnutrition Diagnosis: Severe malnutrition related to chronic disease or condition  As Evidenced by: <75% of estimated nutrient needs x1 month with resulting 7.6% weight loss x 1 month and 24% x3  months.  Additional Assessment Information: Pt at potential risk for refeeding syndrome.    Nutrition Diagnosis  Patient has Nutrition Diagnosis: Yes  Diagnosis Status (1): New  Nutrition Diagnosis 1: Inadequate oral intake  Related to (1): altered mental status  As Evidenced by (1): metabolic encephalopathy and only responsive to pain; plan for dobbhoff placement, 11/18.       Nutrition Interventions/Recommendations         Nutrition Prescription:  Individualized Nutrition Prescription Provided for : TF + NPO (due to lethargy)        Nutrition Interventions:   Interventions: Meals and snacks, Enteral intake  Meals and Snacks: Other (Comment) (NPO)  Goal: Suggest NPO due to lethargy; once more alert reconsult ST to re-evaluate.  Enteral Intake: Insert enteral feeding tube  Goal: Once enteral access obtained and verified, suggest start tube feeds with Vital 1.5 at 20mL/hr x 12 hours; increase by 10mL every 12 hours until intial goal of 45mL/hr to provide 1620kcal, 73g pro, and 825mL formula free water or 15kcal/kg and 1.2g pro/kg IBW. Adjust water flush pending fluid needs--suggest start with 160mL water flush 6x/day to provide 1785mL water (formula + flush). (With risk of refeeding, please closely watch serum lytes mainly potassium, phosphorus, and magnesium. Please too order daily oral MVI and 100mg thiamine x7 days.)    Collaboration and Referral of Nutrition Care: Collaboration by nutrition professional with other providers  Goal: BRAIN Angel; delphine chat with Inez LLOYD    Nutrition Education:   11/18: met with pt family at bedside. Rationale for dobbhoff placement for meds and tube feeds discussed;  verbalizes understanding and is in agreement. Aware tube can be removed when pt more awake/alert and able to eat.       Nutrition Monitoring and Evaluation   Food/Nutrient Related History Monitoring  Monitoring and Evaluation Plan: Energy intake  Energy Intake: Estimated energy intake  Criteria: EN to meet >75% of  estimated nutrient needs    Body Composition/Growth/Weight History  Monitoring and Evaluation Plan: Weight  Weight: Measured weight  Criteria: maintain stable weight    Biochemical Data, Medical Tests and Procedures  Monitoring and Evaluation Plan: Electrolyte/renal panel, Glucose/endocrine profile  Electrolyte and Renal Panel: Magnesium, Phosphorus, Potassium, Sodium  Criteria: lytes WNR  Glucose/Endocrine Profile: Glucose, casual, Glucose, fasting  Criteria: BG 70-180mg/dL    Nutrition Focused Physical Findings  Monitoring and Evaluation Plan: Digestive System  Digestive System: Diarrhea  Criteria: decrease diarrhea; goal of formed stools.         Time Spent (min): 75 minutes

## 2024-11-18 NOTE — PROGRESS NOTES
Vancomycin Dosing by Pharmacy- Cessation of Therapy    Consult to pharmacy for vancomycin dosing has been discontinued by the prescriber, pharmacy will sign off at this time.    Please call pharmacy if there are further questions or re-enter a consult if vancomycin is resumed.     George Bland, PharmD

## 2024-11-18 NOTE — NURSING NOTE
1500:  Inserted corpac via left nares w/o difficulty using cortrak.  Pt tolerated procedure w/o difficulty.  Awaiting KUB for corpac placement.

## 2024-11-19 ENCOUNTER — APPOINTMENT (OUTPATIENT)
Dept: RADIOLOGY | Facility: HOSPITAL | Age: 49
End: 2024-11-19
Payer: COMMERCIAL

## 2024-11-19 LAB
ALBUMIN SERPL BCP-MCNC: 3.6 G/DL (ref 3.4–5)
ALP SERPL-CCNC: 53 U/L (ref 33–110)
ALT SERPL W P-5'-P-CCNC: 19 U/L (ref 7–45)
ANION GAP SERPL CALC-SCNC: 12 MMOL/L (ref 10–20)
APTT PPP: 30 SECONDS (ref 27–38)
AST SERPL W P-5'-P-CCNC: 11 U/L (ref 9–39)
BILIRUB SERPL-MCNC: 1.3 MG/DL (ref 0–1.2)
BUN SERPL-MCNC: 8 MG/DL (ref 6–23)
CALCIUM SERPL-MCNC: 9.1 MG/DL (ref 8.6–10.3)
CHLORIDE SERPL-SCNC: 112 MMOL/L (ref 98–107)
CO2 SERPL-SCNC: 24 MMOL/L (ref 21–32)
CREAT SERPL-MCNC: 0.86 MG/DL (ref 0.5–1.05)
EGFRCR SERPLBLD CKD-EPI 2021: 83 ML/MIN/1.73M*2
ERYTHROCYTE [DISTWIDTH] IN BLOOD BY AUTOMATED COUNT: 16.5 % (ref 11.5–14.5)
ERYTHROCYTE [DISTWIDTH] IN BLOOD BY AUTOMATED COUNT: 16.5 % (ref 11.5–14.5)
GLUCOSE SERPL-MCNC: 118 MG/DL (ref 74–99)
HCT VFR BLD AUTO: 34.7 % (ref 36–46)
HCT VFR BLD AUTO: 38.1 % (ref 36–46)
HGB BLD-MCNC: 10.9 G/DL (ref 12–16)
HGB BLD-MCNC: 12.1 G/DL (ref 12–16)
HOLD SPECIMEN: NORMAL
INR PPP: 1.2 (ref 0.9–1.1)
MAGNESIUM SERPL-MCNC: 1.78 MG/DL (ref 1.6–2.4)
MCH RBC QN AUTO: 27.2 PG (ref 26–34)
MCH RBC QN AUTO: 27.5 PG (ref 26–34)
MCHC RBC AUTO-ENTMCNC: 31.4 G/DL (ref 32–36)
MCHC RBC AUTO-ENTMCNC: 31.8 G/DL (ref 32–36)
MCV RBC AUTO: 87 FL (ref 80–100)
MCV RBC AUTO: 87 FL (ref 80–100)
NRBC BLD-RTO: 0 /100 WBCS (ref 0–0)
NRBC BLD-RTO: 0 /100 WBCS (ref 0–0)
PLATELET # BLD AUTO: 196 X10*3/UL (ref 150–450)
PLATELET # BLD AUTO: 201 X10*3/UL (ref 150–450)
POTASSIUM SERPL-SCNC: 3.6 MMOL/L (ref 3.5–5.3)
PROT SERPL-MCNC: 6.3 G/DL (ref 6.4–8.2)
PROTHROMBIN TIME: 13.3 SECONDS (ref 9.8–12.8)
RBC # BLD AUTO: 4.01 X10*6/UL (ref 4–5.2)
RBC # BLD AUTO: 4.4 X10*6/UL (ref 4–5.2)
SODIUM SERPL-SCNC: 144 MMOL/L (ref 136–145)
WBC # BLD AUTO: 5.3 X10*3/UL (ref 4.4–11.3)
WBC # BLD AUTO: 5.4 X10*3/UL (ref 4.4–11.3)

## 2024-11-19 PROCEDURE — 80053 COMPREHEN METABOLIC PANEL: CPT | Performed by: NURSE PRACTITIONER

## 2024-11-19 PROCEDURE — 2500000004 HC RX 250 GENERAL PHARMACY W/ HCPCS (ALT 636 FOR OP/ED): Performed by: NURSE PRACTITIONER

## 2024-11-19 PROCEDURE — 2500000005 HC RX 250 GENERAL PHARMACY W/O HCPCS

## 2024-11-19 PROCEDURE — 2500000002 HC RX 250 W HCPCS SELF ADMINISTERED DRUGS (ALT 637 FOR MEDICARE OP, ALT 636 FOR OP/ED)

## 2024-11-19 PROCEDURE — 99232 SBSQ HOSP IP/OBS MODERATE 35: CPT | Performed by: NURSE PRACTITIONER

## 2024-11-19 PROCEDURE — 85730 THROMBOPLASTIN TIME PARTIAL: CPT | Performed by: NURSE PRACTITIONER

## 2024-11-19 PROCEDURE — 36415 COLL VENOUS BLD VENIPUNCTURE: CPT | Performed by: NURSE PRACTITIONER

## 2024-11-19 PROCEDURE — 83735 ASSAY OF MAGNESIUM: CPT | Performed by: NURSE PRACTITIONER

## 2024-11-19 PROCEDURE — 2500000001 HC RX 250 WO HCPCS SELF ADMINISTERED DRUGS (ALT 637 FOR MEDICARE OP): Performed by: NURSE PRACTITIONER

## 2024-11-19 PROCEDURE — 2500000005 HC RX 250 GENERAL PHARMACY W/O HCPCS: Performed by: INTERNAL MEDICINE

## 2024-11-19 PROCEDURE — 87506 IADNA-DNA/RNA PROBE TQ 6-11: CPT | Mod: STJLAB

## 2024-11-19 PROCEDURE — 2060000001 HC INTERMEDIATE ICU ROOM DAILY

## 2024-11-19 PROCEDURE — 70551 MRI BRAIN STEM W/O DYE: CPT | Performed by: RADIOLOGY

## 2024-11-19 PROCEDURE — 99232 SBSQ HOSP IP/OBS MODERATE 35: CPT | Performed by: INTERNAL MEDICINE

## 2024-11-19 PROCEDURE — 2500000004 HC RX 250 GENERAL PHARMACY W/ HCPCS (ALT 636 FOR OP/ED): Performed by: INTERNAL MEDICINE

## 2024-11-19 PROCEDURE — 2500000001 HC RX 250 WO HCPCS SELF ADMINISTERED DRUGS (ALT 637 FOR MEDICARE OP)

## 2024-11-19 PROCEDURE — 85027 COMPLETE CBC AUTOMATED: CPT | Performed by: NURSE PRACTITIONER

## 2024-11-19 PROCEDURE — 85610 PROTHROMBIN TIME: CPT | Performed by: NURSE PRACTITIONER

## 2024-11-19 PROCEDURE — 70551 MRI BRAIN STEM W/O DYE: CPT

## 2024-11-19 PROCEDURE — 2500000004 HC RX 250 GENERAL PHARMACY W/ HCPCS (ALT 636 FOR OP/ED): Performed by: STUDENT IN AN ORGANIZED HEALTH CARE EDUCATION/TRAINING PROGRAM

## 2024-11-19 PROCEDURE — 84425 ASSAY OF VITAMIN B-1: CPT | Performed by: STUDENT IN AN ORGANIZED HEALTH CARE EDUCATION/TRAINING PROGRAM

## 2024-11-19 PROCEDURE — 94640 AIRWAY INHALATION TREATMENT: CPT

## 2024-11-19 RX ORDER — LACTULOSE 10 G/15ML
20 SOLUTION ORAL EVERY 4 HOURS
Status: DISCONTINUED | OUTPATIENT
Start: 2024-11-19 | End: 2024-11-28

## 2024-11-19 RX ORDER — LANOLIN ALCOHOL/MO/W.PET/CERES
100 CREAM (GRAM) TOPICAL DAILY
Status: DISPENSED | OUTPATIENT
Start: 2024-11-25

## 2024-11-19 RX ORDER — POTASSIUM CHLORIDE 1.5 G/1.58G
20 POWDER, FOR SOLUTION ORAL ONCE
Status: COMPLETED | OUTPATIENT
Start: 2024-11-19 | End: 2024-11-19

## 2024-11-19 RX ORDER — THIAMINE HYDROCHLORIDE 100 MG/ML
250 INJECTION, SOLUTION INTRAMUSCULAR; INTRAVENOUS DAILY
Status: COMPLETED | OUTPATIENT
Start: 2024-11-22 | End: 2024-11-24

## 2024-11-19 RX ORDER — ACETAMINOPHEN 160 MG/5ML
650 SOLUTION ORAL EVERY 6 HOURS PRN
Status: DISPENSED | OUTPATIENT
Start: 2024-11-19

## 2024-11-19 RX ORDER — LACTULOSE 10 G/15ML
20 SOLUTION ORAL 4 TIMES DAILY
Status: DISCONTINUED | OUTPATIENT
Start: 2024-11-19 | End: 2024-11-19

## 2024-11-19 RX ORDER — THIAMINE HYDROCHLORIDE 100 MG/ML
500 INJECTION, SOLUTION INTRAMUSCULAR; INTRAVENOUS 3 TIMES DAILY
Status: COMPLETED | OUTPATIENT
Start: 2024-11-19 | End: 2024-11-21

## 2024-11-19 RX ORDER — ACETAMINOPHEN 325 MG/1
650 TABLET ORAL EVERY 6 HOURS PRN
Status: ACTIVE | OUTPATIENT
Start: 2024-11-19

## 2024-11-19 RX ORDER — LANOLIN ALCOHOL/MO/W.PET/CERES
400 CREAM (GRAM) TOPICAL ONCE
Status: COMPLETED | OUTPATIENT
Start: 2024-11-19 | End: 2024-11-19

## 2024-11-19 RX ADMIN — VANCOMYCIN HYDROCHLORIDE 125 MG: KIT at 18:04

## 2024-11-19 RX ADMIN — POTASSIUM CHLORIDE 20 MEQ: 1.5 POWDER, FOR SOLUTION ORAL at 10:57

## 2024-11-19 RX ADMIN — HEPARIN SODIUM 5000 UNITS: 5000 INJECTION INTRAVENOUS; SUBCUTANEOUS at 22:31

## 2024-11-19 RX ADMIN — VANCOMYCIN HYDROCHLORIDE 125 MG: KIT at 22:33

## 2024-11-19 RX ADMIN — ACYCLOVIR SODIUM 500 MG: 50 INJECTION, SOLUTION INTRAVENOUS at 23:54

## 2024-11-19 RX ADMIN — FORMOTEROL FUMARATE 20 MCG: 20 SOLUTION RESPIRATORY (INHALATION) at 22:15

## 2024-11-19 RX ADMIN — LACTULOSE 20 G: 20 SOLUTION ORAL at 22:30

## 2024-11-19 RX ADMIN — VANCOMYCIN HYDROCHLORIDE 125 MG: KIT at 14:00

## 2024-11-19 RX ADMIN — Medication 400 MG: at 10:57

## 2024-11-19 RX ADMIN — ENALAPRILAT 1.25 MG: 1.25 INJECTION INTRAVENOUS at 23:54

## 2024-11-19 RX ADMIN — DEXTROSE MONOHYDRATE 50 ML/HR: 50 INJECTION, SOLUTION INTRAVENOUS at 09:01

## 2024-11-19 RX ADMIN — PANTOPRAZOLE SODIUM 40 MG: 40 INJECTION, POWDER, FOR SOLUTION INTRAVENOUS at 16:09

## 2024-11-19 RX ADMIN — THIAMINE HYDROCHLORIDE 500 MG: 100 INJECTION, SOLUTION INTRAMUSCULAR; INTRAVENOUS at 22:30

## 2024-11-19 RX ADMIN — VANCOMYCIN HYDROCHLORIDE 125 MG: KIT at 05:49

## 2024-11-19 RX ADMIN — METOPROLOL TARTRATE 50 MG: 50 TABLET, FILM COATED ORAL at 08:23

## 2024-11-19 RX ADMIN — PANTOPRAZOLE SODIUM 40 MG: 40 INJECTION, POWDER, FOR SOLUTION INTRAVENOUS at 05:49

## 2024-11-19 RX ADMIN — HEPARIN SODIUM 5000 UNITS: 5000 INJECTION INTRAVENOUS; SUBCUTANEOUS at 14:29

## 2024-11-19 RX ADMIN — HEPARIN SODIUM 5000 UNITS: 5000 INJECTION INTRAVENOUS; SUBCUTANEOUS at 05:49

## 2024-11-19 RX ADMIN — ACYCLOVIR SODIUM 500 MG: 50 INJECTION, SOLUTION INTRAVENOUS at 16:10

## 2024-11-19 RX ADMIN — LACTULOSE 20 G: 20 SOLUTION ORAL at 18:03

## 2024-11-19 RX ADMIN — ENALAPRILAT 1.25 MG: 1.25 INJECTION INTRAVENOUS at 00:18

## 2024-11-19 RX ADMIN — THIAMINE HYDROCHLORIDE 500 MG: 100 INJECTION, SOLUTION INTRAMUSCULAR; INTRAVENOUS at 16:09

## 2024-11-19 RX ADMIN — BUDESONIDE 0.5 MG: 0.5 INHALANT RESPIRATORY (INHALATION) at 22:14

## 2024-11-19 RX ADMIN — Medication 1 TABLET: at 08:24

## 2024-11-19 RX ADMIN — ENALAPRILAT 1.25 MG: 1.25 INJECTION INTRAVENOUS at 05:49

## 2024-11-19 RX ADMIN — ENALAPRILAT 1.25 MG: 1.25 INJECTION INTRAVENOUS at 10:34

## 2024-11-19 RX ADMIN — THIAMINE HCL TAB 100 MG 100 MG: 100 TAB at 08:24

## 2024-11-19 RX ADMIN — ACETAMINOPHEN 650 MG: 650 SUSPENSION ORAL at 05:49

## 2024-11-19 RX ADMIN — METOPROLOL TARTRATE 50 MG: 50 TABLET, FILM COATED ORAL at 22:31

## 2024-11-19 SDOH — ECONOMIC STABILITY: FOOD INSECURITY
HOW HARD IS IT FOR YOU TO PAY FOR THE VERY BASICS LIKE FOOD, HOUSING, MEDICAL CARE, AND HEATING?: PATIENT UNABLE TO ANSWER

## 2024-11-19 SDOH — ECONOMIC STABILITY: HOUSING INSECURITY: AT ANY TIME IN THE PAST 12 MONTHS, WERE YOU HOMELESS OR LIVING IN A SHELTER (INCLUDING NOW)?: PATIENT UNABLE TO ANSWER

## 2024-11-19 SDOH — ECONOMIC STABILITY: HOUSING INSECURITY
IN THE LAST 12 MONTHS, WAS THERE A TIME WHEN YOU WERE NOT ABLE TO PAY THE MORTGAGE OR RENT ON TIME?: PATIENT UNABLE TO ANSWER

## 2024-11-19 SDOH — SOCIAL STABILITY: SOCIAL INSECURITY: WITHIN THE LAST YEAR, HAVE YOU BEEN AFRAID OF YOUR PARTNER OR EX-PARTNER?: PATIENT UNABLE TO ANSWER

## 2024-11-19 SDOH — ECONOMIC STABILITY: TRANSPORTATION INSECURITY
IN THE PAST 12 MONTHS, HAS LACK OF TRANSPORTATION KEPT YOU FROM MEDICAL APPOINTMENTS OR FROM GETTING MEDICATIONS?: PATIENT UNABLE TO ANSWER

## 2024-11-19 SDOH — ECONOMIC STABILITY: HOUSING INSECURITY: IN THE PAST 12 MONTHS, HOW MANY TIMES HAVE YOU MOVED WHERE YOU WERE LIVING?: 0

## 2024-11-19 ASSESSMENT — COGNITIVE AND FUNCTIONAL STATUS - GENERAL
EATING MEALS: TOTAL
MOBILITY SCORE: 6
CLIMB 3 TO 5 STEPS WITH RAILING: TOTAL
DRESSING REGULAR LOWER BODY CLOTHING: TOTAL
STANDING UP FROM CHAIR USING ARMS: TOTAL
WALKING IN HOSPITAL ROOM: TOTAL
WALKING IN HOSPITAL ROOM: TOTAL
TURNING FROM BACK TO SIDE WHILE IN FLAT BAD: TOTAL
DAILY ACTIVITIY SCORE: 6
EATING MEALS: TOTAL
CLIMB 3 TO 5 STEPS WITH RAILING: TOTAL
TOILETING: TOTAL
MOBILITY SCORE: 6
TURNING FROM BACK TO SIDE WHILE IN FLAT BAD: TOTAL
PERSONAL GROOMING: TOTAL
HELP NEEDED FOR BATHING: TOTAL
MOVING TO AND FROM BED TO CHAIR: TOTAL
DRESSING REGULAR UPPER BODY CLOTHING: TOTAL
DAILY ACTIVITIY SCORE: 6
PERSONAL GROOMING: TOTAL
MOVING FROM LYING ON BACK TO SITTING ON SIDE OF FLAT BED WITH BEDRAILS: TOTAL
TOILETING: TOTAL
DRESSING REGULAR UPPER BODY CLOTHING: TOTAL
STANDING UP FROM CHAIR USING ARMS: TOTAL
DRESSING REGULAR LOWER BODY CLOTHING: TOTAL
MOVING FROM LYING ON BACK TO SITTING ON SIDE OF FLAT BED WITH BEDRAILS: TOTAL
MOVING TO AND FROM BED TO CHAIR: TOTAL
HELP NEEDED FOR BATHING: TOTAL

## 2024-11-19 ASSESSMENT — PAIN SCALES - GENERAL
PAINLEVEL_OUTOF10: 0 - NO PAIN

## 2024-11-19 ASSESSMENT — ACTIVITIES OF DAILY LIVING (ADL): LACK_OF_TRANSPORTATION: PATIENT UNABLE TO ANSWER

## 2024-11-19 ASSESSMENT — PAIN - FUNCTIONAL ASSESSMENT
PAIN_FUNCTIONAL_ASSESSMENT: CPOT (CRITICAL CARE PAIN OBSERVATION TOOL)

## 2024-11-19 NOTE — PROGRESS NOTES
Samuel Mims is a 48 y.o. female on day 3 of admission presenting with Sepsis with encephalopathy without septic shock, due to unspecified organism (Multi).    Subjective   Pt's neurological function has worsened, and is only responding to painful stimuli. Due to inability to follow directions and poor oral intake she has been started on Corpak and her oral meds have been changed to IV or crushed and placed via Corpak.       Objective     Last Recorded Vitals  /81 (BP Location: Right arm, Patient Position: Lying)   Pulse 106   Temp (!) 38.2 °C (100.8 °F) (Axillary)   Resp 20   Wt 109 kg (241 lb 2.9 oz)   SpO2 95%   Intake/Output last 3 Shifts:    Intake/Output Summary (Last 24 hours) at 11/19/2024 0748  Last data filed at 11/19/2024 0600  Gross per 24 hour   Intake 2539.58 ml   Output 840 ml   Net 1699.58 ml       Admission Weight  Weight: 127 kg (280 lb) (11/16/24 1755)    Daily Weight  11/19/24 : 109 kg (241 lb 2.9 oz)      Physical Exam:  General: Not in acute distress  HEENT: PERRLA, head intact and normocephalic  Neck: Normal to inspection  Lungs: Clear to auscultation, work of breathing within normal limit  Cardiac: Regular rate and rhythm  Abdomen: Soft nontender, positive bowel sounds  : Exam deferred  Skin: Intact  Hematology: No petechia or excessive ecchymosis  Musculoskeletal: Without significant trauma  Neurological:  Obtunded.   Responsive only to sternal rub.    Relevant Results  Scheduled medications  budesonide, 0.5 mg, nebulization, BID  [Held by provider] busPIRone, 10 mg, oral, BID  enalaprilat, 1.25 mg, intravenous, q6h  influenza, 0.5 mL, intramuscular, During hospitalization  formoterol, 20 mcg, nebulization, BID  heparin (porcine), 5,000 Units, subcutaneous, q8h ASNTIAGO  [Held by provider] losartan, 100 mg, oral, Daily  metoprolol tartrate, 50 mg, nasoduodenal tube, q12h  multivitamin with minerals, 1 tablet, nasogastric tube, Daily  [Held by provider] pantoprazole, 40 mg, oral,  BID AC  pantoprazole, 40 mg, intravenous, BID AC  [Held by provider] spironolactone, 12.5 mg, oral, Daily  thiamine, 100 mg, nasogastric tube, Daily  vancomycin, 125 mg, oral, 4x daily  [Held by provider] verapamil SR, 240 mg, oral, Nightly      Continuous medications  dextrose 5%, 50 mL/hr, Last Rate: 50 mL/hr (11/18/24 1514)      PRN medications  PRN medications: acetaminophen **OR** acetaminophen, albuterol, [Held by provider] clonazePAM, hydrALAZINE, melatonin, metoprolol   Results for orders placed or performed during the hospital encounter of 11/16/24 (from the past 24 hours)   Blood Gas Arterial Full Panel   Result Value Ref Range    POCT pH, Arterial 7.51 (H) 7.38 - 7.42 pH    POCT pCO2, Arterial 27 (L) 38 - 42 mm Hg    POCT pO2, Arterial 104 (H) 85 - 95 mm Hg    POCT SO2, Arterial 99 94 - 100 %    POCT Oxy Hemoglobin, Arterial 96.2 94.0 - 98.0 %    POCT Hematocrit Calculated, Arterial 37.0 36.0 - 46.0 %    POCT Sodium, Arterial 146 (H) 136 - 145 mmol/L    POCT Potassium, Arterial 3.5 3.5 - 5.3 mmol/L    POCT Chloride, Arterial 113 (H) 98 - 107 mmol/L    POCT Ionized Calcium, Arterial 1.27 1.10 - 1.33 mmol/L    POCT Glucose, Arterial 152 (H) 74 - 99 mg/dL    POCT Lactate, Arterial 1.4 0.4 - 2.0 mmol/L    POCT Base Excess, Arterial -0.4 -2.0 - 3.0 mmol/L    POCT HCO3 Calculated, Arterial 21.5 (L) 22.0 - 26.0 mmol/L    POCT Hemoglobin, Arterial 12.3 12.0 - 16.0 g/dL    POCT Anion Gap, Arterial 15 10 - 25 mmo/L    Patient Temperature      FiO2 21 %   Urinalysis with Reflex Culture and Microscopic   Result Value Ref Range    Color, Urine Colorless (N) Light-Yellow, Yellow, Dark-Yellow    Appearance, Urine Clear Clear    Specific Gravity, Urine 1.009 1.005 - 1.035    pH, Urine 7.0 5.0, 5.5, 6.0, 6.5, 7.0, 7.5, 8.0    Protein, Urine NEGATIVE NEGATIVE, 10 (TRACE), 20 (TRACE) mg/dL    Glucose, Urine Normal Normal mg/dL    Blood, Urine NEGATIVE NEGATIVE    Ketones, Urine NEGATIVE NEGATIVE mg/dL    Bilirubin, Urine  NEGATIVE NEGATIVE    Urobilinogen, Urine Normal Normal mg/dL    Nitrite, Urine NEGATIVE NEGATIVE    Leukocyte Esterase, Urine NEGATIVE NEGATIVE   Extra Urine Gray Tube   Result Value Ref Range    Extra Tube Hold for add-ons.    SST TOP   Result Value Ref Range    Extra Tube Hold for add-ons.    Protime-INR   Result Value Ref Range    Protime 15.2 (H) 9.8 - 12.8 seconds    INR 1.3 (H) 0.9 - 1.1   Troponin I, High Sensitivity   Result Value Ref Range    Troponin I, High Sensitivity 15 (H) 0 - 13 ng/L   Magnesium   Result Value Ref Range    Magnesium 1.83 1.60 - 2.40 mg/dL   Ammonia   Result Value Ref Range    Ammonia 33 16 - 53 umol/L   Comprehensive metabolic panel   Result Value Ref Range    Glucose 146 (H) 74 - 99 mg/dL    Sodium 147 (H) 136 - 145 mmol/L    Potassium 3.5 3.5 - 5.3 mmol/L    Chloride 114 (H) 98 - 107 mmol/L    Bicarbonate 20 (L) 21 - 32 mmol/L    Anion Gap 17 10 - 20 mmol/L    Urea Nitrogen 8 6 - 23 mg/dL    Creatinine 0.86 0.50 - 1.05 mg/dL    eGFR 83 >60 mL/min/1.73m*2    Calcium 9.1 8.6 - 10.3 mg/dL    Albumin 3.8 3.4 - 5.0 g/dL    Alkaline Phosphatase 60 33 - 110 U/L    Total Protein 6.7 6.4 - 8.2 g/dL    AST 15 9 - 39 U/L    Bilirubin, Total 1.7 (H) 0.0 - 1.2 mg/dL    ALT 23 7 - 45 U/L   Lactate   Result Value Ref Range    Lactate 1.3 0.4 - 2.0 mmol/L   C-reactive protein   Result Value Ref Range    C-Reactive Protein 2.52 (H) <1.00 mg/dL   CBC   Result Value Ref Range    WBC 6.4 4.4 - 11.3 x10*3/uL    nRBC 0.0 0.0 - 0.0 /100 WBCs    RBC 4.33 4.00 - 5.20 x10*6/uL    Hemoglobin 12.1 12.0 - 16.0 g/dL    Hematocrit 38.0 36.0 - 46.0 %    MCV 88 80 - 100 fL    MCH 27.9 26.0 - 34.0 pg    MCHC 31.8 (L) 32.0 - 36.0 g/dL    RDW 16.6 (H) 11.5 - 14.5 %    Platelets 205 150 - 450 x10*3/uL   Sedimentation Rate   Result Value Ref Range    Sedimentation Rate 18 0 - 20 mm/h   Basic Metabolic Panel   Result Value Ref Range    Glucose 160 (H) 74 - 99 mg/dL    Sodium 144 136 - 145 mmol/L    Potassium 3.3 (L) 3.5  - 5.3 mmol/L    Chloride 112 (H) 98 - 107 mmol/L    Bicarbonate 23 21 - 32 mmol/L    Anion Gap 12 10 - 20 mmol/L    Urea Nitrogen 8 6 - 23 mg/dL    Creatinine 0.85 0.50 - 1.05 mg/dL    eGFR 84 >60 mL/min/1.73m*2    Calcium 9.2 8.6 - 10.3 mg/dL   Magnesium   Result Value Ref Range    Magnesium 1.78 1.60 - 2.40 mg/dL   CBC   Result Value Ref Range    WBC 5.3 4.4 - 11.3 x10*3/uL    nRBC 0.0 0.0 - 0.0 /100 WBCs    RBC 4.40 4.00 - 5.20 x10*6/uL    Hemoglobin 12.1 12.0 - 16.0 g/dL    Hematocrit 38.1 36.0 - 46.0 %    MCV 87 80 - 100 fL    MCH 27.5 26.0 - 34.0 pg    MCHC 31.8 (L) 32.0 - 36.0 g/dL    RDW 16.5 (H) 11.5 - 14.5 %    Platelets 196 150 - 450 x10*3/uL   Comprehensive Metabolic Panel   Result Value Ref Range    Glucose 118 (H) 74 - 99 mg/dL    Sodium 144 136 - 145 mmol/L    Potassium 3.6 3.5 - 5.3 mmol/L    Chloride 112 (H) 98 - 107 mmol/L    Bicarbonate 24 21 - 32 mmol/L    Anion Gap 12 10 - 20 mmol/L    Urea Nitrogen 8 6 - 23 mg/dL    Creatinine 0.86 0.50 - 1.05 mg/dL    eGFR 83 >60 mL/min/1.73m*2    Calcium 9.1 8.6 - 10.3 mg/dL    Albumin 3.6 3.4 - 5.0 g/dL    Alkaline Phosphatase 53 33 - 110 U/L    Total Protein 6.3 (L) 6.4 - 8.2 g/dL    AST 11 9 - 39 U/L    Bilirubin, Total 1.3 (H) 0.0 - 1.2 mg/dL    ALT 19 7 - 45 U/L      Lab Results   Component Value Date    BLOODCULT No growth at 2 days 11/16/2024    BLOODCULT No growth at 2 days 11/16/2024    URINECULTURE (A) 11/16/2024     Multiple organisms present, probable contamination. Repeat culture if clinically indicated.       CT head wo IV contrast    Result Date: 11/18/2024  Interpreted By:  Damon John, STUDY: CT HEAD WO IV CONTRAST;  11/18/2024 4:51 pm   INDICATION: Signs/Symptoms:AMS.   COMPARISON: 11/17/2024   ACCESSION NUMBER(S): [id number]   ORDERING CLINICIAN: SHERRIE PHIPPS   TECHNIQUE: Sequential trans axial images were obtained  .   FINDINGS: INTRACRANIAL:   CORTICAL SULCI AND EXTRA-AXIAL SPACES:  Unremarkable.   VENTRICULAR SYSTEM:  Unremarkable  without significant dilatation.   CEREBRAL PARENCHYMA:  Unremarkable without significant degenerative change.There is no evidence of definite subacute infarction, intracranial hemorrhage or mass.   EXTRACRANIAL: Visualized paranasal sinuses and mastoids are clear. The calvarium is intact.       Unremarkable exam. There has not been significant interval change from the prior exam.   Signed by: Damon John 11/18/2024 5:21 PM Dictation workstation:   [id number]    XR abdomen 1 view    Result Date: 11/18/2024  Interpreted By:  Fernando Betts, STUDY: XR ABDOMEN 1 VIEW;  11/18/2024 3:54 pm   INDICATION: Signs/Symptoms:KUB after corpak insertion.   COMPARISON: None.   ACCESSION NUMBER(S): [id number]   ORDERING CLINICIAN: SHERRIE PHIPPS   FINDINGS: Feeding tube tip in the proximal duodenum. Mild gaseous distention of the transverse colon.       Feeding tube tip in the proximal duodenum.   MACRO: None   Signed by: Fernando Betts 11/18/2024 3:58 PM Dictation workstation:   [id number]    ECG 12 Lead    Result Date: 11/18/2024  Sinus tachycardia Left anterior fascicular block Possible Anterolateral infarct (cited on or before 27-AUG-2024) Abnormal ECG When compared with ECG of 16-NOV-2024 17:49, (unconfirmed) Questionable change in initial forces of Septal leads ST less depressed in Lateral leads Confirmed by Alber Luevano ([id number]) on 11/18/2024 3:46:18 PM              Assessment/Plan   Samuel Mims is a 48 y.o. female on day 3 of admission presenting with Sepsis with encephalopathy without septic shock, due to unspecified organism (Multi).  Principal Problem:    Sepsis with encephalopathy without septic shock, due to unspecified organism (Multi)  Active Problems:    Diarrhea    UTI (urinary tract infection)    Hypertension    Colitis    Sinus tachycardia    Prolonged Q-T interval on ECG    Clostridium difficile infection    Altered mental status due to Metabolic encephalopathy  Possibly due to untreated C difficile  infection, PRES from poorly treated HTN or from severe diarrhea , or Viral infection.  CT head with no acute changes. Urine Tox screen negative.  No WBC count.  Ammonia mildly elevated. Blood cultures are negative X2.  Worsening AMS since admission.   Corpak inserted due to poor oral intake.  MRI head pending.  New onset fever of 100.2 today.     Untreated C. Difficile Colitis  Per  she has not taken the oral Vancomycin prescribed at discharge on 11/2/24.   C. Diff PCR pending  Only oral Vancomycin.  CT a/p confirms she continues to have colitis.        Presumed UTI  Urine culture grew multiple organisms so IV Zosyn discontinued.       HTN and Sinus Tachycardia  Discontinued oral Losartan 100 mg,  Verapamil 240 mg and  Spironolactone 25 mg due to poor oral intake and changed to IV enalapril, IV metaprolol.  Blood pressure mildly elevated and continues to be tachycardic.     Asthma  Budesonide 0.5 mg and Formoterol nebs bid.     CHET  On hold Buspirone 10 mg bid.  Klonopin on hold due to encephalopathy     GERD  IV Pantoprazole 40 mg bid.     Plan discussed with .      Nell Green MD

## 2024-11-19 NOTE — PROGRESS NOTES
"Subjective  Patient continues to be obtunded, opens eyes spontaneously at times though continues to have minimal response to stimulation.  Brother at bedside.  Source of encephalopathy remains unknown.  C. difficile PCR is negative.  Can trial lactulose for encephalopathy.    Objective  Blood pressure 127/74, pulse 108, temperature 36.7 °C (98.1 °F), temperature source Temporal, resp. rate 26, height 1.676 m (5' 6\"), weight 109 kg (241 lb 2.9 oz), SpO2 96%.    Physical Exam  Constitutional: Obtunded, in NAD  Eyes: PERRL, sclera clear, no conjunctival injection  Skin: Warm and dry, no rash or ecchymosis  ENMT: Mucous membranes moist, no lesions noted  Resp: CTAB, even and unlabored  CV: RRR, normal S1, S2, no m,r,g  GI: +BS, soft, round, NT, no rebound tenderness or guarding, no palpable masses or organomegaly, Corpak in place  Extremities: Extremities warm, no edema, contusions, wounds or cyanosis  Neuro: ANGEL  Psych: Obtunded    Medications  Scheduled medications  budesonide, 0.5 mg, nebulization, BID  [Held by provider] busPIRone, 10 mg, oral, BID  enalaprilat, 1.25 mg, intravenous, q6h  influenza, 0.5 mL, intramuscular, During hospitalization  formoterol, 20 mcg, nebulization, BID  heparin (porcine), 5,000 Units, subcutaneous, q8h SANTIAGO  [Held by provider] losartan, 100 mg, oral, Daily  metoprolol tartrate, 50 mg, nasoduodenal tube, q12h  multivitamin with minerals, 1 tablet, nasogastric tube, Daily  [Held by provider] pantoprazole, 40 mg, oral, BID AC  pantoprazole, 40 mg, intravenous, BID AC  [Held by provider] spironolactone, 12.5 mg, oral, Daily  thiamine, 100 mg, nasogastric tube, Daily  vancomycin, 125 mg, oral, 4x daily  [Held by provider] verapamil SR, 240 mg, oral, Nightly      Continuous medications  dextrose 5%, 50 mL/hr, Last Rate: 50 mL/hr (11/18/24 1514)      PRN medications  PRN medications: acetaminophen **OR** acetaminophen, albuterol, [Held by provider] clonazePAM, hydrALAZINE, melatonin, " "metoprolol     Labs  Lab Results   Component Value Date    WBC 5.3 11/19/2024    HGB 12.1 11/19/2024    HCT 38.1 11/19/2024    MCV 87 11/19/2024     11/19/2024     Lab Results   Component Value Date    GLUCOSE 118 (H) 11/19/2024    CALCIUM 9.1 11/19/2024     11/19/2024    K 3.6 11/19/2024    CO2 24 11/19/2024     (H) 11/19/2024    BUN 8 11/19/2024    CREATININE 0.86 11/19/2024     Lab Results   Component Value Date    ALT 19 11/19/2024    AST 11 11/19/2024    ALKPHOS 53 11/19/2024    BILITOT 1.3 (H) 11/19/2024     No results found for: \"IRON\", \"TIBC\", \"FERRITIN\"  Lab Results   Component Value Date    INR 1.3 (H) 11/18/2024    PROTIME 15.2 (H) 11/18/2024     Radiology  CT A/P with IV contrast 11/16/2024 noting:  IMPRESSION:  Fluid-filled segments of colon; please correlate for  diarrhea/colitis. There is underdistention versus mild wall  thickening and submucosal fat deposition in loops of ileum in the  right lower quadrant which may relate to chronic inflammatory process.      Signed by: Boogie Nance 11/16/2024 8:16 PM  Dictation workstation:   [id number]     Assessment:  Samuel Mims is a 48 y.o. female with PMH of HTN, GERD, C. difficile 9/2024, 10/2024 presenting with altered mental status.  Patient has not had alcohol in 2 years.  Patient incontinent of urine and stool at home with poor oral intake, 45 pound weight loss over the past month.  Noncompliant with recent dosing of oral vancomycin.  Repeat C. difficile pending.  Patient follows with Dr. Fernandez for outpatient GI care.  Patient remains obtunded, hypertension and tachycardia treated and resolved.  No leukocytosis.  Ammonia 58.      # encephalopathy- ? wernicke's encephalopathy v encephalitis v other encephalopathy syndrome  # elevated ammonia level  # ? KLEIN  # recent c diff x2  # GERD  # accelerated HTN  # weight loss     Plan:  - continue supportive care  - enteral feeds per primary team, nutrition recs  - follow up " infectious work up  - follow up ID recs  - give lactulose 20 gm qid  - continue to follow up infectious work up  - continue PPI BID per previous GI recs  - continue to follow up neurology recs  - pt will continue to follow up with Dr. Fernandez in clinic for GI care     Plan has been discussed with Dr. Downing. GI will continue to follow.      Pili Boles, APRN/CNP

## 2024-11-19 NOTE — PROGRESS NOTES
Speech-Language Pathology                 Therapy Communication Note    Patient Name: Samuel Mims  MRN: [id number]  Department: [facility]  Room: 2112/2112-A  Today's Date: 11/19/2024     Discipline: Speech Language Pathology    Missed Time: Attempt    Comment:  Attempted dysphagia management this date. Patient is inconsistently following commands. RN confirmed the patient is not appropriate for treatment. Will follow and attempt again as patient is able.

## 2024-11-19 NOTE — PROGRESS NOTES
11/19/24 0920   Discharge Planning   Living Arrangements Spouse/significant other   Support Systems Spouse/significant other;Children   Assistance Needed PTA - lives with spouse and son. Independent at baseline, no AD, Works. Drives.   Type of Residence Private residence   Do you have animals or pets at home? Yes   Home or Post Acute Services In home services;Post acute facilities (Rehab/SNF/etc)   Does the patient need discharge transport arranged? Yes   RoundTrip coordination needed? Yes   Patient Choice   Provider Choice list and CMS website (https://medicare.gov/care-compare#search) for post-acute Quality and Resource Measure Data were provided and reviewed with: Patient;Family   Patient / Family choosing to utilize agency / facility established prior to hospitalization No     Brought to ER by family for AMS with lethargy and confusion. Per spouse, had admission in early November for vomiting, CDIFF, and resulting hypokalemia. DC home with PO Vanco form 10 days, spouse states Vanco bottle is still full. Pt has not been eating, stays in bed, and has been incontinent. Suspected Sepsis with Encephalopathy.    Neuro function has worsened as pt is only responding to painful stimuli. Corepak placed for med administration and nutritional needs. MRI head pending. Now febrile. Has been hypertensive. ID, Neuro, and Cardiology following. Will need PT/OT once stable and alert.

## 2024-11-19 NOTE — PROGRESS NOTES
INPATIENT PROGRESS NOTES    PATIENT NAME: Samuel Mims    MRN: [id number]  SERVICE DATE:  11/19/2024   SERVICE TIME:  11:03 AM    SIGNATURE: Valery Chandler MD      SUBJECTIVE  Mental status slightly better today      OBJECTIVE  PHYSICAL EXAM:   Patient Vitals for the past 24 hrs:   BP Temp Temp src Pulse Resp SpO2 Weight   11/19/24 0825 127/74 36.7 °C (98.1 °F) Temporal 108 26 96 % --   11/19/24 0600 -- -- -- -- -- -- 109 kg (241 lb 2.9 oz)   11/19/24 0545 155/81 (!) 38.2 °C (100.8 °F) Axillary 106 20 95 % --   11/19/24 0400 126/84 37.6 °C (99.7 °F) Axillary 110 22 98 % --   11/19/24 0200 150/85 -- -- 108 -- 98 % --   11/19/24 0000 (!) 152/91 37 °C (98.6 °F) Temporal 106 18 99 % --   11/18/24 2300 113/58 -- -- 94 -- 100 % --   11/18/24 2100 (!) 171/103 35.9 °C (96.6 °F) Temporal (!) 114 20 94 % --   11/18/24 2000 (!) 160/109 -- -- (!) 116 -- 96 % --   11/18/24 1900 (!) 166/102 -- -- (!) 112 -- 94 % --   11/18/24 1800 (!) 158/111 -- -- 110 -- 96 % --   11/18/24 1700 (!) 171/132 -- -- 108 -- 97 % --   11/18/24 1600 (!) 177/108 -- -- 102 -- -- --   11/18/24 1500 (!) 160/102 -- -- (!) 116 22 98 % --   11/18/24 1400 156/87 -- -- (!) 112 21 -- --   11/18/24 1300 168/89 -- -- (!) 112 24 97 % --   11/18/24 1230 (!) 175/107 -- -- (!) 114 23 98 % --   11/18/24 1200 (!) 197/124 37.3 °C (99.1 °F) Temporal (!) 112 16 98 % --         Gen: Patient is obtunded  Respiratory: No distress   GI: Abd soft  Extremities: no leg edema      Labs:  Lab Results   Component Value Date    WBC 5.3 11/19/2024    HGB 12.1 11/19/2024    HCT 38.1 11/19/2024    MCV 87 11/19/2024     11/19/2024     Lab Results   Component Value Date    GLUCOSE 118 (H) 11/19/2024    CALCIUM 9.1 11/19/2024     11/19/2024    K 3.6 11/19/2024    CO2 24 11/19/2024     (H) 11/19/2024    BUN 8 11/19/2024    CREATININE 0.86 11/19/2024   ESR: --  Lab Results   Component Value Date    SEDRATE 18 11/18/2024     Lab Results   Component Value Date    CRP  "2.52 (H) 11/18/2024     Lab Results   Component Value Date    ALT 19 11/19/2024    AST 11 11/19/2024    ALKPHOS 53 11/19/2024    BILITOT 1.3 (H) 11/19/2024         DATA:   Diagnostic tests reviewed for today's visit:    Labs this admission reviewed  Imagings this admission reviewed  Cultures: Reviewed        ASSESSMENT :   -Acute encephalopathy--> ?hypertensive urgency/emergency  -Recent C. difficile infection  -Pyuria and bacteriuria with possible UTI.  Urine culture grew multiple organisms  -Hypokalemia likely secondary to diarrhea  -History of hypertension, asthma, GERD    PLAN:  -Awaiting MRI brain  -Continue p.o. vancomycin to complete 10 days total    Valery Chandler MD.   Infectious Disease Attending        This note was partially created using voice recognition software and is inherently subject to errors including those of syntax and \"sound-alike\" substitutions which may escape proofreading. In such instances, original meaning may be extrapolated by contextual derivation       "

## 2024-11-19 NOTE — PROGRESS NOTES
"  Patient: Samuel Mims  : 1975  MRN: [id number]    Today's Date: 24  Hospital Day: #3    Primary Cardiologist:    ASSESSMENT/PLAN:  Hypertension: Generally nice improvement in blood pressure with IV enalapril.  Given tenuous oral intake I would continue the IV enalapril for now.  Renal function is fine.  Sinus tachycardia: Continue to hold verapamil as this cannot be crushed and no need to switch to short acting verapamil.  Okay to continue metoprolol.  Can be done IV or via NG.  If there is any concern about absorption I would simply switch back to IV metoprolol.  Encephalopathy: Unclear etiology.  Ammonia level has returned to normal.  Her blood pressures were never greatly elevated to suggest that this is hypertensive encephalopathy.  CT of the head was unremarkable.  Defer further testing to ID and neurology.        SUBJECTIVE:  Patient remains awake but unresponsive to questions.  She is nonverbal.  She tracks movements with her eyes somewhat but not in a coordinated fashion as best I can tell.    OBJECTIVE:  VITALS: /81 (BP Location: Right arm, Patient Position: Lying)   Pulse 106   Temp (!) 38.2 °C (100.8 °F) (Axillary)   Resp 20   Ht 1.676 m (5' 6\")   Wt 109 kg (241 lb 2.9 oz)   SpO2 95%   BMI 38.93 kg/m²       Intake/Output Summary (Last 24 hours) at 2024 0730  Last data filed at 2024 0600  Gross per 24 hour   Intake 2539.58 ml   Output 840 ml   Net 1699.58 ml       Physical Exam  Vitals reviewed.   Constitutional:       Appearance: She is ill-appearing.      Interventions: She is restrained.   Cardiovascular:      Rate and Rhythm: Normal rate and regular rhythm.      Heart sounds: No murmur heard.  Pulmonary:      Effort: Pulmonary effort is normal. No respiratory distress.      Breath sounds: No wheezing or rales.   Abdominal:      General: Abdomen is flat. There is no distension.      Palpations: Abdomen is soft.   Musculoskeletal:      Right lower leg: No " edema.      Left lower leg: No edema.   Skin:     General: Skin is warm and dry.   Neurological:      General: No focal deficit present.      Mental Status: She is lethargic, disoriented and confused.   Psychiatric:         Mood and Affect: Mood normal.         Behavior: Behavior is uncooperative.          Meds:Scheduled medications  budesonide, 0.5 mg, nebulization, BID  [Held by provider] busPIRone, 10 mg, oral, BID  enalaprilat, 1.25 mg, intravenous, q6h  influenza, 0.5 mL, intramuscular, During hospitalization  formoterol, 20 mcg, nebulization, BID  heparin (porcine), 5,000 Units, subcutaneous, q8h SANTIAGO  [Held by provider] losartan, 100 mg, oral, Daily  metoprolol tartrate, 50 mg, nasoduodenal tube, q12h  multivitamin with minerals, 1 tablet, nasogastric tube, Daily  [Held by provider] pantoprazole, 40 mg, oral, BID AC  pantoprazole, 40 mg, intravenous, BID AC  [Held by provider] spironolactone, 12.5 mg, oral, Daily  thiamine, 100 mg, nasogastric tube, Daily  vancomycin, 125 mg, oral, 4x daily  [Held by provider] verapamil SR, 240 mg, oral, Nightly      Continuous medications  dextrose 5%, 50 mL/hr, Last Rate: 50 mL/hr (11/18/24 1514)      PRN medications  PRN medications: acetaminophen **OR** acetaminophen, albuterol, [Held by provider] clonazePAM, hydrALAZINE, melatonin, metoprolol    Infusions:dextrose 5%, 50 mL/hr, Last Rate: 50 mL/hr (11/18/24 1514)        DIAGNOSTIC DATA:  Results for orders placed or performed during the hospital encounter of 11/16/24 (from the past 24 hours)   Blood Gas Arterial Full Panel   Result Value Ref Range    POCT pH, Arterial 7.51 (H) 7.38 - 7.42 pH    POCT pCO2, Arterial 27 (L) 38 - 42 mm Hg    POCT pO2, Arterial 104 (H) 85 - 95 mm Hg    POCT SO2, Arterial 99 94 - 100 %    POCT Oxy Hemoglobin, Arterial 96.2 94.0 - 98.0 %    POCT Hematocrit Calculated, Arterial 37.0 36.0 - 46.0 %    POCT Sodium, Arterial 146 (H) 136 - 145 mmol/L    POCT Potassium, Arterial 3.5 3.5 - 5.3 mmol/L     POCT Chloride, Arterial 113 (H) 98 - 107 mmol/L    POCT Ionized Calcium, Arterial 1.27 1.10 - 1.33 mmol/L    POCT Glucose, Arterial 152 (H) 74 - 99 mg/dL    POCT Lactate, Arterial 1.4 0.4 - 2.0 mmol/L    POCT Base Excess, Arterial -0.4 -2.0 - 3.0 mmol/L    POCT HCO3 Calculated, Arterial 21.5 (L) 22.0 - 26.0 mmol/L    POCT Hemoglobin, Arterial 12.3 12.0 - 16.0 g/dL    POCT Anion Gap, Arterial 15 10 - 25 mmo/L    Patient Temperature      FiO2 21 %   Urinalysis with Reflex Culture and Microscopic   Result Value Ref Range    Color, Urine Colorless (N) Light-Yellow, Yellow, Dark-Yellow    Appearance, Urine Clear Clear    Specific Gravity, Urine 1.009 1.005 - 1.035    pH, Urine 7.0 5.0, 5.5, 6.0, 6.5, 7.0, 7.5, 8.0    Protein, Urine NEGATIVE NEGATIVE, 10 (TRACE), 20 (TRACE) mg/dL    Glucose, Urine Normal Normal mg/dL    Blood, Urine NEGATIVE NEGATIVE    Ketones, Urine NEGATIVE NEGATIVE mg/dL    Bilirubin, Urine NEGATIVE NEGATIVE    Urobilinogen, Urine Normal Normal mg/dL    Nitrite, Urine NEGATIVE NEGATIVE    Leukocyte Esterase, Urine NEGATIVE NEGATIVE   Extra Urine Gray Tube   Result Value Ref Range    Extra Tube Hold for add-ons.    SST TOP   Result Value Ref Range    Extra Tube Hold for add-ons.    Protime-INR   Result Value Ref Range    Protime 15.2 (H) 9.8 - 12.8 seconds    INR 1.3 (H) 0.9 - 1.1   Troponin I, High Sensitivity   Result Value Ref Range    Troponin I, High Sensitivity 15 (H) 0 - 13 ng/L   Magnesium   Result Value Ref Range    Magnesium 1.83 1.60 - 2.40 mg/dL   Ammonia   Result Value Ref Range    Ammonia 33 16 - 53 umol/L   Comprehensive metabolic panel   Result Value Ref Range    Glucose 146 (H) 74 - 99 mg/dL    Sodium 147 (H) 136 - 145 mmol/L    Potassium 3.5 3.5 - 5.3 mmol/L    Chloride 114 (H) 98 - 107 mmol/L    Bicarbonate 20 (L) 21 - 32 mmol/L    Anion Gap 17 10 - 20 mmol/L    Urea Nitrogen 8 6 - 23 mg/dL    Creatinine 0.86 0.50 - 1.05 mg/dL    eGFR 83 >60 mL/min/1.73m*2    Calcium 9.1 8.6 - 10.3  mg/dL    Albumin 3.8 3.4 - 5.0 g/dL    Alkaline Phosphatase 60 33 - 110 U/L    Total Protein 6.7 6.4 - 8.2 g/dL    AST 15 9 - 39 U/L    Bilirubin, Total 1.7 (H) 0.0 - 1.2 mg/dL    ALT 23 7 - 45 U/L   Lactate   Result Value Ref Range    Lactate 1.3 0.4 - 2.0 mmol/L   C-reactive protein   Result Value Ref Range    C-Reactive Protein 2.52 (H) <1.00 mg/dL   CBC   Result Value Ref Range    WBC 6.4 4.4 - 11.3 x10*3/uL    nRBC 0.0 0.0 - 0.0 /100 WBCs    RBC 4.33 4.00 - 5.20 x10*6/uL    Hemoglobin 12.1 12.0 - 16.0 g/dL    Hematocrit 38.0 36.0 - 46.0 %    MCV 88 80 - 100 fL    MCH 27.9 26.0 - 34.0 pg    MCHC 31.8 (L) 32.0 - 36.0 g/dL    RDW 16.6 (H) 11.5 - 14.5 %    Platelets 205 150 - 450 x10*3/uL   Sedimentation Rate   Result Value Ref Range    Sedimentation Rate 18 0 - 20 mm/h   Basic Metabolic Panel   Result Value Ref Range    Glucose 160 (H) 74 - 99 mg/dL    Sodium 144 136 - 145 mmol/L    Potassium 3.3 (L) 3.5 - 5.3 mmol/L    Chloride 112 (H) 98 - 107 mmol/L    Bicarbonate 23 21 - 32 mmol/L    Anion Gap 12 10 - 20 mmol/L    Urea Nitrogen 8 6 - 23 mg/dL    Creatinine 0.85 0.50 - 1.05 mg/dL    eGFR 84 >60 mL/min/1.73m*2    Calcium 9.2 8.6 - 10.3 mg/dL   Magnesium   Result Value Ref Range    Magnesium 1.78 1.60 - 2.40 mg/dL   CBC   Result Value Ref Range    WBC 5.3 4.4 - 11.3 x10*3/uL    nRBC 0.0 0.0 - 0.0 /100 WBCs    RBC 4.40 4.00 - 5.20 x10*6/uL    Hemoglobin 12.1 12.0 - 16.0 g/dL    Hematocrit 38.1 36.0 - 46.0 %    MCV 87 80 - 100 fL    MCH 27.5 26.0 - 34.0 pg    MCHC 31.8 (L) 32.0 - 36.0 g/dL    RDW 16.5 (H) 11.5 - 14.5 %    Platelets 196 150 - 450 x10*3/uL   Comprehensive Metabolic Panel   Result Value Ref Range    Glucose 118 (H) 74 - 99 mg/dL    Sodium 144 136 - 145 mmol/L    Potassium 3.6 3.5 - 5.3 mmol/L    Chloride 112 (H) 98 - 107 mmol/L    Bicarbonate 24 21 - 32 mmol/L    Anion Gap 12 10 - 20 mmol/L    Urea Nitrogen 8 6 - 23 mg/dL    Creatinine 0.86 0.50 - 1.05 mg/dL    eGFR 83 >60 mL/min/1.73m*2    Calcium  9.1 8.6 - 10.3 mg/dL    Albumin 3.6 3.4 - 5.0 g/dL    Alkaline Phosphatase 53 33 - 110 U/L    Total Protein 6.3 (L) 6.4 - 8.2 g/dL    AST 11 9 - 39 U/L    Bilirubin, Total 1.3 (H) 0.0 - 1.2 mg/dL    ALT 19 7 - 45 U/L               Keron Alvarez MD

## 2024-11-19 NOTE — CARE PLAN
The clinical goals for the shift include patient will remain HDS throughout shift. The patient met this goal     S: Patient admitted 11/16 for sepsis with encephalopathy   B: History of: HTN, asthma, GERD  A: Patient only responsive to pain, unable to follow commands. Remains in NSR/ST and on RA. Corpak remains intact with tube feeds running. Medicated per orders throughout shift. Patient had multiple large episodes of liquid stool, FMS placed per orders. Bilateral soft wrist restraints remain in place for safety.   R: Patient to have MRI today. Call light left within reach

## 2024-11-19 NOTE — PROGRESS NOTES
"Samuel Mims is a 48 y.o. female on day 3 of admission presenting with Sepsis with encephalopathy without septic shock, due to unspecified organism (Multi).      Subjective   Pt's brother at bedside.  Pt had eyes open.   Temp this morning 100.2    I spoke to patient's , Salvatore, on the phone. He states pt has not had any alcohol in 2 years. He also states that she has had a decreased appetite for 2 months since the C-diff symptoms started. He said it took medical about one month to diagnose her.    Objective     Last Recorded Vitals  Blood pressure 127/74, pulse 108, temperature 36.7 °C (98.1 °F), temperature source Temporal, resp. rate 26, height 1.676 m (5' 6\"), weight 109 kg (241 lb 2.9 oz), SpO2 96%.    Physical Exam  Eyes:      Pupils: Pupils are equal, round, and reactive to light.       Neurological Exam  Mental Status  Arousable to tactile stimuli.  Made eye contact.  Pt mouthing words, like \"hi\"  Not following commands..    Cranial Nerves  CN III, IV, VI: Pupils equal round and reactive to light bilaterally.  CN VII: Full and symmetric facial movement.  Blinks to threat  Face appears symmetrical..    Motor    Wrists restrained but hand grasps strong.  Moves legs and feet, but did not lift off bed..    Relevant Results  Scheduled medications  budesonide, 0.5 mg, nebulization, BID  [Held by provider] busPIRone, 10 mg, oral, BID  enalaprilat, 1.25 mg, intravenous, q6h  influenza, 0.5 mL, intramuscular, During hospitalization  formoterol, 20 mcg, nebulization, BID  heparin (porcine), 5,000 Units, subcutaneous, q8h SANTIAGO  [Held by provider] losartan, 100 mg, oral, Daily  metoprolol tartrate, 50 mg, nasoduodenal tube, q12h  multivitamin with minerals, 1 tablet, nasogastric tube, Daily  [Held by provider] pantoprazole, 40 mg, oral, BID AC  pantoprazole, 40 mg, intravenous, BID AC  [Held by provider] spironolactone, 12.5 mg, oral, Daily  thiamine, 100 mg, nasogastric tube, Daily  vancomycin, 125 mg, oral, " 4x daily  [Held by provider] verapamil SR, 240 mg, oral, Nightly      Continuous medications  dextrose 5%, 50 mL/hr, Last Rate: 50 mL/hr (11/18/24 1514)      PRN medications  PRN medications: acetaminophen **OR** acetaminophen, albuterol, [Held by provider] clonazePAM, hydrALAZINE, melatonin, metoprolol  Results for orders placed or performed during the hospital encounter of 11/16/24 (from the past 24 hours)   Blood Gas Arterial Full Panel   Result Value Ref Range    POCT pH, Arterial 7.51 (H) 7.38 - 7.42 pH    POCT pCO2, Arterial 27 (L) 38 - 42 mm Hg    POCT pO2, Arterial 104 (H) 85 - 95 mm Hg    POCT SO2, Arterial 99 94 - 100 %    POCT Oxy Hemoglobin, Arterial 96.2 94.0 - 98.0 %    POCT Hematocrit Calculated, Arterial 37.0 36.0 - 46.0 %    POCT Sodium, Arterial 146 (H) 136 - 145 mmol/L    POCT Potassium, Arterial 3.5 3.5 - 5.3 mmol/L    POCT Chloride, Arterial 113 (H) 98 - 107 mmol/L    POCT Ionized Calcium, Arterial 1.27 1.10 - 1.33 mmol/L    POCT Glucose, Arterial 152 (H) 74 - 99 mg/dL    POCT Lactate, Arterial 1.4 0.4 - 2.0 mmol/L    POCT Base Excess, Arterial -0.4 -2.0 - 3.0 mmol/L    POCT HCO3 Calculated, Arterial 21.5 (L) 22.0 - 26.0 mmol/L    POCT Hemoglobin, Arterial 12.3 12.0 - 16.0 g/dL    POCT Anion Gap, Arterial 15 10 - 25 mmo/L    Patient Temperature      FiO2 21 %   Urinalysis with Reflex Culture and Microscopic   Result Value Ref Range    Color, Urine Colorless (N) Light-Yellow, Yellow, Dark-Yellow    Appearance, Urine Clear Clear    Specific Gravity, Urine 1.009 1.005 - 1.035    pH, Urine 7.0 5.0, 5.5, 6.0, 6.5, 7.0, 7.5, 8.0    Protein, Urine NEGATIVE NEGATIVE, 10 (TRACE), 20 (TRACE) mg/dL    Glucose, Urine Normal Normal mg/dL    Blood, Urine NEGATIVE NEGATIVE    Ketones, Urine NEGATIVE NEGATIVE mg/dL    Bilirubin, Urine NEGATIVE NEGATIVE    Urobilinogen, Urine Normal Normal mg/dL    Nitrite, Urine NEGATIVE NEGATIVE    Leukocyte Esterase, Urine NEGATIVE NEGATIVE   Extra Urine Gray Tube   Result  Value Ref Range    Extra Tube Hold for add-ons.    SST TOP   Result Value Ref Range    Extra Tube Hold for add-ons.    Protime-INR   Result Value Ref Range    Protime 15.2 (H) 9.8 - 12.8 seconds    INR 1.3 (H) 0.9 - 1.1   Troponin I, High Sensitivity   Result Value Ref Range    Troponin I, High Sensitivity 15 (H) 0 - 13 ng/L   Magnesium   Result Value Ref Range    Magnesium 1.83 1.60 - 2.40 mg/dL   Ammonia   Result Value Ref Range    Ammonia 33 16 - 53 umol/L   Comprehensive metabolic panel   Result Value Ref Range    Glucose 146 (H) 74 - 99 mg/dL    Sodium 147 (H) 136 - 145 mmol/L    Potassium 3.5 3.5 - 5.3 mmol/L    Chloride 114 (H) 98 - 107 mmol/L    Bicarbonate 20 (L) 21 - 32 mmol/L    Anion Gap 17 10 - 20 mmol/L    Urea Nitrogen 8 6 - 23 mg/dL    Creatinine 0.86 0.50 - 1.05 mg/dL    eGFR 83 >60 mL/min/1.73m*2    Calcium 9.1 8.6 - 10.3 mg/dL    Albumin 3.8 3.4 - 5.0 g/dL    Alkaline Phosphatase 60 33 - 110 U/L    Total Protein 6.7 6.4 - 8.2 g/dL    AST 15 9 - 39 U/L    Bilirubin, Total 1.7 (H) 0.0 - 1.2 mg/dL    ALT 23 7 - 45 U/L   Lactate   Result Value Ref Range    Lactate 1.3 0.4 - 2.0 mmol/L   C-reactive protein   Result Value Ref Range    C-Reactive Protein 2.52 (H) <1.00 mg/dL   CBC   Result Value Ref Range    WBC 6.4 4.4 - 11.3 x10*3/uL    nRBC 0.0 0.0 - 0.0 /100 WBCs    RBC 4.33 4.00 - 5.20 x10*6/uL    Hemoglobin 12.1 12.0 - 16.0 g/dL    Hematocrit 38.0 36.0 - 46.0 %    MCV 88 80 - 100 fL    MCH 27.9 26.0 - 34.0 pg    MCHC 31.8 (L) 32.0 - 36.0 g/dL    RDW 16.6 (H) 11.5 - 14.5 %    Platelets 205 150 - 450 x10*3/uL   Sedimentation Rate   Result Value Ref Range    Sedimentation Rate 18 0 - 20 mm/h   Basic Metabolic Panel   Result Value Ref Range    Glucose 160 (H) 74 - 99 mg/dL    Sodium 144 136 - 145 mmol/L    Potassium 3.3 (L) 3.5 - 5.3 mmol/L    Chloride 112 (H) 98 - 107 mmol/L    Bicarbonate 23 21 - 32 mmol/L    Anion Gap 12 10 - 20 mmol/L    Urea Nitrogen 8 6 - 23 mg/dL    Creatinine 0.85 0.50 - 1.05  mg/dL    eGFR 84 >60 mL/min/1.73m*2    Calcium 9.2 8.6 - 10.3 mg/dL   Magnesium   Result Value Ref Range    Magnesium 1.78 1.60 - 2.40 mg/dL   CBC   Result Value Ref Range    WBC 5.3 4.4 - 11.3 x10*3/uL    nRBC 0.0 0.0 - 0.0 /100 WBCs    RBC 4.40 4.00 - 5.20 x10*6/uL    Hemoglobin 12.1 12.0 - 16.0 g/dL    Hematocrit 38.1 36.0 - 46.0 %    MCV 87 80 - 100 fL    MCH 27.5 26.0 - 34.0 pg    MCHC 31.8 (L) 32.0 - 36.0 g/dL    RDW 16.5 (H) 11.5 - 14.5 %    Platelets 196 150 - 450 x10*3/uL   Comprehensive Metabolic Panel   Result Value Ref Range    Glucose 118 (H) 74 - 99 mg/dL    Sodium 144 136 - 145 mmol/L    Potassium 3.6 3.5 - 5.3 mmol/L    Chloride 112 (H) 98 - 107 mmol/L    Bicarbonate 24 21 - 32 mmol/L    Anion Gap 12 10 - 20 mmol/L    Urea Nitrogen 8 6 - 23 mg/dL    Creatinine 0.86 0.50 - 1.05 mg/dL    eGFR 83 >60 mL/min/1.73m*2    Calcium 9.1 8.6 - 10.3 mg/dL    Albumin 3.6 3.4 - 5.0 g/dL    Alkaline Phosphatase 53 33 - 110 U/L    Total Protein 6.3 (L) 6.4 - 8.2 g/dL    AST 11 9 - 39 U/L    Bilirubin, Total 1.3 (H) 0.0 - 1.2 mg/dL    ALT 19 7 - 45 U/L     CT head wo IV contrast    Result Date: 11/18/2024  Interpreted By:  Damon John, STUDY: CT HEAD WO IV CONTRAST;  11/18/2024 4:51 pm   INDICATION: Signs/Symptoms:AMS.   COMPARISON: 11/17/2024   ACCESSION NUMBER(S): [id number]   ORDERING CLINICIAN: SHERRIE PHIPPS   TECHNIQUE: Sequential trans axial images were obtained  .   FINDINGS: INTRACRANIAL:   CORTICAL SULCI AND EXTRA-AXIAL SPACES:  Unremarkable.   VENTRICULAR SYSTEM:  Unremarkable without significant dilatation.   CEREBRAL PARENCHYMA:  Unremarkable without significant degenerative change.There is no evidence of definite subacute infarction, intracranial hemorrhage or mass.   EXTRACRANIAL: Visualized paranasal sinuses and mastoids are clear. The calvarium is intact.       Unremarkable exam. There has not been significant interval change from the prior exam.   Signed by: Damon John 11/18/2024 5:21 PM  Dictation workstation:   [id number]    XR abdomen 1 view    Result Date: 11/18/2024  Interpreted By:  Fernando Betts, STUDY: XR ABDOMEN 1 VIEW;  11/18/2024 3:54 pm   INDICATION: Signs/Symptoms:KUB after corpak insertion.   COMPARISON: None.   ACCESSION NUMBER(S): [id number]   ORDERING CLINICIAN: SHERRIE PHIPPS   FINDINGS: Feeding tube tip in the proximal duodenum. Mild gaseous distention of the transverse colon.       Feeding tube tip in the proximal duodenum.   MACRO: None   Signed by: Fernando Betts 11/18/2024 3:58 PM Dictation workstation:   [id number]    ECG 12 Lead    Result Date: 11/18/2024  Sinus tachycardia Left anterior fascicular block Possible Anterolateral infarct (cited on or before 27-AUG-2024) Abnormal ECG When compared with ECG of 16-NOV-2024 17:49, (unconfirmed) Questionable change in initial forces of Septal leads ST less depressed in Lateral leads Confirmed by Alber Luevano ([id number]) on 11/18/2024 3:46:18 PM    CT head wo IV contrast    Result Date: 11/17/2024  Interpreted By:  Boogie Nance, STUDY: CT HEAD WO IV CONTRAST;  11/17/2024 10:04 pm   INDICATION: Signs/Symptoms:Altered mental status.   COMPARISON: 11/16/2024   ACCESSION NUMBER(S): [id number]   ORDERING CLINICIAN: CRYSTAL WEINBERG   TECHNIQUE: Contiguous axial images of the head were obtained without intravenous contrast.   FINDINGS: BRAIN PARENCHYMA:   The gray white matter differentiation is preserved.  No mass effect or midline shift.   HEMORRHAGE:  No evidence of acute intracranial hemorrhage. VENTRICLES AND EXTRA-AXIAL SPACES:  The ventricles are within normal limits in size for brain volume.  No evidence of abnormal extraaxial fluid collection. EXTRACRANIAL SOFT TISSUES:  Within normal limits. PARANASAL SINUSES/MASTOIDS:  The visualized paranasal sinuses and mastoid air cells are clear and well pneumatized. CALVARIUM:  No evidence of depressed calvarial fracture.   OTHER FINDINGS:  None       No evidence of acute intracranial  hemorrhage or mass effect.       MACRO: None   Signed by: Boogie Nance 11/17/2024 11:05 PM Dictation workstation:   [id number]    ECG 12 lead    Result Date: 11/17/2024  Sinus tachycardia Left axis deviation Left ventricular hypertrophy with repolarization abnormality Inferior infarct (cited on or before 27-AUG-2024) Anteroseptal infarct (cited on or before 27-AUG-2024) Abnormal ECG When compared with ECG of 27-AUG-2024 02:40, ST now depressed in Lateral leads    CT abdomen pelvis w IV contrast    Result Date: 11/16/2024  Interpreted By:  Boogie Nance, STUDY: CT ABDOMEN PELVIS W IV CONTRAST;  11/16/2024 7:54 pm   INDICATION: Signs/Symptoms:generalized ttp, sepsis.   COMPARISON: None.   ACCESSION NUMBER(S): [id number]   ORDERING CLINICIAN: ZAHRA MATTHEW   TECHNIQUE: Contiguous axial images of the abdomen and pelvis were obtained after the intravenous administration of  contrast. Coronal and sagittal reformatted images were obtained from the axial images.   FINDINGS: There is limited evaluation the lung bases. Mild basilar atelectasis. No pleural effusion.   Evaluation of the abdomen and pelvis is limited secondary to patient body habitus, motion, and beam hardening artifact secondary to inferior position of the patient's arms. Mild hypodensity near the falciform ligament may correspond to focal fatty infiltration. The gallbladder surgically absent.   The pancreas, spleen, and adrenal glands appear unremarkable.   Symmetric enhancement of the kidneys. No hydronephrosis.   No evidence of bowel obstruction. Fluid-filled segments of colon. There is underdistention versus mild wall thickening and submucosal fat deposition in loops of ileum in the right lower quadrant which may relate to chronic inflammatory process.   Urinary bladder appears unremarkable.   Limited evaluation of the uterus and adnexa. Intrauterine device. 2.4 cm x 1.8 cm cystic lesion in the right adnexa.   Multilevel degenerative change of the lumbar  spine.       Fluid-filled segments of colon; please correlate for diarrhea/colitis. There is underdistention versus mild wall thickening and submucosal fat deposition in loops of ileum in the right lower quadrant which may relate to chronic inflammatory process.       MACRO: None   Signed by: Boogie Nance 11/16/2024 8:16 PM Dictation workstation:   [id number]    CT head wo IV contrast    Result Date: 11/16/2024  Interpreted By:  Boogie Nance, STUDY: CT HEAD WO IV CONTRAST;  11/16/2024 7:54 pm   INDICATION: Signs/Symptoms:AMS.   COMPARISON: None   ACCESSION NUMBER(S): [id number]   ORDERING CLINICIAN: ZAHRA MATTHEW   TECHNIQUE: Contiguous axial images of the head were obtained without intravenous contrast.   FINDINGS: BRAIN PARENCHYMA:   The gray white matter differentiation is preserved.  No mass effect or midline shift.   HEMORRHAGE:  No evidence of acute intracranial hemorrhage. VENTRICLES AND EXTRA-AXIAL SPACES:  The ventricles are within normal limits in size for brain volume.  No evidence of abnormal extraaxial fluid collection. EXTRACRANIAL SOFT TISSUES:  Within normal limits. PARANASAL SINUSES/MASTOIDS:  The visualized paranasal sinuses and mastoid air cells are clear and well pneumatized. CALVARIUM:  No evidence of depressed calvarial fracture.   OTHER FINDINGS:  None       No evidence of acute intracranial hemorrhage or mass effect.       MACRO: None   Signed by: Boogie Nance 11/16/2024 8:11 PM Dictation workstation:   [id number]    NM gastric emptying solid    Result Date: 11/6/2024  Interpreted By:  Lalia Lugo, STUDY: NM GASTRIC EMPTYING SOLID; 11/6/2024 2:00 pm   INDICATION: Signs/Symptoms:NAUSEA.   COMPARISON: None.   ACCESSION NUMBER(S): [id number]   ORDERING CLINICIAN: MAGNO VARGAS   TECHNIQUE: DIVISION OF NUCLEAR MEDICINE GASTRIC EMPTYING QUANTIFICATION   The patient received an oral radiolabeled solid-phase meal utilizing 1.0 mCi of Tc-99m sulfur colloid in cooked egg. Sequential  anterior and posterior images of the abdomen were then acquired over the next four hours. Computer quantification of gastric emptying (using geometric mean activity) was performed.   FINDINGS: The image series shows prompt antegrade transit of radiolabeled solids from stomach to small bowel. Computer quantification demonstrates gastric retention as follows: 45 %  at 1 hr    (normal max 90%) 23 %  at 2 hr    (normal max 60%) 12 %  at 3 hr    (normal max 30%) 1 %  at 4 hr    (normal max 10%)       1. Normal gastric emptying of solids without evidence of gastroparesis.   I personally reviewed the images/study. This study was interpreted at [place], [place].   MACRO: None   Signed by: Laila Lugo 11/6/2024 2:04 PM Dictation workstation:   [id number]    Flexible Sigmoidoscopy    Result Date: 10/29/2024  [facility] Patient Name: Samuel ORTIZ Procedure Date: 10/29/2024 8:47 AM MRN: [id number] YOB: 1975 Age: 48 Gender: Female Race: Unknown Attending MD: Jose Fernandez MD, [id number] Procedure:             Colonoscopy Referring MD:          PIPO JAMISON MD Providers:      Jose Fernandez MD Indications:           Abnormal CT of the GI tract, Weight loss Findings:              The perianal and digital rectal examinations were                        normal.                        Multiple small and large-mouthed diverticula were                        found in the sigmoid colon.                        The terminal ileum appeared normal. Biopsies were                        taken with a cold forceps for histology. Verification                        of patient identification for the specimen was done by                        the nurse using the patient's name, birth date and                        medical record number. Estimated blood loss was                        minimal.                        A 2 mm polyp was found in  the ileocecal valve. The                        polyp was sessile. The polyp was removed with a cold                        biopsy forceps. Resection and retrieval were complete.                        Verification of patient identification for the                        specimen was done by the nurse using the patient's                        name, birth date and medical record number. Estimated                        blood loss was minimal.                        A 4 mm polyp was found in the cecum. The polyp was                        sessile. The polyp was removed with a cold snare.                        Resection and retrieval were complete. Verification of                        patient identification for the specimen was done by                        the nurse using the patient's name, birth date and                        medical record number. Estimated blood loss was                        minimal.                        Two sessile polyps were found in the ascending colon.                        The polyps were 5 to 7 mm in size. These polyps were                        removed with a cold snare. Resection and retrieval                        were complete. Verification of patient identification                        for the specimen was done by the nurse using the                        patient's name, birth date and medical record number.                        Estimated blood loss was minimal.                        The colon (entire examined portion) appeared otherwise                        normal. Biopsies were taken with a cold forceps for                        histology. Verification of patient identification for                        the specimen was done by the nurse using the patient's                        name, birth date and medical record number. Estimated                        blood loss was minimal.                        Stool sample collected and submitted for C diff PCR                         and culture and fecal calprotectin.                        The retroflexed view of the distal rectum and anal                        verge was normal and showed no anal or rectal                        abnormalities. Patient Profile:       This is a 48 year old female. Refer to note in patient                        chart for documentation of history and physical. Impression:            - Diverticulosis in the sigmoid colon.                        - The examined portion of the ileum was normal.                        Biopsied.                        - One 2 mm polyp at the ileocecal valve, removed with                        a cold biopsy forceps. Resected and retrieved.                        - One 4 mm polyp in the cecum, removed with a cold                        snare. Resected and retrieved.                        - Two 5 to 7 mm polyps in the ascending colon, removed                        with a cold snare. Resected and retrieved.                        - The entire examined colon is otherwise normal.                        Biopsied.                        - The distal rectum and anal verge are normal on                        retroflexion view. Recommendation:        - Await pathology results.                        - Patient has a contact number available for                        emergencies. The signs and symptoms of potential                        delayed complications were discussed with the patient.                        Return to normal activities tomorrow. Written                        discharge instructions were provided to the patient.                        - High fiber diet.                        - Continue present medications.                        - Schedule gastric emptying study as previously                        recommended.                        - Repeat colonoscopy in 3 years for surveillance. Medicines:             Propofol per Anesthesia Procedure:              Pre-Anesthesia Assessment:                        - Prior to the procedure, a History and Physical was                        performed, and patient medications and allergies were                        reviewed. The patient is competent. The risks and                        benefits of the procedure and the sedation options and                        risks were discussed with the patient. All questions                        were answered and informed consent was obtained.                        Patient identification and proposed procedure were                        verified by the physician and the nurse in the                        pre-procedure area in the procedure room. Mental                        Status Examination: alert and oriented. Airway                        Examination: normal oropharyngeal airway and neck                        mobility. Respiratory Examination: clear to                        auscultation. CV Examination: normal. Prophylactic                        Antibiotics: The patient does not require prophylactic                        antibiotics. Prior Anticoagulants: The patient has                        taken no anticoagulant or antiplatelet agents. ASA                        Grade Assessment: III - A patient with severe systemic                        disease. After reviewing the risks and benefits, the                        patient was deemed in satisfactory condition to                        undergo the procedure. The anesthesia plan was to use                        deep sedation / analgesia. Immediately prior to                        administration of medications, the patient was                        re-assessed for adequacy to receive sedatives. The                        heart rate, respiratory rate, oxygen saturations,                        blood pressure, adequacy of pulmonary ventilation, and                        response to care were monitored throughout the                         procedure. The physical status of the patient was                        re-assessed after the procedure.                        After I obtained informed consent, the scope was                        passed under direct vision. Throughout the procedure,                        the patient's blood pressure, pulse, and oxygen                        saturations were monitored continuously. Provation                        AI/GI Genius was used during withdrawal. The                        COLONOSCOPE was introduced through the anus and                        advanced to the terminal ileum. The colonoscopy was                        performed without difficulty. The patient tolerated                        the procedure well. The quality of the bowel                        preparation was good. The terminal ileum, ileocecal                        valve, appendiceal orifice, and rectum were                        photographed. The quality of the bowel preparation was                        evaluated using the BBPS (Warren Bowel Preparation                        Scale) with scores of: Right Colon = 2 (minor amount                        of residual staining, small fragments of stool and/or                        opaque liquid, but mucosa seen well), Transverse Colon                        = 3 (entire mucosa seen well with no residual                        staining, small fragments of stool or opaque liquid)                        and Left Colon = 3 (entire mucosa seen well with no                        residual staining, small fragments of stool or opaque                        liquid). The total BBPS score equals 8. Complications:         No immediate complications. Procedure Code(s):     --- Professional ---                        05579, Colonoscopy, flexible; with removal of                        tumor(s), polyp(s), or other lesion(s) by snare                        technique                         72829, 59, Colonoscopy, flexible; with biopsy, single                        or multiple Diagnosis Code(s):     --- Professional ---                        D12.0, Benign neoplasm of cecum                        D12.2, Benign neoplasm of ascending colon                        R63.4, Abnormal weight loss                        K57.30, Diverticulosis of large intestine without                        perforation or abscess without bleeding                        R93.3, Abnormal findings on diagnostic imaging of                        other parts of digestive tract CPT copyright 2021 [employer]. All rights reserved. The codes documented in this report are preliminary and upon  review may be revised to meet current compliance requirements. Jose Fernandez MD 10/29/2024 9:42:34 AM Number of Addenda: 0 Note Initiated On: 10/29/2024 8:47 AM     Assessment/Plan   This patient currently has cardiac telemetry ordered; if you would like to modify or discontinue the telemetry order, click here to go to the orders activity to modify/discontinue the order.  Assessment & Plan  Sepsis with encephalopathy without septic shock, due to unspecified organism (Multi)    Diarrhea    UTI (urinary tract infection)    Hypertension    Colitis    Sinus tachycardia    Prolonged Q-T interval on ECG    Clostridium difficile infection    Metabolic encephalopathy - likely multifactorial. Abnormalities identified on MRI brain may be 2/2 Wernicke's, inflammatory, viral/infectious, or other structural processes.    Mental status improving today.      Recommend:     Lumbar puncture with CSF analysis   High dose thiamine- see orders  Blood pressure control.   ID following; cardiology and gastroenterology as well.   Continue supportive care.  Likely will repeat MRI brain in a few days.     Case/plan discussed with Dr. Tracey.     Nelly Manley, APRN-CNP

## 2024-11-20 ENCOUNTER — APPOINTMENT (OUTPATIENT)
Dept: RADIOLOGY | Facility: HOSPITAL | Age: 49
End: 2024-11-20
Payer: COMMERCIAL

## 2024-11-20 LAB
ALBUMIN SERPL BCP-MCNC: 3.6 G/DL (ref 3.4–5)
ALP SERPL-CCNC: 55 U/L (ref 33–110)
ALT SERPL W P-5'-P-CCNC: 21 U/L (ref 7–45)
ANION GAP SERPL CALC-SCNC: 12 MMOL/L (ref 10–20)
APPEARANCE CSF: CLEAR
AST SERPL W P-5'-P-CCNC: 21 U/L (ref 9–39)
ATRIAL RATE: 145 BPM
BASOPHILS NFR CSF MANUAL: 0 %
BILIRUB SERPL-MCNC: 1 MG/DL (ref 0–1.2)
BLASTS CSF MANUAL: 0 %
BUN SERPL-MCNC: 10 MG/DL (ref 6–23)
C COLI+JEJ+UPSA DNA STL QL NAA+PROBE: NOT DETECTED
CALCIUM SERPL-MCNC: 9.5 MG/DL (ref 8.6–10.3)
CHLORIDE SERPL-SCNC: 110 MMOL/L (ref 98–107)
CO2 SERPL-SCNC: 25 MMOL/L (ref 21–32)
COLOR CSF: COLORLESS
COLOR SPUN CSF: COLORLESS
CREAT SERPL-MCNC: 0.86 MG/DL (ref 0.5–1.05)
EC STX1 GENE STL QL NAA+PROBE: NOT DETECTED
EC STX2 GENE STL QL NAA+PROBE: NOT DETECTED
EGFRCR SERPLBLD CKD-EPI 2021: 83 ML/MIN/1.73M*2
EOSINOPHIL NFR CSF MANUAL: 0 %
GLUCOSE CSF-MCNC: 70 MG/DL (ref 40–70)
GLUCOSE SERPL-MCNC: 116 MG/DL (ref 74–99)
HIV 1+2 AB+HIV1 P24 AG SERPL QL IA: NONREACTIVE
HOLD SPECIMEN: NORMAL
IMM GRANULOCYTES NFR CSF: 0 %
LYMPHOCYTES NFR CSF MANUAL: 93 % (ref 28–96)
MAGNESIUM SERPL-MCNC: 1.77 MG/DL (ref 1.6–2.4)
MONOS+MACROS NFR CSF MANUAL: 7 % (ref 16–56)
NEUTS SEG NFR CSF MANUAL: 0 % (ref 0–5)
NOROVIRUS GI + GII RNA STL NAA+PROBE: NOT DETECTED
OTHER CELLS NFR CSF MANUAL: 0 %
P AXIS: 33 DEGREES
P OFFSET: 201 MS
P ONSET: 155 MS
PLASMA CELLS NFR CSF MICRO: 0 %
POTASSIUM SERPL-SCNC: 3.3 MMOL/L (ref 3.5–5.3)
PR INTERVAL: 138 MS
PROT CSF-MCNC: 90 MG/DL (ref 15–45)
PROT SERPL-MCNC: 6.3 G/DL (ref 6.4–8.2)
Q ONSET: 224 MS
QRS COUNT: 23 BEATS
QRS DURATION: 68 MS
QT INTERVAL: 342 MS
QTC CALCULATION(BAZETT): 531 MS
QTC FREDERICIA: 458 MS
R AXIS: -52 DEGREES
RBC # CSF AUTO: 1 /UL (ref 0–5)
RV RNA STL NAA+PROBE: NOT DETECTED
SALMONELLA DNA STL QL NAA+PROBE: NOT DETECTED
SHIGELLA DNA SPEC QL NAA+PROBE: NOT DETECTED
SODIUM SERPL-SCNC: 144 MMOL/L (ref 136–145)
T AXIS: 89 DEGREES
T OFFSET: 395 MS
TOTAL CELLS COUNTED CSF: 14
TREPONEMA PALLIDUM IGG+IGM AB [PRESENCE] IN SERUM OR PLASMA BY IMMUNOASSAY: NONREACTIVE
TUBE # CSF: NORMAL
V CHOLERAE DNA STL QL NAA+PROBE: NOT DETECTED
VENTRICULAR RATE: 145 BPM
WBC # CSF AUTO: 1 /UL (ref 1–5)
Y ENTEROCOL DNA STL QL NAA+PROBE: NOT DETECTED

## 2024-11-20 PROCEDURE — 2500000004 HC RX 250 GENERAL PHARMACY W/ HCPCS (ALT 636 FOR OP/ED): Performed by: STUDENT IN AN ORGANIZED HEALTH CARE EDUCATION/TRAINING PROGRAM

## 2024-11-20 PROCEDURE — 2500000004 HC RX 250 GENERAL PHARMACY W/ HCPCS (ALT 636 FOR OP/ED): Performed by: NURSE PRACTITIONER

## 2024-11-20 PROCEDURE — 62270 DX LMBR SPI PNXR: CPT | Performed by: RADIOLOGY

## 2024-11-20 PROCEDURE — 80053 COMPREHEN METABOLIC PANEL: CPT | Performed by: NURSE PRACTITIONER

## 2024-11-20 PROCEDURE — 83735 ASSAY OF MAGNESIUM: CPT | Performed by: NURSE PRACTITIONER

## 2024-11-20 PROCEDURE — 92526 ORAL FUNCTION THERAPY: CPT | Mod: GN

## 2024-11-20 PROCEDURE — 2500000004 HC RX 250 GENERAL PHARMACY W/ HCPCS (ALT 636 FOR OP/ED): Performed by: FAMILY MEDICINE

## 2024-11-20 PROCEDURE — 89051 BODY FLUID CELL COUNT: CPT | Performed by: NURSE PRACTITIONER

## 2024-11-20 PROCEDURE — 94640 AIRWAY INHALATION TREATMENT: CPT

## 2024-11-20 PROCEDURE — 80051 ELECTROLYTE PANEL: CPT | Performed by: NURSE PRACTITIONER

## 2024-11-20 PROCEDURE — 2060000001 HC INTERMEDIATE ICU ROOM DAILY

## 2024-11-20 PROCEDURE — 2500000001 HC RX 250 WO HCPCS SELF ADMINISTERED DRUGS (ALT 637 FOR MEDICARE OP): Performed by: NURSE PRACTITIONER

## 2024-11-20 PROCEDURE — 36415 COLL VENOUS BLD VENIPUNCTURE: CPT | Performed by: NURSE PRACTITIONER

## 2024-11-20 PROCEDURE — 77003 FLUOROGUIDE FOR SPINE INJECT: CPT | Performed by: RADIOLOGY

## 2024-11-20 PROCEDURE — 2500000005 HC RX 250 GENERAL PHARMACY W/O HCPCS: Performed by: INTERNAL MEDICINE

## 2024-11-20 PROCEDURE — 62328 DX LMBR SPI PNXR W/FLUOR/CT: CPT | Performed by: RADIOLOGY

## 2024-11-20 PROCEDURE — 87070 CULTURE OTHR SPECIMN AEROBIC: CPT | Mod: STJLAB | Performed by: NURSE PRACTITIONER

## 2024-11-20 PROCEDURE — 2500000001 HC RX 250 WO HCPCS SELF ADMINISTERED DRUGS (ALT 637 FOR MEDICARE OP): Performed by: INTERNAL MEDICINE

## 2024-11-20 PROCEDURE — 84157 ASSAY OF PROTEIN OTHER: CPT | Performed by: NURSE PRACTITIONER

## 2024-11-20 PROCEDURE — 87389 HIV-1 AG W/HIV-1&-2 AB AG IA: CPT | Mod: STJLAB | Performed by: INTERNAL MEDICINE

## 2024-11-20 PROCEDURE — 2500000005 HC RX 250 GENERAL PHARMACY W/O HCPCS: Performed by: NURSE PRACTITIONER

## 2024-11-20 PROCEDURE — 36415 COLL VENOUS BLD VENIPUNCTURE: CPT | Performed by: FAMILY MEDICINE

## 2024-11-20 PROCEDURE — 86780 TREPONEMA PALLIDUM: CPT | Mod: STJLAB | Performed by: INTERNAL MEDICINE

## 2024-11-20 PROCEDURE — 87206 SMEAR FLUORESCENT/ACID STAI: CPT | Mod: STJLAB | Performed by: NURSE PRACTITIONER

## 2024-11-20 PROCEDURE — 87102 FUNGUS ISOLATION CULTURE: CPT | Mod: STJLAB | Performed by: NURSE PRACTITIONER

## 2024-11-20 PROCEDURE — 87205 SMEAR GRAM STAIN: CPT | Performed by: FAMILY MEDICINE

## 2024-11-20 PROCEDURE — 2500000005 HC RX 250 GENERAL PHARMACY W/O HCPCS

## 2024-11-20 PROCEDURE — 87070 CULTURE OTHR SPECIMN AEROBIC: CPT | Performed by: FAMILY MEDICINE

## 2024-11-20 PROCEDURE — 99232 SBSQ HOSP IP/OBS MODERATE 35: CPT | Performed by: NURSE PRACTITIONER

## 2024-11-20 PROCEDURE — 2500000002 HC RX 250 W HCPCS SELF ADMINISTERED DRUGS (ALT 637 FOR MEDICARE OP, ALT 636 FOR OP/ED)

## 2024-11-20 PROCEDURE — 2500000004 HC RX 250 GENERAL PHARMACY W/ HCPCS (ALT 636 FOR OP/ED): Performed by: INTERNAL MEDICINE

## 2024-11-20 PROCEDURE — 99232 SBSQ HOSP IP/OBS MODERATE 35: CPT

## 2024-11-20 PROCEDURE — 87483 CNS DNA AMP PROBE TYPE 12-25: CPT | Mod: STJLAB | Performed by: NURSE PRACTITIONER

## 2024-11-20 PROCEDURE — 87529 HSV DNA AMP PROBE: CPT | Mod: STJLAB | Performed by: NURSE PRACTITIONER

## 2024-11-20 PROCEDURE — 82945 GLUCOSE OTHER FLUID: CPT | Performed by: NURSE PRACTITIONER

## 2024-11-20 RX ORDER — VANCOMYCIN HCL 50 MG/ML
125 SOLUTION, RECONSTITUTED, ORAL ORAL 4 TIMES DAILY
Status: DISCONTINUED | OUTPATIENT
Start: 2024-11-20 | End: 2024-11-25

## 2024-11-20 RX ORDER — MAGNESIUM SULFATE HEPTAHYDRATE 40 MG/ML
2 INJECTION, SOLUTION INTRAVENOUS ONCE
Status: COMPLETED | OUTPATIENT
Start: 2024-11-20 | End: 2024-11-20

## 2024-11-20 RX ORDER — POTASSIUM CHLORIDE 14.9 MG/ML
20 INJECTION INTRAVENOUS
Status: COMPLETED | OUTPATIENT
Start: 2024-11-20 | End: 2024-11-20

## 2024-11-20 RX ORDER — LOSARTAN POTASSIUM 50 MG/1
50 TABLET ORAL 2 TIMES DAILY
Status: DISCONTINUED | OUTPATIENT
Start: 2024-11-20 | End: 2024-11-21

## 2024-11-20 RX ADMIN — LACTULOSE 20 G: 20 SOLUTION ORAL at 10:33

## 2024-11-20 RX ADMIN — METOPROLOL TARTRATE 7.5 MG: 5 INJECTION INTRAVENOUS at 09:25

## 2024-11-20 RX ADMIN — POTASSIUM CHLORIDE 20 MEQ: 14.9 INJECTION, SOLUTION INTRAVENOUS at 10:38

## 2024-11-20 RX ADMIN — LACTULOSE 20 G: 20 SOLUTION ORAL at 02:44

## 2024-11-20 RX ADMIN — Medication 1 TABLET: at 08:16

## 2024-11-20 RX ADMIN — METOPROLOL TARTRATE 50 MG: 50 TABLET, FILM COATED ORAL at 20:36

## 2024-11-20 RX ADMIN — FORMOTEROL FUMARATE 20 MCG: 20 SOLUTION RESPIRATORY (INHALATION) at 08:41

## 2024-11-20 RX ADMIN — VANCOMYCIN HYDROCHLORIDE 125 MG: KIT at 15:25

## 2024-11-20 RX ADMIN — PANTOPRAZOLE SODIUM 40 MG: 40 INJECTION, POWDER, FOR SOLUTION INTRAVENOUS at 15:26

## 2024-11-20 RX ADMIN — VANCOMYCIN HYDROCHLORIDE 125 MG: KIT at 20:23

## 2024-11-20 RX ADMIN — HEPARIN SODIUM 5000 UNITS: 5000 INJECTION INTRAVENOUS; SUBCUTANEOUS at 06:51

## 2024-11-20 RX ADMIN — BUDESONIDE 0.5 MG: 0.5 INHALANT RESPIRATORY (INHALATION) at 08:42

## 2024-11-20 RX ADMIN — THIAMINE HYDROCHLORIDE 500 MG: 100 INJECTION, SOLUTION INTRAMUSCULAR; INTRAVENOUS at 20:36

## 2024-11-20 RX ADMIN — LACTULOSE 20 G: 20 SOLUTION ORAL at 22:29

## 2024-11-20 RX ADMIN — LACTULOSE 20 G: 20 SOLUTION ORAL at 06:51

## 2024-11-20 RX ADMIN — MAGNESIUM SULFATE HEPTAHYDRATE 2 G: 40 INJECTION, SOLUTION INTRAVENOUS at 10:32

## 2024-11-20 RX ADMIN — THIAMINE HYDROCHLORIDE 500 MG: 100 INJECTION, SOLUTION INTRAMUSCULAR; INTRAVENOUS at 08:16

## 2024-11-20 RX ADMIN — VANCOMYCIN HYDROCHLORIDE 125 MG: KIT at 06:51

## 2024-11-20 RX ADMIN — THIAMINE HYDROCHLORIDE 500 MG: 100 INJECTION, SOLUTION INTRAMUSCULAR; INTRAVENOUS at 15:26

## 2024-11-20 RX ADMIN — ACYCLOVIR SODIUM 500 MG: 50 INJECTION, SOLUTION INTRAVENOUS at 15:27

## 2024-11-20 RX ADMIN — HEPARIN SODIUM 5000 UNITS: 5000 INJECTION INTRAVENOUS; SUBCUTANEOUS at 15:26

## 2024-11-20 RX ADMIN — ENALAPRILAT 1.25 MG: 1.25 INJECTION INTRAVENOUS at 06:51

## 2024-11-20 RX ADMIN — ACYCLOVIR SODIUM 500 MG: 50 INJECTION, SOLUTION INTRAVENOUS at 22:29

## 2024-11-20 RX ADMIN — ACYCLOVIR SODIUM 500 MG: 50 INJECTION, SOLUTION INTRAVENOUS at 06:51

## 2024-11-20 RX ADMIN — HEPARIN SODIUM 5000 UNITS: 5000 INJECTION INTRAVENOUS; SUBCUTANEOUS at 22:29

## 2024-11-20 RX ADMIN — LOSARTAN POTASSIUM 50 MG: 50 TABLET, FILM COATED ORAL at 20:37

## 2024-11-20 RX ADMIN — POTASSIUM CHLORIDE 20 MEQ: 14.9 INJECTION, SOLUTION INTRAVENOUS at 08:35

## 2024-11-20 RX ADMIN — PANTOPRAZOLE SODIUM 40 MG: 40 INJECTION, POWDER, FOR SOLUTION INTRAVENOUS at 06:51

## 2024-11-20 RX ADMIN — VANCOMYCIN HYDROCHLORIDE 125 MG: KIT at 23:57

## 2024-11-20 ASSESSMENT — COGNITIVE AND FUNCTIONAL STATUS - GENERAL
PERSONAL GROOMING: TOTAL
DAILY ACTIVITIY SCORE: 6
DAILY ACTIVITIY SCORE: 6
HELP NEEDED FOR BATHING: TOTAL
WALKING IN HOSPITAL ROOM: TOTAL
DRESSING REGULAR LOWER BODY CLOTHING: TOTAL
WALKING IN HOSPITAL ROOM: TOTAL
DRESSING REGULAR LOWER BODY CLOTHING: TOTAL
CLIMB 3 TO 5 STEPS WITH RAILING: TOTAL
MOVING FROM LYING ON BACK TO SITTING ON SIDE OF FLAT BED WITH BEDRAILS: A LOT
TOILETING: TOTAL
STANDING UP FROM CHAIR USING ARMS: TOTAL
PERSONAL GROOMING: TOTAL
TOILETING: TOTAL
MOBILITY SCORE: 7
STANDING UP FROM CHAIR USING ARMS: TOTAL
MOVING FROM LYING ON BACK TO SITTING ON SIDE OF FLAT BED WITH BEDRAILS: A LOT
CLIMB 3 TO 5 STEPS WITH RAILING: TOTAL
MOVING TO AND FROM BED TO CHAIR: TOTAL
MOBILITY SCORE: 7
TURNING FROM BACK TO SIDE WHILE IN FLAT BAD: TOTAL
DRESSING REGULAR UPPER BODY CLOTHING: TOTAL
EATING MEALS: TOTAL
EATING MEALS: TOTAL
TURNING FROM BACK TO SIDE WHILE IN FLAT BAD: TOTAL
DRESSING REGULAR UPPER BODY CLOTHING: TOTAL
MOVING TO AND FROM BED TO CHAIR: TOTAL
HELP NEEDED FOR BATHING: TOTAL

## 2024-11-20 ASSESSMENT — PAIN SCALES - WONG BAKER
WONGBAKER_NUMERICALRESPONSE: NO HURT

## 2024-11-20 ASSESSMENT — PAIN - FUNCTIONAL ASSESSMENT
PAIN_FUNCTIONAL_ASSESSMENT: 0-10
PAIN_FUNCTIONAL_ASSESSMENT: WONG-BAKER FACES
PAIN_FUNCTIONAL_ASSESSMENT: 0-10
PAIN_FUNCTIONAL_ASSESSMENT: 0-10

## 2024-11-20 ASSESSMENT — PAIN SCALES - GENERAL
PAINLEVEL_OUTOF10: 0 - NO PAIN

## 2024-11-20 NOTE — CARE PLAN
Problem: Skin  Goal: Participates in plan/prevention/treatment measures  11/20/2024 1835 by Carly Sneed RN  Outcome: Progressing  11/20/2024 1835 by Carly Sneed RN  Outcome: Progressing  Goal: Prevent/manage excess moisture  11/20/2024 1835 by Carly Sneed RN  Outcome: Progressing  11/20/2024 1835 by Carly Sneed RN  Outcome: Progressing  Goal: Prevent/minimize sheer/friction injuries  11/20/2024 1835 by Carly Sneed RN  Outcome: Progressing  11/20/2024 1835 by Carly Sneed RN  Outcome: Progressing  Goal: Promote/optimize nutrition  11/20/2024 1835 by Carly Sneed RN  Outcome: Progressing  11/20/2024 1835 by Carly Sneed RN  Outcome: Progressing  Goal: Promote skin healing  11/20/2024 1835 by Carly Sneed RN  Outcome: Progressing  11/20/2024 1835 by Carly Sneed RN  Outcome: Progressing     Problem: Pain - Adult  Goal: Verbalizes/displays adequate comfort level or baseline comfort level  11/20/2024 1835 by Carly Sneed RN  Outcome: Progressing  11/20/2024 1835 by Carly Sneed RN  Outcome: Progressing     Problem: Discharge Planning  Goal: Discharge to home or other facility with appropriate resources  11/20/2024 1835 by Carly Sneed RN  Outcome: Progressing  11/20/2024 1835 by Carly Sneed RN  Outcome: Progressing     Problem: Chronic Conditions and Co-morbidities  Goal: Patient's chronic conditions and co-morbidity symptoms are monitored and maintained or improved  11/20/2024 1835 by Carly Sneed RN  Outcome: Progressing  11/20/2024 1835 by Carly Sneed RN  Outcome: Progressing     Problem: Fall/Injury  Goal: Verbalize understanding of personal risk factors for fall in the hospital  11/20/2024 1835 by Carly Sneed RN  Outcome: Progressing  11/20/2024 1835 by Carly Sneed RN  Outcome: Progressing  Goal: Verbalize understanding of risk factor reduction measures to prevent injury from fall in the home  11/20/2024 1835 by  Carly Sneed RN  Outcome: Progressing  11/20/2024 1835 by Carly Sneed RN  Outcome: Progressing  Goal: Use assistive devices by end of the shift  11/20/2024 1835 by Carly Sneed RN  Outcome: Progressing  11/20/2024 1835 by Carly Sneed RN  Outcome: Progressing  Goal: Pace activities to prevent fatigue by end of the shift  11/20/2024 1835 by Carly Sneed RN  Outcome: Progressing  11/20/2024 1835 by Carly Sneed RN  Outcome: Progressing     Problem: Pain  Goal: Takes deep breaths with improved pain control throughout the shift  11/20/2024 1835 by Carly Sneed RN  Outcome: Progressing  11/20/2024 1835 by Carly Sneed RN  Outcome: Progressing  Goal: Turns in bed with improved pain control throughout the shift  11/20/2024 1835 by Carly Sneed RN  Outcome: Progressing  11/20/2024 1835 by Carly Sneed RN  Outcome: Progressing  Goal: Walks with improved pain control throughout the shift  11/20/2024 1835 by Carly Sneed RN  Outcome: Progressing  11/20/2024 1835 by Carly Sneed RN  Outcome: Progressing  Goal: Performs ADL's with improved pain control throughout shift  11/20/2024 1835 by Carly Sneed RN  Outcome: Progressing  11/20/2024 1835 by Carly Sneed RN  Outcome: Progressing     Problem: Arrythmia/Dysrhythmia  Goal: Lab values return to normal range  11/20/2024 1835 by Carly Sneed RN  Outcome: Progressing  11/20/2024 1835 by Carly Sneed RN  Outcome: Progressing  Goal: Promote self management  11/20/2024 1835 by Carly Sneed RN  Outcome: Progressing  11/20/2024 1835 by Carly Sneed RN  Outcome: Progressing  Goal: Serial ECG will return to baseline  11/20/2024 1835 by Carly Sneed RN  Outcome: Progressing  11/20/2024 1835 by Carly Sneed RN  Outcome: Progressing  Goal: Vital signs return to baseline  11/20/2024 1835 by Carly Sneed RN  Outcome: Progressing  11/20/2024 1835 by Carly Sneed RN  Outcome:  Progressing     Problem: Nutrition  Goal: Nutrition support goals are met within 48 hrs  11/20/2024 1835 by Carly Sneed RN  Outcome: Progressing  11/20/2024 1835 by Carly Sneed RN  Outcome: Progressing  Goal: Nutrition support is meeting 75% of nutrient needs  11/20/2024 1835 by Carly Sneed RN  Outcome: Progressing  11/20/2024 1835 by Carly Sneed RN  Outcome: Progressing  Goal: Tube feed tolerance  11/20/2024 1835 by Carly Sneed RN  Outcome: Progressing  11/20/2024 1835 by Carly Sneed RN  Outcome: Progressing  Goal: BG  mg/dL  11/20/2024 1835 by Carly Sneed RN  Outcome: Progressing  11/20/2024 1835 by Carly Sneed RN  Outcome: Progressing  Goal: Electrolytes WNL  11/20/2024 1835 by Carly Sneed RN  Outcome: Progressing  11/20/2024 1835 by Carly Sneed RN  Outcome: Progressing     Problem: Safety - Medical Restraint  Goal: Remains free of injury from restraints (Restraint for Interference with Medical Device)  11/20/2024 1835 by Carly Sneed RN  Outcome: Progressing  11/20/2024 1835 by Carly Sneed RN  Outcome: Progressing  Goal: Free from restraint(s) (Restraint for Interference with Medical Device)  11/20/2024 1835 by Carly Sneed RN  Outcome: Progressing  11/20/2024 1835 by Carly Sneed RN  Outcome: Progressing   The patient's goals for the shift include The patient is too lethargic at this time to participate in her care plan    The clinical goals for the shift include patient  mental status will improve this shift.    Patient more alert this shift.  Mentation labile.  Early in shift patient able to state year but became confused on year later in shift.  Repeitive with questions.   at bedside majority of the day.  Able to eat dinner with assistance.  Intermittently follows commands.

## 2024-11-20 NOTE — PROGRESS NOTES
"Samuel Mims is a 48 y.o. female on day 4 of admission presenting with Sepsis with encephalopathy without septic shock, due to unspecified organism (Multi).      Subjective   Spouse at bedside.  Pt was alert, looking at her phone.  She has intermittent hearing loss.   Episode of tachycardia today improved with IV metoprolol     Objective     Last Recorded Vitals  Blood pressure 113/51, pulse 104, temperature 36.4 °C (97.5 °F), temperature source Temporal, resp. rate 20, height 1.676 m (5' 6\"), weight 109 kg (241 lb 2.9 oz), SpO2 97%.    Physical Exam  Constitutional:       General: She is awake.   Eyes:      Pupils: Pupils are equal, round, and reactive to light.   Neurological:      Mental Status: She is alert.      Motor: Motor strength is normal.  Psychiatric:         Speech: Speech normal.       Neurological Exam  Mental Status  Awake and alert. Oriented only to person and place. Speech is normal. Language is fluent with no aphasia.  Following commands..    Cranial Nerves  CN III, IV, VI: Pupils equal round and reactive to light bilaterally.  CN V: Facial sensation is normal.  CN IX, X: Palate elevates symmetrically  CN XII: Tongue midline without atrophy or fasciculations.  Blinks to bilateral visual threat..    Motor  Normal muscle bulk throughout. Normal muscle tone. Strength is 5/5 throughout all four extremities.  Generalized weakness..    Sensory  Sensation is intact to light touch, pinprick, vibration and proprioception in all four extremities.    Coordination  Right: Finger-to-nose normal.Left: Finger-to-nose normal.    Relevant Results  Scheduled medications  acyclovir, 500 mg, intravenous, q8h  budesonide, 0.5 mg, nebulization, BID  [Held by provider] busPIRone, 10 mg, oral, BID  influenza, 0.5 mL, intramuscular, During hospitalization  formoterol, 20 mcg, nebulization, BID  heparin (porcine), 5,000 Units, subcutaneous, q8h SANTIAGO  lactulose, 20 g, oral, q4h  losartan, 50 mg, nasogastric tube, " BID  magnesium sulfate, 2 g, intravenous, Once  metoprolol tartrate, 50 mg, nasoduodenal tube, q12h  multivitamin with minerals, 1 tablet, nasogastric tube, Daily  [Held by provider] pantoprazole, 40 mg, oral, BID AC  pantoprazole, 40 mg, intravenous, BID AC  potassium chloride, 20 mEq, intravenous, q2h  [Held by provider] spironolactone, 12.5 mg, oral, Daily  thiamine, 500 mg, intravenous, TID   Followed by  [START ON 11/22/2024] thiamine, 250 mg, intravenous, Daily   Followed by  [START ON 11/25/2024] thiamine, 100 mg, oral, Daily  vancomycin, 125 mg, nasoduodenal tube, 4x daily      Continuous medications     PRN medications  PRN medications: acetaminophen **OR** acetaminophen, albuterol, [Held by provider] clonazePAM, hydrALAZINE, melatonin, metoprolol  Results for orders placed or performed during the hospital encounter of 11/16/24 (from the past 24 hours)   CBC   Result Value Ref Range    WBC 5.4 4.4 - 11.3 x10*3/uL    nRBC 0.0 0.0 - 0.0 /100 WBCs    RBC 4.01 4.00 - 5.20 x10*6/uL    Hemoglobin 10.9 (L) 12.0 - 16.0 g/dL    Hematocrit 34.7 (L) 36.0 - 46.0 %    MCV 87 80 - 100 fL    MCH 27.2 26.0 - 34.0 pg    MCHC 31.4 (L) 32.0 - 36.0 g/dL    RDW 16.5 (H) 11.5 - 14.5 %    Platelets 201 150 - 450 x10*3/uL   SST TOP   Result Value Ref Range    Extra Tube Hold for add-ons.    Protime-INR   Result Value Ref Range    Protime 13.3 (H) 9.8 - 12.8 seconds    INR 1.2 (H) 0.9 - 1.1   aPTT   Result Value Ref Range    aPTT 30 27 - 38 seconds   SST TOP   Result Value Ref Range    Extra Tube Hold for add-ons.    Magnesium   Result Value Ref Range    Magnesium 1.77 1.60 - 2.40 mg/dL   Comprehensive Metabolic Panel   Result Value Ref Range    Glucose 116 (H) 74 - 99 mg/dL    Sodium 144 136 - 145 mmol/L    Potassium 3.3 (L) 3.5 - 5.3 mmol/L    Chloride 110 (H) 98 - 107 mmol/L    Bicarbonate 25 21 - 32 mmol/L    Anion Gap 12 10 - 20 mmol/L    Urea Nitrogen 10 6 - 23 mg/dL    Creatinine 0.86 0.50 - 1.05 mg/dL    eGFR 83 >60  mL/min/1.73m*2    Calcium 9.5 8.6 - 10.3 mg/dL    Albumin 3.6 3.4 - 5.0 g/dL    Alkaline Phosphatase 55 33 - 110 U/L    Total Protein 6.3 (L) 6.4 - 8.2 g/dL    AST 21 9 - 39 U/L    Bilirubin, Total 1.0 0.0 - 1.2 mg/dL    ALT 21 7 - 45 U/L   Lavender Top   Result Value Ref Range    Extra Tube Hold for add-ons.      ECG 12 lead    Result Date: 11/20/2024  Sinus tachycardia Left axis deviation Left ventricular hypertrophy with repolarization abnormality Inferior infarct (cited on or before 27-AUG-2024) Anteroseptal infarct (cited on or before 27-AUG-2024) Abnormal ECG When compared with ECG of 27-AUG-2024 02:40, ST now depressed in Lateral leads Confirmed by Erin Gardner ([id number]) on 11/20/2024 7:29:17 AM    MR brain wo IV contrast    Result Date: 11/19/2024  Interpreted By:  Yoon Harding, STUDY: MR BRAIN WO IV CONTRAST;  11/19/2024 11:26 am   INDICATION: Signs/Symptoms:Ongoing encephalopathy.     COMPARISON: None.   ACCESSION NUMBER(S): [id number]   ORDERING CLINICIAN: MARYAM LINDO   TECHNIQUE: Axial T2, FLAIR, DWI,  and sagittal and coronal T1 weighted images of brain were acquired.  Axial GE weighted images were unable to be performed.   FINDINGS: The examination is markedly degraded by patient motion artifact.   CSF Spaces: The ventricles, sulci and basal cisterns are within normal limits.   Parenchyma: No definite diffusion restriction abnormality to suggest acute infarct.   The axial T2 FLAIR weighted images are significantly degraded by patient motion artifact. However, within this limitation there appears to be abnormal increased signal within the medial thalami bilaterally, surrounding the aqueduct and possibly within the caudate heads.   There is questionable increased cortical signal on the FLAIR images within the bilateral frontal lobes (images 37-39, series 4).   There is a background of nonspecific patchy white matter signal which is presumed microangiopathy.   There is no mass effect or midline  shift.   Cerebellar tonsils are above the foramen magnum. Pituitary and sella are not enlarged.   Paranasal Sinuses and Mastoids: Visualized paranasal sinuses and mastoid air cells are predominantly clear..       The examination is markedly degraded by patient motion artifact.   Within this limitation, there appears to be increased signal within the medial thalami, periaqueductal region and possible increased signal within the caudate and cortex of the bilateral posterior frontal lobes. Differential considerations include a toxic metabolic process, an encephalitis, Wernicke's encephalopathy or possibly an atypical appearance of posterior reversible encephalopathy syndrome.   Within the limitation of motion artifact no evidence of acute infarct, intracranial mass effect or midline shift.   Consider repeat examination with contrast when the patient is able to tolerate the MRI.   MACRO: None   Signed by: Yoon Harding 11/19/2024 1:33 PM Dictation workstation:   [id number]    CT head wo IV contrast    Result Date: 11/18/2024  Interpreted By:  Damon John, STUDY: CT HEAD WO IV CONTRAST;  11/18/2024 4:51 pm   INDICATION: Signs/Symptoms:AMS.   COMPARISON: 11/17/2024   ACCESSION NUMBER(S): [id number]   ORDERING CLINICIAN: SHERRIE PHIPPS   TECHNIQUE: Sequential trans axial images were obtained  .   FINDINGS: INTRACRANIAL:   CORTICAL SULCI AND EXTRA-AXIAL SPACES:  Unremarkable.   VENTRICULAR SYSTEM:  Unremarkable without significant dilatation.   CEREBRAL PARENCHYMA:  Unremarkable without significant degenerative change.There is no evidence of definite subacute infarction, intracranial hemorrhage or mass.   EXTRACRANIAL: Visualized paranasal sinuses and mastoids are clear. The calvarium is intact.       Unremarkable exam. There has not been significant interval change from the prior exam.   Signed by: Damon John 11/18/2024 5:21 PM Dictation workstation:   [id number]    XR abdomen 1 view    Result Date:  11/18/2024  Interpreted By:  Fernando Betts, STUDY: XR ABDOMEN 1 VIEW;  11/18/2024 3:54 pm   INDICATION: Signs/Symptoms:KUB after corpak insertion.   COMPARISON: None.   ACCESSION NUMBER(S): [id number]   ORDERING CLINICIAN: SHERRIE PHIPPS   FINDINGS: Feeding tube tip in the proximal duodenum. Mild gaseous distention of the transverse colon.       Feeding tube tip in the proximal duodenum.   MACRO: None   Signed by: Fernando Betts 11/18/2024 3:58 PM Dictation workstation:   [id number]    ECG 12 Lead    Result Date: 11/18/2024  Sinus tachycardia Left anterior fascicular block Possible Anterolateral infarct (cited on or before 27-AUG-2024) Abnormal ECG When compared with ECG of 16-NOV-2024 17:49, (unconfirmed) Questionable change in initial forces of Septal leads ST less depressed in Lateral leads Confirmed by Alber Luevano ([id number]) on 11/18/2024 3:46:18 PM    CT head wo IV contrast    Result Date: 11/17/2024  Interpreted By:  Boogie Nance, STUDY: CT HEAD WO IV CONTRAST;  11/17/2024 10:04 pm   INDICATION: Signs/Symptoms:Altered mental status.   COMPARISON: 11/16/2024   ACCESSION NUMBER(S): [id number]   ORDERING CLINICIAN: CRYSTAL WEINBERG   TECHNIQUE: Contiguous axial images of the head were obtained without intravenous contrast.   FINDINGS: BRAIN PARENCHYMA:   The gray white matter differentiation is preserved.  No mass effect or midline shift.   HEMORRHAGE:  No evidence of acute intracranial hemorrhage. VENTRICLES AND EXTRA-AXIAL SPACES:  The ventricles are within normal limits in size for brain volume.  No evidence of abnormal extraaxial fluid collection. EXTRACRANIAL SOFT TISSUES:  Within normal limits. PARANASAL SINUSES/MASTOIDS:  The visualized paranasal sinuses and mastoid air cells are clear and well pneumatized. CALVARIUM:  No evidence of depressed calvarial fracture.   OTHER FINDINGS:  None       No evidence of acute intracranial hemorrhage or mass effect.       MACRO: None   Signed by: Boogie Nance 11/17/2024  11:05 PM Dictation workstation:   [id number]    CT abdomen pelvis w IV contrast    Result Date: 11/16/2024  Interpreted By:  Boogie Nance, STUDY: CT ABDOMEN PELVIS W IV CONTRAST;  11/16/2024 7:54 pm   INDICATION: Signs/Symptoms:generalized ttp, sepsis.   COMPARISON: None.   ACCESSION NUMBER(S): [id number]   ORDERING CLINICIAN: ZAHRA MATTHEW   TECHNIQUE: Contiguous axial images of the abdomen and pelvis were obtained after the intravenous administration of  contrast. Coronal and sagittal reformatted images were obtained from the axial images.   FINDINGS: There is limited evaluation the lung bases. Mild basilar atelectasis. No pleural effusion.   Evaluation of the abdomen and pelvis is limited secondary to patient body habitus, motion, and beam hardening artifact secondary to inferior position of the patient's arms. Mild hypodensity near the falciform ligament may correspond to focal fatty infiltration. The gallbladder surgically absent.   The pancreas, spleen, and adrenal glands appear unremarkable.   Symmetric enhancement of the kidneys. No hydronephrosis.   No evidence of bowel obstruction. Fluid-filled segments of colon. There is underdistention versus mild wall thickening and submucosal fat deposition in loops of ileum in the right lower quadrant which may relate to chronic inflammatory process.   Urinary bladder appears unremarkable.   Limited evaluation of the uterus and adnexa. Intrauterine device. 2.4 cm x 1.8 cm cystic lesion in the right adnexa.   Multilevel degenerative change of the lumbar spine.       Fluid-filled segments of colon; please correlate for diarrhea/colitis. There is underdistention versus mild wall thickening and submucosal fat deposition in loops of ileum in the right lower quadrant which may relate to chronic inflammatory process.       MACRO: None   Signed by: Boogie Nance 11/16/2024 8:16 PM Dictation workstation:   [id number]    CT head wo IV contrast    Result Date:  11/16/2024  Interpreted By:  Boogie Nance, STUDY: CT HEAD WO IV CONTRAST;  11/16/2024 7:54 pm   INDICATION: Signs/Symptoms:AMS.   COMPARISON: None   ACCESSION NUMBER(S): [id number]   ORDERING CLINICIAN: ZAHRA MATTHEW   TECHNIQUE: Contiguous axial images of the head were obtained without intravenous contrast.   FINDINGS: BRAIN PARENCHYMA:   The gray white matter differentiation is preserved.  No mass effect or midline shift.   HEMORRHAGE:  No evidence of acute intracranial hemorrhage. VENTRICLES AND EXTRA-AXIAL SPACES:  The ventricles are within normal limits in size for brain volume.  No evidence of abnormal extraaxial fluid collection. EXTRACRANIAL SOFT TISSUES:  Within normal limits. PARANASAL SINUSES/MASTOIDS:  The visualized paranasal sinuses and mastoid air cells are clear and well pneumatized. CALVARIUM:  No evidence of depressed calvarial fracture.   OTHER FINDINGS:  None       No evidence of acute intracranial hemorrhage or mass effect.       MACRO: None   Signed by: Boogie Nance 11/16/2024 8:11 PM Dictation workstation:   [id number]    NM gastric emptying solid    Result Date: 11/6/2024  Interpreted By:  Laila Lugo, STUDY: NM GASTRIC EMPTYING SOLID; 11/6/2024 2:00 pm   INDICATION: Signs/Symptoms:NAUSEA.   COMPARISON: None.   ACCESSION NUMBER(S): [id number]   ORDERING CLINICIAN: MAGNO VARGAS   TECHNIQUE: DIVISION OF NUCLEAR MEDICINE GASTRIC EMPTYING QUANTIFICATION   The patient received an oral radiolabeled solid-phase meal utilizing 1.0 mCi of Tc-99m sulfur colloid in cooked egg. Sequential anterior and posterior images of the abdomen were then acquired over the next four hours. Computer quantification of gastric emptying (using geometric mean activity) was performed.   FINDINGS: The image series shows prompt antegrade transit of radiolabeled solids from stomach to small bowel. Computer quantification demonstrates gastric retention as follows: 45 %  at 1 hr    (normal max 90%) 23 %  at 2 hr     (normal max 60%) 12 %  at 3 hr    (normal max 30%) 1 %  at 4 hr    (normal max 10%)       1. Normal gastric emptying of solids without evidence of gastroparesis.   I personally reviewed the images/study. This study was interpreted at [facility], [place], [place].   MACRO: None   Signed by: Laila Lugo 11/6/2024 2:04 PM Dictation workstation:   [id number]    Flexible Sigmoidoscopy    Result Date: 10/29/2024  [facility] Patient Name: Samuel ORTIZ Procedure Date: 10/29/2024 8:47 AM MRN: [id number] YOB: 1975 Age: 48 Gender: Female Race: Unknown Attending MD: Jose Fernandez MD, [id number] Procedure:             Colonoscopy Referring MD:          PIPO JAMISON MD Providers:      Jose Fernandez MD Indications:           Abnormal CT of the GI tract, Weight loss Findings:              The perianal and digital rectal examinations were                        normal.                        Multiple small and large-mouthed diverticula were                        found in the sigmoid colon.                        The terminal ileum appeared normal. Biopsies were                        taken with a cold forceps for histology. Verification                        of patient identification for the specimen was done by                        the nurse using the patient's name, birth date and                        medical record number. Estimated blood loss was                        minimal.                        A 2 mm polyp was found in the ileocecal valve. The                        polyp was sessile. The polyp was removed with a cold                        biopsy forceps. Resection and retrieval were complete.                        Verification of patient identification for the                        specimen was done by the nurse using the patient's                        name, birth date and medical record number. Estimated                         blood loss was minimal.                        A 4 mm polyp was found in the cecum. The polyp was                        sessile. The polyp was removed with a cold snare.                        Resection and retrieval were complete. Verification of                        patient identification for the specimen was done by                        the nurse using the patient's name, birth date and                        medical record number. Estimated blood loss was                        minimal.                        Two sessile polyps were found in the ascending colon.                        The polyps were 5 to 7 mm in size. These polyps were                        removed with a cold snare. Resection and retrieval                        were complete. Verification of patient identification                        for the specimen was done by the nurse using the                        patient's name, birth date and medical record number.                        Estimated blood loss was minimal.                        The colon (entire examined portion) appeared otherwise                        normal. Biopsies were taken with a cold forceps for                        histology. Verification of patient identification for                        the specimen was done by the nurse using the patient's                        name, birth date and medical record number. Estimated                        blood loss was minimal.                        Stool sample collected and submitted for C diff PCR                        and culture and fecal calprotectin.                        The retroflexed view of the distal rectum and anal                        verge was normal and showed no anal or rectal                        abnormalities. Patient Profile:       This is a 48 year old female. Refer to note in patient                        chart for documentation of history and physical. Impression:            -  Diverticulosis in the sigmoid colon.                        - The examined portion of the ileum was normal.                        Biopsied.                        - One 2 mm polyp at the ileocecal valve, removed with                        a cold biopsy forceps. Resected and retrieved.                        - One 4 mm polyp in the cecum, removed with a cold                        snare. Resected and retrieved.                        - Two 5 to 7 mm polyps in the ascending colon, removed                        with a cold snare. Resected and retrieved.                        - The entire examined colon is otherwise normal.                        Biopsied.                        - The distal rectum and anal verge are normal on                        retroflexion view. Recommendation:        - Await pathology results.                        - Patient has a contact number available for                        emergencies. The signs and symptoms of potential                        delayed complications were discussed with the patient.                        Return to normal activities tomorrow. Written                        discharge instructions were provided to the patient.                        - High fiber diet.                        - Continue present medications.                        - Schedule gastric emptying study as previously                        recommended.                        - Repeat colonoscopy in 3 years for surveillance. Medicines:             Propofol per Anesthesia Procedure:             Pre-Anesthesia Assessment:                        - Prior to the procedure, a History and Physical was                        performed, and patient medications and allergies were                        reviewed. The patient is competent. The risks and                        benefits of the procedure and the sedation options and                        risks were discussed with the patient. All questions                         were answered and informed consent was obtained.                        Patient identification and proposed procedure were                        verified by the physician and the nurse in the                        pre-procedure area in the procedure room. Mental                        Status Examination: alert and oriented. Airway                        Examination: normal oropharyngeal airway and neck                        mobility. Respiratory Examination: clear to                        auscultation. CV Examination: normal. Prophylactic                        Antibiotics: The patient does not require prophylactic                        antibiotics. Prior Anticoagulants: The patient has                        taken no anticoagulant or antiplatelet agents. ASA                        Grade Assessment: III - A patient with severe systemic                        disease. After reviewing the risks and benefits, the                        patient was deemed in satisfactory condition to                        undergo the procedure. The anesthesia plan was to use                        deep sedation / analgesia. Immediately prior to                        administration of medications, the patient was                        re-assessed for adequacy to receive sedatives. The                        heart rate, respiratory rate, oxygen saturations,                        blood pressure, adequacy of pulmonary ventilation, and                        response to care were monitored throughout the                        procedure. The physical status of the patient was                        re-assessed after the procedure.                        After I obtained informed consent, the scope was                        passed under direct vision. Throughout the procedure,                        the patient's blood pressure, pulse, and oxygen                        saturations were monitored continuously.  Provation                        AI/GI Genius was used during withdrawal. The                        COLONOSCOPE was introduced through the anus and                        advanced to the terminal ileum. The colonoscopy was                        performed without difficulty. The patient tolerated                        the procedure well. The quality of the bowel                        preparation was good. The terminal ileum, ileocecal                        valve, appendiceal orifice, and rectum were                        photographed. The quality of the bowel preparation was                        evaluated using the BBPS ([place] Bowel Preparation                        Scale) with scores of: Right Colon = 2 (minor amount                        of residual staining, small fragments of stool and/or                        opaque liquid, but mucosa seen well), Transverse Colon                        = 3 (entire mucosa seen well with no residual                        staining, small fragments of stool or opaque liquid)                        and Left Colon = 3 (entire mucosa seen well with no                        residual staining, small fragments of stool or opaque                        liquid). The total BBPS score equals 8. Complications:         No immediate complications. Procedure Code(s):     --- Professional ---                        12214, Colonoscopy, flexible; with removal of                        tumor(s), polyp(s), or other lesion(s) by snare                        technique                        15237, 59, Colonoscopy, flexible; with biopsy, single                        or multiple Diagnosis Code(s):     --- Professional ---                        D12.0, Benign neoplasm of cecum                        D12.2, Benign neoplasm of ascending colon                        R63.4, Abnormal weight loss                        K57.30, Diverticulosis of large intestine without                         perforation or abscess without bleeding                        R93.3, Abnormal findings on diagnostic imaging of                        other parts of digestive tract CPT copyright 2021 [employer]. All rights reserved. The codes documented in this report are preliminary and upon  review may be revised to meet current compliance requirements. Jose Fernandez MD 10/29/2024 9:42:34 AM Number of Addenda: 0 Note Initiated On: 10/29/2024 8:47 AM     Assessment/Plan   This patient currently has cardiac telemetry ordered; if you would like to modify or discontinue the telemetry order, click here to go to the orders activity to modify/discontinue the order.  Assessment & Plan  Sepsis with encephalopathy without septic shock, due to unspecified organism (Multi)    Diarrhea    UTI (urinary tract infection)    Hypertension    Colitis    Sinus tachycardia    Prolonged Q-T interval on ECG    Clostridium difficile infection    Metabolic encephalopathy - likely 2/2 Wernicke's encephalopathy given pt   continuing to improve. Other possibilities include PRES, inflammation, viral/infectious processes.      Recommend:     Lumbar puncture with CSF analysis pending  High dose thiamine; vitamin B1 level pending  Blood pressure control.   ID following   Continue supportive care.  Neuro checks every 4 hours.  Likely will repeat MRI brain in a few days.     Case/plan discussed with Dr. Tracey.  Neurology will continue to follow.         Nelly Manley, JOSE-CNP

## 2024-11-20 NOTE — PROGRESS NOTES
Samuel Mims is a 48 y.o. female on day 4 of admission presenting with Sepsis with encephalopathy without septic shock, due to unspecified organism (Multi).    Subjective   Pt continues to obtunded and not responding to commands.  Opens eyes. Only responsive to sternal rub.  Per  she was responsive to questions yesterday, but seemed to have difficulty hearing.  Still has copious diarrhea.       Objective     Last Recorded Vitals  /83 (BP Location: Left arm, Patient Position: Lying)   Pulse 94   Temp 36.5 °C (97.7 °F) (Temporal)   Resp 21   Wt 109 kg (241 lb 2.9 oz)   SpO2 96%   Intake/Output last 3 Shifts:    Intake/Output Summary (Last 24 hours) at 11/20/2024 0804  Last data filed at 11/20/2024 0600  Gross per 24 hour   Intake 820 ml   Output 1550 ml   Net -730 ml       Admission Weight  Weight: 127 kg (280 lb) (11/16/24 1755)    Daily Weight  11/19/24 : 109 kg (241 lb 2.9 oz)      Physical Exam:  General: Not in acute distress  HEENT: PERRLA, head intact and normocephalic  Neck: Normal to inspection  Lungs: Clear to auscultation, work of breathing within normal limit  Cardiac: Regular rate and rhythm  Abdomen: Soft nontender, positive bowel sounds  : Exam deferred  Skin: Intact  Hematology: No petechia or excessive ecchymosis  Musculoskeletal: Without significant trauma  Neurological:  Unable to answer questions, Lethargic.   Responsive to sternal rub.     Relevant Results  Scheduled medications  acyclovir, 500 mg, intravenous, q8h  budesonide, 0.5 mg, nebulization, BID  [Held by provider] busPIRone, 10 mg, oral, BID  enalaprilat, 1.25 mg, intravenous, q6h  influenza, 0.5 mL, intramuscular, During hospitalization  formoterol, 20 mcg, nebulization, BID  heparin (porcine), 5,000 Units, subcutaneous, q8h SANTIAGO  lactulose, 20 g, oral, q4h  [Held by provider] losartan, 100 mg, oral, Daily  metoprolol tartrate, 50 mg, nasoduodenal tube, q12h  multivitamin with minerals, 1 tablet, nasogastric tube,  Daily  [Held by provider] pantoprazole, 40 mg, oral, BID AC  pantoprazole, 40 mg, intravenous, BID AC  [Held by provider] spironolactone, 12.5 mg, oral, Daily  thiamine, 500 mg, intravenous, TID   Followed by  [START ON 11/22/2024] thiamine, 250 mg, intravenous, Daily   Followed by  [START ON 11/25/2024] thiamine, 100 mg, oral, Daily  vancomycin, 125 mg, oral, 4x daily  [Held by provider] verapamil SR, 240 mg, oral, Nightly      Continuous medications  dextrose 5%, 50 mL/hr, Last Rate: 50 mL/hr (11/19/24 0901)      PRN medications  PRN medications: acetaminophen **OR** acetaminophen, albuterol, [Held by provider] clonazePAM, hydrALAZINE, melatonin, metoprolol   Results for orders placed or performed during the hospital encounter of 11/16/24 (from the past 24 hours)   CBC   Result Value Ref Range    WBC 5.4 4.4 - 11.3 x10*3/uL    nRBC 0.0 0.0 - 0.0 /100 WBCs    RBC 4.01 4.00 - 5.20 x10*6/uL    Hemoglobin 10.9 (L) 12.0 - 16.0 g/dL    Hematocrit 34.7 (L) 36.0 - 46.0 %    MCV 87 80 - 100 fL    MCH 27.2 26.0 - 34.0 pg    MCHC 31.4 (L) 32.0 - 36.0 g/dL    RDW 16.5 (H) 11.5 - 14.5 %    Platelets 201 150 - 450 x10*3/uL   SST TOP   Result Value Ref Range    Extra Tube Hold for add-ons.    Protime-INR   Result Value Ref Range    Protime 13.3 (H) 9.8 - 12.8 seconds    INR 1.2 (H) 0.9 - 1.1   aPTT   Result Value Ref Range    aPTT 30 27 - 38 seconds   SST TOP   Result Value Ref Range    Extra Tube Hold for add-ons.    Magnesium   Result Value Ref Range    Magnesium 1.77 1.60 - 2.40 mg/dL   Comprehensive Metabolic Panel   Result Value Ref Range    Glucose 116 (H) 74 - 99 mg/dL    Sodium 144 136 - 145 mmol/L    Potassium 3.3 (L) 3.5 - 5.3 mmol/L    Chloride 110 (H) 98 - 107 mmol/L    Bicarbonate 25 21 - 32 mmol/L    Anion Gap 12 10 - 20 mmol/L    Urea Nitrogen 10 6 - 23 mg/dL    Creatinine 0.86 0.50 - 1.05 mg/dL    eGFR 83 >60 mL/min/1.73m*2    Calcium 9.5 8.6 - 10.3 mg/dL    Albumin 3.6 3.4 - 5.0 g/dL    Alkaline Phosphatase 55  33 - 110 U/L    Total Protein 6.3 (L) 6.4 - 8.2 g/dL    AST 21 9 - 39 U/L    Bilirubin, Total 1.0 0.0 - 1.2 mg/dL    ALT 21 7 - 45 U/L      Lab Results   Component Value Date    BLOODCULT No growth at 3 days 11/16/2024    BLOODCULT No growth at 3 days 11/16/2024    URINECULTURE (A) 11/16/2024     Multiple organisms present, probable contamination. Repeat culture if clinically indicated.       ECG 12 lead    Result Date: 11/20/2024  Sinus tachycardia Left axis deviation Left ventricular hypertrophy with repolarization abnormality Inferior infarct (cited on or before 27-AUG-2024) Anteroseptal infarct (cited on or before 27-AUG-2024) Abnormal ECG When compared with ECG of 27-AUG-2024 02:40, ST now depressed in Lateral leads Confirmed by Erin Gardner ([id number]) on 11/20/2024 7:29:17 AM    MR brain wo IV contrast    Result Date: 11/19/2024  Interpreted By:  Yoon Harding, STUDY: MR BRAIN WO IV CONTRAST;  11/19/2024 11:26 am   INDICATION: Signs/Symptoms:Ongoing encephalopathy.     COMPARISON: None.   ACCESSION NUMBER(S): [id number]   ORDERING CLINICIAN: MARYAM LINDO   TECHNIQUE: Axial T2, FLAIR, DWI,  and sagittal and coronal T1 weighted images of brain were acquired.  Axial GE weighted images were unable to be performed.   FINDINGS: The examination is markedly degraded by patient motion artifact.   CSF Spaces: The ventricles, sulci and basal cisterns are within normal limits.   Parenchyma: No definite diffusion restriction abnormality to suggest acute infarct.   The axial T2 FLAIR weighted images are significantly degraded by patient motion artifact. However, within this limitation there appears to be abnormal increased signal within the medial thalami bilaterally, surrounding the aqueduct and possibly within the caudate heads.   There is questionable increased cortical signal on the FLAIR images within the bilateral frontal lobes (images 37-39, series 4).   There is a background of nonspecific patchy white matter  signal which is presumed microangiopathy.   There is no mass effect or midline shift.   Cerebellar tonsils are above the foramen magnum. Pituitary and sella are not enlarged.   Paranasal Sinuses and Mastoids: Visualized paranasal sinuses and mastoid air cells are predominantly clear..       The examination is markedly degraded by patient motion artifact.   Within this limitation, there appears to be increased signal within the medial thalami, periaqueductal region and possible increased signal within the caudate and cortex of the bilateral posterior frontal lobes. Differential considerations include a toxic metabolic process, an encephalitis, Wernicke's encephalopathy or possibly an atypical appearance of posterior reversible encephalopathy syndrome.   Within the limitation of motion artifact no evidence of acute infarct, intracranial mass effect or midline shift.   Consider repeat examination with contrast when the patient is able to tolerate the MRI.   MACRO: None   Signed by: Yoon Harding 11/19/2024 1:33 PM Dictation workstation:   [id number]    CT head wo IV contrast    Result Date: 11/18/2024  Interpreted By:  Damon John, STUDY: CT HEAD WO IV CONTRAST;  11/18/2024 4:51 pm   INDICATION: Signs/Symptoms:AMS.   COMPARISON: 11/17/2024   ACCESSION NUMBER(S): [id number]   ORDERING CLINICIAN: SHERRIE PHIPPS   TECHNIQUE: Sequential trans axial images were obtained  .   FINDINGS: INTRACRANIAL:   CORTICAL SULCI AND EXTRA-AXIAL SPACES:  Unremarkable.   VENTRICULAR SYSTEM:  Unremarkable without significant dilatation.   CEREBRAL PARENCHYMA:  Unremarkable without significant degenerative change.There is no evidence of definite subacute infarction, intracranial hemorrhage or mass.   EXTRACRANIAL: Visualized paranasal sinuses and mastoids are clear. The calvarium is intact.       Unremarkable exam. There has not been significant interval change from the prior exam.   Signed by: Damon John 11/18/2024 5:21 PM Dictation  workstation:   [id number]    XR abdomen 1 view    Result Date: 11/18/2024  Interpreted By:  Fernando Betts, STUDY: XR ABDOMEN 1 VIEW;  11/18/2024 3:54 pm   INDICATION: Signs/Symptoms:KUB after corpak insertion.   COMPARISON: None.   ACCESSION NUMBER(S): [id number]   ORDERING CLINICIAN: SHERRIE PHIPPS   FINDINGS: Feeding tube tip in the proximal duodenum. Mild gaseous distention of the transverse colon.       Feeding tube tip in the proximal duodenum.   MACRO: None   Signed by: Fernando Betts 11/18/2024 3:58 PM Dictation workstation:   [id number]    ECG 12 Lead    Result Date: 11/18/2024  Sinus tachycardia Left anterior fascicular block Possible Anterolateral infarct (cited on or before 27-AUG-2024) Abnormal ECG When compared with ECG of 16-NOV-2024 17:49, (unconfirmed) Questionable change in initial forces of Septal leads ST less depressed in Lateral leads Confirmed by Alber Luevano ([id number]) on 11/18/2024 3:46:18 PM            Assessment/Plan   Samuel Mims is a 48 y.o. female on day 4 of admission presenting with Sepsis with encephalopathy without septic shock, due to unspecified organism (Multi).  Principal Problem:    Sepsis with encephalopathy without septic shock, due to unspecified organism (Multi)  Active Problems:    Diarrhea    UTI (urinary tract infection)    Hypertension    Colitis    Sinus tachycardia    Prolonged Q-T interval on ECG    Clostridium difficile infection     Metabolic encephalopathy- multifactorial, possible Wernicke's   Possibly due to significant weight loss of 50+ pounds w/poor nutritional intake,  PRES from poorly treated HTN,   or Viral infection.  CT head with no acute changes. Urine Tox screen negative.  No WBC count.  Ammonia mildly elevated. Blood cultures are negative X2.  Worsening AMS since admission.   Corpak inserted due to poor oral intake.  On 11/19/24 MRI head of poor quality due to motion artifact but show increased signal within the medial thalami, periaqueductal  region and possible increased signal within the caudate and cortex of the bilateral posterior frontal lobes.  Started on IV Thiamine and MVI for possible Wernicke's.  On IV Acyclovir for possible viral encephalopathy.  On Lactulose for mildly elevated ammonia levels.  Lumbar Puncture to be performed today.     Untreated C. Difficile Colitis  Per  she has not taken the oral Vancomycin prescribed at discharge on 11/2/24.   Stool for pathogens negative.  C. Diff PCR negative but since untreated this could be false negative.  On oral Vancomycin.  CT a/p confirms she continues to have colitis.        Presumed UTI  Urine culture grew multiple organisms so IV Zosyn discontinued.        HTN and Sinus Tachycardia  Discontinued oral Losartan 100 mg,  Verapamil 240 mg and  Spironolactone 25 mg due to poor oral intake and changed to IV enalapril, IV metaprolol.  Blood pressure mildly elevated and continues to be tachycardic.     Asthma  Budesonide 0.5 mg and Formoterol nebs bid.     CHET  On hold Buspirone 10 mg bid.  Klonopin on hold due to encephalopathy     GERD  IV Pantoprazole 40 mg bid.     Plan discussed with .    Nell Green MD

## 2024-11-20 NOTE — PROGRESS NOTES
"Subjective  Patient sitting up in bed with eyes open, muttering some words though not answering questions.  Very weak and lethargic.  Stool pathogens and C. difficile PCR negative. Given patient is waking up some with the lactulose, suspect hepatic encephalopathy possibly worsened acutely by inflammatory or infectious process.    Objective  Blood pressure 113/51, pulse 104, temperature 36.4 °C (97.5 °F), temperature source Temporal, resp. rate 20, height 1.676 m (5' 6\"), weight 109 kg (241 lb 2.9 oz), SpO2 97%.    Physical Exam  Constitutional: awake, quite lethargic, in NAD  Eyes: PERRL, sclera clear, no conjunctival injection  Skin: Warm and dry, no rash or ecchymosis  ENMT: Mucous membranes moist, no lesions noted  Resp: CTAB, even and unlabored  CV: RRR, normal S1, S2, no m,r,g  GI: +BS, soft, round, NT, no rebound tenderness or guarding, no palpable masses or organomegaly, Corpak in place, FMS with brn liquid stool  Extremities: Extremities warm, no edema, contusions, wounds or cyanosis  Neuro: ANGEL  Psych: lethergic, garbled speech    Medications  Scheduled medications  acyclovir, 500 mg, intravenous, q8h  budesonide, 0.5 mg, nebulization, BID  [Held by provider] busPIRone, 10 mg, oral, BID  influenza, 0.5 mL, intramuscular, During hospitalization  formoterol, 20 mcg, nebulization, BID  heparin (porcine), 5,000 Units, subcutaneous, q8h SANTIAGO  lactulose, 20 g, oral, q4h  losartan, 50 mg, nasogastric tube, BID  magnesium sulfate, 2 g, intravenous, Once  metoprolol tartrate, 50 mg, nasoduodenal tube, q12h  multivitamin with minerals, 1 tablet, nasogastric tube, Daily  [Held by provider] pantoprazole, 40 mg, oral, BID AC  pantoprazole, 40 mg, intravenous, BID AC  potassium chloride, 20 mEq, intravenous, q2h  [Held by provider] spironolactone, 12.5 mg, oral, Daily  thiamine, 500 mg, intravenous, TID   Followed by  [START ON 11/22/2024] thiamine, 250 mg, intravenous, Daily   Followed by  [START ON 11/25/2024] thiamine, " "100 mg, oral, Daily  vancomycin, 125 mg, nasoduodenal tube, 4x daily      Continuous medications       PRN medications  PRN medications: acetaminophen **OR** acetaminophen, albuterol, [Held by provider] clonazePAM, hydrALAZINE, melatonin, metoprolol     Labs  Lab Results   Component Value Date    WBC 5.4 11/19/2024    HGB 10.9 (L) 11/19/2024    HCT 34.7 (L) 11/19/2024    MCV 87 11/19/2024     11/19/2024     Lab Results   Component Value Date    GLUCOSE 116 (H) 11/20/2024    CALCIUM 9.5 11/20/2024     11/20/2024    K 3.3 (L) 11/20/2024    CO2 25 11/20/2024     (H) 11/20/2024    BUN 10 11/20/2024    CREATININE 0.86 11/20/2024     Lab Results   Component Value Date    ALT 21 11/20/2024    AST 21 11/20/2024    ALKPHOS 55 11/20/2024    BILITOT 1.0 11/20/2024     No results found for: \"IRON\", \"TIBC\", \"FERRITIN\"  Lab Results   Component Value Date    INR 1.2 (H) 11/19/2024    INR 1.3 (H) 11/18/2024    PROTIME 13.3 (H) 11/19/2024    PROTIME 15.2 (H) 11/18/2024     Radiology  CT A/P with IV contrast 11/16/2024 noting:  IMPRESSION:  Fluid-filled segments of colon; please correlate for  diarrhea/colitis. There is underdistention versus mild wall  thickening and submucosal fat deposition in loops of ileum in the  right lower quadrant which may relate to chronic inflammatory process.      Signed by: Boogie Nance 11/16/2024 8:16 PM  Dictation workstation:   [id number]     Assessment:  Samuel Mims is a 48 y.o. female with PMH of HTN, GERD, C. difficile 9/2024, 10/2024 presenting with altered mental status.  Patient has not had alcohol in 2 years.  Patient incontinent of urine and stool at home with poor oral intake, 45 pound weight loss over the past month.  Noncompliant with recent dosing of oral vancomycin.  Repeat C. difficile pending.  Patient follows with Dr. Fernandez for outpatient GI care.  Patient remains obtunded, hypertension and tachycardia treated and resolved.  No leukocytosis.  Ammonia " 58.      # encephalopathy- hepatic v ? wernicke's encephalopathy v encephalitis v other encephalopathy syndrome  # ? KLEIN  # recent c diff x2  # GERD  # weight loss  # alcohol history     Plan:  - continue supportive care  - enteral feeds per primary team, nutrition recs  - follow up infectious work up  - follow up ID recs  - continue lactulose 20 gm qid  - continue to follow up infectious work up  - continue PPI BID per previous GI recs  - possible acutely worsened hepatic encephalopathy secondary to infectious or inflammatory process  - pt will continue to follow up with Dr. Fernandez in clinic for GI care     Plan has been discussed with Dr. Downing. GI will continue to follow.      Pili Boles, APRN/CNP

## 2024-11-20 NOTE — PROGRESS NOTES
Samuel Mims is a 48 y.o. female on day 4 of admission presenting with Sepsis with encephalopathy without septic shock, due to unspecified organism (Multi).      Subjective   Yesterday around 1600 Patient had transient episode of hypotension. Vasotech held for one dose. Otherwise tachycardia and pressures have both improved. This morning patient had episode of tachycardia rates up to 150 that improved with 7.5mg Metoprolol.        Objective     Last Recorded Vitals  /51 (BP Location: Left arm, Patient Position: Lying)   Pulse 104   Temp 36.4 °C (97.5 °F) (Temporal)   Resp 20   Wt 109 kg (241 lb 2.9 oz)   SpO2 97%   Intake/Output last 3 Shifts:    Intake/Output Summary (Last 24 hours) at 11/20/2024 0938  Last data filed at 11/20/2024 0853  Gross per 24 hour   Intake 2094.17 ml   Output 1750 ml   Net 344.17 ml       Admission Weight  Weight: 127 kg (280 lb) (11/16/24 1755)    Daily Weight  11/19/24 : 109 kg (241 lb 2.9 oz)    Image Results  ECG 12 lead  Sinus tachycardia  Left axis deviation  Left ventricular hypertrophy with repolarization abnormality  Inferior infarct (cited on or before 27-AUG-2024)  Anteroseptal infarct (cited on or before 27-AUG-2024)  Abnormal ECG  When compared with ECG of 27-AUG-2024 02:40,  ST now depressed in Lateral leads  Confirmed by Erin Gardner ([id number]) on 11/20/2024 7:29:17 AM      Physical Exam    General: Awake, confused, ill appearing, no acute distress  Head: Atraumatic/Normocephalic  Eyes: Normal external exam  Cardiovascular: RRR, no murmurs or rubs  Pulmonary: CTAB, no respiratory distress. No wheezes, rales, or ronchi  Abdomen: +BS, soft, mild diffuse tenderness  Neuro: Disoriented, confused, no focal motor deficits noted      Relevant Results               Assessment/Plan   This patient currently has cardiac telemetry ordered; if you would like to modify or discontinue the telemetry order, click here to go to the orders activity to modify/discontinue the  order.              Assessment & Plan    #Hypertension  -Can discontinue IV enalapril now that patient has NGT  -Resume Losartan however switch to 50mg BID  -Verapamil on hold as it cannot be crushed. No need for short acting.     #Sinus tachycardia  -Ok to continue Metoprolol 50mg BID    #Metabolic encephalopathy  -MRI brain with findings concerning for toxic encephalopathy v. Wernickes v. PRES  -LP ordered for today  -ID and neuro following           Assessment and plan discussed with Dr. Kim Ang, DO  Internal medicine, PGY3

## 2024-11-20 NOTE — CARE PLAN
Problem: Skin  Goal: Prevent/manage excess moisture  Outcome: Progressing  Prevent/manage excess moisture: Cleanse incontinence/protect with barrier cream     Problem: Nutrition  Goal: Tube feed tolerance  Outcome: Progressing    Problem: Nutrition  Goal: Electrolytes WNL  Outcome: Progressing      The patient's goals for the shift include comfort & safety since the patient cannot participate in her plan of care at this time.    The clinical goals for the shift include patient will have stable vitals.         SEPSIS Maternal fever Maternal fever, baby followed with q4h vitals x 48 hours for early onset sepsis protocol; d/c weight 3315g (7.9%), Mom O+/Baby O+, jocelin negative , tcb 3.8 at 48 hours

## 2024-11-20 NOTE — PROGRESS NOTES
INPATIENT PROGRESS NOTES    PATIENT NAME: Samuel Mims    MRN: [id number]  SERVICE DATE:  11/20/2024   SERVICE TIME:  11:57 AM    SIGNATURE: Valery Chandler MD      SUBJECTIVE  No fever      OBJECTIVE  PHYSICAL EXAM:   Patient Vitals for the past 24 hrs:   BP Temp Temp src Pulse Resp SpO2   11/20/24 0808 113/51 36.4 °C (97.5 °F) Temporal 104 20 97 %   11/20/24 0439 159/83 36.5 °C (97.7 °F) Temporal 94 21 96 %   11/20/24 0118 108/69 36.8 °C (98.2 °F) Temporal 82 20 97 %   11/19/24 2338 151/90 36.9 °C (98.4 °F) Temporal 102 24 97 %   11/19/24 2231 (!) 167/98 -- -- 90 -- --   11/19/24 1941 129/80 37 °C (98.6 °F) Temporal 94 18 96 %   11/19/24 1643 93/57 36.5 °C (97.7 °F) Temporal 96 22 97 %   11/19/24 1642 78/59 -- -- 98 22 --   11/19/24 1631 (!) 63/42 -- -- 98 22 --         Gen: NAD  Respiratory: No distress   Extremities: no leg edema      Labs:  Lab Results   Component Value Date    WBC 5.4 11/19/2024    HGB 10.9 (L) 11/19/2024    HCT 34.7 (L) 11/19/2024    MCV 87 11/19/2024     11/19/2024     Lab Results   Component Value Date    GLUCOSE 116 (H) 11/20/2024    CALCIUM 9.5 11/20/2024     11/20/2024    K 3.3 (L) 11/20/2024    CO2 25 11/20/2024     (H) 11/20/2024    BUN 10 11/20/2024    CREATININE 0.86 11/20/2024   ESR: --  Lab Results   Component Value Date    SEDRATE 18 11/18/2024     Lab Results   Component Value Date    CRP 2.52 (H) 11/18/2024     Lab Results   Component Value Date    ALT 21 11/20/2024    AST 21 11/20/2024    ALKPHOS 55 11/20/2024    BILITOT 1.0 11/20/2024         DATA:   Diagnostic tests reviewed for today's visit:    Labs this admission reviewed  Imagings this admission reviewed  Cultures: Reviewed        ASSESSMENT :   -Acute encephalopathy  -MRI brain showed increased signal within the medial thalami, with possible increased signal within the caudate and bilateral posterior frontal lobes   -Differential diagnosis Warnicke's encephalopathy versus encephalitis versus  "PRES  -Recent C. difficile infection  -Pyuria and bacteriuria with possible UTI.  Urine culture grew multiple organisms  -Hypokalemia likely secondary to diarrhea  -History of hypertension, asthma, GERD    PLAN:  -Continue on acyclovir pending CSF findings  -Follow-up vitamin B1 level  -Continue p.o. vancomycin to complete 10 days total  -Check HIV and syphilis screening tests    Discussed with neurology team      Valery Chandler MD.   Infectious Disease Attending        This note was partially created using voice recognition software and is inherently subject to errors including those of syntax and \"sound-alike\" substitutions which may escape proofreading. In such instances, original meaning may be extrapolated by contextual derivation       "

## 2024-11-20 NOTE — PROGRESS NOTES
Inpatient Speech Language Pathology Treatment Note     Patient Name: Samuel Mims  MRN: [id number]  : 1975  Patient Room: [address] [place], [place] [address]  Today's Date: 24  Time Calculation  Start Time: 1045  Stop Time: 1100  Time Calculation (min): 15 min     SLP Assessment:  Patient seen for dysphagia tx this date. BRAIN Angel reported patient has been following more commands and allowing her to perform oral care, but having some difficulty recalling past events. RN reported patient has an ear infection causing hearing issues. Patient level of alertness waxing and waning this session. Patient completed oral care via oral rinse/oral swabs with max assist. Patient was able to manipulate oral swab around her oral cavity, but requiring further assistance to clean thoroughly. Patient spoon-fed x4 sips of thin liquids with no overt s/s of penetration or aspiration. No change in vocal quality noted. Patient given x2 (1 self-fed) bite applesauce with good bolus clearance and no oral residue. Noted difficulty coordinating self-feeding requiring hand under hand techniques. Patient politely declined further PO further trials.     PLAN:  Skilled speech therapy for dysphagia treatment continues to be warranted for pt/caregiver education in order to reduce risk of aspiration, dehydration and malnutrition. , to assess tolerance of diet , to determine ability to upgrade diet after PO trials with SLP  SLP TX Plan: Continue Plan of Care  SLP Frequency: 2x per week  Discussed POC: Patient  Discussed Risks/Benefits: Patient  Patient/Caregiver Agreeable: Yes  Continue skilled SLP at next level of care: n/a      Recommendations:   Solid Diet Recommendations: Minced & moist/ground (IDDSI Level 5)  Liquid Diet Recommendations: Thin (IDDSI Level 0)  Compensatory strategies: Only when alert, 1:1 SUP, decrease distractions during feeding, upright 90 degrees for all PO intake, eat/feed slowly  Medication administration: crushed in  sathish      Subjective:  Pt. Seen at bedside for skilled dysphagia treatment.   Prior to Session Communication: Bedside nurse    Pain:  Ratin-10   Pt report: None  Action taken: none needed         Oxygen Status:   room air             Goals:   Established on2024  Pt will tolerate recommended diet with no overt s/s of difficulty or aspiration.    Progress: Patient spoon-fed x4 sips of thin liquids with no overt s/s of penetration or aspiration. No change in vocal quality noted.     Status: Continue goal    Pt will tolerate advanced solid trials with no overt s/s of difficulty or aspiration to assess readiness for diet upgrade.     Progress: Patient mentation waxing and weaning this session. Patient given x2 (1 self-fed) bite applesauce with good bolus clearance and no oral residue. Noted difficulty coordinating self-feeding requiring hand under hand techniques. Patient politely declined further PO further trials.     Status: Continue goal      Treatment Outcome:  SLP TX Intervention Outcome: Making Progress Towards Goals    Treatment Tolerance: Patient limited by fatigue (Treatment limited due to poor appetite)   Prognosis: Good   Barriers: Comorbidities   Medical Staff Made Aware: Yes         SLP Inpatient Education:  Learner patient   Barriers to Learning Lethargic, Otitis Media    Method Verbal, Demonstration   Education - Topic ST provided patient education regarding role of ST, oral care, general swallow precautions.   Outcome    Needs further review.

## 2024-11-20 NOTE — NURSING NOTE
Heart rate noted to be in the 150's.  Patient aysmptomatic.  Lying in bed talking with .  7.5 IV metoprolol administered.  Heart rate decreased the 90's.

## 2024-11-21 LAB
ALBUMIN SERPL BCP-MCNC: 3.7 G/DL (ref 3.4–5)
ALP SERPL-CCNC: 57 U/L (ref 33–110)
ALT SERPL W P-5'-P-CCNC: 35 U/L (ref 7–45)
ANION GAP SERPL CALC-SCNC: 13 MMOL/L (ref 10–20)
AST SERPL W P-5'-P-CCNC: 39 U/L (ref 9–39)
BACTERIA BLD CULT: NORMAL
BACTERIA BLD CULT: NORMAL
BILIRUB SERPL-MCNC: 1.1 MG/DL (ref 0–1.2)
BUN SERPL-MCNC: 10 MG/DL (ref 6–23)
C GATTII+NEOFOR DNA CSF QL NAA+NON-PROBE: NOT DETECTED
CALCIUM SERPL-MCNC: 9.4 MG/DL (ref 8.6–10.3)
CHLORIDE SERPL-SCNC: 113 MMOL/L (ref 98–107)
CMV DNA CSF QL NAA+NON-PROBE: NOT DETECTED
CO2 SERPL-SCNC: 22 MMOL/L (ref 21–32)
COLOR CSF: COLORLESS
CREAT SERPL-MCNC: 0.83 MG/DL (ref 0.5–1.05)
E COLI K1 DNA CSF QL NAA+NON-PROBE: NOT DETECTED
EGFRCR SERPLBLD CKD-EPI 2021: 87 ML/MIN/1.73M*2
EV RNA CSF QL NAA+NON-PROBE: NOT DETECTED
GLUCOSE SERPL-MCNC: 92 MG/DL (ref 74–99)
GP B STREP DNA CSF QL NAA+NON-PROBE: NOT DETECTED
HAEM INFLU DNA CSF QL NAA+NON-PROBE: NOT DETECTED
HHV6 DNA CSF QL NAA+NON-PROBE: NOT DETECTED
HSV1 DNA CSF QL NAA+NON-PROBE: NOT DETECTED
HSV1 DNA CSF QL NAA+PROBE: NOT DETECTED
HSV2 DNA CSF QL NAA+NON-PROBE: NOT DETECTED
HSV2 DNA CSF QL NAA+PROBE: NOT DETECTED
L MONOCYTOG DNA CSF QL NAA+NON-PROBE: NOT DETECTED
MAGNESIUM SERPL-MCNC: 2 MG/DL (ref 1.6–2.4)
N MEN DNA CSF QL NAA+NON-PROBE: NOT DETECTED
PARECHOVIRUS A RNA CSF QL NAA+NON-PROBE: NOT DETECTED
POTASSIUM SERPL-SCNC: 3.8 MMOL/L (ref 3.5–5.3)
PROT SERPL-MCNC: 6.2 G/DL (ref 6.4–8.2)
S PNEUM DNA CSF QL NAA+NON-PROBE: NOT DETECTED
SODIUM SERPL-SCNC: 144 MMOL/L (ref 136–145)
VZV DNA CSF QL NAA+NON-PROBE: NOT DETECTED

## 2024-11-21 PROCEDURE — 2500000001 HC RX 250 WO HCPCS SELF ADMINISTERED DRUGS (ALT 637 FOR MEDICARE OP): Performed by: INTERNAL MEDICINE

## 2024-11-21 PROCEDURE — 2500000004 HC RX 250 GENERAL PHARMACY W/ HCPCS (ALT 636 FOR OP/ED): Performed by: NURSE PRACTITIONER

## 2024-11-21 PROCEDURE — 36415 COLL VENOUS BLD VENIPUNCTURE: CPT | Performed by: NURSE PRACTITIONER

## 2024-11-21 PROCEDURE — 80053 COMPREHEN METABOLIC PANEL: CPT | Performed by: NURSE PRACTITIONER

## 2024-11-21 PROCEDURE — 2500000005 HC RX 250 GENERAL PHARMACY W/O HCPCS: Performed by: NURSE PRACTITIONER

## 2024-11-21 PROCEDURE — 99232 SBSQ HOSP IP/OBS MODERATE 35: CPT | Performed by: NURSE PRACTITIONER

## 2024-11-21 PROCEDURE — 83735 ASSAY OF MAGNESIUM: CPT | Performed by: NURSE PRACTITIONER

## 2024-11-21 PROCEDURE — 2500000001 HC RX 250 WO HCPCS SELF ADMINISTERED DRUGS (ALT 637 FOR MEDICARE OP): Performed by: NURSE PRACTITIONER

## 2024-11-21 PROCEDURE — 92526 ORAL FUNCTION THERAPY: CPT | Mod: GN | Performed by: SPEECH-LANGUAGE PATHOLOGIST

## 2024-11-21 PROCEDURE — 2060000001 HC INTERMEDIATE ICU ROOM DAILY

## 2024-11-21 PROCEDURE — 2500000004 HC RX 250 GENERAL PHARMACY W/ HCPCS (ALT 636 FOR OP/ED): Performed by: STUDENT IN AN ORGANIZED HEALTH CARE EDUCATION/TRAINING PROGRAM

## 2024-11-21 PROCEDURE — 2500000004 HC RX 250 GENERAL PHARMACY W/ HCPCS (ALT 636 FOR OP/ED): Performed by: INTERNAL MEDICINE

## 2024-11-21 RX ORDER — LOSARTAN POTASSIUM 50 MG/1
50 TABLET ORAL 2 TIMES DAILY
Status: DISPENSED | OUTPATIENT
Start: 2024-11-21

## 2024-11-21 RX ORDER — MULTIVIT-MIN/IRON FUM/FOLIC AC 7.5 MG-4
1 TABLET ORAL DAILY
Status: DISPENSED | OUTPATIENT
Start: 2024-11-21

## 2024-11-21 RX ORDER — POTASSIUM CHLORIDE 1.5 G/1.58G
20 POWDER, FOR SOLUTION ORAL ONCE
Status: COMPLETED | OUTPATIENT
Start: 2024-11-21 | End: 2024-11-21

## 2024-11-21 RX ADMIN — VANCOMYCIN HYDROCHLORIDE 125 MG: KIT at 06:54

## 2024-11-21 RX ADMIN — LOSARTAN POTASSIUM 50 MG: 50 TABLET, FILM COATED ORAL at 20:29

## 2024-11-21 RX ADMIN — VANCOMYCIN HYDROCHLORIDE 125 MG: KIT at 16:07

## 2024-11-21 RX ADMIN — LACTULOSE 20 G: 20 SOLUTION ORAL at 06:54

## 2024-11-21 RX ADMIN — LACTULOSE 20 G: 20 SOLUTION ORAL at 18:08

## 2024-11-21 RX ADMIN — POTASSIUM CHLORIDE 20 MEQ: 1.5 POWDER, FOR SOLUTION ORAL at 09:59

## 2024-11-21 RX ADMIN — PANTOPRAZOLE SODIUM 40 MG: 40 INJECTION, POWDER, FOR SOLUTION INTRAVENOUS at 06:54

## 2024-11-21 RX ADMIN — METOPROLOL TARTRATE 50 MG: 50 TABLET, FILM COATED ORAL at 09:59

## 2024-11-21 RX ADMIN — LACTULOSE 20 G: 20 SOLUTION ORAL at 09:58

## 2024-11-21 RX ADMIN — ACYCLOVIR SODIUM 500 MG: 50 INJECTION, SOLUTION INTRAVENOUS at 07:38

## 2024-11-21 RX ADMIN — PANTOPRAZOLE SODIUM 40 MG: 40 INJECTION, POWDER, FOR SOLUTION INTRAVENOUS at 16:07

## 2024-11-21 RX ADMIN — METOPROLOL TARTRATE 50 MG: 50 TABLET, FILM COATED ORAL at 20:29

## 2024-11-21 RX ADMIN — HEPARIN SODIUM 5000 UNITS: 5000 INJECTION INTRAVENOUS; SUBCUTANEOUS at 13:22

## 2024-11-21 RX ADMIN — LACTULOSE 20 G: 20 SOLUTION ORAL at 13:22

## 2024-11-21 RX ADMIN — VANCOMYCIN HYDROCHLORIDE 125 MG: KIT at 20:30

## 2024-11-21 RX ADMIN — VANCOMYCIN HYDROCHLORIDE 125 MG: KIT at 13:22

## 2024-11-21 RX ADMIN — HEPARIN SODIUM 5000 UNITS: 5000 INJECTION INTRAVENOUS; SUBCUTANEOUS at 06:54

## 2024-11-21 RX ADMIN — HEPARIN SODIUM 5000 UNITS: 5000 INJECTION INTRAVENOUS; SUBCUTANEOUS at 21:03

## 2024-11-21 RX ADMIN — THIAMINE HYDROCHLORIDE 500 MG: 100 INJECTION, SOLUTION INTRAMUSCULAR; INTRAVENOUS at 09:58

## 2024-11-21 RX ADMIN — LACTULOSE 20 G: 20 SOLUTION ORAL at 21:03

## 2024-11-21 RX ADMIN — LOSARTAN POTASSIUM 50 MG: 50 TABLET, FILM COATED ORAL at 09:58

## 2024-11-21 RX ADMIN — Medication 1 TABLET: at 09:59

## 2024-11-21 ASSESSMENT — PAIN SCALES - GENERAL
PAINLEVEL_OUTOF10: 0 - NO PAIN

## 2024-11-21 ASSESSMENT — PAIN - FUNCTIONAL ASSESSMENT
PAIN_FUNCTIONAL_ASSESSMENT: 0-10
PAIN_FUNCTIONAL_ASSESSMENT: CPOT (CRITICAL CARE PAIN OBSERVATION TOOL)
PAIN_FUNCTIONAL_ASSESSMENT: 0-10

## 2024-11-21 ASSESSMENT — COGNITIVE AND FUNCTIONAL STATUS - GENERAL
DRESSING REGULAR UPPER BODY CLOTHING: TOTAL
CLIMB 3 TO 5 STEPS WITH RAILING: TOTAL
MOVING TO AND FROM BED TO CHAIR: TOTAL
MOVING FROM LYING ON BACK TO SITTING ON SIDE OF FLAT BED WITH BEDRAILS: A LOT
DAILY ACTIVITIY SCORE: 6
TOILETING: TOTAL
PERSONAL GROOMING: TOTAL
MOBILITY SCORE: 7
HELP NEEDED FOR BATHING: TOTAL
EATING MEALS: TOTAL
WALKING IN HOSPITAL ROOM: TOTAL
TURNING FROM BACK TO SIDE WHILE IN FLAT BAD: TOTAL
DRESSING REGULAR LOWER BODY CLOTHING: TOTAL
STANDING UP FROM CHAIR USING ARMS: TOTAL

## 2024-11-21 ASSESSMENT — PAIN SCALES - WONG BAKER: WONGBAKER_NUMERICALRESPONSE: NO HURT

## 2024-11-21 NOTE — CARE PLAN
Problem: Skin  Goal: Prevent/manage excess moisture  Outcome: Progressing  Prevent/manage excess moisture: Cleanse incontinence/protect with barrier cream     Problem: Skin  Goal: Promote/optimize nutrition  Outcome: Progressing     Problem: Nutrition  Goal: Electrolytes WNL  Outcome: Progressing     Problem: Pain - Adult  Goal: Verbalizes/displays adequate comfort level or baseline comfort level  Outcome: Progressing     The patient's goals for the shift include to keep the patient safe and comfortable.    The clinical goals for the shift include the patient will have a systolic blood pressure less than 160.

## 2024-11-21 NOTE — PROGRESS NOTES
Samuel Mims is a 48 y.o. female on day 5 of admission presenting with Sepsis with encephalopathy without septic shock, due to unspecified organism (Multi).    Subjective   Hypokalemia resolved.  Blood pressures continue to go up and down.  Patient alert, aware she is in the hospital and able to read my badge. Very confused, insists she is pregnant.       Objective     Last Recorded Vitals  /81 (BP Location: Left arm, Patient Position: Lying)   Pulse 94   Temp 36.8 °C (98.2 °F) (Temporal)   Resp 20   Wt 109 kg (241 lb 2.9 oz)   SpO2 98%   Intake/Output last 3 Shifts:    Intake/Output Summary (Last 24 hours) at 11/21/2024 0757  Last data filed at 11/21/2024 0042  Gross per 24 hour   Intake 680 ml   Output 1150 ml   Net -470 ml       Admission Weight  Weight: 127 kg (280 lb) (11/16/24 1755)    Daily Weight  11/19/24 : 109 kg (241 lb 2.9 oz)      Physical Exam:  General: Not in acute distress  HEENT: PERRLA, Left TM indicates recent otitis media, Right TM full of fluid.  Neck: Normal to inspection  Lungs: Clear to auscultation, work of breathing within normal limit  Cardiac: Regular rate and rhythm  Abdomen: Soft nontender, positive bowel sounds  : Exam deferred  Skin: Intact  Hematology: No petechia or excessive ecchymosis  Musculoskeletal: Without significant trauma  Neurological: Alert, Unable to answer questions appropriately,      Relevant Results  Scheduled medications  acyclovir, 500 mg, intravenous, q8h  budesonide, 0.5 mg, nebulization, BID  [Held by provider] busPIRone, 10 mg, oral, BID  influenza, 0.5 mL, intramuscular, During hospitalization  formoterol, 20 mcg, nebulization, BID  heparin (porcine), 5,000 Units, subcutaneous, q8h SANTIAGO  lactulose, 20 g, oral, q4h  losartan, 50 mg, nasogastric tube, BID  metoprolol tartrate, 50 mg, nasoduodenal tube, q12h  multivitamin with minerals, 1 tablet, nasogastric tube, Daily  [Held by provider] pantoprazole, 40 mg, oral, BID AC  pantoprazole, 40 mg,  intravenous, BID AC  [Held by provider] spironolactone, 12.5 mg, oral, Daily  thiamine, 500 mg, intravenous, TID   Followed by  [START ON 11/22/2024] thiamine, 250 mg, intravenous, Daily   Followed by  [START ON 11/25/2024] thiamine, 100 mg, oral, Daily  vancomycin, 125 mg, nasoduodenal tube, 4x daily      Continuous medications     PRN medications  PRN medications: acetaminophen **OR** acetaminophen, albuterol, [Held by provider] clonazePAM, hydrALAZINE, melatonin, metoprolol   Results for orders placed or performed during the hospital encounter of 11/16/24 (from the past 24 hours)   Syphilis Screen with Reflex   Result Value Ref Range    Syphilis Total Ab Nonreactive Nonreactive   HIV 1/2 Antigen/Antibody Screen with Reflex to Confirmation   Result Value Ref Range    HIV 1/2 Antigen/Antibody Screen with Reflex to Confirmation Nonreactive Nonreactive   Glucose, CSF   Result Value Ref Range    Glucose, CSF 70 40 - 70 mg/dL   Protein, Total CSF   Result Value Ref Range    Total Protein, CSF 90 (H) 15 - 45 mg/dL   CSF Cell Count   Result Value Ref Range    Tube Number, CSF Tube 4       Color, CSF Colorless Colorless    Clarity, CSF Clear Clear    Color, Supernatant CSF Colorless      WBC, CSF 1 1 - 5 /uL    RBC, CSF 1 0 - 5 /uL   CSF Differential   Result Value Ref Range    Neutrophils %, Manual, CSF 0 0 - 5 %    Lymphocytes %, Manual, CSF 93 28 - 96 %    Mono/Macrophages %, Manual, CSF 7 (L) 16 - 56 %    Eosinophils %, Manual, CSF 0 Rare %    Basophils %, Manual, CSF 0 Not Established %    Immature Granulocytes %, Manual, CSF 0 Not Established %    Blasts %, Manual, CSF 0 Not Established %    Unclassified Cells %, Manual, CSF 0 Not Established %    Plasma Cells %, Manual, CSF 0 Not Established %    Total Cells Counted, CSF 14    Magnesium   Result Value Ref Range    Magnesium 2.00 1.60 - 2.40 mg/dL   Comprehensive Metabolic Panel   Result Value Ref Range    Glucose 92 74 - 99 mg/dL    Sodium 144 136 - 145 mmol/L     Potassium 3.8 3.5 - 5.3 mmol/L    Chloride 113 (H) 98 - 107 mmol/L    Bicarbonate 22 21 - 32 mmol/L    Anion Gap 13 10 - 20 mmol/L    Urea Nitrogen 10 6 - 23 mg/dL    Creatinine 0.83 0.50 - 1.05 mg/dL    eGFR 87 >60 mL/min/1.73m*2    Calcium 9.4 8.6 - 10.3 mg/dL    Albumin 3.7 3.4 - 5.0 g/dL    Alkaline Phosphatase 57 33 - 110 U/L    Total Protein 6.2 (L) 6.4 - 8.2 g/dL    AST 39 9 - 39 U/L    Bilirubin, Total 1.1 0.0 - 1.2 mg/dL    ALT 35 7 - 45 U/L      Lab Results   Component Value Date    BLOODCULT No growth at 4 days -  FINAL REPORT 11/16/2024    BLOODCULT No growth at 4 days -  FINAL REPORT 11/16/2024    URINECULTURE (A) 11/16/2024     Multiple organisms present, probable contamination. Repeat culture if clinically indicated.         ECG 12 lead    Result Date: 11/20/2024  Sinus tachycardia Left axis deviation Left ventricular hypertrophy with repolarization abnormality Inferior infarct (cited on or before 27-AUG-2024) Anteroseptal infarct (cited on or before 27-AUG-2024) Abnormal ECG When compared with ECG of 27-AUG-2024 02:40, ST now depressed in Lateral leads Confirmed by Erin Gardner ([id number]) on 11/20/2024 7:29:17 AM    MR brain wo IV contrast    Result Date: 11/19/2024  Interpreted By:  Yoon Harding, STUDY: MR BRAIN WO IV CONTRAST;  11/19/2024 11:26 am   INDICATION: Signs/Symptoms:Ongoing encephalopathy.     COMPARISON: None.   ACCESSION NUMBER(S): [id number]   ORDERING CLINICIAN: MARYAM LINDO   TECHNIQUE: Axial T2, FLAIR, DWI,  and sagittal and coronal T1 weighted images of brain were acquired.  Axial GE weighted images were unable to be performed.   FINDINGS: The examination is markedly degraded by patient motion artifact.   CSF Spaces: The ventricles, sulci and basal cisterns are within normal limits.   Parenchyma: No definite diffusion restriction abnormality to suggest acute infarct.   The axial T2 FLAIR weighted images are significantly degraded by patient motion artifact. However, within this  limitation there appears to be abnormal increased signal within the medial thalami bilaterally, surrounding the aqueduct and possibly within the caudate heads.   There is questionable increased cortical signal on the FLAIR images within the bilateral frontal lobes (images 37-39, series 4).   There is a background of nonspecific patchy white matter signal which is presumed microangiopathy.   There is no mass effect or midline shift.   Cerebellar tonsils are above the foramen magnum. Pituitary and sella are not enlarged.   Paranasal Sinuses and Mastoids: Visualized paranasal sinuses and mastoid air cells are predominantly clear..       The examination is markedly degraded by patient motion artifact.   Within this limitation, there appears to be increased signal within the medial thalami, periaqueductal region and possible increased signal within the caudate and cortex of the bilateral posterior frontal lobes. Differential considerations include a toxic metabolic process, an encephalitis, Wernicke's encephalopathy or possibly an atypical appearance of posterior reversible encephalopathy syndrome.   Within the limitation of motion artifact no evidence of acute infarct, intracranial mass effect or midline shift.   Consider repeat examination with contrast when the patient is able to tolerate the MRI.   MACRO: None   Signed by: Yoon Harding 11/19/2024 1:33 PM Dictation workstation:   [id number]      Assessment/Plan   Samuel Mims is a 48 y.o. female on day 5 of admission presenting with Sepsis with encephalopathy without septic shock, due to unspecified organism (Multi).  Principal Problem:    Sepsis with encephalopathy without septic shock, due to unspecified organism (Multi)  Active Problems:    Diarrhea    UTI (urinary tract infection)    Hypertension    Colitis    Sinus tachycardia    Prolonged Q-T interval on ECG    Clostridium difficile infection     Metabolic encephalopathy- multifactorial, possible Wernicke's    Possibly due to significant weight loss of 50+ pounds w/poor nutritional intake,  PRES from poorly treated HTN,   or Viral infection.    CT head with no acute changes. Urine Tox screen negative.  No WBC count.  Ammonia mildly elevated. Blood cultures are negative X2.  Corpak inserted due to poor oral intake.  On 11/19/24 MRI head of poor quality due to motion artifact but show increased signal within the medial thalami, periaqueductal region and possible increased signal within the caudate and cortex of the bilateral posterior frontal lobes.  Started on IV Thiamine and MVI for possible Wernicke's.  On IV Acyclovir for possible viral encephalopathy.  On Lactulose for mildly elevated ammonia levels.  Had Lumbar Puncture on 11/20 and results pending for culture, meningitis pathogen panel, and HSV.   Some improvement in her alertness today.     Untreated C. Difficile Colitis  Per  she has not taken the oral Vancomycin prescribed at discharge on 11/2/24.   Stool for pathogens negative.  C. Diff PCR negative but since untreated this could be false negative.  On oral Vancomycin.  CT a/p confirms she continues to have colitis.        Presumed UTI  Urine culture grew multiple organisms so IV Zosyn discontinued.        HTN and Sinus Tachycardia  On oral Losartan 50 mg, Metoprolol tartrate 50 mg bid, Spironolactone 12.5 mg  Blood pressure mildly elevated and continues to be tachycardic off and on.     Asthma  Budesonide 0.5 mg and Formoterol nebs bid.     CHET  On hold Buspirone 10 mg bid.  Klonopin on hold due to encephalopathy     GERD  Oral Pantoprazole 40 mg bid.    Recent Otitis Media  Ears show fluid probably causing some hearing impairment        Plan discussed with .         Nell Green MD

## 2024-11-21 NOTE — PROGRESS NOTES
INPATIENT PROGRESS NOTES    PATIENT NAME: Samuel Mims    MRN: [id number]  SERVICE DATE:  11/21/2024   SERVICE TIME:  10:47 AM    SIGNATURE: Valery Chandler MD      SUBJECTIVE  No fever      OBJECTIVE  PHYSICAL EXAM:   Patient Vitals for the past 24 hrs:   BP Temp Temp src Pulse Resp SpO2   11/21/24 0742 154/81 36.7 °C (98.1 °F) Temporal 98 15 99 %   11/21/24 0412 139/81 36.8 °C (98.2 °F) Temporal 94 20 98 %   11/20/24 2354 148/83 36.5 °C (97.7 °F) Temporal 98 18 95 %   11/20/24 2100 -- -- -- 108 -- --   11/20/24 2052 (!) 146/96 36.6 °C (97.9 °F) Temporal 110 25 98 %   11/20/24 2028 (!) 185/108 36.5 °C (97.7 °F) Temporal 104 20 99 %   11/20/24 1608 135/79 36.3 °C (97.3 °F) Temporal 102 20 98 %   11/20/24 1253 97/80 -- -- 84 16 96 %         Gen: NAD  Respiratory: No distress   Extremities: no leg edema      Labs:  Lab Results   Component Value Date    WBC 5.4 11/19/2024    HGB 10.9 (L) 11/19/2024    HCT 34.7 (L) 11/19/2024    MCV 87 11/19/2024     11/19/2024     Lab Results   Component Value Date    GLUCOSE 92 11/21/2024    CALCIUM 9.4 11/21/2024     11/21/2024    K 3.8 11/21/2024    CO2 22 11/21/2024     (H) 11/21/2024    BUN 10 11/21/2024    CREATININE 0.83 11/21/2024   ESR: --  Lab Results   Component Value Date    SEDRATE 18 11/18/2024     Lab Results   Component Value Date    CRP 2.52 (H) 11/18/2024     Lab Results   Component Value Date    ALT 35 11/21/2024    AST 39 11/21/2024    ALKPHOS 57 11/21/2024    BILITOT 1.1 11/21/2024         DATA:   Diagnostic tests reviewed for today's visit:    Labs this admission reviewed  Imagings this admission reviewed  Cultures: Reviewed        ASSESSMENT :   -Acute encephalopathy  -MRI brain showed increased signal within the medial thalami, with possible increased signal within the caudate and bilateral posterior frontal lobes   -Differential diagnosis Warnicke's encephalopathy versus encephalitis versus PRES  -Recent C. difficile infection  -Pyuria  "and bacteriuria with possible UTI.  Urine culture grew multiple organisms  -Hypokalemia likely secondary to diarrhea  -History of hypertension, asthma, GERD    PLAN:  -Negative CSF for CNS infection  -Discontinue acyclovir  -Follow-up vitamin B1 level  -Negative HIV and syphilis screening tests    ID team will sign off please call back if any questions      Valery Chandler MD.   Infectious Disease Attending        This note was partially created using voice recognition software and is inherently subject to errors including those of syntax and \"sound-alike\" substitutions which may escape proofreading. In such instances, original meaning may be extrapolated by contextual derivation       "

## 2024-11-21 NOTE — PROGRESS NOTES
"Subjective  Patient sitting up in bed with eyes open, [place],  at bedside. Remains lethargic.  Continuing to have liquid stool via FMS with lactulose, suspect hepatic encephalopathy possibly worsened acutely by inflammatory or infectious process.  Plan to continue lactulose, follow-up infectious workup, LP findings.    Objective  Blood pressure 154/81, pulse 98, temperature 36.7 °C (98.1 °F), temperature source Temporal, resp. rate 15, height 1.676 m (5' 6\"), weight 109 kg (241 lb 2.9 oz), SpO2 99%.    Physical Exam  Constitutional: awake, quite lethargic, in NAD,  at bedside  Eyes: PERRL, sclera clear, no conjunctival injection  Skin: Warm and dry, no rash or ecchymosis  ENMT: Mucous membranes moist, no lesions noted  Resp: CTAB, even and unlabored  CV: RRR, normal S1, S2, no m,r,g  GI: +BS, soft, round, NT, no rebound tenderness or guarding, no palpable masses or organomegaly, FMS with brn liquid stool  Extremities: Extremities warm, + 1 generalized edema, no contusions, wounds or cyanosis  Neuro: ANGEL  Psych: lethergic, making eye contact, smiling    Medications  Scheduled medications  budesonide, 0.5 mg, nebulization, BID  [Held by provider] busPIRone, 10 mg, oral, BID  influenza, 0.5 mL, intramuscular, During hospitalization  formoterol, 20 mcg, nebulization, BID  heparin (porcine), 5,000 Units, subcutaneous, q8h SANTIAGO  lactulose, 20 g, oral, q4h  losartan, 50 mg, oral, BID  metoprolol tartrate, 50 mg, nasoduodenal tube, q12h  multivitamin with minerals, 1 tablet, oral, Daily  [Held by provider] pantoprazole, 40 mg, oral, BID AC  pantoprazole, 40 mg, intravenous, BID AC  [Held by provider] spironolactone, 12.5 mg, oral, Daily  [START ON 11/22/2024] thiamine, 250 mg, intravenous, Daily   Followed by  [START ON 11/25/2024] thiamine, 100 mg, oral, Daily  vancomycin, 125 mg, nasoduodenal tube, 4x daily      Continuous medications       PRN medications  PRN medications: acetaminophen **OR** acetaminophen, " "albuterol, [Held by provider] clonazePAM, hydrALAZINE, melatonin, metoprolol     Labs  Lab Results   Component Value Date    WBC 5.4 11/19/2024    HGB 10.9 (L) 11/19/2024    HCT 34.7 (L) 11/19/2024    MCV 87 11/19/2024     11/19/2024     Lab Results   Component Value Date    GLUCOSE 92 11/21/2024    CALCIUM 9.4 11/21/2024     11/21/2024    K 3.8 11/21/2024    CO2 22 11/21/2024     (H) 11/21/2024    BUN 10 11/21/2024    CREATININE 0.83 11/21/2024     Lab Results   Component Value Date    ALT 35 11/21/2024    AST 39 11/21/2024    ALKPHOS 57 11/21/2024    BILITOT 1.1 11/21/2024     No results found for: \"IRON\", \"TIBC\", \"FERRITIN\"  Lab Results   Component Value Date    INR 1.2 (H) 11/19/2024    INR 1.3 (H) 11/18/2024    PROTIME 13.3 (H) 11/19/2024    PROTIME 15.2 (H) 11/18/2024     Radiology  CT A/P with IV contrast 11/16/2024 noting:  IMPRESSION:  Fluid-filled segments of colon; please correlate for  diarrhea/colitis. There is underdistention versus mild wall  thickening and submucosal fat deposition in loops of ileum in the  right lower quadrant which may relate to chronic inflammatory process.      Signed by: Boogie Nance 11/16/2024 8:16 PM  Dictation workstation:   [id number]     Assessment:  Samuel Mims is a 48 y.o. female with PMH of HTN, GERD, C. difficile 9/2024, 10/2024 presenting with altered mental status.  Patient has not had alcohol in 2 years.  Patient incontinent of urine and stool at home with poor oral intake, 45 pound weight loss over the past month. Noncompliant with recent dosing of oral vancomycin. C diff PCR, pathogens (-).  Patient remains obtunded, hypertension and tachycardia treated and resolved.  No leukocytosis. Liver enzymes WNL. Ammonia 58.  Neuro workup NTD.  Patient follows with Dr. Fernandez for outpatient GI care.      # encephalopathy- hepatic v ? wernicke's encephalopathy v encephalitis v other encephalopathy syndrome  # ? KLEIN  # recent c diff x2  # " possible UTI  # weight loss     Plan:  - continue supportive care  - diet as tolerated  - follow up infectious work up  - follow up ID recs  - continue lactulose 20 gm qid  - continue PPI BID per previous GI recs  - possibility of acutely worsened hepatic encephalopathy secondary to infectious or inflammatory process  - pt will continue to follow up with Dr. Fernandez in clinic for GI care     Plan has been discussed with Dr. Downing. GI will continue to follow.      Pili Boles, APRN/CNP

## 2024-11-21 NOTE — PROGRESS NOTES
Nutrition Follow Up Assessment:   Nutrition Assessment    Reason for Assessment: Provider consult order    Nutrition Note:  Samuel Mims is a 48 y.o. female presenting 11/16 confusion, increased sleeping, decreased oral intake, nausea with intermittent vomiting, and diarrhea. Patient was hospitalized on 10/31-11/2/24 with hypokalemia, vomiting, and c-diff (tx with oral vancomycin 11/5-11/18/24); it was reported that pt family found multiple vancomycin pills at home--likely pt not taking medication. Work up revealing r/o continued C diff, metabolic encephalopathy (NH3 58umol/L), and UTI.  Cardiology involved in pt care due to prolonged QT interval, HTN, and sinus tachycardia; ID and Neurology also involved in pt care.  ST eval 11/17 suggesting minced moist foods level 5 with thin liquids and 1:1 feeding however 11/18, pt only responsive to pain therefore to place dobhoff for medications and to start enteral nutrition.    11/21/2024: Re-consult, follow up: Chart reviewed and events noted. Pt more awake/passed ST eval 11/20 for minced moist level 5/thin liquids with 1:1 feeding; pt pulled out corpak on 11/20.  Per GI, suspect hepatic encephalopathy possibly worsened acutely by inflammatory or infectious process--on lactulose and FMS in place since 11/20--noting 1.55L out 11/19-11/20AM.  Per Neurology, pt on high dose thiamine; LP results and MRI pending.    Past Medical History   has a past medical history of Asthma, Clostridium difficile infection, GERD (gastroesophageal reflux disease), and Hypertension.IUD (placed 17 years ago per ), and c-diff (9/2024)   Surgical History   has a past surgical history that includes Cholecystectomy; Esophagogastroduodenoscopy; and Colonoscopy.       Nutrition History:  Food and Nutrient History: 11/21: Per  at bedside, pt ate ~25% of breakfast. 11/18: Pt from home with ; per nurse, pt only responsive to pain today; + encephalopathy. Family reported pt with  "very poor oral intakes PTA--did not take all antibiotic for recent C diff dx.  Per archive MNT note, pt reported last admit she was only able to tolerate tea/water/Gatorade x3 weeks. Spoke with pt  at bedside whom verfied above.  reports the last time pt had solid food was Monday, 11/11/2024, \"a small chocolate chip muffin.\"  Vitamin/Herbal Supplement Use: none reported  Food Allergies/Intolerances:  None  GI Symptoms: diarrhea and poor oral intakes.   Oral Problems: Chewing difficulty, Swallowing difficulty, and ST eval 11/20 suggesting minced/moist level 5 with thin liquids and feeding strategies including 1:1 feed. .       Anthropometrics:  Height: 167.6 cm (5' 6\")   Weight: 109 kg (241 lb 2.9 oz)   BMI (Calculated): 38.95   IBW 59kg          Weight History:   10/31/2024: 113 kg 3.5% ~19 days  10/17/2024 118 kg  7.6% x1 month  8/2024: 143kg 24% x 3 months  2/2023: 128kg   Weight Change %: significant unintentional loss through 3 months   0-10 (Numeric) Pain Score: 0 - No pain  Garrido-Baker FACES Pain Rating: No hurt     Nutrition Focused Physical Exam Findings:  defer: 11/21: pt lying flat in bed.  Subcutaneous Fat Loss:      Muscle Wasting:     Edema:  Edema: +2 mild  Edema Location: nonpitting to BUE and BLE  Physical Findings:  Skin: Positive (pale; sacrum red/blanchable)    Nutrition Significant Labs:  BMP Trend:   Results from last 7 days   Lab Units 11/21/24  0629 11/20/24  0653 11/19/24  0429 11/18/24  1942   GLUCOSE mg/dL 92 116* 118* 160*   CALCIUM mg/dL 9.4 9.5 9.1 9.2   SODIUM mmol/L 144 144 144 144   POTASSIUM mmol/L 3.8 3.3* 3.6 3.3*   CO2 mmol/L 22 25 24 23   CHLORIDE mmol/L 113* 110* 112* 112*   BUN mg/dL 10 10 8 8   CREATININE mg/dL 0.83 0.86 0.86 0.85    , A1C:  Lab Results   Component Value Date    HGBA1C 5.3 01/23/2021   , BG POCT trend:    , Liver Function Trend:   Results from last 7 days   Lab Units 11/21/24  0629 11/20/24  0653 11/19/24  0429 11/18/24  1405   ALK PHOS U/L 57 55 " "53 60   AST U/L 39 21 11 15   ALT U/L 35 21 19 23   BILIRUBIN TOTAL mg/dL 1.1 1.0 1.3* 1.7*    , Vit D: No results found for: \"VITD25\" , Vit B12: No results found for: \"WMRKCIOZ90\" , Folate: No results found for: \"FOLATE\"     Nutrition Specific Medications:  Scheduled medications  budesonide, 0.5 mg, nebulization, BID  [Held by provider] busPIRone, 10 mg, oral, BID  influenza, 0.5 mL, intramuscular, During hospitalization  formoterol, 20 mcg, nebulization, BID  heparin (porcine), 5,000 Units, subcutaneous, q8h SANTIAGO  lactulose, 20 g, oral, q4h  losartan, 50 mg, oral, BID  metoprolol tartrate, 50 mg, nasoduodenal tube, q12h  multivitamin with minerals, 1 tablet, oral, Daily  [Held by provider] pantoprazole, 40 mg, oral, BID AC  pantoprazole, 40 mg, intravenous, BID AC  [Held by provider] spironolactone, 12.5 mg, oral, Daily  [START ON 11/22/2024] thiamine, 250 mg, intravenous, Daily   Followed by  [START ON 11/25/2024] thiamine, 100 mg, oral, Daily  vancomycin, 125 mg, nasoduodenal tube, 4x daily      Continuous medications       PRN medications  PRN medications: acetaminophen **OR** acetaminophen, albuterol, [Held by provider] clonazePAM, hydrALAZINE, melatonin, metoprolol     I/O:   Last BM Date: 11/21/24; Stool Appearance: Liquid (11/21/24 0800)    Dietary Orders (From admission, onward)       Start     Ordered    11/21/24 1109  Adult diet Regular; Minced and moist 5; Thin 0  Diet effective now        Comments: Compensatory strategies: Only when alert, 1:1 SUP, decrease distractions during feeding, upright 90 degrees for all PO intake, eat/feed slowly  Medication administration: crushed in puree   Question Answer Comment   Diet type Regular    Texture Minced and moist 5    Fluid consistency Thin 0        11/21/24 1109    11/21/24 1106  Oral nutritional supplements  Until discontinued        Comments: Chocolate and PRN please   Question Answer Comment   Deliver with Lunch    Select supplement: Magic Cup        " 11/21/24 1106    11/21/24 1106  Oral nutritional supplements  Until discontinued        Comments: And PRN please   Question Answer Comment   Deliver with Dinner    Select supplement: Gelatein Plus        11/21/24 1106    11/21/24 1104  Oral nutritional supplements  Until discontinued        Comments: Chocolate please   Question Answer Comment   Deliver with Breakfast    Deliver with Dinner    Deliver with Lunch    Select supplement: Ensure Plus High Protein        11/21/24 1104    11/17/24 0020  May Participate in Room Service With Assistance  ( ROOM SERVICE MAY PARTICIPATE WITH ASSISTANCE)  Once        Question:  .  Answer:  Yes    11/17/24 0019                     Estimated Needs:   Total Energy Estimated Needs (kCal):  (1440-1745kcal (13-16kcal/kg of 109kg))     Total Protein Estimated Needs (g):  (76-95g (1.3-1.6g/kg IBW of 59kg))     Total Fluid Estimated Needs (mL):  (1mL/kcal/d or as per physician)           Nutrition Diagnosis   Malnutrition Diagnosis  Patient has Malnutrition Diagnosis: Yes  Diagnosis Status: Ongoing  Malnutrition Diagnosis: Severe malnutrition related to chronic disease or condition  As Evidenced by: <75% of estimated nutrient needs x1 month with resulting 7.6% weight loss x 1 month and 24% x3 months.  Additional Assessment Information: 11/21: Case discussed with NP, ST, and RN. NP agreeable to liberalize to regular with minced/moist level 5 in an attempt to offer more meal selections while on modified food textures and thus encourage oral intakes.    Nutrition Diagnosis  Patient has Nutrition Diagnosis: Yes  Diagnosis Status (1): Ongoing  Nutrition Diagnosis 1: Inadequate oral intake  Related to (1): altered mental status  As Evidenced by (1): metabolic encephalopathy; dobbhoff out as pt pulled it out  however now on oral/modified diet.       Nutrition Interventions/Recommendations         Nutrition Prescription:  Individualized Nutrition Prescription Provided for : regular/minced moist  level 5/thin liquids with ONS        Nutrition Interventions:   Interventions: Meals and snacks, Medical food supplement  Meals and Snacks: Texture-modified diet  Goal: regular/minced moist foods level 5 with thin liquids and 1:1 feeding  Medical Food Supplement: Commercial beverage, Commercial food  Goal: Chocolate Ensure Plus HP 3x/day for additional 1050kcal and 60g pro, Magic Cup 1x/day and PRN for additional 290kcal and 9g pro/4 oz as well as Gelatein Plus 1x/d and PRN for additional 160kcal and 20g pro.    Collaboration and Referral of Nutrition Care: Collaboration by nutrition professional with other providers  Goal: RN Yasmeen, NP ST Liz Ramírez    Nutrition Education:   11/21: Met with pt and  at bedside. Pt nodded head with agreed with trying chocolate Ensure otherwise pt did not converse. No further preferences verbalized per pt or .        Nutrition Monitoring and Evaluation   Food/Nutrient Related History Monitoring  Monitoring and Evaluation Plan: Energy intake, Fluid intake  Energy Intake: Estimated energy intake  Criteria: >75% of energy intakes via meals and ONS; total feed for encouragement and safety  Fluid Intake: Estimated fluid intake  Criteria: may need to consider 1:1 fluid replacement therapy pending FMS output.    Body Composition/Growth/Weight History  Monitoring and Evaluation Plan: Weight  Weight: Measured weight  Criteria: maintain stable weight    Biochemical Data, Medical Tests and Procedures  Monitoring and Evaluation Plan: Electrolyte/renal panel, Glucose/endocrine profile  Electrolyte and Renal Panel: Magnesium, Phosphorus, Potassium, Sodium  Criteria: lytes WNR  Glucose/Endocrine Profile: Glucose, casual, Glucose, fasting  Criteria: BG 70-180mg/dL    Nutrition Focused Physical Findings  Monitoring and Evaluation Plan: Digestive System  Digestive System: Diarrhea  Criteria: decrease diarrhea; goal of formed stools.         Time Spent (min): 60 minutes

## 2024-11-21 NOTE — PROGRESS NOTES
"Speech-Language Pathology    SLP Adult Inpatient  Speech-Language Pathology Treatment     Patient Name: Samuel Mims  MRN: [id number]  Today's Date: 11/21/2024  Time Calculation  Start Time: 1358  Stop Time: 1424  Time Calculation (min): 26 min         Current Problem:   1. Sepsis with encephalopathy without septic shock, due to unspecified organism (Multi)        2. Acute cystitis with hematuria              SLP Assessment:  SLP TX Intervention Outcome: Making Progress Towards Goals  SLP Assessment Results: Other (Comment) (oral dysphagia)  Prognosis: Good  Patient seen this date in follow-up to clinical swallow evaluation completed 11/17/24. Patient without Corpak this date, following self-removal. Patient has been tolerating minced/moist diet, per family. They stated that her diet was \"pretty much the same\" at home. This date, patient with severe hearing impairment due to infection, but was alert and responsive. Patient was able to participate in PO trials, but required redirection to task due to tangential discourse and perseveration. Patient tolerated PO trials of ice chip, thin liquid via cup/straw, and puree without overt s/s of aspiration. Some oral loss noted with PO trials of puree. Patient declined trials of soft solids, as she disliked item presented.   Patient is appropriate to continue previously recommended diet of minced/moist solids and thin liquids. ST to continue to follow during inpatient stay to ensure diet tolerance, and to consider for diet advancement as tolerated.  Treatment Tolerance: Patient tolerated treatment well  Strengths: Family/Caregiver Support  Barriers: Cognition, Comorbidities  Education Provided: Yes       Plan:  Inpatient/Swing Bed or Outpatient: Inpatient  Treatment/Interventions: Other (Comment) (Dysphagia management)  SLP TX Plan: Continue Plan of Care  SLP Plan: Skilled SLP  SLP Frequency: 2x per week  Duration: 2 weeks  SLP Discharge Recommendations: Continue skilled " SLP services at the next level of care  Next Treatment Priority: Diet tolerance, compensatory strategies  Discussed POC: Patient, Caregiver/family  Patient/Caregiver Agreeable: Other (Comment) (family verbalized understanding)      Subjective   Patient noted with repeated question-asking about the whereabouts of her older son.     Most Recent Visit:  SLP Received On: 11/21/24    General Visit Information:   Reason for Referral: Assess swallow function  Referred By: JOSE Marino-CNP  Past Medical History Relevant to Rehab: Samuel Mims is a 48 y.o. female presenting to [facility] on 11/16/2024 with pertinent medical history of HTN, asthma, GERD, IUD (placed 17 years ago per ), and c-diff (9/2024) who presents to the ED with confusion, increased sleeping, decreased oral intake, nausea with intermittent vomiting, and diarrhea on Thursday from home. Per her  she was sitting in her urine for a couple days and had diarrhea on Thursday. Her son also reports that she has been confused and not eating or drinking for a couple days and they decided to bring her to the hospital today as she was not getting better. HPI and history obtained from the patient's , ED physician, and previous medical records as patient is lethargic and confused.  Of note, the patient was hospitalized on 11/1-11/2/24 with hypokalemia, vomiting, and c-diff (tx with oral vancomycin 11/5-11/18/24).  Of note, ED physician talked to OB/GYN on-call regarding IUD, he reports they said she could follow-up outpatient for removal.  Limited ROS due to patient's confusion, she does deny any pain.     ED Course:  Vital signs: Temp. 36.9, -102, RR 28-22, /109> 141/90, SPO2 98% on room air  CBC: Unremarkable  Lactate: 1.8  CMP: Total bilirubin 1.6  UA: 50 protein, 20 ketones, 2+ nitrates, WBC 21-50, RBC 11-20, 4+ bacteria  Urine toxicology: Negative  Ethanol level, acetaminophen level, and acetylsalicylic acid level: negative.   EKG: Sinus tachycardia, ventricular rate 145, , QRS 68, QTc 531. No ST elevation or depression noted.   CT abdomen/pelvis: Fluid-filled segments of colon, correlate with diarrhea/colitis.  CT head: No acute intercranial hemorrhage or mass effect.  Medications: Flagyl 500 mg IV, vancomycin 2 g IV, cefepime 2 g IV, Cozaar 100 mg p.o., Toradol 15 mg IV, labetalol 20 mg IV, LR x 2 L.  Disposition: Patient admitted for sepsis with encephalopathy without septic shock, UTI, HTN, colitis, sinus tachycardia, and diarrhea.      Past Medical History  She has a past medical history of Asthma, Clostridium difficile infection, GERD (gastroesophageal reflux disease), and Hypertension.     Surgical History  She has a past surgical history that includes Cholecystectomy; Esophagogastroduodenoscopy; and Colonoscopy.  Arrival: Family/caregiver present  Caregiver Feedback: Per RD, patient removed Corpak. Family reports patient ate well earlier this date. Patient with severe hearing impairment due to infection, and family has been writing in order to communicate.  Total Number of Visits : 3      Pain Assessment:  Pain Assessment  Pain Assessment: 0-10  0-10 (Numeric) Pain Score: 0 - No pain      Objective     Goals:   Established on- 11/17/2024  Pt will tolerate recommended diet with no overt s/s of difficulty or aspiration.     Progress: Patient tolerated PO trials of ice chips, thin liquids via cup/straw, and puree without overt s/s of aspiration.     Status: Continue goal     Pt will tolerate advanced solid trials with no overt s/s of difficulty or aspiration to assess readiness for diet upgrade.      Progress: Patient deferred soft solid trials offered due to disliking fruit cup (does not like pineapple or pineapple juice). Will plan to offer an alternative in next session.     Status: Continue goal    Therapeutic Swallow:  Therapeutic Swallow Intervention : PO Trials, Caregiver Education  Solid Diet Recommendations: Minced &  moist/ground (IDDSI Level 5)  Liquid Diet Recommendations: Thin (IDDSI Level 0)  Swallow Comments: Compensatory Swallowing Strategies: Alternate solids and liquids, Decrease distractions during eating/feeding, Upright 90 degrees as possible for all oral intake, Full supervision with meals, Small bites/sips, Eat/feed slowly      Inpatient:    Education:  Learner patient; family   Barriers to Learning acuteness of illness barrier; cognitive limitations barrier; hearing limitations barrier   Method demonstration; verbal; written   Education - Topic ST provided patient education regarding role of ST, purpose of assessment, clinical impressions, goals of treatment, and plan of care. Patient verbalized questionable full comprehension, consistent with cognitive status. Education will be reinforced. ST further coordinated with RN regarding recommendations and precautions per this assessment, with RN verbalizing understanding.     Outcome    Needs review/reinforcement

## 2024-11-21 NOTE — PROGRESS NOTES
"Samuel Mims is a 48 y.o. female on day 5 of admission presenting with Sepsis with encephalopathy without septic shock, due to unspecified organism (Multi).      Subjective   Seen at bedside with Dr. Tracye.  Pt repeating, \"I'm confused.\" She was awake and following commands.       Objective     Last Recorded Vitals  Blood pressure 125/87, pulse 108, temperature 36.5 °C (97.7 °F), temperature source Temporal, resp. rate 19, height 1.676 m (5' 6\"), weight 109 kg (241 lb 2.9 oz), SpO2 98%.    Physical Exam  Neurological Exam  Physical Exam  Constitutional:       General: She is awake.   Eyes:      Pupils: Pupils are equal, round, and reactive to light.   Neurological:      Mental Status: She is alert.      Motor: Motor strength is normal.  Psychiatric:         Speech: Speech normal.         Neurological Exam  Mental Status  Awake and alert. Oriented only to person and place. Speech is normal. Language is fluent with no aphasia.  Following commands.     Cranial Nerves  CN III, IV, VI: Pupils equal round and reactive to light bilaterally.  CN V: Facial sensation is normal.  CN IX, X: Palate elevates symmetrically  CN XII: Tongue midline without atrophy or fasciculations.  Blinks to bilateral visual threat..     Motor  Normal muscle bulk throughout. Normal muscle tone. Strength is 5/5 throughout all four extremities.  Generalized weakness..     Sensory  Sensation is intact to light touch in all four extremities.     Coordination  Bilateral FTN ataxia - mild    Relevant Results  Scheduled medications  budesonide, 0.5 mg, nebulization, BID  [Held by provider] busPIRone, 10 mg, oral, BID  influenza, 0.5 mL, intramuscular, During hospitalization  formoterol, 20 mcg, nebulization, BID  heparin (porcine), 5,000 Units, subcutaneous, q8h SANTIAGO  lactulose, 20 g, oral, q4h  losartan, 50 mg, oral, BID  metoprolol tartrate, 50 mg, nasoduodenal tube, q12h  multivitamin with minerals, 1 tablet, oral, Daily  [Held by provider] " pantoprazole, 40 mg, oral, BID AC  pantoprazole, 40 mg, intravenous, BID AC  [Held by provider] spironolactone, 12.5 mg, oral, Daily  [START ON 11/22/2024] thiamine, 250 mg, intravenous, Daily   Followed by  [START ON 11/25/2024] thiamine, 100 mg, oral, Daily  vancomycin, 125 mg, nasoduodenal tube, 4x daily      Continuous medications     PRN medications  PRN medications: acetaminophen **OR** acetaminophen, albuterol, [Held by provider] clonazePAM, hydrALAZINE, melatonin, metoprolol  Results for orders placed or performed during the hospital encounter of 11/16/24 (from the past 24 hours)   Magnesium   Result Value Ref Range    Magnesium 2.00 1.60 - 2.40 mg/dL   Comprehensive Metabolic Panel   Result Value Ref Range    Glucose 92 74 - 99 mg/dL    Sodium 144 136 - 145 mmol/L    Potassium 3.8 3.5 - 5.3 mmol/L    Chloride 113 (H) 98 - 107 mmol/L    Bicarbonate 22 21 - 32 mmol/L    Anion Gap 13 10 - 20 mmol/L    Urea Nitrogen 10 6 - 23 mg/dL    Creatinine 0.83 0.50 - 1.05 mg/dL    eGFR 87 >60 mL/min/1.73m*2    Calcium 9.4 8.6 - 10.3 mg/dL    Albumin 3.7 3.4 - 5.0 g/dL    Alkaline Phosphatase 57 33 - 110 U/L    Total Protein 6.2 (L) 6.4 - 8.2 g/dL    AST 39 9 - 39 U/L    Bilirubin, Total 1.1 0.0 - 1.2 mg/dL    ALT 35 7 - 45 U/L     IR lumbar puncture therapeutic    Result Date: 11/20/2024  Interpreted By:  Fernando Betts, STUDY: IR LUMBAR PUNCTURE THERAPEUTIC;  11/20/2024 1:59 pm   INDICATION: Signs/Symptoms:encephalopathy- abnormal MRI brain.     COMPARISON: None.   ACCESSION NUMBER(S): [id number]   ORDERING CLINICIAN: MARYAM LINDO   TECHNIQUE: After discussion of risks, benefits, and alternatives, oral and written informed consent was obtained from the  of the patient by phone.. The patient was placed in prone position on exam table. A time out was performed prior to procedure confirming the identity of the patient, procedure to be performed, and allergies.   The L3-4 interspace was then marked under  fluoroscopy. The patient was then prepped and draped in sterile fashion. Local anesthesia was achieved with 1% lidocaine solution. A 20 gauge spinal needle was then introduced at the L3-L4 level under direct fluoroscopic guidance until return of CSF was demonstrated. 8 ml of clear CSF was collected in 4 tubes. The stylet was then replaced and the spinal needle was removed.  Hemostasis was achieved with direct pressure and the area was dressed with a Band-Aid. The patient tolerated procedure well without any immediate or clinically apparent complications.   The collected CSF was then sent to the lab for appropriate lab tests as ordered by the referring physician.   FINDINGS: A single periprocedural spot fluoroscopic image was provided for interpretation. Image quality is such that fine bony detail is obscured. A needle is seen to overlie the posterior elements of L4.       Fluoroscopic guided lumbar puncture.   MACRO: None   Signed by: Fernando Betts 11/20/2024 3:08 PM Dictation workstation:   [id number]    ECG 12 lead    Result Date: 11/20/2024  Sinus tachycardia Left axis deviation Left ventricular hypertrophy with repolarization abnormality Inferior infarct (cited on or before 27-AUG-2024) Anteroseptal infarct (cited on or before 27-AUG-2024) Abnormal ECG When compared with ECG of 27-AUG-2024 02:40, ST now depressed in Lateral leads Confirmed by Erin Gardner ([id number]) on 11/20/2024 7:29:17 AM    MR brain wo IV contrast    Result Date: 11/19/2024  Interpreted By:  Yoon Harding, STUDY: MR BRAIN WO IV CONTRAST;  11/19/2024 11:26 am   INDICATION: Signs/Symptoms:Ongoing encephalopathy.     COMPARISON: None.   ACCESSION NUMBER(S): [id number]   ORDERING CLINICIAN: MARYAM LINDO   TECHNIQUE: Axial T2, FLAIR, DWI,  and sagittal and coronal T1 weighted images of brain were acquired.  Axial GE weighted images were unable to be performed.   FINDINGS: The examination is markedly degraded by patient motion artifact.   CSF  Spaces: The ventricles, sulci and basal cisterns are within normal limits.   Parenchyma: No definite diffusion restriction abnormality to suggest acute infarct.   The axial T2 FLAIR weighted images are significantly degraded by patient motion artifact. However, within this limitation there appears to be abnormal increased signal within the medial thalami bilaterally, surrounding the aqueduct and possibly within the caudate heads.   There is questionable increased cortical signal on the FLAIR images within the bilateral frontal lobes (images 37-39, series 4).   There is a background of nonspecific patchy white matter signal which is presumed microangiopathy.   There is no mass effect or midline shift.   Cerebellar tonsils are above the foramen magnum. Pituitary and sella are not enlarged.   Paranasal Sinuses and Mastoids: Visualized paranasal sinuses and mastoid air cells are predominantly clear..       The examination is markedly degraded by patient motion artifact.   Within this limitation, there appears to be increased signal within the medial thalami, periaqueductal region and possible increased signal within the caudate and cortex of the bilateral posterior frontal lobes. Differential considerations include a toxic metabolic process, an encephalitis, Wernicke's encephalopathy or possibly an atypical appearance of posterior reversible encephalopathy syndrome.   Within the limitation of motion artifact no evidence of acute infarct, intracranial mass effect or midline shift.   Consider repeat examination with contrast when the patient is able to tolerate the MRI.   MACRO: None   Signed by: Yoon Harding 11/19/2024 1:33 PM Dictation workstation:   [id number]    CT head wo IV contrast    Result Date: 11/18/2024  Interpreted By:  Damon John, STUDY: CT HEAD WO IV CONTRAST;  11/18/2024 4:51 pm   INDICATION: Signs/Symptoms:AMS.   COMPARISON: 11/17/2024   ACCESSION NUMBER(S): [id number]   ORDERING CLINICIAN: SHERRIE  MOISE   TECHNIQUE: Sequential trans axial images were obtained  .   FINDINGS: INTRACRANIAL:   CORTICAL SULCI AND EXTRA-AXIAL SPACES:  Unremarkable.   VENTRICULAR SYSTEM:  Unremarkable without significant dilatation.   CEREBRAL PARENCHYMA:  Unremarkable without significant degenerative change.There is no evidence of definite subacute infarction, intracranial hemorrhage or mass.   EXTRACRANIAL: Visualized paranasal sinuses and mastoids are clear. The calvarium is intact.       Unremarkable exam. There has not been significant interval change from the prior exam.   Signed by: Damon John 11/18/2024 5:21 PM Dictation workstation:   [id number]    XR abdomen 1 view    Result Date: 11/18/2024  Interpreted By:  Fernando Betts, STUDY: XR ABDOMEN 1 VIEW;  11/18/2024 3:54 pm   INDICATION: Signs/Symptoms:KUB after corpak insertion.   COMPARISON: None.   ACCESSION NUMBER(S): [id number]   ORDERING CLINICIAN: SHERRIE PHIPPS   FINDINGS: Feeding tube tip in the proximal duodenum. Mild gaseous distention of the transverse colon.       Feeding tube tip in the proximal duodenum.   MACRO: None   Signed by: Fernando Betts 11/18/2024 3:58 PM Dictation workstation:   [id number]    ECG 12 Lead    Result Date: 11/18/2024  Sinus tachycardia Left anterior fascicular block Possible Anterolateral infarct (cited on or before 27-AUG-2024) Abnormal ECG When compared with ECG of 16-NOV-2024 17:49, (unconfirmed) Questionable change in initial forces of Septal leads ST less depressed in Lateral leads Confirmed by Alber Luevano ([id number]) on 11/18/2024 3:46:18 PM    CT head wo IV contrast    Result Date: 11/17/2024  Interpreted By:  Boogie Nance, STUDY: CT HEAD WO IV CONTRAST;  11/17/2024 10:04 pm   INDICATION: Signs/Symptoms:Altered mental status.   COMPARISON: 11/16/2024   ACCESSION NUMBER(S): [id number]   ORDERING CLINICIAN: CRYSTAL WEINBERG   TECHNIQUE: Contiguous axial images of the head were obtained without intravenous contrast.   FINDINGS:  BRAIN PARENCHYMA:   The gray white matter differentiation is preserved.  No mass effect or midline shift.   HEMORRHAGE:  No evidence of acute intracranial hemorrhage. VENTRICLES AND EXTRA-AXIAL SPACES:  The ventricles are within normal limits in size for brain volume.  No evidence of abnormal extraaxial fluid collection. EXTRACRANIAL SOFT TISSUES:  Within normal limits. PARANASAL SINUSES/MASTOIDS:  The visualized paranasal sinuses and mastoid air cells are clear and well pneumatized. CALVARIUM:  No evidence of depressed calvarial fracture.   OTHER FINDINGS:  None       No evidence of acute intracranial hemorrhage or mass effect.       MACRO: None   Signed by: Boogie Nance 11/17/2024 11:05 PM Dictation workstation:   [id number]    CT abdomen pelvis w IV contrast    Result Date: 11/16/2024  Interpreted By:  Boogie Nance, STUDY: CT ABDOMEN PELVIS W IV CONTRAST;  11/16/2024 7:54 pm   INDICATION: Signs/Symptoms:generalized ttp, sepsis.   COMPARISON: None.   ACCESSION NUMBER(S): [id number]   ORDERING CLINICIAN: ZAHRA MATTHEW   TECHNIQUE: Contiguous axial images of the abdomen and pelvis were obtained after the intravenous administration of  contrast. Coronal and sagittal reformatted images were obtained from the axial images.   FINDINGS: There is limited evaluation the lung bases. Mild basilar atelectasis. No pleural effusion.   Evaluation of the abdomen and pelvis is limited secondary to patient body habitus, motion, and beam hardening artifact secondary to inferior position of the patient's arms. Mild hypodensity near the falciform ligament may correspond to focal fatty infiltration. The gallbladder surgically absent.   The pancreas, spleen, and adrenal glands appear unremarkable.   Symmetric enhancement of the kidneys. No hydronephrosis.   No evidence of bowel obstruction. Fluid-filled segments of colon. There is underdistention versus mild wall thickening and submucosal fat deposition in loops of ileum in the  right lower quadrant which may relate to chronic inflammatory process.   Urinary bladder appears unremarkable.   Limited evaluation of the uterus and adnexa. Intrauterine device. 2.4 cm x 1.8 cm cystic lesion in the right adnexa.   Multilevel degenerative change of the lumbar spine.       Fluid-filled segments of colon; please correlate for diarrhea/colitis. There is underdistention versus mild wall thickening and submucosal fat deposition in loops of ileum in the right lower quadrant which may relate to chronic inflammatory process.       MACRO: None   Signed by: Boogie Nance 11/16/2024 8:16 PM Dictation workstation:   [id number]    CT head wo IV contrast    Result Date: 11/16/2024  Interpreted By:  Boogie Nance, STUDY: CT HEAD WO IV CONTRAST;  11/16/2024 7:54 pm   INDICATION: Signs/Symptoms:AMS.   COMPARISON: None   ACCESSION NUMBER(S): [id number]   ORDERING CLINICIAN: ZAHRA MATTHEW   TECHNIQUE: Contiguous axial images of the head were obtained without intravenous contrast.   FINDINGS: BRAIN PARENCHYMA:   The gray white matter differentiation is preserved.  No mass effect or midline shift.   HEMORRHAGE:  No evidence of acute intracranial hemorrhage. VENTRICLES AND EXTRA-AXIAL SPACES:  The ventricles are within normal limits in size for brain volume.  No evidence of abnormal extraaxial fluid collection. EXTRACRANIAL SOFT TISSUES:  Within normal limits. PARANASAL SINUSES/MASTOIDS:  The visualized paranasal sinuses and mastoid air cells are clear and well pneumatized. CALVARIUM:  No evidence of depressed calvarial fracture.   OTHER FINDINGS:  None       No evidence of acute intracranial hemorrhage or mass effect.       MACRO: None   Signed by: Boogie Nance 11/16/2024 8:11 PM Dictation workstation:   [id number]    NM gastric emptying solid    Result Date: 11/6/2024  Interpreted By:  Laila Lugo, STUDY: NM GASTRIC EMPTYING SOLID; 11/6/2024 2:00 pm   INDICATION: Signs/Symptoms:NAUSEA.   COMPARISON: None.    ACCESSION NUMBER(S): [id number]   ORDERING CLINICIAN: JOSE VARGAS   TECHNIQUE: DIVISION OF NUCLEAR MEDICINE GASTRIC EMPTYING QUANTIFICATION   The patient received an oral radiolabeled solid-phase meal utilizing 1.0 mCi of Tc-99m sulfur colloid in cooked egg. Sequential anterior and posterior images of the abdomen were then acquired over the next four hours. Computer quantification of gastric emptying (using geometric mean activity) was performed.   FINDINGS: The image series shows prompt antegrade transit of radiolabeled solids from stomach to small bowel. Computer quantification demonstrates gastric retention as follows: 45 %  at 1 hr    (normal max 90%) 23 %  at 2 hr    (normal max 60%) 12 %  at 3 hr    (normal max 30%) 1 %  at 4 hr    (normal max 10%)       1. Normal gastric emptying of solids without evidence of gastroparesis.   I personally reviewed the images/study. This study was interpreted at [place], [place].   MACRO: None   Signed by: Laila Lugo 11/6/2024 2:04 PM Dictation workstation:   [id number]    Flexible Sigmoidoscopy    Result Date: 10/29/2024  [facility] Patient Name: Samuel ORTIZ Procedure Date: 10/29/2024 8:47 AM MRN: [id number] YOB: 1975 Age: 48 Gender: Female Race: Unknown Attending MD: Jose Vargas MD, [id number] Procedure:             Colonoscopy Referring MD:          PIPO JAMISON MD Providers:      Jose Vargas MD Indications:           Abnormal CT of the GI tract, Weight loss Findings:              The perianal and digital rectal examinations were                        normal.                        Multiple small and large-mouthed diverticula were                        found in the sigmoid colon.                        The terminal ileum appeared normal. Biopsies were                        taken with a cold forceps for histology. Verification                        of  patient identification for the specimen was done by                        the nurse using the patient's name, birth date and                        medical record number. Estimated blood loss was                        minimal.                        A 2 mm polyp was found in the ileocecal valve. The                        polyp was sessile. The polyp was removed with a cold                        biopsy forceps. Resection and retrieval were complete.                        Verification of patient identification for the                        specimen was done by the nurse using the patient's                        name, birth date and medical record number. Estimated                        blood loss was minimal.                        A 4 mm polyp was found in the cecum. The polyp was                        sessile. The polyp was removed with a cold snare.                        Resection and retrieval were complete. Verification of                        patient identification for the specimen was done by                        the nurse using the patient's name, birth date and                        medical record number. Estimated blood loss was                        minimal.                        Two sessile polyps were found in the ascending colon.                        The polyps were 5 to 7 mm in size. These polyps were                        removed with a cold snare. Resection and retrieval                        were complete. Verification of patient identification                        for the specimen was done by the nurse using the                        patient's name, birth date and medical record number.                        Estimated blood loss was minimal.                        The colon (entire examined portion) appeared otherwise                        normal. Biopsies were taken with a cold forceps for                        histology. Verification of patient identification for                         the specimen was done by the nurse using the patient's                        name, birth date and medical record number. Estimated                        blood loss was minimal.                        Stool sample collected and submitted for C diff PCR                        and culture and fecal calprotectin.                        The retroflexed view of the distal rectum and anal                        verge was normal and showed no anal or rectal                        abnormalities. Patient Profile:       This is a 48 year old female. Refer to note in patient                        chart for documentation of history and physical. Impression:            - Diverticulosis in the sigmoid colon.                        - The examined portion of the ileum was normal.                        Biopsied.                        - One 2 mm polyp at the ileocecal valve, removed with                        a cold biopsy forceps. Resected and retrieved.                        - One 4 mm polyp in the cecum, removed with a cold                        snare. Resected and retrieved.                        - Two 5 to 7 mm polyps in the ascending colon, removed                        with a cold snare. Resected and retrieved.                        - The entire examined colon is otherwise normal.                        Biopsied.                        - The distal rectum and anal verge are normal on                        retroflexion view. Recommendation:        - Await pathology results.                        - Patient has a contact number available for                        emergencies. The signs and symptoms of potential                        delayed complications were discussed with the patient.                        Return to normal activities tomorrow. Written                        discharge instructions were provided to the patient.                        - High fiber diet.                        -  Continue present medications.                        - Schedule gastric emptying study as previously                        recommended.                        - Repeat colonoscopy in 3 years for surveillance. Medicines:             Propofol per Anesthesia Procedure:             Pre-Anesthesia Assessment:                        - Prior to the procedure, a History and Physical was                        performed, and patient medications and allergies were                        reviewed. The patient is competent. The risks and                        benefits of the procedure and the sedation options and                        risks were discussed with the patient. All questions                        were answered and informed consent was obtained.                        Patient identification and proposed procedure were                        verified by the physician and the nurse in the                        pre-procedure area in the procedure room. Mental                        Status Examination: alert and oriented. Airway                        Examination: normal oropharyngeal airway and neck                        mobility. Respiratory Examination: clear to                        auscultation. CV Examination: normal. Prophylactic                        Antibiotics: The patient does not require prophylactic                        antibiotics. Prior Anticoagulants: The patient has                        taken no anticoagulant or antiplatelet agents. ASA                        Grade Assessment: III - A patient with severe systemic                        disease. After reviewing the risks and benefits, the                        patient was deemed in satisfactory condition to                        undergo the procedure. The anesthesia plan was to use                        deep sedation / analgesia. Immediately prior to                        administration of medications, the patient was                         re-assessed for adequacy to receive sedatives. The                        heart rate, respiratory rate, oxygen saturations,                        blood pressure, adequacy of pulmonary ventilation, and                        response to care were monitored throughout the                        procedure. The physical status of the patient was                        re-assessed after the procedure.                        After I obtained informed consent, the scope was                        passed under direct vision. Throughout the procedure,                        the patient's blood pressure, pulse, and oxygen                        saturations were monitored continuously. Provation                        AI/GI Genius was used during withdrawal. The                        COLONOSCOPE was introduced through the anus and                        advanced to the terminal ileum. The colonoscopy was                        performed without difficulty. The patient tolerated                        the procedure well. The quality of the bowel                        preparation was good. The terminal ileum, ileocecal                        valve, appendiceal orifice, and rectum were                        photographed. The quality of the bowel preparation was                        evaluated using the BBPS ([place] Bowel Preparation                        Scale) with scores of: Right Colon = 2 (minor amount                        of residual staining, small fragments of stool and/or                        opaque liquid, but mucosa seen well), Transverse Colon                        = 3 (entire mucosa seen well with no residual                        staining, small fragments of stool or opaque liquid)                        and Left Colon = 3 (entire mucosa seen well with no                        residual staining, small fragments of stool or opaque                        liquid). The total BBPS score equals 8.  Complications:         No immediate complications. Procedure Code(s):     --- Professional ---                        67743, Colonoscopy, flexible; with removal of                        tumor(s), polyp(s), or other lesion(s) by snare                        technique                        82243, 59, Colonoscopy, flexible; with biopsy, single                        or multiple Diagnosis Code(s):     --- Professional ---                        D12.0, Benign neoplasm of cecum                        D12.2, Benign neoplasm of ascending colon                        R63.4, Abnormal weight loss                        K57.30, Diverticulosis of large intestine without                        perforation or abscess without bleeding                        R93.3, Abnormal findings on diagnostic imaging of                        other parts of digestive tract CPT copyright 2021 [employer]. All rights reserved. The codes documented in this report are preliminary and upon  review may be revised to meet current compliance requirements. Jose Fernandez MD 10/29/2024 9:42:34 AM Number of Addenda: 0 Note Initiated On: 10/29/2024 8:47 AM     Assessment/Plan   This patient currently has cardiac telemetry ordered; if you would like to modify or discontinue the telemetry order, click here to go to the orders activity to modify/discontinue the order.  Assessment & Plan  Sepsis with encephalopathy without septic shock, due to unspecified organism (Multi)    Diarrhea    UTI (urinary tract infection)    Hypertension    Colitis    Sinus tachycardia    Prolonged Q-T interval on ECG    Clostridium difficile infection    Metabolic encephalopathy - likely 2/2 Wernicke's encephalopathy given pt   continuing to improve. Other possibilities include PRES, inflammation.      Recommend:     CSF indicates no CNS infection  High dose thiamine; vitamin B1 level pending  Blood pressure control.     Continue supportive care.  Neuro  checks every 4 hours.  MRI brain with and without contrast.     Case/plan discussed and pt seen with Dr. Tracey.  Neurology will continue to follow.      JOSE Johnson-CNP

## 2024-11-22 LAB
ALBUMIN SERPL BCP-MCNC: 3.8 G/DL (ref 3.4–5)
ALBUMIN SERPL BCP-MCNC: 4 G/DL (ref 3.4–5)
ALP SERPL-CCNC: 68 U/L (ref 33–110)
ALP SERPL-CCNC: 73 U/L (ref 33–110)
ALT SERPL W P-5'-P-CCNC: 64 U/L (ref 7–45)
ALT SERPL W P-5'-P-CCNC: 67 U/L (ref 7–45)
ANION GAP SERPL CALC-SCNC: 14 MMOL/L (ref 10–20)
ANION GAP SERPL CALC-SCNC: 15 MMOL/L (ref 10–20)
AST SERPL W P-5'-P-CCNC: 54 U/L (ref 9–39)
AST SERPL W P-5'-P-CCNC: 59 U/L (ref 9–39)
BILIRUB SERPL-MCNC: 0.9 MG/DL (ref 0–1.2)
BILIRUB SERPL-MCNC: 1 MG/DL (ref 0–1.2)
BUN SERPL-MCNC: 14 MG/DL (ref 6–23)
BUN SERPL-MCNC: 16 MG/DL (ref 6–23)
CALCIUM SERPL-MCNC: 9.6 MG/DL (ref 8.6–10.3)
CALCIUM SERPL-MCNC: 9.8 MG/DL (ref 8.6–10.3)
CHLORIDE SERPL-SCNC: 114 MMOL/L (ref 98–107)
CHLORIDE SERPL-SCNC: 114 MMOL/L (ref 98–107)
CO2 SERPL-SCNC: 21 MMOL/L (ref 21–32)
CO2 SERPL-SCNC: 22 MMOL/L (ref 21–32)
CREAT SERPL-MCNC: 0.93 MG/DL (ref 0.5–1.05)
CREAT SERPL-MCNC: 0.99 MG/DL (ref 0.5–1.05)
EGFRCR SERPLBLD CKD-EPI 2021: 70 ML/MIN/1.73M*2
EGFRCR SERPLBLD CKD-EPI 2021: 76 ML/MIN/1.73M*2
ERYTHROCYTE [DISTWIDTH] IN BLOOD BY AUTOMATED COUNT: 16.7 % (ref 11.5–14.5)
FUNGUS SPEC CULT: NORMAL
FUNGUS SPEC FUNGUS STN: NORMAL
GLUCOSE SERPL-MCNC: 106 MG/DL (ref 74–99)
GLUCOSE SERPL-MCNC: 109 MG/DL (ref 74–99)
HCT VFR BLD AUTO: 37.4 % (ref 36–46)
HGB BLD-MCNC: 11.9 G/DL (ref 12–16)
HOLD SPECIMEN: NORMAL
MAGNESIUM SERPL-MCNC: 2.11 MG/DL (ref 1.6–2.4)
MCH RBC QN AUTO: 27.7 PG (ref 26–34)
MCHC RBC AUTO-ENTMCNC: 31.8 G/DL (ref 32–36)
MCV RBC AUTO: 87 FL (ref 80–100)
NRBC BLD-RTO: 0 /100 WBCS (ref 0–0)
PLATELET # BLD AUTO: 203 X10*3/UL (ref 150–450)
POTASSIUM SERPL-SCNC: 3.6 MMOL/L (ref 3.5–5.3)
POTASSIUM SERPL-SCNC: 3.8 MMOL/L (ref 3.5–5.3)
PROT SERPL-MCNC: 6.6 G/DL (ref 6.4–8.2)
PROT SERPL-MCNC: 6.9 G/DL (ref 6.4–8.2)
RBC # BLD AUTO: 4.29 X10*6/UL (ref 4–5.2)
SODIUM SERPL-SCNC: 145 MMOL/L (ref 136–145)
SODIUM SERPL-SCNC: 147 MMOL/L (ref 136–145)
WBC # BLD AUTO: 6.7 X10*3/UL (ref 4.4–11.3)

## 2024-11-22 PROCEDURE — 80053 COMPREHEN METABOLIC PANEL: CPT | Performed by: PHYSICIAN ASSISTANT

## 2024-11-22 PROCEDURE — 85027 COMPLETE CBC AUTOMATED: CPT | Performed by: PHYSICIAN ASSISTANT

## 2024-11-22 PROCEDURE — 2500000002 HC RX 250 W HCPCS SELF ADMINISTERED DRUGS (ALT 637 FOR MEDICARE OP, ALT 636 FOR OP/ED)

## 2024-11-22 PROCEDURE — 83735 ASSAY OF MAGNESIUM: CPT | Performed by: NURSE PRACTITIONER

## 2024-11-22 PROCEDURE — 2500000004 HC RX 250 GENERAL PHARMACY W/ HCPCS (ALT 636 FOR OP/ED): Performed by: NURSE PRACTITIONER

## 2024-11-22 PROCEDURE — 2500000004 HC RX 250 GENERAL PHARMACY W/ HCPCS (ALT 636 FOR OP/ED): Performed by: STUDENT IN AN ORGANIZED HEALTH CARE EDUCATION/TRAINING PROGRAM

## 2024-11-22 PROCEDURE — 2500000001 HC RX 250 WO HCPCS SELF ADMINISTERED DRUGS (ALT 637 FOR MEDICARE OP): Performed by: NURSE PRACTITIONER

## 2024-11-22 PROCEDURE — 99232 SBSQ HOSP IP/OBS MODERATE 35: CPT | Performed by: NURSE PRACTITIONER

## 2024-11-22 PROCEDURE — 94640 AIRWAY INHALATION TREATMENT: CPT

## 2024-11-22 PROCEDURE — 2500000001 HC RX 250 WO HCPCS SELF ADMINISTERED DRUGS (ALT 637 FOR MEDICARE OP): Performed by: INTERNAL MEDICINE

## 2024-11-22 PROCEDURE — 2060000001 HC INTERMEDIATE ICU ROOM DAILY

## 2024-11-22 PROCEDURE — 80053 COMPREHEN METABOLIC PANEL: CPT | Performed by: NURSE PRACTITIONER

## 2024-11-22 PROCEDURE — 36415 COLL VENOUS BLD VENIPUNCTURE: CPT | Performed by: PHYSICIAN ASSISTANT

## 2024-11-22 PROCEDURE — 2500000005 HC RX 250 GENERAL PHARMACY W/O HCPCS: Performed by: NURSE PRACTITIONER

## 2024-11-22 PROCEDURE — 36415 COLL VENOUS BLD VENIPUNCTURE: CPT | Performed by: NURSE PRACTITIONER

## 2024-11-22 RX ADMIN — LOSARTAN POTASSIUM 50 MG: 50 TABLET, FILM COATED ORAL at 08:55

## 2024-11-22 RX ADMIN — HEPARIN SODIUM 5000 UNITS: 5000 INJECTION INTRAVENOUS; SUBCUTANEOUS at 21:13

## 2024-11-22 RX ADMIN — Medication 1 TABLET: at 08:55

## 2024-11-22 RX ADMIN — LOSARTAN POTASSIUM 50 MG: 50 TABLET, FILM COATED ORAL at 21:12

## 2024-11-22 RX ADMIN — METOPROLOL TARTRATE 50 MG: 50 TABLET, FILM COATED ORAL at 21:12

## 2024-11-22 RX ADMIN — METOPROLOL TARTRATE 50 MG: 50 TABLET, FILM COATED ORAL at 08:54

## 2024-11-22 RX ADMIN — BUDESONIDE 0.5 MG: 0.5 INHALANT RESPIRATORY (INHALATION) at 21:31

## 2024-11-22 RX ADMIN — PANTOPRAZOLE SODIUM 40 MG: 40 INJECTION, POWDER, FOR SOLUTION INTRAVENOUS at 06:07

## 2024-11-22 RX ADMIN — VANCOMYCIN HYDROCHLORIDE 125 MG: KIT at 06:07

## 2024-11-22 RX ADMIN — LACTULOSE 20 G: 20 SOLUTION ORAL at 02:30

## 2024-11-22 RX ADMIN — HEPARIN SODIUM 5000 UNITS: 5000 INJECTION INTRAVENOUS; SUBCUTANEOUS at 06:07

## 2024-11-22 RX ADMIN — FORMOTEROL FUMARATE 20 MCG: 20 SOLUTION RESPIRATORY (INHALATION) at 21:31

## 2024-11-22 RX ADMIN — THIAMINE HYDROCHLORIDE 250 MG: 100 INJECTION, SOLUTION INTRAMUSCULAR; INTRAVENOUS at 08:55

## 2024-11-22 RX ADMIN — PANTOPRAZOLE SODIUM 40 MG: 40 INJECTION, POWDER, FOR SOLUTION INTRAVENOUS at 17:07

## 2024-11-22 RX ADMIN — VANCOMYCIN HYDROCHLORIDE 125 MG: KIT at 17:07

## 2024-11-22 RX ADMIN — VANCOMYCIN HYDROCHLORIDE 125 MG: KIT at 21:13

## 2024-11-22 RX ADMIN — HEPARIN SODIUM 5000 UNITS: 5000 INJECTION INTRAVENOUS; SUBCUTANEOUS at 13:06

## 2024-11-22 ASSESSMENT — PAIN SCALES - WONG BAKER: WONGBAKER_NUMERICALRESPONSE: NO HURT

## 2024-11-22 ASSESSMENT — COGNITIVE AND FUNCTIONAL STATUS - GENERAL
EATING MEALS: TOTAL
TURNING FROM BACK TO SIDE WHILE IN FLAT BAD: TOTAL
STANDING UP FROM CHAIR USING ARMS: TOTAL
WALKING IN HOSPITAL ROOM: TOTAL
MOVING TO AND FROM BED TO CHAIR: TOTAL
CLIMB 3 TO 5 STEPS WITH RAILING: TOTAL
DAILY ACTIVITIY SCORE: 6
PERSONAL GROOMING: TOTAL
HELP NEEDED FOR BATHING: TOTAL
MOBILITY SCORE: 7
DRESSING REGULAR LOWER BODY CLOTHING: TOTAL
TOILETING: TOTAL
DRESSING REGULAR UPPER BODY CLOTHING: TOTAL
MOVING FROM LYING ON BACK TO SITTING ON SIDE OF FLAT BED WITH BEDRAILS: A LOT

## 2024-11-22 ASSESSMENT — PAIN - FUNCTIONAL ASSESSMENT
PAIN_FUNCTIONAL_ASSESSMENT: CPOT (CRITICAL CARE PAIN OBSERVATION TOOL)
PAIN_FUNCTIONAL_ASSESSMENT: 0-10
PAIN_FUNCTIONAL_ASSESSMENT: 0-10
PAIN_FUNCTIONAL_ASSESSMENT: CPOT (CRITICAL CARE PAIN OBSERVATION TOOL)
PAIN_FUNCTIONAL_ASSESSMENT: 0-10
PAIN_FUNCTIONAL_ASSESSMENT: 0-10

## 2024-11-22 ASSESSMENT — PAIN SCALES - GENERAL
PAINLEVEL_OUTOF10: 0 - NO PAIN

## 2024-11-22 NOTE — PROGRESS NOTES
"Subjective  Patient sitting up in bed with eyes open, "[employer] more alert and verbal though oriented x 1.  Continuing to have adequate stool output with lactulose.  Possible hepatic encephalopathy exacerbated by inflammatory infectious process.  Plan to continue lactulose, follow-up infectious workup.  Patient will follow-up with Dr. Fernandez for outpatient GI care in clinic.    Objective  Blood pressure 107/71, pulse 105, temperature 36.5 °C (97.7 °F), temperature source Temporal, resp. rate 20, height 1.676 m (5' 6\"), weight 107 kg (235 lb 8 oz), SpO2 100%.    Physical Exam  Constitutional: awake, quite lethargic, in NAD  Eyes: PERRL, sclera clear, no conjunctival injection  Skin: Warm and dry, no rash or ecchymosis  ENMT: Mucous membranes moist, no lesions noted  Resp: CTAB, even and unlabored  CV: RRR, normal S1, S2, no m,r,g  GI: +BS, soft, round, NT, no rebound tenderness or guarding, no palpable masses or organomegaly, FMS with brn liquid stool  Extremities: Extremities warm, + 1 generalized edema, no contusions, wounds or cyanosis  Neuro: ANGEL  Psych: lethergic, making eye contact, smiling    Medications  Scheduled medications  budesonide, 0.5 mg, nebulization, BID  [Held by provider] busPIRone, 10 mg, oral, BID  influenza, 0.5 mL, intramuscular, During hospitalization  formoterol, 20 mcg, nebulization, BID  heparin (porcine), 5,000 Units, subcutaneous, q8h SANTIAGO  lactulose, 20 g, oral, q4h  losartan, 50 mg, oral, BID  metoprolol tartrate, 50 mg, nasoduodenal tube, q12h  multivitamin with minerals, 1 tablet, oral, Daily  [Held by provider] pantoprazole, 40 mg, oral, BID AC  pantoprazole, 40 mg, intravenous, BID AC  [Held by provider] spironolactone, 12.5 mg, oral, Daily  thiamine, 250 mg, intravenous, Daily   Followed by  [START ON 11/25/2024] thiamine, 100 mg, oral, Daily  vancomycin, 125 mg, nasoduodenal tube, 4x daily      Continuous medications       PRN medications  PRN medications: acetaminophen **OR** " "acetaminophen, albuterol, [Held by provider] clonazePAM, hydrALAZINE, melatonin, metoprolol     Labs  Lab Results   Component Value Date    WBC 5.4 11/19/2024    HGB 10.9 (L) 11/19/2024    HCT 34.7 (L) 11/19/2024    MCV 87 11/19/2024     11/19/2024     Lab Results   Component Value Date    GLUCOSE 106 (H) 11/22/2024    CALCIUM 9.6 11/22/2024     (H) 11/22/2024    K 3.8 11/22/2024    CO2 22 11/22/2024     (H) 11/22/2024    BUN 14 11/22/2024    CREATININE 0.93 11/22/2024     Lab Results   Component Value Date    ALT 64 (H) 11/22/2024    AST 59 (H) 11/22/2024    ALKPHOS 68 11/22/2024    BILITOT 1.0 11/22/2024     No results found for: \"IRON\", \"TIBC\", \"FERRITIN\"  Lab Results   Component Value Date    INR 1.2 (H) 11/19/2024    INR 1.3 (H) 11/18/2024    PROTIME 13.3 (H) 11/19/2024    PROTIME 15.2 (H) 11/18/2024     Radiology  CT A/P with IV contrast 11/16/2024 noting:  IMPRESSION:  Fluid-filled segments of colon; please correlate for  diarrhea/colitis. There is underdistention versus mild wall  thickening and submucosal fat deposition in loops of ileum in the  right lower quadrant which may relate to chronic inflammatory process.      Signed by: Boogie Nance 11/16/2024 8:16 PM  Dictation workstation:   [id number]     Assessment:  Samuel Mims is a 48 y.o. female with PMH of HTN, GERD, C. difficile 9/2024, 10/2024 presenting with altered mental status.  Patient has not had alcohol in 2 years.  Patient incontinent of urine and stool at home with poor oral intake, 45 pound weight loss over the past month. Noncompliant with recent dosing of oral vancomycin. C diff PCR, pathogens (-).  Patient remains obtunded, hypertension and tachycardia treated and resolved.  No leukocytosis. Liver enzymes WNL. Ammonia 58.  Neuro workup NTD.  Patient follows with Dr. Fernandez for outpatient GI care.      # encephalopathy- hepatic v ? wernicke's encephalopathy v encephalitis v other encephalopathy syndrome- " improving  # ? KLEIN  # recent c diff x2  # possible UTI  # weight loss     Plan:  - continue supportive care  - diet as tolerated  - follow up infectious work up  - follow up ID recs  - continue lactulose 20 gm qid  - continue PPI BID per previous GI recs  - possibility of acutely worsened hepatic encephalopathy secondary to infectious or inflammatory process  - pt will continue to follow up with Dr. Fernandez in clinic for GI care     Plan has been discussed with Dr. Downing. GI will sign off.       Pili Boles, APRN/CNP

## 2024-11-22 NOTE — PROGRESS NOTES
Samuel Mims is a 48 y.o. female on day 6 of admission presenting with Sepsis with encephalopathy without septic shock, due to unspecified organism (Multi).    Subjective   CSF fluid shows no bacterial or viral infection.  Patient continuously pulling on leads, and flexiseal devices so soft restraints placed.   Per  she was lucid yesterday morning with talking, but by noon she was confused again.       Objective     Last Recorded Vitals  /85 (BP Location: Right arm, Patient Position: Lying)   Pulse 104   Temp 36.4 °C (97.5 °F) (Temporal)   Resp 18   Wt 107 kg (235 lb 8 oz)   SpO2 100%   Intake/Output last 3 Shifts:    Intake/Output Summary (Last 24 hours) at 11/22/2024 0713  Last data filed at 11/22/2024 0543  Gross per 24 hour   Intake 150 ml   Output 1300 ml   Net -1150 ml       Admission Weight  Weight: 127 kg (280 lb) (11/16/24 1755)    Daily Weight  11/22/24 : 107 kg (235 lb 8 oz)      Physical Exam:  General: Not in acute distress  HEENT: PERRLA, Left TM indicates recent otitis media, Right TM full of fluid.  Neck: Normal to inspection  Lungs: Clear to auscultation, work of breathing within normal limit  Cardiac: Regular rate and rhythm  Abdomen: Soft nontender, positive bowel sounds  : Exam deferred  Skin: Intact  Hematology: No petechia or excessive ecchymosis  Musculoskeletal: Without significant trauma  Neurological: Alert and oriented X 0,  Unable to answer questions appropriately. Very restless in bed.    Relevant Results  Scheduled medications  budesonide, 0.5 mg, nebulization, BID  [Held by provider] busPIRone, 10 mg, oral, BID  influenza, 0.5 mL, intramuscular, During hospitalization  formoterol, 20 mcg, nebulization, BID  heparin (porcine), 5,000 Units, subcutaneous, q8h SANTIAGO  lactulose, 20 g, oral, q4h  losartan, 50 mg, oral, BID  metoprolol tartrate, 50 mg, nasoduodenal tube, q12h  multivitamin with minerals, 1 tablet, oral, Daily  [Held by provider] pantoprazole, 40 mg,  oral, BID AC  pantoprazole, 40 mg, intravenous, BID AC  [Held by provider] spironolactone, 12.5 mg, oral, Daily  thiamine, 250 mg, intravenous, Daily   Followed by  [START ON 11/25/2024] thiamine, 100 mg, oral, Daily  vancomycin, 125 mg, nasoduodenal tube, 4x daily      Continuous medications     PRN medications  PRN medications: acetaminophen **OR** acetaminophen, albuterol, [Held by provider] clonazePAM, hydrALAZINE, melatonin, metoprolol   Results for orders placed or performed during the hospital encounter of 11/16/24 (from the past 24 hours)   Magnesium   Result Value Ref Range    Magnesium 2.11 1.60 - 2.40 mg/dL   Comprehensive Metabolic Panel   Result Value Ref Range    Glucose 106 (H) 74 - 99 mg/dL    Sodium 147 (H) 136 - 145 mmol/L    Potassium 3.8 3.5 - 5.3 mmol/L    Chloride 114 (H) 98 - 107 mmol/L    Bicarbonate 22 21 - 32 mmol/L    Anion Gap 15 10 - 20 mmol/L    Urea Nitrogen 14 6 - 23 mg/dL    Creatinine 0.93 0.50 - 1.05 mg/dL    eGFR 76 >60 mL/min/1.73m*2    Calcium 9.6 8.6 - 10.3 mg/dL    Albumin 3.8 3.4 - 5.0 g/dL    Alkaline Phosphatase 68 33 - 110 U/L    Total Protein 6.6 6.4 - 8.2 g/dL    AST 59 (H) 9 - 39 U/L    Bilirubin, Total 1.0 0.0 - 1.2 mg/dL    ALT 64 (H) 7 - 45 U/L      Lab Results   Component Value Date    BLOODCULT No growth at 4 days -  FINAL REPORT 11/16/2024    BLOODCULT No growth at 4 days -  FINAL REPORT 11/16/2024    URINECULTURE (A) 11/16/2024     Multiple organisms present, probable contamination. Repeat culture if clinically indicated.       I  ECG 12 lead    Result Date: 11/20/2024  Sinus tachycardia Left axis deviation Left ventricular hypertrophy with repolarization abnormality Inferior infarct (cited on or before 27-AUG-2024) Anteroseptal infarct (cited on or before 27-AUG-2024) Abnormal ECG When compared with ECG of 27-AUG-2024 02:40, ST now depressed in Lateral leads Confirmed by Erin Gardner ([id number]) on 11/20/2024 7:29:17 AM     MOLECULAR MICROBIOLOGY/ANTIGEN    Herpes  simplex virus Type 1 PCR, Qual, CSF       Not De...      Herpes simplex virus Type 1 PCR, Qual, CSF     Escherichia coli K1, CSF, PCR       Not De...      Escherichia coli K1, CSF, PCR     Human Herpesvirus 6, CSF, PCR       Not De...      Human Herpesvirus 6, CSF, PCR     Herpes Simplex 1, CSF, PCR       Not De...      Herpes Simplex 1, CSF, PCR     Herpes Simplex 2, CSF, PCR       Not De...      Herpes Simplex 2, CSF, PCR     Human Parechovirus, CSF, PCR       Not De...      Human Parechovirus, CSF, PCR     Varicella Zoster CSF PCR       Not De...      Varicella Zoster CSF PCR     Haemophilus influenzae, CSF, PCR       Not De...      Haemophilus influenzae, CSF, PCR     Listeria monocytogenes, CSF, PCR       Not De...      Listeria monocytogenes, CSF, PCR     Neisseria meningitidis, CSF, PCR       Not De...      Neisseria meningitidis, CSF, PCR     Strep. agalactiae, CSF, PCR       Not De...      Strep. agalactiae, CSF, PCR     Strep.pneumoniae, CSF, PCR       Not De...      Strep.pneumoniae, CSF, PCR     Cytomegalovirus, CSF, PCR       Not De...      Cytomegalovirus, CSF, PCR     Enterovirus, CSF, PCR       Not De...      Enterovirus, CSF, PCR     Cryptococcus, CSF, PCR       Not De...      Cryptococcus, CSF, PCR     Color, CSF       Colorl...      Color, CSF     HSV 2 PCR, QuaL, CSF       Not De...      HSV 2 PCR, QuaL, CSF             Assessment/Plan   Samuel Mims is a 48 y.o. female on day 6 of admission presenting with Sepsis with encephalopathy without septic shock, due to unspecified organism (Multi).  Principal Problem:    Sepsis with encephalopathy without septic shock, due to unspecified organism (Multi)  Active Problems:    Diarrhea    UTI (urinary tract infection)    Hypertension    Colitis    Sinus tachycardia    Prolonged Q-T interval on ECG    Clostridium difficile infection     Metabolic encephalopathy- multifactorial, possible Wernicke's, possible hepatic encephalopathy   Possibly due to  significant weight loss of 50+ pounds w/poor nutritional intake,  PRES from poorly treated HTN.     CT head with no acute changes. Urine Tox screen negative.  No WBC count.  Ammonia mildly elevated. Blood cultures are negative X2.  Corpak inserted due to poor oral intake but removed when she became alert on 11/21.  On 11/19/24 MRI head of poor quality due to motion artifact but show increased signal within the medial thalami, periaqueductal region and possible increased signal within the caudate and cortex of the bilateral posterior frontal lobes.  Started on IV Thiamine and MVI for possible Wernicke's.  IV Acyclovir discontinued as CSF negative for HSV infection.  On Lactulose for mildly elevated ammonia levels.  Had Lumbar Puncture on 11/20 and results negative for viruses and bacteria.  Some improvement in her alertness since 11/21/24, but not able to answer questions.     Untreated C. Difficile Colitis  Per  she has not taken the oral Vancomycin prescribed at discharge on 11/2/24.   Stool for pathogens negative.  C. Diff PCR negative but since untreated this could be false negative.  On oral Vancomycin.  CT a/p confirms she continues to have colitis.  Has diarrhea now due to oral lactulose.        HTN and Sinus Tachycardia  On oral Losartan 50 mg, Metoprolol tartrate 50 mg bid, Spironolactone 12.5 mg  Blood pressure mildly elevated and continues to be tachycardic off and on.     Asthma  Budesonide 0.5 mg and Formoterol nebs bid.     CHET  On hold Buspirone 10 mg bid.  Klonopin on hold due to encephalopathy     GERD  Oral Pantoprazole 40 mg bid.     Recent Otitis Media  Ears show fluid probably causing some hearing impairment        Plan discussed with .       Nell Green MD

## 2024-11-22 NOTE — PROGRESS NOTES
"Samuel Mims is a 48 y.o. female on day 6 of admission presenting with Sepsis with encephalopathy without septic shock, due to unspecified organism (Multi).      Subjective   Agitated during night, pulling at lines. Required soft wrist restraints.  This morning pt states that she \"feels confused.\"       Objective     Last Recorded Vitals  Blood pressure 141/85, pulse 104, temperature 36.4 °C (97.5 °F), temperature source Temporal, resp. rate 18, height 1.676 m (5' 6\"), weight 107 kg (235 lb 8 oz), SpO2 100%.    Physical Exam  Neurological Exam  Neurological Exam  Physical Exam  Constitutional:       General: She is awake.   Eyes:      Pupils: Pupils are equal, round, and reactive to light.   Neurological:      Mental Status: She is alert.      Motor: Motor strength is normal.  Psychiatric:         Speech: Speech normal.         Neurological Exam  Mental Status  Awake and alert. Oriented only to person and place. Speech is normal. Language is fluent with no aphasia.  Following commands.     Cranial Nerves  CN III, IV, VI: Pupils equal round and reactive to light bilaterally.  CN V: Facial sensation is normal.  CN IX, X: Palate elevates symmetrically  CN XII: Tongue midline without atrophy or fasciculations.  Blinks to bilateral visual threat..     Motor  Normal muscle bulk throughout. Normal muscle tone. Strength is 5/5 throughout all four extremities.  Generalized weakness..     Sensory  Sensation is intact to light touch in all four extremities.     Coordination  Bilateral FTN ataxia - mild    Relevant Results    Assessment & Plan  Sepsis with encephalopathy without septic shock, due to unspecified organism (Multi)    Diarrhea    UTI (urinary tract infection)    Hypertension    Colitis    Sinus tachycardia    Prolonged Q-T interval on ECG    Clostridium difficile infection    Metabolic encephalopathy - likely 2/2 Wernicke's encephalopathy given pt   continuing to improve. Other possibilities include PRES, " inflammation. Waiting for repeat MRI brain.     Recommend:     CSF indicates no CNS infection  High dose thiamine; vitamin B1 level pending  Blood pressure control per primary care team.     Continue supportive care.  Neuro checks every 4 hours.  MRI brain with and without contrast pending     Case/plan discussed with Dr. Tracey.  Neurology will continue to follow.      Nelly Manley, APRN-CNP

## 2024-11-22 NOTE — SIGNIFICANT EVENT
Notified by RN that patient has continuously been pulling of telemetry leads overnight and has been restless, and has just now pulled out peripheral IV as well as the flexiseal device which was in place. Patient cannot be reoriented at this time and continues to interfere with treatment and care. Bilateral soft wrist restraints ordered at this time. RN to replace IV and reapply telemetry monitoring, as well as replace flexiseal as she continues to have large amounts of liquid stool. Nursing to attempt to remove restraints as able to over the next 24 hours.

## 2024-11-22 NOTE — NURSING NOTE
"Patient remains A&Ox0 and unable to follow commands. She does occasionally say a word here and there including \"hi\" but does not answer questions including orientation questions. Patient continuously pulling telemetry leads, pulled out one of her IV's and FMS tube. Notified [facility]. Bilateral soft wrist restraints applied, telemetry leads replaced, other IV secured, and FMS replaced. Patient on oral lactulose, remains having large amount of liquid stool. Remains in NSR-ST and on RA. Safety maintained.   "

## 2024-11-23 LAB
ALBUMIN SERPL BCP-MCNC: 4.1 G/DL (ref 3.4–5)
ALP SERPL-CCNC: 75 U/L (ref 33–110)
ALT SERPL W P-5'-P-CCNC: 65 U/L (ref 7–45)
ANION GAP SERPL CALC-SCNC: 14 MMOL/L (ref 10–20)
AST SERPL W P-5'-P-CCNC: 44 U/L (ref 9–39)
BILIRUB SERPL-MCNC: 0.9 MG/DL (ref 0–1.2)
BUN SERPL-MCNC: 18 MG/DL (ref 6–23)
CALCIUM SERPL-MCNC: 9.8 MG/DL (ref 8.6–10.3)
CHLORIDE SERPL-SCNC: 113 MMOL/L (ref 98–107)
CO2 SERPL-SCNC: 22 MMOL/L (ref 21–32)
CREAT SERPL-MCNC: 1.01 MG/DL (ref 0.5–1.05)
EGFRCR SERPLBLD CKD-EPI 2021: 68 ML/MIN/1.73M*2
ERYTHROCYTE [DISTWIDTH] IN BLOOD BY AUTOMATED COUNT: 16.9 % (ref 11.5–14.5)
GLUCOSE SERPL-MCNC: 117 MG/DL (ref 74–99)
HCT VFR BLD AUTO: 40.3 % (ref 36–46)
HGB BLD-MCNC: 12.2 G/DL (ref 12–16)
MAGNESIUM SERPL-MCNC: 2.04 MG/DL (ref 1.6–2.4)
MCH RBC QN AUTO: 27.2 PG (ref 26–34)
MCHC RBC AUTO-ENTMCNC: 30.3 G/DL (ref 32–36)
MCV RBC AUTO: 90 FL (ref 80–100)
NRBC BLD-RTO: 0 /100 WBCS (ref 0–0)
PLATELET # BLD AUTO: 170 X10*3/UL (ref 150–450)
POTASSIUM SERPL-SCNC: 3.4 MMOL/L (ref 3.5–5.3)
PROT SERPL-MCNC: 6.9 G/DL (ref 6.4–8.2)
RBC # BLD AUTO: 4.49 X10*6/UL (ref 4–5.2)
SODIUM SERPL-SCNC: 146 MMOL/L (ref 136–145)
VIT B1 PYROPHOSHATE BLD-SCNC: 33 NMOL/L (ref 70–180)
WBC # BLD AUTO: 6 X10*3/UL (ref 4.4–11.3)

## 2024-11-23 PROCEDURE — 2060000001 HC INTERMEDIATE ICU ROOM DAILY

## 2024-11-23 PROCEDURE — 85027 COMPLETE CBC AUTOMATED: CPT | Performed by: PHYSICIAN ASSISTANT

## 2024-11-23 PROCEDURE — 2500000001 HC RX 250 WO HCPCS SELF ADMINISTERED DRUGS (ALT 637 FOR MEDICARE OP): Performed by: NURSE PRACTITIONER

## 2024-11-23 PROCEDURE — 2500000002 HC RX 250 W HCPCS SELF ADMINISTERED DRUGS (ALT 637 FOR MEDICARE OP, ALT 636 FOR OP/ED)

## 2024-11-23 PROCEDURE — 2500000004 HC RX 250 GENERAL PHARMACY W/ HCPCS (ALT 636 FOR OP/ED): Performed by: NURSE PRACTITIONER

## 2024-11-23 PROCEDURE — 83735 ASSAY OF MAGNESIUM: CPT | Performed by: NURSE PRACTITIONER

## 2024-11-23 PROCEDURE — 36415 COLL VENOUS BLD VENIPUNCTURE: CPT | Performed by: PHYSICIAN ASSISTANT

## 2024-11-23 PROCEDURE — 80053 COMPREHEN METABOLIC PANEL: CPT | Performed by: NURSE PRACTITIONER

## 2024-11-23 PROCEDURE — 2500000004 HC RX 250 GENERAL PHARMACY W/ HCPCS (ALT 636 FOR OP/ED): Performed by: STUDENT IN AN ORGANIZED HEALTH CARE EDUCATION/TRAINING PROGRAM

## 2024-11-23 PROCEDURE — 94640 AIRWAY INHALATION TREATMENT: CPT

## 2024-11-23 PROCEDURE — 99233 SBSQ HOSP IP/OBS HIGH 50: CPT | Performed by: STUDENT IN AN ORGANIZED HEALTH CARE EDUCATION/TRAINING PROGRAM

## 2024-11-23 PROCEDURE — 2500000005 HC RX 250 GENERAL PHARMACY W/O HCPCS: Performed by: NURSE PRACTITIONER

## 2024-11-23 RX ORDER — POTASSIUM CHLORIDE 14.9 MG/ML
20 INJECTION INTRAVENOUS ONCE
Status: COMPLETED | OUTPATIENT
Start: 2024-11-23 | End: 2024-11-23

## 2024-11-23 RX ORDER — HALOPERIDOL 5 MG/ML
2 INJECTION INTRAMUSCULAR ONCE
Status: COMPLETED | OUTPATIENT
Start: 2024-11-23 | End: 2024-11-23

## 2024-11-23 RX ADMIN — METOPROLOL TARTRATE 50 MG: 50 TABLET, FILM COATED ORAL at 20:26

## 2024-11-23 RX ADMIN — HALOPERIDOL LACTATE 2 MG: 5 INJECTION, SOLUTION INTRAMUSCULAR at 20:26

## 2024-11-23 RX ADMIN — HEPARIN SODIUM 5000 UNITS: 5000 INJECTION INTRAVENOUS; SUBCUTANEOUS at 22:25

## 2024-11-23 RX ADMIN — BUDESONIDE 0.5 MG: 0.5 INHALANT RESPIRATORY (INHALATION) at 21:02

## 2024-11-23 RX ADMIN — HEPARIN SODIUM 5000 UNITS: 5000 INJECTION INTRAVENOUS; SUBCUTANEOUS at 14:15

## 2024-11-23 RX ADMIN — LACTULOSE 20 G: 20 SOLUTION ORAL at 10:48

## 2024-11-23 RX ADMIN — HEPARIN SODIUM 5000 UNITS: 5000 INJECTION INTRAVENOUS; SUBCUTANEOUS at 06:18

## 2024-11-23 RX ADMIN — VANCOMYCIN HYDROCHLORIDE 125 MG: KIT at 06:18

## 2024-11-23 RX ADMIN — VANCOMYCIN HYDROCHLORIDE 125 MG: KIT at 17:00

## 2024-11-23 RX ADMIN — VANCOMYCIN HYDROCHLORIDE 125 MG: KIT at 20:26

## 2024-11-23 RX ADMIN — POTASSIUM CHLORIDE 20 MEQ: 14.9 INJECTION, SOLUTION INTRAVENOUS at 12:30

## 2024-11-23 RX ADMIN — PANTOPRAZOLE SODIUM 40 MG: 40 INJECTION, POWDER, FOR SOLUTION INTRAVENOUS at 06:18

## 2024-11-23 RX ADMIN — FORMOTEROL FUMARATE 20 MCG: 20 SOLUTION RESPIRATORY (INHALATION) at 21:02

## 2024-11-23 RX ADMIN — LACTULOSE 20 G: 20 SOLUTION ORAL at 17:51

## 2024-11-23 RX ADMIN — Medication 1 TABLET: at 10:47

## 2024-11-23 RX ADMIN — BUDESONIDE 0.5 MG: 0.5 INHALANT RESPIRATORY (INHALATION) at 09:09

## 2024-11-23 RX ADMIN — PANTOPRAZOLE SODIUM 40 MG: 40 INJECTION, POWDER, FOR SOLUTION INTRAVENOUS at 17:51

## 2024-11-23 RX ADMIN — VANCOMYCIN HYDROCHLORIDE 125 MG: KIT at 14:15

## 2024-11-23 RX ADMIN — FORMOTEROL FUMARATE 20 MCG: 20 SOLUTION RESPIRATORY (INHALATION) at 09:09

## 2024-11-23 RX ADMIN — THIAMINE HYDROCHLORIDE 250 MG: 100 INJECTION, SOLUTION INTRAMUSCULAR; INTRAVENOUS at 10:47

## 2024-11-23 RX ADMIN — LACTULOSE 20 G: 20 SOLUTION ORAL at 14:15

## 2024-11-23 RX ADMIN — LACTULOSE 20 G: 20 SOLUTION ORAL at 22:25

## 2024-11-23 ASSESSMENT — PAIN - FUNCTIONAL ASSESSMENT
PAIN_FUNCTIONAL_ASSESSMENT: WONG-BAKER FACES
PAIN_FUNCTIONAL_ASSESSMENT: 0-10
PAIN_FUNCTIONAL_ASSESSMENT: FLACC (FACE, LEGS, ACTIVITY, CRY, CONSOLABILITY)
PAIN_FUNCTIONAL_ASSESSMENT: 0-10
PAIN_FUNCTIONAL_ASSESSMENT: 0-10
PAIN_FUNCTIONAL_ASSESSMENT: WONG-BAKER FACES
PAIN_FUNCTIONAL_ASSESSMENT: WONG-BAKER FACES

## 2024-11-23 ASSESSMENT — COGNITIVE AND FUNCTIONAL STATUS - GENERAL
PERSONAL GROOMING: A LITTLE
STANDING UP FROM CHAIR USING ARMS: A LITTLE
DAILY ACTIVITIY SCORE: 18
TOILETING: A LITTLE
MOBILITY SCORE: 20
CLIMB 3 TO 5 STEPS WITH RAILING: A LITTLE
MOVING TO AND FROM BED TO CHAIR: A LITTLE
HELP NEEDED FOR BATHING: A LITTLE
WALKING IN HOSPITAL ROOM: A LITTLE
DRESSING REGULAR UPPER BODY CLOTHING: A LITTLE
EATING MEALS: A LITTLE
DRESSING REGULAR LOWER BODY CLOTHING: A LITTLE

## 2024-11-23 ASSESSMENT — PAIN SCALES - GENERAL
PAINLEVEL_OUTOF10: 0 - NO PAIN

## 2024-11-23 NOTE — NURSING NOTE
"Patient remained in NSR and on RA. Remains A&Ox1, unable to follow commands. Soft wrist restraints maintained, safety checks performed as ordered. Patient refusing lactulose, shakes head \"no\" when attempting to give patient medication. No c/o pain. Safety maintained.  "

## 2024-11-23 NOTE — PROGRESS NOTES
"Samuel Mims is a 48 y.o. female on day 7 of admission presenting with Sepsis with encephalopathy without septic shock, due to unspecified organism (Multi).      Subjective   Remains confused but awake and alert  Was able to eat breakfast with assistance   Has not been able to sit still for MRI        Objective     Last Recorded Vitals  Blood pressure (!) 136/91, pulse 106, temperature 35.8 °C (96.4 °F), temperature source Temporal, resp. rate 20, height 1.676 m (5' 6\"), weight 106 kg (233 lb 7.5 oz), SpO2 95%.    Physical Exam  Constitutional:       General: She is awake.   Eyes:      Extraocular Movements: Extraocular movements intact.   Neurological:      Mental Status: She is alert.      Motor: Motor strength is normal.      Neurological Exam  Mental Status  Awake and alert.  Unclear if she has severe hearing loss  Was able to make spontaneously fluent speech but could not answer questions   Followed midline and appendicular one step commands .    Cranial Nerves  CN II: Visual fields full to confrontation.  CN III, IV, VI: Extraocular movements intact bilaterally.  CN VII: Full and symmetric facial movement.  CN IX, X: Palate elevates symmetrically  CN XI: Shoulder shrug strength is normal.  CN XII: Tongue midline without atrophy or fasciculations.  Appeared to have difficulty understanding verbal speech and pointed to her ears so it is unclear if she is experiencing bilateral hearing loss .    Motor   Strength is 5/5 throughout all four extremities.    Sensory  Light touch is normal in upper and lower extremities.     Relevant Results              [place] Coma Scale  Best Eye Response: Spontaneous  Best Verbal Response: Confused  Best Motor Response: Localizes pain  [place] Coma Scale Score: 13                             Assessment/Plan   This patient currently has cardiac telemetry ordered; if you would like to modify or discontinue the telemetry order, click here to go to the orders activity to " modify/discontinue the order.  Assessment & Plan  Sepsis with encephalopathy without septic shock, due to unspecified organism (Multi)    Diarrhea    UTI (urinary tract infection)    Hypertension    Colitis    Sinus tachycardia    Prolonged Q-T interval on ECG    Clostridium difficile infection    Concern for wernicke's encephalopathy given MRI findings of bilateral mamillary body involvement though initial MRI was greatly motion degraded    LP - CSF studies were unremarkable    Will continue high dose IV thiamine supplementation as ordered    ID evaluated for treatment of cdiff     Given her difficulty staying still in MRI, will hold off for now. But would like to consider repeat MRI as able due to the severity of motion degradation on initial study    Neurology will continue to follow     Tiffanie Tracey MD

## 2024-11-23 NOTE — NURSING NOTE
Pt removed swr herself and pulled out her fms which is the 2nd time she has done this today.  No stool in unit. Rectum red and irritated, not replaced.

## 2024-11-23 NOTE — CARE PLAN
Problem: Skin  Goal: Participates in plan/prevention/treatment measures  Outcome: Progressing  Goal: Prevent/manage excess moisture  Outcome: Progressing  Goal: Prevent/minimize sheer/friction injuries  Outcome: Progressing  Goal: Promote/optimize nutrition  Outcome: Progressing  Goal: Promote skin healing  Outcome: Progressing     Problem: Pain - Adult  Goal: Verbalizes/displays adequate comfort level or baseline comfort level  Outcome: Progressing     Problem: Discharge Planning  Goal: Discharge to home or other facility with appropriate resources  Outcome: Progressing   The patient's goals for the shift include The patient is too lethargic at this time to participate in her care plan    The clinical goals for the shift include Patient will remain alert by end of shift 11/23/24 by 0700.

## 2024-11-23 NOTE — PROGRESS NOTES
Samuel Mims is a 48 y.o. female on day 7 of admission presenting with Sepsis with encephalopathy without septic shock, due to unspecified organism (Multi). I evaluate patient as a partner with attending, Dr Green.    Subjective   Evaluated patient and reviewed notes of the last day or more. The gastroenterology team states they are available if needed, their workup is complete. Nursing states patient is declining her lactulose dosing; combine this with normalization of the ammonia level. ID note states CSF fluid shows no bacterial or viral infection. Acyclovir was discontinued as not indicated. ID notes state they are available if needed. Neurology team visits patient daily.     New lab findings show the vitamin B1 level was low (value 33 with normal between 70 - 180). The result supports the diagnosis of Wernicke encephalopaty. The patient has even prior to lab results been dosed with thiamine supplementation.  Symptoms of Wernicke encephalopathy include confusion and loss of mental activity, and such is consistent in her situation. She is receiving replacement of thiamine and providing proper nutrition and hydration. Await neurology opinion on the lab results.        Objective     Last Recorded Vitals  BP (!) 136/91 (BP Location: Left arm, Patient Position: Lying)   Pulse 106   Temp 35.8 °C (96.4 °F) (Temporal)   Resp 20   Wt 106 kg (233 lb 7.5 oz)   SpO2 95%   Intake/Output last 3 Shifts:    Intake/Output Summary (Last 24 hours) at 11/23/2024 1039  Last data filed at 11/23/2024 0400  Gross per 24 hour   Intake 100 ml   Output 601 ml   Net -501 ml       Admission Weight  Weight: 127 kg (280 lb) (11/16/24 1755)    Daily Weight  11/23/24 : 106 kg (233 lb 7.5 oz)      Physical Exam:  General: Not in acute distress  HEENT: PERRLA, Left TM indicates recent otitis media, Right TM full of fluid.  Neck: Normal to inspection  Lungs: Clear to auscultation, work of breathing within normal limit  Cardiac: Regular  rate and rhythm  Abdomen: Soft nontender, positive bowel sounds  : Exam deferred  Skin: Intact  Hematology: No petechia or excessive ecchymosis  Musculoskeletal: Without significant trauma  Neurological: Alert and oriented X 0,  Unable to answer questions appropriately. Very restless in bed.    Relevant Results  Scheduled medications  budesonide, 0.5 mg, nebulization, BID  [Held by provider] busPIRone, 10 mg, oral, BID  influenza, 0.5 mL, intramuscular, During hospitalization  formoterol, 20 mcg, nebulization, BID  heparin (porcine), 5,000 Units, subcutaneous, q8h SANTIAGO  lactulose, 20 g, oral, q4h  losartan, 50 mg, oral, BID  metoprolol tartrate, 50 mg, nasoduodenal tube, q12h  multivitamin with minerals, 1 tablet, oral, Daily  [Held by provider] pantoprazole, 40 mg, oral, BID AC  pantoprazole, 40 mg, intravenous, BID AC  potassium chloride, 20 mEq, intravenous, Once  [Held by provider] spironolactone, 12.5 mg, oral, Daily  thiamine, 250 mg, intravenous, Daily   Followed by  [START ON 11/25/2024] thiamine, 100 mg, oral, Daily  vancomycin, 125 mg, nasoduodenal tube, 4x daily      Continuous medications     PRN medications  PRN medications: acetaminophen **OR** acetaminophen, albuterol, [Held by provider] clonazePAM, hydrALAZINE, melatonin, metoprolol   Results for orders placed or performed during the hospital encounter of 11/16/24 (from the past 24 hours)   CBC   Result Value Ref Range    WBC 6.7 4.4 - 11.3 x10*3/uL    nRBC 0.0 0.0 - 0.0 /100 WBCs    RBC 4.29 4.00 - 5.20 x10*6/uL    Hemoglobin 11.9 (L) 12.0 - 16.0 g/dL    Hematocrit 37.4 36.0 - 46.0 %    MCV 87 80 - 100 fL    MCH 27.7 26.0 - 34.0 pg    MCHC 31.8 (L) 32.0 - 36.0 g/dL    RDW 16.7 (H) 11.5 - 14.5 %    Platelets 203 150 - 450 x10*3/uL   Comprehensive Metabolic Panel   Result Value Ref Range    Glucose 109 (H) 74 - 99 mg/dL    Sodium 145 136 - 145 mmol/L    Potassium 3.6 3.5 - 5.3 mmol/L    Chloride 114 (H) 98 - 107 mmol/L    Bicarbonate 21 21 - 32  mmol/L    Anion Gap 14 10 - 20 mmol/L    Urea Nitrogen 16 6 - 23 mg/dL    Creatinine 0.99 0.50 - 1.05 mg/dL    eGFR 70 >60 mL/min/1.73m*2    Calcium 9.8 8.6 - 10.3 mg/dL    Albumin 4.0 3.4 - 5.0 g/dL    Alkaline Phosphatase 73 33 - 110 U/L    Total Protein 6.9 6.4 - 8.2 g/dL    AST 54 (H) 9 - 39 U/L    Bilirubin, Total 0.9 0.0 - 1.2 mg/dL    ALT 67 (H) 7 - 45 U/L   Magnesium   Result Value Ref Range    Magnesium 2.04 1.60 - 2.40 mg/dL   Comprehensive Metabolic Panel   Result Value Ref Range    Glucose 117 (H) 74 - 99 mg/dL    Sodium 146 (H) 136 - 145 mmol/L    Potassium 3.4 (L) 3.5 - 5.3 mmol/L    Chloride 113 (H) 98 - 107 mmol/L    Bicarbonate 22 21 - 32 mmol/L    Anion Gap 14 10 - 20 mmol/L    Urea Nitrogen 18 6 - 23 mg/dL    Creatinine 1.01 0.50 - 1.05 mg/dL    eGFR 68 >60 mL/min/1.73m*2    Calcium 9.8 8.6 - 10.3 mg/dL    Albumin 4.1 3.4 - 5.0 g/dL    Alkaline Phosphatase 75 33 - 110 U/L    Total Protein 6.9 6.4 - 8.2 g/dL    AST 44 (H) 9 - 39 U/L    Bilirubin, Total 0.9 0.0 - 1.2 mg/dL    ALT 65 (H) 7 - 45 U/L   CBC   Result Value Ref Range    WBC 6.0 4.4 - 11.3 x10*3/uL    nRBC 0.0 0.0 - 0.0 /100 WBCs    RBC 4.49 4.00 - 5.20 x10*6/uL    Hemoglobin 12.2 12.0 - 16.0 g/dL    Hematocrit 40.3 36.0 - 46.0 %    MCV 90 80 - 100 fL    MCH 27.2 26.0 - 34.0 pg    MCHC 30.3 (L) 32.0 - 36.0 g/dL    RDW 16.9 (H) 11.5 - 14.5 %    Platelets 170 150 - 450 x10*3/uL      Lab Results   Component Value Date    BLOODCULT No growth at 4 days -  FINAL REPORT 11/16/2024    BLOODCULT No growth at 4 days -  FINAL REPORT 11/16/2024    URINECULTURE (A) 11/16/2024     Multiple organisms present, probable contamination. Repeat culture if clinically indicated.        MOLECULAR MICROBIOLOGY/ANTIGEN    Herpes simplex virus Type 1 PCR, Qual, CSF       Not De...      Herpes simplex virus Type 1 PCR, Qual, CSF     Escherichia coli K1, CSF, PCR       Not De...      Escherichia coli K1, CSF, PCR     Human Herpesvirus 6, CSF, PCR       Not De...       Human Herpesvirus 6, CSF, PCR     Herpes Simplex 1, CSF, PCR       Not De...      Herpes Simplex 1, CSF, PCR     Herpes Simplex 2, CSF, PCR       Not De...      Herpes Simplex 2, CSF, PCR     Human Parechovirus, CSF, PCR       Not De...      Human Parechovirus, CSF, PCR     Varicella Zoster CSF PCR       Not De...      Varicella Zoster CSF PCR     Haemophilus influenzae, CSF, PCR       Not De...      Haemophilus influenzae, CSF, PCR     Listeria monocytogenes, CSF, PCR       Not De...      Listeria monocytogenes, CSF, PCR     Neisseria meningitidis, CSF, PCR       Not De...      Neisseria meningitidis, CSF, PCR     Strep. agalactiae, CSF, PCR       Not De...      Strep. agalactiae, CSF, PCR     Strep.pneumoniae, CSF, PCR       Not De...      Strep.pneumoniae, CSF, PCR     Cytomegalovirus, CSF, PCR       Not De...      Cytomegalovirus, CSF, PCR     Enterovirus, CSF, PCR       Not De...      Enterovirus, CSF, PCR     Cryptococcus, CSF, PCR       Not De...      Cryptococcus, CSF, PCR     Color, CSF       Colorl...      Color, CSF     HSV 2 PCR, QuaL, CSF       Not De...      HSV 2 PCR, QuaL, CSF             Assessment/Plan   Samuel Mims is a 48 y.o. female on day 7 of admission presenting with Sepsis with encephalopathy without septic shock, due to unspecified organism (Multi).  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 The patient is followed by neurology team. Today is her 7th day of admission, and the confused patient has protocol for minimal but necessary restraints for safety. Neurology is controlling orders of the encephalopathy, and the notes reflect a pending MRI of the brain. Spinal tap was  accomplished 11-20-24, and no infection has been found.     Consideration of infection has been evaluated by infection disease team. Acyclovir was discontinued as not likely beneficial and negative reward/risk ratio. Blood cultures-x2 were negative at four days. Stool cultures were negative. Spinal cultures/tests were negative.     New lab findings show the vitamin B1 level was low (value 33, with normal between 70 - 180). The result supports the diagnosis of Wernicke encephalopaty. The patient has even prior to lab results been dosed with thiamine supplementation.  Symptoms of Wernicke encephalopathy include confusion and loss of mental activity, and such is consistent in her situation. She is receiving replacement of thiamine and providing proper nutrition and hydration. Await neurology opinion on the lab results.     Add there is a 45 pound weight loss in a short timeframe, prior to admission. The patient has prior diagnosis of C. diff, and prior to hospital the report is that she was non-compliant with oral vancomycin. Since admission the patient was evaluated by both infection disease and gastroenterology. The workup complete, the gastroenterology team signs off the case but is available should need arise.     Metabolic encephalopathy- multifactorial, possible Wernicke's, possible hepatic encephalopathy   New lab findings show the vitamin B1 level was low (value 33 with normal between 70 - 180). The result supports the diagnosis of Wernicke encephalopaty. The patient has even prior to lab results been dosed with thiamine supplementation.  Symptoms of Wernicke encephalopathy include confusion and loss of mental activity, and such is consistent in her situation. She is receiving replacement of thiamine and providing proper nutrition and hydration. Await neurology opinion on the lab results.     Possibly due to significant weight loss of 50+ pounds w/poor nutritional intake,  PRES from poorly treated HTN.     CT  head with no acute changes. Urine Tox screen negative.  No WBC count.  Ammonia mildly elevated. Blood cultures are negative X2.  Corpak inserted due to poor oral intake but removed when she became alert on 11/21.  On 11/19/24 MRI head of poor quality due to motion artifact but show increased signal within the medial thalami, periaqueductal region and possible increased signal within the caudate and cortex of the bilateral posterior frontal lobes.  Started on IV Thiamine and MVI for possible Wernicke's.  IV Acyclovir discontinued as CSF negative for HSV infection.  On Lactulose for mildly elevated ammonia levels.  Had Lumbar Puncture on 11/20 and results negative for viruses and bacteria.  Some improvement in her alertness since 11/21/24, but not able to answer questions.     Untreated C. Difficile Colitis  Per  she has not taken the oral Vancomycin prescribed at discharge on 11/2/24.   Stool for pathogens negative.  C. Diff PCR negative but since untreated this could be false negative.  On oral Vancomycin.  CT a/p confirms she continues to have colitis.  Has diarrhea now due to oral lactulose, should she accept the dose.         HTN and Sinus Tachycardia  On oral Metoprolol tartrate 50 mg bid, Spironolactone 12.5 mg; the losartan is to be held, due to relatively low blood pressure.   Blood pressure generally controlled in the last day. Pulse generally between 85 and 100, currently rising to 125 as the family brushes her hair and her confusion increases anxiety.       Asthma  Budesonide 0.5 mg and Formoterol nebs bid.     CHET  On hold Buspirone 10 mg bid.  Klonopin on hold due to encephalopathy     GERD  Oral Pantoprazole 40 mg bid.     Recent Otitis Media  Ears show fluid probably causing some hearing impairment        Plan discussed with .       Dean Gutiérrez MD

## 2024-11-24 VITALS
DIASTOLIC BLOOD PRESSURE: 96 MMHG | TEMPERATURE: 98.6 F | HEIGHT: 66 IN | WEIGHT: 233.47 LBS | OXYGEN SATURATION: 97 % | BODY MASS INDEX: 37.52 KG/M2 | HEART RATE: 108 BPM | RESPIRATION RATE: 20 BRPM | SYSTOLIC BLOOD PRESSURE: 146 MMHG

## 2024-11-24 LAB
ALBUMIN SERPL BCP-MCNC: 3.6 G/DL (ref 3.4–5)
ALP SERPL-CCNC: 64 U/L (ref 33–110)
ALT SERPL W P-5'-P-CCNC: 49 U/L (ref 7–45)
ANION GAP SERPL CALC-SCNC: 12 MMOL/L (ref 10–20)
AST SERPL W P-5'-P-CCNC: 29 U/L (ref 9–39)
BACTERIA CSF CULT: NORMAL
BILIRUB SERPL-MCNC: 0.6 MG/DL (ref 0–1.2)
BUN SERPL-MCNC: 19 MG/DL (ref 6–23)
CALCIUM SERPL-MCNC: 9.3 MG/DL (ref 8.6–10.3)
CHLORIDE SERPL-SCNC: 115 MMOL/L (ref 98–107)
CO2 SERPL-SCNC: 25 MMOL/L (ref 21–32)
CREAT SERPL-MCNC: 0.97 MG/DL (ref 0.5–1.05)
EGFRCR SERPLBLD CKD-EPI 2021: 72 ML/MIN/1.73M*2
ERYTHROCYTE [DISTWIDTH] IN BLOOD BY AUTOMATED COUNT: 17.3 % (ref 11.5–14.5)
GLUCOSE SERPL-MCNC: 108 MG/DL (ref 74–99)
GRAM STN SPEC: NORMAL
GRAM STN SPEC: NORMAL
HCT VFR BLD AUTO: 34.5 % (ref 36–46)
HGB BLD-MCNC: 10.7 G/DL (ref 12–16)
MAGNESIUM SERPL-MCNC: 2.11 MG/DL (ref 1.6–2.4)
MCH RBC QN AUTO: 27.4 PG (ref 26–34)
MCHC RBC AUTO-ENTMCNC: 31 G/DL (ref 32–36)
MCV RBC AUTO: 88 FL (ref 80–100)
NRBC BLD-RTO: 0 /100 WBCS (ref 0–0)
PLATELET # BLD AUTO: 207 X10*3/UL (ref 150–450)
POTASSIUM SERPL-SCNC: 3.5 MMOL/L (ref 3.5–5.3)
PROT SERPL-MCNC: 6 G/DL (ref 6.4–8.2)
RBC # BLD AUTO: 3.91 X10*6/UL (ref 4–5.2)
SODIUM SERPL-SCNC: 148 MMOL/L (ref 136–145)
WBC # BLD AUTO: 6.4 X10*3/UL (ref 4.4–11.3)

## 2024-11-24 PROCEDURE — 2060000001 HC INTERMEDIATE ICU ROOM DAILY

## 2024-11-24 PROCEDURE — 85027 COMPLETE CBC AUTOMATED: CPT | Performed by: PHYSICIAN ASSISTANT

## 2024-11-24 PROCEDURE — 2500000001 HC RX 250 WO HCPCS SELF ADMINISTERED DRUGS (ALT 637 FOR MEDICARE OP): Performed by: NURSE PRACTITIONER

## 2024-11-24 PROCEDURE — 83735 ASSAY OF MAGNESIUM: CPT | Performed by: NURSE PRACTITIONER

## 2024-11-24 PROCEDURE — 2500000002 HC RX 250 W HCPCS SELF ADMINISTERED DRUGS (ALT 637 FOR MEDICARE OP, ALT 636 FOR OP/ED)

## 2024-11-24 PROCEDURE — 36415 COLL VENOUS BLD VENIPUNCTURE: CPT | Performed by: PHYSICIAN ASSISTANT

## 2024-11-24 PROCEDURE — 80053 COMPREHEN METABOLIC PANEL: CPT | Performed by: NURSE PRACTITIONER

## 2024-11-24 PROCEDURE — 2500000004 HC RX 250 GENERAL PHARMACY W/ HCPCS (ALT 636 FOR OP/ED): Performed by: NURSE PRACTITIONER

## 2024-11-24 PROCEDURE — 2500000004 HC RX 250 GENERAL PHARMACY W/ HCPCS (ALT 636 FOR OP/ED): Performed by: STUDENT IN AN ORGANIZED HEALTH CARE EDUCATION/TRAINING PROGRAM

## 2024-11-24 PROCEDURE — 99233 SBSQ HOSP IP/OBS HIGH 50: CPT | Performed by: STUDENT IN AN ORGANIZED HEALTH CARE EDUCATION/TRAINING PROGRAM

## 2024-11-24 PROCEDURE — 2500000005 HC RX 250 GENERAL PHARMACY W/O HCPCS: Performed by: NURSE PRACTITIONER

## 2024-11-24 PROCEDURE — 94640 AIRWAY INHALATION TREATMENT: CPT

## 2024-11-24 RX ORDER — POTASSIUM CHLORIDE 1.5 G/1.58G
20 POWDER, FOR SOLUTION ORAL ONCE
Status: COMPLETED | OUTPATIENT
Start: 2024-11-24 | End: 2024-11-24

## 2024-11-24 RX ORDER — POTASSIUM CHLORIDE 14.9 MG/ML
20 INJECTION INTRAVENOUS ONCE
Status: DISCONTINUED | OUTPATIENT
Start: 2024-11-24 | End: 2024-11-24

## 2024-11-24 RX ADMIN — HEPARIN SODIUM 5000 UNITS: 5000 INJECTION INTRAVENOUS; SUBCUTANEOUS at 13:05

## 2024-11-24 RX ADMIN — LACTULOSE 20 G: 20 SOLUTION ORAL at 13:05

## 2024-11-24 RX ADMIN — VANCOMYCIN HYDROCHLORIDE 125 MG: KIT at 21:58

## 2024-11-24 RX ADMIN — VANCOMYCIN HYDROCHLORIDE 125 MG: KIT at 06:23

## 2024-11-24 RX ADMIN — HEPARIN SODIUM 5000 UNITS: 5000 INJECTION INTRAVENOUS; SUBCUTANEOUS at 05:39

## 2024-11-24 RX ADMIN — METOPROLOL TARTRATE 50 MG: 50 TABLET, FILM COATED ORAL at 09:06

## 2024-11-24 RX ADMIN — VANCOMYCIN HYDROCHLORIDE 125 MG: KIT at 12:41

## 2024-11-24 RX ADMIN — Medication 1 TABLET: at 09:06

## 2024-11-24 RX ADMIN — VANCOMYCIN HYDROCHLORIDE 125 MG: KIT at 17:00

## 2024-11-24 RX ADMIN — POTASSIUM CHLORIDE 20 MEQ: 1.5 POWDER, FOR SOLUTION ORAL at 12:40

## 2024-11-24 RX ADMIN — HEPARIN SODIUM 5000 UNITS: 5000 INJECTION INTRAVENOUS; SUBCUTANEOUS at 21:58

## 2024-11-24 RX ADMIN — FORMOTEROL FUMARATE 20 MCG: 20 SOLUTION RESPIRATORY (INHALATION) at 10:18

## 2024-11-24 RX ADMIN — PANTOPRAZOLE SODIUM 40 MG: 40 INJECTION, POWDER, FOR SOLUTION INTRAVENOUS at 17:00

## 2024-11-24 RX ADMIN — BUDESONIDE 0.5 MG: 0.5 INHALANT RESPIRATORY (INHALATION) at 20:29

## 2024-11-24 RX ADMIN — LACTULOSE 20 G: 20 SOLUTION ORAL at 17:00

## 2024-11-24 RX ADMIN — BUDESONIDE 0.5 MG: 0.5 INHALANT RESPIRATORY (INHALATION) at 10:18

## 2024-11-24 RX ADMIN — PANTOPRAZOLE SODIUM 40 MG: 40 INJECTION, POWDER, FOR SOLUTION INTRAVENOUS at 06:37

## 2024-11-24 RX ADMIN — LACTULOSE 20 G: 20 SOLUTION ORAL at 09:06

## 2024-11-24 RX ADMIN — THIAMINE HYDROCHLORIDE 250 MG: 100 INJECTION, SOLUTION INTRAMUSCULAR; INTRAVENOUS at 09:06

## 2024-11-24 RX ADMIN — METOPROLOL TARTRATE 50 MG: 50 TABLET, FILM COATED ORAL at 21:57

## 2024-11-24 RX ADMIN — LACTULOSE 20 G: 20 SOLUTION ORAL at 21:57

## 2024-11-24 RX ADMIN — FORMOTEROL FUMARATE 20 MCG: 20 SOLUTION RESPIRATORY (INHALATION) at 20:29

## 2024-11-24 RX ADMIN — METOPROLOL TARTRATE 7.5 MG: 5 INJECTION INTRAVENOUS at 13:17

## 2024-11-24 ASSESSMENT — PAIN - FUNCTIONAL ASSESSMENT
PAIN_FUNCTIONAL_ASSESSMENT: 0-10

## 2024-11-24 ASSESSMENT — PAIN SCALES - GENERAL
PAINLEVEL_OUTOF10: 0 - NO PAIN

## 2024-11-24 NOTE — NURSING NOTE
The patient was restless, agitated, and anxious during the night. The haldol was effective. The patient did not meet the goal of being able to discontinue her restraints by the end of this shift. The patient's  Salvatore is at the bedside with this patient now.

## 2024-11-24 NOTE — PROGRESS NOTES
"Samuel Mims is a 48 y.o. female on day 8 of admission presenting with Sepsis with encephalopathy without septic shock, due to unspecified organism (Multi). I evaluate patient as a partner with attending, Dr Green.    Subjective     Saw patient and  today.     Reviewed neurology notes of yesterday, and the nursing notes through the day.     There was a mention for \"lethargy\", and at another time a statement of \"restless, agitated, and anxious\". Also note the ongoing delirium. There is new this month symptom of hearing loss. There is safety use of the minimum amount of soft restraint for patient to remain safe. Example of poor judgement includes self-removal (without deflating the balloon) of her fecal management sytem. Patient was apparently able to extract an arm from her soft restraint to accomplish this. The goal is to improve cognition that no restraint (and no fecal restraint system) are needed.     See the finding that thiamine was deficient on labs. Replacement is ongoing.     Recall that infection disease has stated that they would return if asked to again see the patient, and that there is no IV antibiotics, no antiviral medications, and that the patient continues on oral vancomycin, delivered through a nasal,esophageal,gastric then duodenal tube.     Recall that the gastroenterology team states they are available if needed, their workup is complete.     Lab results show the vitamin B1 level was low (value 33 with normal between 70 - 180). The result supports the diagnosis of Wernicke encephalopathy. The thiamine supplementation began empirically even prior to the results were available.  Symptoms of Wernicke encephalopathy include confusion and loss of mental activity, and such is consistent in her situation. She is receiving replacement of thiamine and providing proper nutrition and hydration. Await neurology opinion on the lab results. Eye muscles are noted by neurologist to to function. " Note the hearing issues. Four limbs are all strong.    Given her difficulty staying still in MRI, and lab result which supports the theory for thiamine deficiency, neurology decides MRI is indefinitely on hold, for now. But neurologist keeps open the possibility to consider repeat MRI in the near future.     Regarding medications, the spironolactone, pantoprazole and losartan are not currently being given, but the other medications continue.     Pulse is between 100-120 this past day. Blood pressure is appropriate, this AM being higher (normalized) compared to yesterday. Patient has no fever.        Objective     Last Recorded Vitals  BP (!) 134/95   Pulse (!) 122   Temp 35.9 °C (96.6 °F)   Resp 15   Wt 106 kg (233 lb 7.5 oz)   SpO2 97%   Intake/Output last 3 Shifts:  No intake or output data in the 24 hours ending 11/24/24 1153      Admission Weight  Weight: 127 kg (280 lb) (11/16/24 1755)    Daily Weight  11/23/24 : 106 kg (233 lb 7.5 oz)      Physical Exam:  Alert and calm, interacting with  in an appropriate way.   Vision: Defer the full neurology exam to the neurologist, noting the extraocular movements are normal in her note. T  Ears: the patient still complains of hearing loss, affecting what seems a hearing issue, not seemingly a processing issue.   Neck: Normal to inspection  Nose: there is no NG tube at this time, adding she swallowed liquid medication offered by nurse without physical or psychiatric issues of any type.   Lungs: No labored breathing and no cough.   Cardiac: Mildly-fast rate and normal heart rhythm  Abdomen: Soft nontender, positive bowel sounds, there is no rectal tube at this time.   : Exam deferred, there is no Andrea at this time.   Skin: Intact  Hematology: No petechia or excessive ecchymosis  Musculoskeletal: Without significant trauma  Neurological: She is partially oriented, alert and conversational, although she cannot hear and  writes for her to read. She has  "answers that seem on target, but that she asks the same question over and over, \"So, are they going to do a CT exam today?\"  states it is an MRI, scheduled for tomorrow. She responds that she already had one and he responds that a second test was ordered. She is without restraints today, is calm and  acts as a \"sitter\" as well as a family member. Patient is moving at normal quickness and dexterity (hands for voluntary use, head on neck to look back/forth with  and doctor, and strength of legs when asked).     Relevant Results  Scheduled medications  budesonide, 0.5 mg, nebulization, BID  [Held by provider] busPIRone, 10 mg, oral, BID  influenza, 0.5 mL, intramuscular, During hospitalization  formoterol, 20 mcg, nebulization, BID  heparin (porcine), 5,000 Units, subcutaneous, q8h SANTIAGO  lactulose, 20 g, oral, q4h  [Held by provider] losartan, 50 mg, oral, BID  metoprolol tartrate, 50 mg, nasoduodenal tube, q12h  multivitamin with minerals, 1 tablet, oral, Daily  [Held by provider] pantoprazole, 40 mg, oral, BID AC  pantoprazole, 40 mg, intravenous, BID AC  potassium chloride, 20 mEq, oral, Once  [Held by provider] spironolactone, 12.5 mg, oral, Daily  [START ON 11/25/2024] thiamine, 100 mg, oral, Daily  vancomycin, 125 mg, nasoduodenal tube, 4x daily      Continuous medications     PRN medications  PRN medications: acetaminophen **OR** acetaminophen, albuterol, [Held by provider] clonazePAM, hydrALAZINE, melatonin, metoprolol   Results for orders placed or performed during the hospital encounter of 11/16/24 (from the past 24 hours)   Magnesium   Result Value Ref Range    Magnesium 2.11 1.60 - 2.40 mg/dL   Comprehensive Metabolic Panel   Result Value Ref Range    Glucose 108 (H) 74 - 99 mg/dL    Sodium 148 (H) 136 - 145 mmol/L    Potassium 3.5 3.5 - 5.3 mmol/L    Chloride 115 (H) 98 - 107 mmol/L    Bicarbonate 25 21 - 32 mmol/L    Anion Gap 12 10 - 20 mmol/L    Urea Nitrogen 19 6 - 23 mg/dL    " Creatinine 0.97 0.50 - 1.05 mg/dL    eGFR 72 >60 mL/min/1.73m*2    Calcium 9.3 8.6 - 10.3 mg/dL    Albumin 3.6 3.4 - 5.0 g/dL    Alkaline Phosphatase 64 33 - 110 U/L    Total Protein 6.0 (L) 6.4 - 8.2 g/dL    AST 29 9 - 39 U/L    Bilirubin, Total 0.6 0.0 - 1.2 mg/dL    ALT 49 (H) 7 - 45 U/L   CBC   Result Value Ref Range    WBC 6.4 4.4 - 11.3 x10*3/uL    nRBC 0.0 0.0 - 0.0 /100 WBCs    RBC 3.91 (L) 4.00 - 5.20 x10*6/uL    Hemoglobin 10.7 (L) 12.0 - 16.0 g/dL    Hematocrit 34.5 (L) 36.0 - 46.0 %    MCV 88 80 - 100 fL    MCH 27.4 26.0 - 34.0 pg    MCHC 31.0 (L) 32.0 - 36.0 g/dL    RDW 17.3 (H) 11.5 - 14.5 %    Platelets 207 150 - 450 x10*3/uL      Lab Results   Component Value Date    BLOODCULT No growth at 4 days -  FINAL REPORT 11/16/2024    BLOODCULT No growth at 4 days -  FINAL REPORT 11/16/2024    URINECULTURE (A) 11/16/2024     Multiple organisms present, probable contamination. Repeat culture if clinically indicated.        MOLECULAR MICROBIOLOGY/ANTIGEN    Herpes simplex virus Type 1 PCR, Qual, CSF       Not De...      Herpes simplex virus Type 1 PCR, Qual, CSF     Escherichia coli K1, CSF, PCR       Not De...      Escherichia coli K1, CSF, PCR     Human Herpesvirus 6, CSF, PCR       Not De...      Human Herpesvirus 6, CSF, PCR     Herpes Simplex 1, CSF, PCR       Not De...      Herpes Simplex 1, CSF, PCR     Herpes Simplex 2, CSF, PCR       Not De...      Herpes Simplex 2, CSF, PCR     Human Parechovirus, CSF, PCR       Not De...      Human Parechovirus, CSF, PCR     Varicella Zoster CSF PCR       Not De...      Varicella Zoster CSF PCR     Haemophilus influenzae, CSF, PCR       Not De...      Haemophilus influenzae, CSF, PCR     Listeria monocytogenes, CSF, PCR       Not De...      Listeria monocytogenes, CSF, PCR     Neisseria meningitidis, CSF, PCR       Not De...      Neisseria meningitidis, CSF, PCR     Strep. agalactiae, CSF, PCR       Not De...      Strep. agalactiae, CSF, PCR     Strep.pneumoniae,  CSF, PCR       Not De...      Strep.pneumoniae, CSF, PCR     Cytomegalovirus, CSF, PCR       Not De...      Cytomegalovirus, CSF, PCR     Enterovirus, CSF, PCR       Not De...      Enterovirus, CSF, PCR     Cryptococcus, CSF, PCR       Not De...      Cryptococcus, CSF, PCR     Color, CSF       Colorl...      Color, CSF     HSV 2 PCR, QuaL, CSF       Not De...      HSV 2 PCR, QuaL, CSF       Lab results show further rise in sodium. The trend is that patient has moved from hyponatremic three weeks ago (134 multiple times at the start of this month) to hypernatremic this week (144-148 in the last seven days). Although today is the most elevated (148) the value is not obviously escalating but instead in a range both up and down.    Regarding C diff, the test was negative for toxin on 11-.       Assessment/Plan   Samuel Mims is a 48 y.o. female on day 8 of admission presenting with Sepsis with encephalopathy without septic shock, due to unspecified organism (Multi).  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 The patient is followed by neurology team. The confused patient has protocol for minimal but necessary restraints for safety. Neurology is controlling orders of the encephalopathy, and the lab findings show the vitamin B1 level was low (value 33, with normal between 70 - 180). The result supports the diagnosis of Wernicke encephalopathy. The patient has even prior to lab results been dosed with thiamine supplementation.  Symptoms of Wernicke encephalopathy include confusion and loss of mental  activity, and such is consistent in her situation.     Cause of B1 deficiency is possibly connected to significant weight loss of 50+ pounds w/poor nutritional intake. C-diff infection likely was involved. PLAN involves that neurologist has scheduled MRI for tomorrow.       Partially-treated C. Difficile Colitis  Currently she is free of stool for 12-24 hours (statement from nurse). Per  she was not taking the oral Vancomycin as prescribed in week prior to the hospitalization.   C. Diff PCR negative but since untreated this could be false negative.  On oral Vancomycin.  CT a/p confirms she continues to have colitis.     HTN and Sinus Tachycardia  On oral Metoprolol tartrate 50 mg bid, Spironolactone is currently held; the losartan is held, due to relatively low blood pressure. Since these changes, the blood pressure generally controlled in the last day. Pulse still seems 100-125, although I said the opposite in yesterday's note. There could be augmentation of pulse with anxiety of delirium.       HYPERNATREMIA  See cessation of spironolactone in the possible increased level of sodium, the hyponatremia would likely be a combination of the effects of spironolactone and simultaneously the C diff (or other factors contributing to the severe weight loss). Defer to the neurologist for an opinion, but perhaps patient could have labs such as urine osmolality and urinalysis. Yet, hold that order due to the difficulty with patient to acquiesce to the collection. Serum osmolarity. ADH. Additionally, less delirium may be able to improve self-monitoring of fluid status (drink when thirsty).      Asthma  Budesonide 0.5 mg and Formoterol nebs bid.     CHET  On hold Buspirone 10 mg bid.  Klonopin on hold due to encephalopathy     GERD  Oral Pantoprazole 40 mg bid.     Recent Otitis Media  Ears show fluid probably causing some hearing impairment        Plan discussed with .       Dean Gutiérrez MD

## 2024-11-24 NOTE — CARE PLAN
The patient's goals for the shift include to follow directions     The clinical goals for the shift include to be free of restraints.     Goals met. Restraints were removed at 8:30AM. Pt. Is safety able to follow directions. Hr remains elevated in the 110s. Iv metoprolol given.

## 2024-11-24 NOTE — CARE PLAN
Problem: Safety - Medical Restraint  Goal: Free from restraint(s) (Restraint for Interference with Medical Device)  Outcome: Progressing    Problem: Skin  Goal: Promote/optimize nutrition  Outcome: Progressing    Problem: Safety - Adult  Goal: Free from fall injury  Outcome: Progressing     Problem: Nutrition  Goal: Electrolytes WNL  Outcome: Progressing    Problem: Safety - Medical Restraint  Goal: Remains free of injury from restraints (Restraint for Interference with Medical Device)  Outcome: Progressing    The patient's goals for the shift include to be safe and to have stable vital signs. The patient cannot verbalize her own specific goal at this time.    The clinical goals for the shift include the patient to be in a stable condition & the patient will be able to be free from restraints by the end of the shift.

## 2024-11-24 NOTE — PROGRESS NOTES
"Samuel Mims is a 48 y.o. female on day 8 of admission presenting with Sepsis with encephalopathy without septic shock, due to unspecified organism (Multi).      Subjective   Continues to improve, though is still endorsing severe bilateral hearing loss   at bedside along with her sister, she gave permission to speak to both people  They report she is improving but still has short term memory loss and sometimes requires written communication due to the hearing loss          Objective     Last Recorded Vitals  Blood pressure 140/82, pulse 106, temperature 36.6 °C (97.9 °F), temperature source Temporal, resp. rate 20, height 1.676 m (5' 6\"), weight 106 kg (233 lb 7.5 oz), SpO2 95%.    Physical Exam  Eyes:      Extraocular Movements: Extraocular movements intact.   Neurological:      Motor: Motor strength is normal.  Psychiatric:         Speech: Speech normal.       Neurological Exam  Mental Status  Awake, alert and oriented to person, place and time. Speech is normal.  Some cognitive impairment and short term memory loss  Exam also limited by her hearing loss .    Cranial Nerves  CN III, IV, VI: Extraocular movements intact bilaterally.  CN VII: Full and symmetric facial movement.    Motor   Strength is 5/5 throughout all four extremities.    Relevant Results                    [place] Coma Scale  Best Eye Response: Spontaneous  Best Verbal Response: Confused  Best Motor Response: Localizes pain  [place] Coma Scale Score: 13                             Assessment/Plan   This patient currently has cardiac telemetry ordered; if you would like to modify or discontinue the telemetry order, click here to go to the orders activity to modify/discontinue the order.  Assessment & Plan  Sepsis with encephalopathy without septic shock, due to unspecified organism (Multi)    Diarrhea    UTI (urinary tract infection)    Hypertension    Colitis    Sinus tachycardia    Prolonged Q-T interval on ECG    Clostridium " difficile infection    Wernicke's encephalopathy - clinically improving. Thiamine level returned low at 33   Completed IV thamine 500mg TID, now on IV 250mg daily day 3/3 and will start PO 100mg daily tomorrow  Would like to repeat MRI now that she is able to follow commands and lie still to get a better study     Neurology will continue to follow   Tiffanie Tracey MD

## 2024-11-25 ENCOUNTER — ANESTHESIA (OUTPATIENT)
Dept: RADIOLOGY | Facility: HOSPITAL | Age: 49
End: 2024-11-25
Payer: COMMERCIAL

## 2024-11-25 ENCOUNTER — ANESTHESIA EVENT (OUTPATIENT)
Dept: RADIOLOGY | Facility: HOSPITAL | Age: 49
End: 2024-11-25
Payer: COMMERCIAL

## 2024-11-25 ENCOUNTER — APPOINTMENT (OUTPATIENT)
Dept: RADIOLOGY | Facility: HOSPITAL | Age: 49
End: 2024-11-25
Payer: COMMERCIAL

## 2024-11-25 LAB
ALBUMIN SERPL BCP-MCNC: 3.7 G/DL (ref 3.4–5)
ALP SERPL-CCNC: 65 U/L (ref 33–110)
ALT SERPL W P-5'-P-CCNC: 45 U/L (ref 7–45)
ANION GAP SERPL CALC-SCNC: 14 MMOL/L (ref 10–20)
AST SERPL W P-5'-P-CCNC: 25 U/L (ref 9–39)
BILIRUB SERPL-MCNC: 0.8 MG/DL (ref 0–1.2)
BUN SERPL-MCNC: 18 MG/DL (ref 6–23)
CALCIUM SERPL-MCNC: 9.6 MG/DL (ref 8.6–10.3)
CHLORIDE SERPL-SCNC: 109 MMOL/L (ref 98–107)
CO2 SERPL-SCNC: 25 MMOL/L (ref 21–32)
CREAT SERPL-MCNC: 0.95 MG/DL (ref 0.5–1.05)
EGFRCR SERPLBLD CKD-EPI 2021: 74 ML/MIN/1.73M*2
ERYTHROCYTE [DISTWIDTH] IN BLOOD BY AUTOMATED COUNT: 17.4 % (ref 11.5–14.5)
FUNGUS SPEC CULT: NORMAL
FUNGUS SPEC FUNGUS STN: NORMAL
GLUCOSE SERPL-MCNC: 124 MG/DL (ref 74–99)
HCT VFR BLD AUTO: 34.1 % (ref 36–46)
HGB BLD-MCNC: 10.8 G/DL (ref 12–16)
HOLD SPECIMEN: NORMAL
MAGNESIUM SERPL-MCNC: 2.03 MG/DL (ref 1.6–2.4)
MCH RBC QN AUTO: 27.8 PG (ref 26–34)
MCHC RBC AUTO-ENTMCNC: 31.7 G/DL (ref 32–36)
MCV RBC AUTO: 88 FL (ref 80–100)
NRBC BLD-RTO: 0 /100 WBCS (ref 0–0)
OSMOLALITY SERPL: 310 MOSM/KG (ref 280–300)
PLATELET # BLD AUTO: 220 X10*3/UL (ref 150–450)
POTASSIUM SERPL-SCNC: 3.5 MMOL/L (ref 3.5–5.3)
PROT SERPL-MCNC: 6.2 G/DL (ref 6.4–8.2)
RBC # BLD AUTO: 3.88 X10*6/UL (ref 4–5.2)
SODIUM SERPL-SCNC: 144 MMOL/L (ref 136–145)
WBC # BLD AUTO: 6.3 X10*3/UL (ref 4.4–11.3)

## 2024-11-25 PROCEDURE — 2500000004 HC RX 250 GENERAL PHARMACY W/ HCPCS (ALT 636 FOR OP/ED): Performed by: NURSE PRACTITIONER

## 2024-11-25 PROCEDURE — B030ZZZ MAGNETIC RESONANCE IMAGING (MRI) OF BRAIN: ICD-10-PCS | Performed by: RADIOLOGY

## 2024-11-25 PROCEDURE — 2500000002 HC RX 250 W HCPCS SELF ADMINISTERED DRUGS (ALT 637 FOR MEDICARE OP, ALT 636 FOR OP/ED): Performed by: NURSE PRACTITIONER

## 2024-11-25 PROCEDURE — 85027 COMPLETE CBC AUTOMATED: CPT | Performed by: PHYSICIAN ASSISTANT

## 2024-11-25 PROCEDURE — 7100000001 HC RECOVERY ROOM TIME - INITIAL BASE CHARGE

## 2024-11-25 PROCEDURE — 84588 ASSAY OF VASOPRESSIN: CPT | Performed by: FAMILY MEDICINE

## 2024-11-25 PROCEDURE — 2550000001 HC RX 255 CONTRASTS: Performed by: FAMILY MEDICINE

## 2024-11-25 PROCEDURE — 2500000004 HC RX 250 GENERAL PHARMACY W/ HCPCS (ALT 636 FOR OP/ED): Performed by: ANESTHESIOLOGIST ASSISTANT

## 2024-11-25 PROCEDURE — 2500000001 HC RX 250 WO HCPCS SELF ADMINISTERED DRUGS (ALT 637 FOR MEDICARE OP): Performed by: FAMILY MEDICINE

## 2024-11-25 PROCEDURE — 70553 MRI BRAIN STEM W/O & W/DYE: CPT | Performed by: RADIOLOGY

## 2024-11-25 PROCEDURE — 7100000002 HC RECOVERY ROOM TIME - EACH INCREMENTAL 1 MINUTE

## 2024-11-25 PROCEDURE — 94640 AIRWAY INHALATION TREATMENT: CPT

## 2024-11-25 PROCEDURE — 80053 COMPREHEN METABOLIC PANEL: CPT | Performed by: NURSE PRACTITIONER

## 2024-11-25 PROCEDURE — 99233 SBSQ HOSP IP/OBS HIGH 50: CPT | Performed by: SPECIALIST

## 2024-11-25 PROCEDURE — 3700000001 HC GENERAL ANESTHESIA TIME - INITIAL BASE CHARGE

## 2024-11-25 PROCEDURE — 3700000002 HC GENERAL ANESTHESIA TIME - EACH INCREMENTAL 1 MINUTE

## 2024-11-25 PROCEDURE — 2500000005 HC RX 250 GENERAL PHARMACY W/O HCPCS: Performed by: NURSE PRACTITIONER

## 2024-11-25 PROCEDURE — 2500000005 HC RX 250 GENERAL PHARMACY W/O HCPCS: Performed by: FAMILY MEDICINE

## 2024-11-25 PROCEDURE — 2500000001 HC RX 250 WO HCPCS SELF ADMINISTERED DRUGS (ALT 637 FOR MEDICARE OP): Performed by: NURSE PRACTITIONER

## 2024-11-25 PROCEDURE — 2060000001 HC INTERMEDIATE ICU ROOM DAILY

## 2024-11-25 PROCEDURE — 2500000004 HC RX 250 GENERAL PHARMACY W/ HCPCS (ALT 636 FOR OP/ED): Performed by: ANESTHESIOLOGY

## 2024-11-25 PROCEDURE — 36415 COLL VENOUS BLD VENIPUNCTURE: CPT | Performed by: FAMILY MEDICINE

## 2024-11-25 PROCEDURE — 83930 ASSAY OF BLOOD OSMOLALITY: CPT | Mod: STJLAB | Performed by: FAMILY MEDICINE

## 2024-11-25 PROCEDURE — A9575 INJ GADOTERATE MEGLUMI 0.1ML: HCPCS | Performed by: FAMILY MEDICINE

## 2024-11-25 PROCEDURE — 70553 MRI BRAIN STEM W/O & W/DYE: CPT

## 2024-11-25 PROCEDURE — 83735 ASSAY OF MAGNESIUM: CPT | Performed by: NURSE PRACTITIONER

## 2024-11-25 RX ORDER — PROPOFOL 10 MG/ML
INJECTION, EMULSION INTRAVENOUS AS NEEDED
Status: DISCONTINUED | OUTPATIENT
Start: 2024-11-25 | End: 2024-11-25

## 2024-11-25 RX ORDER — HALOPERIDOL 5 MG/ML
2 INJECTION INTRAMUSCULAR ONCE
Status: COMPLETED | OUTPATIENT
Start: 2024-11-25 | End: 2024-11-25

## 2024-11-25 RX ORDER — BUSPIRONE HYDROCHLORIDE 5 MG/1
5 TABLET ORAL ONCE
Status: DISCONTINUED | OUTPATIENT
Start: 2024-11-25 | End: 2024-11-25

## 2024-11-25 RX ORDER — VANCOMYCIN HCL 50 MG/ML
125 SOLUTION, RECONSTITUTED, ORAL ORAL 4 TIMES DAILY
Status: COMPLETED | OUTPATIENT
Start: 2024-11-25 | End: 2024-11-26

## 2024-11-25 RX ORDER — ONDANSETRON HYDROCHLORIDE 2 MG/ML
4 INJECTION, SOLUTION INTRAVENOUS ONCE AS NEEDED
Status: DISCONTINUED | OUTPATIENT
Start: 2024-11-25 | End: 2024-11-25 | Stop reason: HOSPADM

## 2024-11-25 RX ORDER — MEPERIDINE HYDROCHLORIDE 50 MG/ML
12.5 INJECTION INTRAMUSCULAR; INTRAVENOUS; SUBCUTANEOUS EVERY 10 MIN PRN
Status: DISCONTINUED | OUTPATIENT
Start: 2024-11-25 | End: 2024-11-25 | Stop reason: HOSPADM

## 2024-11-25 RX ORDER — METOPROLOL TARTRATE 50 MG/1
50 TABLET ORAL EVERY 12 HOURS
Status: DISPENSED | OUTPATIENT
Start: 2024-11-25

## 2024-11-25 RX ORDER — HYDRALAZINE HYDROCHLORIDE 20 MG/ML
5 INJECTION INTRAMUSCULAR; INTRAVENOUS EVERY 30 MIN PRN
Status: DISCONTINUED | OUTPATIENT
Start: 2024-11-25 | End: 2024-11-25 | Stop reason: HOSPADM

## 2024-11-25 RX ORDER — GADOTERATE MEGLUMINE 376.9 MG/ML
20 INJECTION INTRAVENOUS
Status: COMPLETED | OUTPATIENT
Start: 2024-11-25 | End: 2024-11-25

## 2024-11-25 RX ORDER — ALBUTEROL SULFATE 0.83 MG/ML
2.5 SOLUTION RESPIRATORY (INHALATION) ONCE AS NEEDED
Status: DISCONTINUED | OUTPATIENT
Start: 2024-11-25 | End: 2024-11-25 | Stop reason: HOSPADM

## 2024-11-25 RX ORDER — METOPROLOL TARTRATE 1 MG/ML
5 INJECTION, SOLUTION INTRAVENOUS ONCE
Status: COMPLETED | OUTPATIENT
Start: 2024-11-25 | End: 2024-11-25

## 2024-11-25 RX ORDER — LIDOCAINE HYDROCHLORIDE 10 MG/ML
0.1 INJECTION, SOLUTION INFILTRATION; PERINEURAL ONCE
Status: DISCONTINUED | OUTPATIENT
Start: 2024-11-25 | End: 2024-11-25 | Stop reason: HOSPADM

## 2024-11-25 RX ORDER — POTASSIUM CHLORIDE 20 MEQ/1
40 TABLET, EXTENDED RELEASE ORAL ONCE
Status: COMPLETED | OUTPATIENT
Start: 2024-11-25 | End: 2024-11-25

## 2024-11-25 RX ORDER — OXYCODONE HYDROCHLORIDE 10 MG/1
10 TABLET ORAL EVERY 4 HOURS PRN
Status: DISCONTINUED | OUTPATIENT
Start: 2024-11-25 | End: 2024-11-25 | Stop reason: HOSPADM

## 2024-11-25 RX ORDER — MIDAZOLAM HYDROCHLORIDE 1 MG/ML
1 INJECTION, SOLUTION INTRAMUSCULAR; INTRAVENOUS ONCE AS NEEDED
Status: DISCONTINUED | OUTPATIENT
Start: 2024-11-25 | End: 2024-11-25 | Stop reason: HOSPADM

## 2024-11-25 RX ORDER — ACETAMINOPHEN 325 MG/1
975 TABLET ORAL ONCE
Status: DISCONTINUED | OUTPATIENT
Start: 2024-11-25 | End: 2024-11-25 | Stop reason: HOSPADM

## 2024-11-25 RX ORDER — FENTANYL CITRATE 50 UG/ML
12.5 INJECTION, SOLUTION INTRAMUSCULAR; INTRAVENOUS EVERY 5 MIN PRN
Status: DISCONTINUED | OUTPATIENT
Start: 2024-11-25 | End: 2024-11-25 | Stop reason: HOSPADM

## 2024-11-25 RX ORDER — HYDROXYZINE HYDROCHLORIDE 50 MG/1
50 TABLET, FILM COATED ORAL ONCE
Status: COMPLETED | OUTPATIENT
Start: 2024-11-25 | End: 2024-11-25

## 2024-11-25 RX ORDER — HYDROMORPHONE HYDROCHLORIDE 1 MG/ML
1 INJECTION, SOLUTION INTRAMUSCULAR; INTRAVENOUS; SUBCUTANEOUS EVERY 5 MIN PRN
Status: DISCONTINUED | OUTPATIENT
Start: 2024-11-25 | End: 2024-11-25 | Stop reason: HOSPADM

## 2024-11-25 RX ORDER — NYSTATIN 100000 U/G
CREAM TOPICAL 2 TIMES DAILY
Status: DISPENSED | OUTPATIENT
Start: 2024-11-25

## 2024-11-25 RX ORDER — LIDOCAINE HYDROCHLORIDE 20 MG/ML
INJECTION, SOLUTION EPIDURAL; INFILTRATION; INTRACAUDAL; PERINEURAL AS NEEDED
Status: DISCONTINUED | OUTPATIENT
Start: 2024-11-25 | End: 2024-11-25

## 2024-11-25 RX ORDER — FENTANYL CITRATE 50 UG/ML
INJECTION, SOLUTION INTRAMUSCULAR; INTRAVENOUS CONTINUOUS PRN
Status: DISCONTINUED | OUTPATIENT
Start: 2024-11-25 | End: 2024-11-25

## 2024-11-25 RX ORDER — ACETAMINOPHEN 500 MG
5 TABLET ORAL ONCE
Status: COMPLETED | OUTPATIENT
Start: 2024-11-25 | End: 2024-11-25

## 2024-11-25 RX ORDER — DIPHENHYDRAMINE HYDROCHLORIDE 50 MG/ML
12.5 INJECTION INTRAMUSCULAR; INTRAVENOUS ONCE AS NEEDED
Status: DISCONTINUED | OUTPATIENT
Start: 2024-11-25 | End: 2024-11-25 | Stop reason: HOSPADM

## 2024-11-25 RX ORDER — BUSPIRONE HYDROCHLORIDE 10 MG/1
10 TABLET ORAL ONCE
Status: COMPLETED | OUTPATIENT
Start: 2024-11-25 | End: 2024-11-25

## 2024-11-25 RX ORDER — SODIUM CHLORIDE, SODIUM LACTATE, POTASSIUM CHLORIDE, CALCIUM CHLORIDE 600; 310; 30; 20 MG/100ML; MG/100ML; MG/100ML; MG/100ML
100 INJECTION, SOLUTION INTRAVENOUS CONTINUOUS
Status: DISCONTINUED | OUTPATIENT
Start: 2024-11-25 | End: 2024-11-25 | Stop reason: HOSPADM

## 2024-11-25 RX ADMIN — BUSPIRONE HYDROCHLORIDE 10 MG: 10 TABLET ORAL at 04:42

## 2024-11-25 RX ADMIN — POTASSIUM CHLORIDE 40 MEQ: 1500 TABLET, EXTENDED RELEASE ORAL at 17:41

## 2024-11-25 RX ADMIN — HEPARIN SODIUM 5000 UNITS: 5000 INJECTION INTRAVENOUS; SUBCUTANEOUS at 13:23

## 2024-11-25 RX ADMIN — GADOTERATE MEGLUMINE 20 ML: 376.9 INJECTION, SOLUTION INTRAVENOUS at 15:03

## 2024-11-25 RX ADMIN — BUDESONIDE 0.5 MG: 0.5 INHALANT RESPIRATORY (INHALATION) at 20:30

## 2024-11-25 RX ADMIN — VANCOMYCIN HYDROCHLORIDE 125 MG: KIT at 06:55

## 2024-11-25 RX ADMIN — Medication 5 MG: at 02:45

## 2024-11-25 RX ADMIN — VANCOMYCIN HYDROCHLORIDE 125 MG: KIT at 21:22

## 2024-11-25 RX ADMIN — LACTULOSE 20 G: 20 SOLUTION ORAL at 17:46

## 2024-11-25 RX ADMIN — NYSTATIN: 100000 CREAM TOPICAL at 11:30

## 2024-11-25 RX ADMIN — FORMOTEROL FUMARATE 20 MCG: 20 SOLUTION RESPIRATORY (INHALATION) at 20:30

## 2024-11-25 RX ADMIN — PANTOPRAZOLE SODIUM 40 MG: 40 INJECTION, POWDER, FOR SOLUTION INTRAVENOUS at 06:55

## 2024-11-25 RX ADMIN — METOPROLOL TARTRATE 5 MG: 5 INJECTION INTRAVENOUS at 15:54

## 2024-11-25 RX ADMIN — LACTULOSE 20 G: 20 SOLUTION ORAL at 02:45

## 2024-11-25 RX ADMIN — HALOPERIDOL LACTATE 2 MG: 5 INJECTION, SOLUTION INTRAMUSCULAR at 17:47

## 2024-11-25 RX ADMIN — HEPARIN SODIUM 5000 UNITS: 5000 INJECTION INTRAVENOUS; SUBCUTANEOUS at 22:42

## 2024-11-25 RX ADMIN — HEPARIN SODIUM 5000 UNITS: 5000 INJECTION INTRAVENOUS; SUBCUTANEOUS at 06:55

## 2024-11-25 RX ADMIN — METOPROLOL TARTRATE 50 MG: 50 TABLET, FILM COATED ORAL at 21:22

## 2024-11-25 RX ADMIN — HYDROXYZINE HYDROCHLORIDE 50 MG: 50 TABLET, FILM COATED ORAL at 02:45

## 2024-11-25 RX ADMIN — NYSTATIN 1 APPLICATION: 100000 CREAM TOPICAL at 20:52

## 2024-11-25 ASSESSMENT — COGNITIVE AND FUNCTIONAL STATUS - GENERAL
PERSONAL GROOMING: A LITTLE
EATING MEALS: A LITTLE
MOVING TO AND FROM BED TO CHAIR: A LITTLE
DRESSING REGULAR LOWER BODY CLOTHING: A LITTLE
HELP NEEDED FOR BATHING: A LITTLE
DRESSING REGULAR UPPER BODY CLOTHING: A LITTLE
CLIMB 3 TO 5 STEPS WITH RAILING: A LITTLE
MOBILITY SCORE: 20
WALKING IN HOSPITAL ROOM: A LITTLE
DAILY ACTIVITIY SCORE: 18
STANDING UP FROM CHAIR USING ARMS: A LITTLE
TOILETING: A LITTLE

## 2024-11-25 ASSESSMENT — PAIN - FUNCTIONAL ASSESSMENT
PAIN_FUNCTIONAL_ASSESSMENT: 0-10
PAIN_FUNCTIONAL_ASSESSMENT: 0-10
PAIN_FUNCTIONAL_ASSESSMENT: CPOT (CRITICAL CARE PAIN OBSERVATION TOOL)
PAIN_FUNCTIONAL_ASSESSMENT: 0-10
PAIN_FUNCTIONAL_ASSESSMENT: 0-10
PAIN_FUNCTIONAL_ASSESSMENT: CPOT (CRITICAL CARE PAIN OBSERVATION TOOL)
PAIN_FUNCTIONAL_ASSESSMENT: CPOT (CRITICAL CARE PAIN OBSERVATION TOOL)
PAIN_FUNCTIONAL_ASSESSMENT: 0-10
PAIN_FUNCTIONAL_ASSESSMENT: CPOT (CRITICAL CARE PAIN OBSERVATION TOOL)
PAIN_FUNCTIONAL_ASSESSMENT: CPOT (CRITICAL CARE PAIN OBSERVATION TOOL)

## 2024-11-25 ASSESSMENT — PAIN SCALES - GENERAL
PAINLEVEL_OUTOF10: 0 - NO PAIN

## 2024-11-25 NOTE — ANESTHESIA PROCEDURE NOTES
Airway  Date/Time: 11/25/2024 2:10 PM  Urgency: elective    Airway not difficult    Staffing  Performed: [facility]   Authorized by: Maxwell Carr DO    Performed by: MARIUSZ Ferreira  Patient location during procedure: OR    Indications and Patient Condition  Indications for airway management: anesthesia  Spontaneous Ventilation: absent  Sedation level: deep  Preoxygenated: yes  Patient position: sniffing  MILS not maintained throughout  Mask difficulty assessment: 1 - vent by mask    Final Airway Details  Final airway type: supraglottic airway      Successful airway: Supraglottic airway: igel.  Size 4     Number of attempts at approach: 1  Number of other approaches attempted: 0    Additional Comments  Atraumatic placement x1 attempt

## 2024-11-25 NOTE — SIGNIFICANT EVENT
Code spot note    Patient encephalopathic overnight and code spot called this morning due to patient having a witnessed fall try to get out of bed.  Patient did not hit head according to witnesses and has communication difficulties secondary to Wernicke encephalopathy.  Patient not in any acute distress sitting on the floor with no gown.  Full skin exam shows some bruising on abdomen which appears to be from Lovenox shots as well as lip with evidence of subacute but not acute cut or blistering.  Patient with no obvious head trauma or hematoma.  Patient moving all extremities.  No tenderness to palpation on any extremities or abdomen.  Patient placed back in bed without event.  Do not believe any further imaging is noted related to this fall.  Primary team present for the code spot.

## 2024-11-25 NOTE — ANESTHESIA POSTPROCEDURE EVALUATION
Patient: Samuel Mims    Procedure Summary       Date: 11/25/24 Room / Location: [facility]    Anesthesia Start: 1359 Anesthesia Stop: 1502    Procedure: MR BRAIN W AND WO CONTRAST Diagnosis: (Abnormal MRI with motion artifacts)    Scheduled Providers:  Responsible Provider: Maxwell Carr DO    Anesthesia Type: general ASA Status: 3            Anesthesia Type: general    Vitals Value Taken Time   /114 11/25/24 1517   Temp 36.3 °C (97.3 °F) 11/25/24 1500   Pulse 67 11/25/24 1518   Resp 17 11/25/24 1518   SpO2 98 % 11/25/24 1518   Vitals shown include unfiled device data.    Anesthesia Post Evaluation    Patient location during evaluation: PACU  Patient participation: complete - patient participated  Level of consciousness: awake and alert  Pain management: adequate  Airway patency: patent  Cardiovascular status: acceptable  Respiratory status: acceptable  Hydration status: acceptable  Postoperative Nausea and Vomiting: none        No notable events documented.

## 2024-11-25 NOTE — SIGNIFICANT EVENT
Pt not in the floor. Will attempt to see her again tomorrow      11/25/24 at 4:33 PM - Negro Kline MD

## 2024-11-25 NOTE — PROGRESS NOTES
Samuel Mims is a 48 y.o. female on day 9 of admission presenting with Sepsis with encephalopathy without septic shock, due to unspecified organism (Multi).    Subjective   Over the weekend pt was in restraints and still required Haldol.  Over the weekend she was given Buspirone, Hydroxyzine, and Melatonin, but still not sleeping. She is worried about losing a baby due to overhead monitors.   BP's continue to be elevated with sinus tachycardia.   This am fell out of bed, unwitnessed, but no head and body injuries.   She is having looser stools but not enough to use fecal system.    Objective     Last Recorded Vitals  BP (!) 146/96 (BP Location: Right arm, Patient Position: Lying)   Pulse 108   Temp 37 °C (98.6 °F) (Oral)   Resp 22   Wt 106 kg (233 lb 7.5 oz)   SpO2 97%   Intake/Output last 3 Shifts:    Intake/Output Summary (Last 24 hours) at 11/25/2024 0805  Last data filed at 11/24/2024 1800  Gross per 24 hour   Intake 1200 ml   Output --   Net 1200 ml       Admission Weight  Weight: 127 kg (280 lb) (11/16/24 1755)    Daily Weight  11/23/24 : 106 kg (233 lb 7.5 oz)      Physical Exam:  General: Not in acute distress  HEENT: PERRLA, Left TM indicates recent otitis media, Right TM full of fluid.  Neck: Normal to inspection  Lungs: Clear to auscultation, work of breathing within normal limit  Cardiac: Regular rate and rhythm  Abdomen: Soft nontender, positive bowel sounds  : Exam deferred  Skin: Perianal erythema with clear demarcation.  Hematology: No petechia or excessive ecchymosis  Musculoskeletal: Without significant trauma, arms in restraints  Neurological: Alert and oriented X 0,  Unable to answer questions appropriately. Very restless.    Relevant Results  Scheduled medications  budesonide, 0.5 mg, nebulization, BID  [Held by provider] busPIRone, 10 mg, oral, BID  influenza, 0.5 mL, intramuscular, During hospitalization  formoterol, 20 mcg, nebulization, BID  heparin (porcine), 5,000 Units,  subcutaneous, q8h SANTIAGO  lactulose, 20 g, oral, q4h  [Held by provider] losartan, 50 mg, oral, BID  metoprolol tartrate, 50 mg, nasoduodenal tube, q12h  multivitamin with minerals, 1 tablet, oral, Daily  [Held by provider] pantoprazole, 40 mg, oral, BID AC  pantoprazole, 40 mg, intravenous, BID AC  [Held by provider] spironolactone, 12.5 mg, oral, Daily  thiamine, 100 mg, oral, Daily  vancomycin, 125 mg, nasoduodenal tube, 4x daily      Continuous medications     PRN medications  PRN medications: acetaminophen **OR** acetaminophen, albuterol, [Held by provider] clonazePAM, hydrALAZINE, melatonin, metoprolol   Results for orders placed or performed during the hospital encounter of 11/16/24 (from the past 24 hours)   Magnesium   Result Value Ref Range    Magnesium 2.03 1.60 - 2.40 mg/dL   Comprehensive Metabolic Panel   Result Value Ref Range    Glucose 124 (H) 74 - 99 mg/dL    Sodium 144 136 - 145 mmol/L    Potassium 3.5 3.5 - 5.3 mmol/L    Chloride 109 (H) 98 - 107 mmol/L    Bicarbonate 25 21 - 32 mmol/L    Anion Gap 14 10 - 20 mmol/L    Urea Nitrogen 18 6 - 23 mg/dL    Creatinine 0.95 0.50 - 1.05 mg/dL    eGFR 74 >60 mL/min/1.73m*2    Calcium 9.6 8.6 - 10.3 mg/dL    Albumin 3.7 3.4 - 5.0 g/dL    Alkaline Phosphatase 65 33 - 110 U/L    Total Protein 6.2 (L) 6.4 - 8.2 g/dL    AST 25 9 - 39 U/L    Bilirubin, Total 0.8 0.0 - 1.2 mg/dL    ALT 45 7 - 45 U/L   CBC   Result Value Ref Range    WBC 6.3 4.4 - 11.3 x10*3/uL    nRBC 0.0 0.0 - 0.0 /100 WBCs    RBC 3.88 (L) 4.00 - 5.20 x10*6/uL    Hemoglobin 10.8 (L) 12.0 - 16.0 g/dL    Hematocrit 34.1 (L) 36.0 - 46.0 %    MCV 88 80 - 100 fL    MCH 27.8 26.0 - 34.0 pg    MCHC 31.7 (L) 32.0 - 36.0 g/dL    RDW 17.4 (H) 11.5 - 14.5 %    Platelets 220 150 - 450 x10*3/uL   SST TOP   Result Value Ref Range    Extra Tube Hold for add-ons.       Lab Results   Component Value Date    BLOODCULT No growth at 4 days -  FINAL REPORT 11/16/2024    BLOODCULT No growth at 4 days -  FINAL REPORT  11/16/2024    URINECULTURE (A) 11/16/2024     Multiple organisms present, probable contamination. Repeat culture if clinically indicated.       IR lumbar puncture therapeutic         Assessment/Plan   Samuel Mims is a 48 y.o. female on day 9 of admission presenting with Sepsis with encephalopathy without septic shock, due to unspecified organism (Multi).  Principal Problem:    Sepsis with encephalopathy without septic shock, due to unspecified organism (Multi)  Active Problems:    Diarrhea    UTI (urinary tract infection)    Hypertension    Colitis    Sinus tachycardia    Prolonged Q-T interval on ECG    Clostridium difficile infection     Metabolic encephalopathy- multifactorial, possible Wernicke's, possible, hepatic encephalopathy   Possibly due to significant weight loss of 50+ pounds w/poor nutritional intake, vs PRES from poorly treated HTN.     Initial CT head with no acute changes. Urine Tox screen negative.  No elevated WBC count.  Ammonia mildly elevated. Blood cultures are negative X2.  Corpak inserted due to poor oral intake but removed when she became alert on 11/21.  On 11/19/24 MRI head of poor quality due to motion artifact but show increased signal within the medial thalami, periaqueductal region and possible increased signal within the caudate and cortex of the bilateral posterior frontal lobes.  Started on IV Thiamine and MVI for possible Wernicke's.  Thiamine level low at 33.  IV Acyclovir discontinued as CSF negative for HSV infection.  On Lactulose for mildly elevated ammonia levels.  Had Lumbar Puncture on 11/20 and results negative for viruses and bacteria.  Some improvement in her alertness since 11/21/24, but not able to answer questions or follow directions.  The plan is to repeat MRI today with anesthesia today.  Consult psychiatry to assist with restlessness and insomnia.       Untreated C. Difficile Colitis  Per  she has not taken the oral Vancomycin prescribed at discharge  on 11/2/24.   Stool for pathogens negative.  C. Diff PCR negative but since untreated this could be false negative.  On oral Vancomycin.  CT a/p confirms she continues to have colitis.  Has diarrhea now due to oral lactulose.        HTN and Sinus Tachycardia  On oral Losartan 50 mg, Metoprolol tartrate 50 mg bid, Spironolactone 12.5 mg  Blood pressure mildly elevated and continues to be tachycardic off and on.     Asthma  Budesonide 0.5 mg and Formoterol nebs bid.     CHET   Buspirone 10 mg bid.  Klonopin on hold due to encephalopathy     GERD  Oral Pantoprazole 40 mg bid.     Recent Otitis Media  Ears show fluid probably causing some hearing impairment          Plan discussed with .  Will see if she can be discharged to nursing home as unsafe to be discharged home.          Nell Green MD

## 2024-11-25 NOTE — DOCUMENTATION CLARIFICATION NOTE
PATIENT:               JOLENE VIDAL  ACCT #:                  [id number]  MRN:                       [id number]  :                       1975  ADMIT DATE:       2024 5:39 PM  DISCH DATE:  RESPONDING PROVIDER #:        [id number]          PROVIDER RESPONSE TEXT:    I agree with dietician diagnosis of severe malnutrition on 24    CDI QUERY TEXT:    Clarification    Instruction:    Based on your assessment of the patient and the clinical information, please provide the requested documentation by clicking on the appropriate radio button and enter any additional information if prompted.    Question: Please further clarify this patient nutritional status as    When answering this query, please exercise your independent professional judgment. The fact that a question is being asked, does not imply that any particular answer is desired or expected.    The patient's clinical indicators include:  Clinical Information:  48F presents for AMS; found to have metabolic encephalopathy and continued C. diff colitis    Clinical Indicators:  - BMI 38.95  - RD, : Family reported pt with very poor oral intakes PTA--did not take all antibiotic for recent C diff dx.  Per archive MNT note, pt reported last admit she was only able to tolerate tea/water/Gatorade x3 weeks...Severe malnutrition related to chronic disease or condition As Evidenced by: <75% of estimated nutrient needs x1 month with resulting 7.6% weight loss x 1 month and 24% x3 months.    Treatment:  Vital 1.5 enteral nutrition, LR IVF bolus x 2L (), LR IVF (-)    Risk Factors:  Poor oral intake, C. diff colitis  Options provided:  -- I agree with dietician diagnosis of severe malnutrition on 24  -- Other - I will add my own diagnosis  -- Refer to Clinical Documentation Reviewer    Query created by: Kiran Dawson on 2024 3:53 PM      Electronically signed by:  PIPO JAMISON MD 2024 5:54 PM

## 2024-11-25 NOTE — ANESTHESIA PREPROCEDURE EVALUATION
Patient: Samuel Mims    Procedure Information       Anesthesia Start Date/Time: 11/25/24 1514    Procedure: MR BRAIN W AND WO CONTRAST    Location: [facility]            Relevant Problems   Cardiac   (+) Hypertension   (+) Prolonged Q-T interval on ECG      /Renal   (+) UTI (urinary tract infection)      ID   (+) Clostridium difficile infection   (+) UTI (urinary tract infection)       Clinical information reviewed:   Tobacco  Allergies  Meds   Med Hx  Surg Hx   Fam Hx  Soc Hx        NPO Detail:  No data recorded     Physical Exam    Airway  Mallampati: II  TM distance: >3 FB  Neck ROM: full     Cardiovascular - normal exam     Dental    Pulmonary - normal exam     Abdominal   (+) obese             Anesthesia Plan    History of general anesthesia?: yes  History of complications of general anesthesia?: no    ASA 3     general     intravenous induction   Anesthetic plan and risks discussed with patient.    Plan discussed with CRNA.

## 2024-11-25 NOTE — NURSING NOTE
0815 Patient had a witnessed fall trying to get to bed. Code Spot was called. MD at bedside. No trauma to the head or body injuries. Patient assisted into back into bed safely. Sitter at bedside to maintain patient safety. Will continue to monitor.

## 2024-11-26 DIAGNOSIS — E51.2 WERNICKE ENCEPHALOPATHY: Primary | ICD-10-CM

## 2024-11-26 LAB
ALBUMIN SERPL BCP-MCNC: 3.9 G/DL (ref 3.4–5)
ALP SERPL-CCNC: 62 U/L (ref 33–110)
ALT SERPL W P-5'-P-CCNC: 50 U/L (ref 7–45)
ANION GAP SERPL CALC-SCNC: 17 MMOL/L (ref 10–20)
AST SERPL W P-5'-P-CCNC: 36 U/L (ref 9–39)
BILIRUB SERPL-MCNC: 0.9 MG/DL (ref 0–1.2)
BUN SERPL-MCNC: 13 MG/DL (ref 6–23)
CALCIUM SERPL-MCNC: 9.1 MG/DL (ref 8.6–10.3)
CHLORIDE SERPL-SCNC: 109 MMOL/L (ref 98–107)
CO2 SERPL-SCNC: 20 MMOL/L (ref 21–32)
CREAT SERPL-MCNC: 0.8 MG/DL (ref 0.5–1.05)
EGFRCR SERPLBLD CKD-EPI 2021: 90 ML/MIN/1.73M*2
ERYTHROCYTE [DISTWIDTH] IN BLOOD BY AUTOMATED COUNT: 17.3 % (ref 11.5–14.5)
GLUCOSE SERPL-MCNC: 115 MG/DL (ref 74–99)
HCT VFR BLD AUTO: 32.2 % (ref 36–46)
HGB BLD-MCNC: 10.3 G/DL (ref 12–16)
MAGNESIUM SERPL-MCNC: 2.37 MG/DL (ref 1.6–2.4)
MCH RBC QN AUTO: 28.2 PG (ref 26–34)
MCHC RBC AUTO-ENTMCNC: 32 G/DL (ref 32–36)
MCV RBC AUTO: 88 FL (ref 80–100)
NRBC BLD-RTO: 0 /100 WBCS (ref 0–0)
PLATELET # BLD AUTO: 222 X10*3/UL (ref 150–450)
POTASSIUM SERPL-SCNC: 4.2 MMOL/L (ref 3.5–5.3)
PROT SERPL-MCNC: 6.4 G/DL (ref 6.4–8.2)
RBC # BLD AUTO: 3.65 X10*6/UL (ref 4–5.2)
SODIUM SERPL-SCNC: 142 MMOL/L (ref 136–145)
WBC # BLD AUTO: 5.6 X10*3/UL (ref 4.4–11.3)

## 2024-11-26 PROCEDURE — 2500000004 HC RX 250 GENERAL PHARMACY W/ HCPCS (ALT 636 FOR OP/ED)

## 2024-11-26 PROCEDURE — 97530 THERAPEUTIC ACTIVITIES: CPT | Mod: GP

## 2024-11-26 PROCEDURE — 2060000001 HC INTERMEDIATE ICU ROOM DAILY

## 2024-11-26 PROCEDURE — 85027 COMPLETE CBC AUTOMATED: CPT | Performed by: NURSE PRACTITIONER

## 2024-11-26 PROCEDURE — 2500000001 HC RX 250 WO HCPCS SELF ADMINISTERED DRUGS (ALT 637 FOR MEDICARE OP): Performed by: NURSE PRACTITIONER

## 2024-11-26 PROCEDURE — 83735 ASSAY OF MAGNESIUM: CPT | Performed by: NURSE PRACTITIONER

## 2024-11-26 PROCEDURE — 2500000001 HC RX 250 WO HCPCS SELF ADMINISTERED DRUGS (ALT 637 FOR MEDICARE OP)

## 2024-11-26 PROCEDURE — 99222 1ST HOSP IP/OBS MODERATE 55: CPT | Performed by: PSYCHIATRY & NEUROLOGY

## 2024-11-26 PROCEDURE — 2500000002 HC RX 250 W HCPCS SELF ADMINISTERED DRUGS (ALT 637 FOR MEDICARE OP, ALT 636 FOR OP/ED): Performed by: NURSE PRACTITIONER

## 2024-11-26 PROCEDURE — 2500000001 HC RX 250 WO HCPCS SELF ADMINISTERED DRUGS (ALT 637 FOR MEDICARE OP): Performed by: FAMILY MEDICINE

## 2024-11-26 PROCEDURE — 2500000005 HC RX 250 GENERAL PHARMACY W/O HCPCS: Performed by: FAMILY MEDICINE

## 2024-11-26 PROCEDURE — 80053 COMPREHEN METABOLIC PANEL: CPT | Performed by: NURSE PRACTITIONER

## 2024-11-26 PROCEDURE — 2500000004 HC RX 250 GENERAL PHARMACY W/ HCPCS (ALT 636 FOR OP/ED): Performed by: NURSE PRACTITIONER

## 2024-11-26 PROCEDURE — 97162 PT EVAL MOD COMPLEX 30 MIN: CPT | Mod: GP

## 2024-11-26 PROCEDURE — 94640 AIRWAY INHALATION TREATMENT: CPT

## 2024-11-26 PROCEDURE — 2500000002 HC RX 250 W HCPCS SELF ADMINISTERED DRUGS (ALT 637 FOR MEDICARE OP, ALT 636 FOR OP/ED): Performed by: PSYCHIATRY & NEUROLOGY

## 2024-11-26 PROCEDURE — 92526 ORAL FUNCTION THERAPY: CPT | Mod: GN

## 2024-11-26 PROCEDURE — 36415 COLL VENOUS BLD VENIPUNCTURE: CPT | Performed by: NURSE PRACTITIONER

## 2024-11-26 RX ORDER — RISPERIDONE 0.5 MG/1
0.5 TABLET ORAL NIGHTLY
Status: COMPLETED | OUTPATIENT
Start: 2024-11-26 | End: 2024-11-30

## 2024-11-26 RX ORDER — METOPROLOL TARTRATE 50 MG/1
50 TABLET ORAL ONCE
Status: COMPLETED | OUTPATIENT
Start: 2024-11-26 | End: 2024-11-26

## 2024-11-26 RX ORDER — HALOPERIDOL 5 MG/ML
2 INJECTION INTRAMUSCULAR ONCE
Status: COMPLETED | OUTPATIENT
Start: 2024-11-26 | End: 2024-11-26

## 2024-11-26 RX ADMIN — NYSTATIN 1 APPLICATION: 100000 CREAM TOPICAL at 21:16

## 2024-11-26 RX ADMIN — VANCOMYCIN HYDROCHLORIDE 125 MG: KIT at 06:21

## 2024-11-26 RX ADMIN — LACTULOSE 20 G: 20 SOLUTION ORAL at 18:31

## 2024-11-26 RX ADMIN — Medication 1 TABLET: at 10:36

## 2024-11-26 RX ADMIN — LACTULOSE 20 G: 20 SOLUTION ORAL at 10:36

## 2024-11-26 RX ADMIN — HALOPERIDOL LACTATE 2 MG: 5 INJECTION, SOLUTION INTRAMUSCULAR at 03:42

## 2024-11-26 RX ADMIN — HEPARIN SODIUM 5000 UNITS: 5000 INJECTION INTRAVENOUS; SUBCUTANEOUS at 14:14

## 2024-11-26 RX ADMIN — PANTOPRAZOLE SODIUM 40 MG: 40 TABLET, DELAYED RELEASE ORAL at 06:21

## 2024-11-26 RX ADMIN — LACTULOSE 20 G: 20 SOLUTION ORAL at 14:14

## 2024-11-26 RX ADMIN — METOPROLOL TARTRATE 50 MG: 50 TABLET, FILM COATED ORAL at 22:11

## 2024-11-26 RX ADMIN — THIAMINE HCL TAB 100 MG 100 MG: 100 TAB at 10:36

## 2024-11-26 RX ADMIN — HEPARIN SODIUM 5000 UNITS: 5000 INJECTION INTRAVENOUS; SUBCUTANEOUS at 22:08

## 2024-11-26 RX ADMIN — LACTULOSE 20 G: 20 SOLUTION ORAL at 02:20

## 2024-11-26 RX ADMIN — LACTULOSE 20 G: 20 SOLUTION ORAL at 22:08

## 2024-11-26 RX ADMIN — FORMOTEROL FUMARATE 20 MCG: 20 SOLUTION RESPIRATORY (INHALATION) at 20:28

## 2024-11-26 RX ADMIN — PANTOPRAZOLE SODIUM 40 MG: 40 TABLET, DELAYED RELEASE ORAL at 18:31

## 2024-11-26 RX ADMIN — LACTULOSE 20 G: 20 SOLUTION ORAL at 06:21

## 2024-11-26 RX ADMIN — NYSTATIN: 100000 CREAM TOPICAL at 10:36

## 2024-11-26 RX ADMIN — HEPARIN SODIUM 5000 UNITS: 5000 INJECTION INTRAVENOUS; SUBCUTANEOUS at 06:21

## 2024-11-26 RX ADMIN — METOPROLOL TARTRATE 50 MG: 50 TABLET, FILM COATED ORAL at 10:36

## 2024-11-26 RX ADMIN — LOSARTAN POTASSIUM 50 MG: 50 TABLET, FILM COATED ORAL at 21:14

## 2024-11-26 RX ADMIN — RISPERIDONE 0.5 MG: 0.5 TABLET, FILM COATED ORAL at 21:14

## 2024-11-26 RX ADMIN — VANCOMYCIN HYDROCHLORIDE 125 MG: KIT at 14:14

## 2024-11-26 RX ADMIN — METOPROLOL TARTRATE 50 MG: 50 TABLET, FILM COATED ORAL at 19:33

## 2024-11-26 RX ADMIN — BUDESONIDE 0.5 MG: 0.5 INHALANT RESPIRATORY (INHALATION) at 20:29

## 2024-11-26 ASSESSMENT — COGNITIVE AND FUNCTIONAL STATUS - GENERAL
STANDING UP FROM CHAIR USING ARMS: A LOT
CLIMB 3 TO 5 STEPS WITH RAILING: A LOT
MOBILITY SCORE: 11
DRESSING REGULAR LOWER BODY CLOTHING: A LOT
TURNING FROM BACK TO SIDE WHILE IN FLAT BAD: A LITTLE
DRESSING REGULAR UPPER BODY CLOTHING: A LOT
EATING MEALS: A LOT
MOVING TO AND FROM BED TO CHAIR: A LOT
PERSONAL GROOMING: A LOT
DRESSING REGULAR UPPER BODY CLOTHING: A LOT
PERSONAL GROOMING: A LOT
DAILY ACTIVITIY SCORE: 12
CLIMB 3 TO 5 STEPS WITH RAILING: TOTAL
DAILY ACTIVITIY SCORE: 12
CLIMB 3 TO 5 STEPS WITH RAILING: A LOT
MOVING FROM LYING ON BACK TO SITTING ON SIDE OF FLAT BED WITH BEDRAILS: A LITTLE
WALKING IN HOSPITAL ROOM: A LOT
HELP NEEDED FOR BATHING: A LOT
TOILETING: A LOT
WALKING IN HOSPITAL ROOM: A LOT
MOBILITY SCORE: 16
DRESSING REGULAR LOWER BODY CLOTHING: A LOT
WALKING IN HOSPITAL ROOM: TOTAL
MOBILITY SCORE: 16
EATING MEALS: A LOT
STANDING UP FROM CHAIR USING ARMS: A LOT
HELP NEEDED FOR BATHING: A LOT
MOVING TO AND FROM BED TO CHAIR: A LOT
MOVING TO AND FROM BED TO CHAIR: TOTAL
STANDING UP FROM CHAIR USING ARMS: A LOT
TOILETING: A LOT

## 2024-11-26 ASSESSMENT — PAIN - FUNCTIONAL ASSESSMENT
PAIN_FUNCTIONAL_ASSESSMENT: CPOT (CRITICAL CARE PAIN OBSERVATION TOOL)
PAIN_FUNCTIONAL_ASSESSMENT: 0-10
PAIN_FUNCTIONAL_ASSESSMENT: CPOT (CRITICAL CARE PAIN OBSERVATION TOOL)
PAIN_FUNCTIONAL_ASSESSMENT: 0-10
PAIN_FUNCTIONAL_ASSESSMENT: CPOT (CRITICAL CARE PAIN OBSERVATION TOOL)
PAIN_FUNCTIONAL_ASSESSMENT: UNABLE TO SELF-REPORT
PAIN_FUNCTIONAL_ASSESSMENT: CPOT (CRITICAL CARE PAIN OBSERVATION TOOL)

## 2024-11-26 ASSESSMENT — PAIN SCALES - WONG BAKER: WONGBAKER_NUMERICALRESPONSE: NO HURT

## 2024-11-26 ASSESSMENT — PAIN SCALES - GENERAL
PAINLEVEL_OUTOF10: 0 - NO PAIN

## 2024-11-26 NOTE — DISCHARGE INSTRUCTIONS
Outpatient Occupational Therapy evaluation ordered for cognitive rehab, evaluation for driving safety and capacity to return to work .

## 2024-11-26 NOTE — CARE PLAN
The clinical goals for the shift include Pt will remain HDS this shift.    Problem: Fall/Injury  Goal: Verbalize understanding of personal risk factors for fall in the hospital  Outcome: Not Progressing  Goal: Verbalize understanding of risk factor reduction measures to prevent injury from fall in the home  Outcome: Not Progressing     Problem: Skin  Goal: Participates in plan/prevention/treatment measures  Outcome: Progressing  Flowsheets (Taken 11/26/2024 1739)  Participates in plan/prevention/treatment measures:   Elevate heels   Discuss with provider PT/OT consult   Increase activity/out of bed for meals  Goal: Prevent/manage excess moisture  Outcome: Progressing  Flowsheets (Taken 11/26/2024 1739)  Prevent/manage excess moisture:   Cleanse incontinence/protect with barrier cream   Moisturize dry skin   Monitor for/manage infection if present  Goal: Prevent/minimize sheer/friction injuries  Outcome: Progressing  Flowsheets (Taken 11/26/2024 1739)  Prevent/minimize sheer/friction injuries:   Increase activity/out of bed for meals   Complete micro-shifts as needed if patient unable. Adjust patient position to relieve pressure points, not a full turn   HOB 30 degrees or less   Turn/reposition every 2 hours/use positioning/transfer devices   Utilize specialty bed per algorithm   Use pull sheet  Goal: Promote/optimize nutrition  Outcome: Progressing  Flowsheets (Taken 11/26/2024 1739)  Promote/optimize nutrition:   Offer water/supplements/favorite foods   Monitor/record intake including meals  Goal: Promote skin healing  Outcome: Progressing  Flowsheets (Taken 11/26/2024 1739)  Promote skin healing:   Turn/reposition every 2 hours/use positioning/transfer devices   Rotate device position/do not position patient on device   Protective dressings over bony prominences   Ensure correct size (line/device) and apply per  instructions   Assess skin/pad under line(s)/device(s)     Problem: Pain - Adult  Goal:  Verbalizes/displays adequate comfort level or baseline comfort level  Outcome: Met     Problem: Discharge Planning  Goal: Discharge to home or other facility with appropriate resources  Outcome: Progressing     Problem: Fall/Injury  Goal: Verbalize understanding of personal risk factors for fall in the hospital  Outcome: Not Progressing  Goal: Verbalize understanding of risk factor reduction measures to prevent injury from fall in the home  Outcome: Not Progressing  Goal: Use assistive devices by end of the shift  Outcome: Progressing  Goal: Pace activities to prevent fatigue by end of the shift  Outcome: Progressing     Problem: Pain  Goal: Takes deep breaths with improved pain control throughout the shift  Outcome: Progressing  Goal: Turns in bed with improved pain control throughout the shift  Outcome: Progressing  Goal: Walks with improved pain control throughout the shift  Outcome: Progressing  Goal: Performs ADL's with improved pain control throughout shift  Outcome: Progressing     Problem: Arrythmia/Dysrhythmia  Goal: Promote self management  Outcome: Progressing  Goal: Serial ECG will return to baseline  Outcome: Progressing  Goal: Vital signs return to baseline  Outcome: Progressing     Problem: Nutrition  Goal: Nutrition support goals are met within 48 hrs  Outcome: Progressing  Goal: Nutrition support is meeting 75% of nutrient needs  Outcome: Progressing  Goal: Tube feed tolerance  Outcome: Progressing  Goal: BG  mg/dL  Outcome: Progressing  Goal: Electrolytes WNL  Outcome: Progressing

## 2024-11-26 NOTE — PROGRESS NOTES
Nutrition Follow Up Assessment:   Nutrition Assessment       Nutrition Note:  Samuel Mims is a 48 y.o. female presenting 11/16 confusion, increased sleeping, decreased oral intake, nausea with intermittent vomiting, and diarrhea. Patient was hospitalized on 10/31-11/2/24 with hypokalemia, vomiting, and c-diff (tx with oral vancomycin 11/5-11/18/24); it was reported that pt family found multiple vancomycin pills at home--likely pt not taking medication. Work up revealing r/o continued C diff, metabolic encephalopathy (NH3 58umol/L), and UTI.  Cardiology involved in pt care due to prolonged QT interval, HTN, and sinus tachycardia; ID and Neurology also involved in pt care.  ST eval 11/17 suggesting minced moist foods level 5 with thin liquids and 1:1 feeding however 11/18, pt only responsive to pain therefore to place dobhoff for medications and to start enteral nutrition.    11/21/2024: Re-consult, follow up: Chart reviewed and events noted. Pt more awake/passed ST eval 11/20 for minced moist level 5/thin liquids with 1:1 feeding; pt pulled out corpak on 11/20.  Per GI, suspect hepatic encephalopathy possibly worsened acutely by inflammatory or infectious process--on lactulose and FMS in place since 11/20--noting 1.55L out 11/19-11/20AM.  Per Neurology, pt on high dose thiamine; LP results and MRI pending.    Follow up 11/26/24: Psych, Neurology and Speech Therapy on  consult. SLP completed bedside skilled dysphagia treatment today and recommended continued minced/moist diet w/thin liquids.  Per SLP notes, possible diet advancement pending dentures being brought to hospital. Per chart notes, pt w/fall out of bed on 11/25. MRI completed 11/25. Pt remains in precautions for cdiff. Pt remains w/hearing difficulty. Etiology of encephalopathy unknown.    Per meal documentation: 11/24 50%L and D, 100%B; 11/21 25%B; 11/26 aide noted 75% of breakfast consumed.     Past Medical History   has a past medical history of  "Asthma, Clostridium difficile infection, GERD (gastroesophageal reflux disease), and Hypertension.IUD (placed 17 years ago per ), and c-diff (9/2024)   Surgical History   has a past surgical history that includes Cholecystectomy; Esophagogastroduodenoscopy; and Colonoscopy.       Nutrition History:  Energy Intake: Poor < 50 %  Food and Nutrient History: 11/21: Per  at bedside, pt ate ~25% of breakfast. 11/18: Pt from home with ; per nurse, pt only responsive to pain today; + encephalopathy. Family reported pt with very poor oral intakes PTA--did not take all antibiotic for recent C diff dx.  Per archive MNT note, pt reported last admit she was only able to tolerate tea/water/Gatorade x3 weeks. Spoke with pt  at bedside whom verfied above.  reports the last time pt had solid food was Monday, 11/11/2024, \"a small chocolate chip muffin.\"  Vitamin/Herbal Supplement Use: none reported  Food Allergies/Intolerances:  None  GI Symptoms: diarrhea and poor oral intakes.   Oral Problems: Chewing difficulty, Swallowing difficulty, and ST eval 11/20 suggesting minced/moist level 5 with thin liquids and feeding strategies including 1:1 feed. .       Anthropometrics:  Height: 167.6 cm (5' 6\")   Weight: 106 kg (233 lb 7.5 oz)   BMI (Calculated): 37.7   IBW 59kg          Weight History:   10/31/2024: 113 kg 3.5% ~19 days  10/17/2024 118 kg  7.6% x1 month  8/2024: 143kg 24% x 3 months  2/2023: 128kg   Weight Change %: significant unintentional loss through 3 months  Significant Weight Loss: Yes  Interpretation of Weight Loss: >7.5% in 3 months0-10 (Numeric) Pain Score: 0 - No pain  Critical-Care Pain Observation Score:  [0]      Nutrition Focused Physical Exam Findings:  defer: 11/21: pt lying flat in bed. 11/26 pt resting  Subcutaneous Fat Loss:      Muscle Wasting:     Edema:  Edema Location: BUE, BLE non-pitting  Physical Findings:  Skin: Positive (pale, sacrum red/blanchable, dry, left buttock " "wound)    Nutrition Significant Labs:  BMP Trend:   Results from last 7 days   Lab Units 11/26/24  0344 11/25/24  0429 11/24/24  0550 11/23/24  0453   GLUCOSE mg/dL 115* 124* 108* 117*   CALCIUM mg/dL 9.1 9.6 9.3 9.8   SODIUM mmol/L 142 144 148* 146*   POTASSIUM mmol/L 4.2 3.5 3.5 3.4*   CO2 mmol/L 20* 25 25 22   CHLORIDE mmol/L 109* 109* 115* 113*   BUN mg/dL 13 18 19 18   CREATININE mg/dL 0.80 0.95 0.97 1.01    , A1C:  Lab Results   Component Value Date    HGBA1C 5.3 01/23/2021   , BG POCT trend:    , Liver Function Trend:   Results from last 7 days   Lab Units 11/26/24 0344 11/25/24  0429 11/24/24  0550 11/23/24  0453   ALK PHOS U/L 62 65 64 75   AST U/L 36 25 29 44*   ALT U/L 50* 45 49* 65*   BILIRUBIN TOTAL mg/dL 0.9 0.8 0.6 0.9    , Vit D: No results found for: \"VITD25\" , Vit B12: No results found for: \"FBNKVSIT34\" , Folate: No results found for: \"FOLATE\"     Nutrition Specific Medications:  Scheduled medications  budesonide, 0.5 mg, nebulization, BID  [Held by provider] busPIRone, 10 mg, oral, BID  influenza, 0.5 mL, intramuscular, During hospitalization  formoterol, 20 mcg, nebulization, BID  heparin (porcine), 5,000 Units, subcutaneous, q8h SANTIAGO  lactulose, 20 g, oral, q4h  losartan, 50 mg, oral, BID  metoprolol tartrate, 50 mg, oral, q12h  multivitamin with minerals, 1 tablet, oral, Daily  nystatin, , Topical, BID  pantoprazole, 40 mg, oral, BID AC  risperiDONE, 0.5 mg, oral, Nightly  [Held by provider] spironolactone, 12.5 mg, oral, Daily  thiamine, 100 mg, oral, Daily      Continuous medications       PRN medications  PRN medications: acetaminophen **OR** acetaminophen, albuterol, [Held by provider] clonazePAM, hydrALAZINE, melatonin, metoprolol     I/O:   Last BM Date: 11/25/24; Stool Appearance: Loose (11/25/24 0800)    Dietary Orders (From admission, onward)       Start     Ordered    11/25/24 1728  Adult diet Regular; Minced and moist 5; Thin 0  Diet effective now        Question Answer Comment "   Diet type Regular    Texture Minced and moist 5    Fluid consistency Thin 0        11/25/24 1728    11/21/24 1106  Oral nutritional supplements  Until discontinued        Comments: Chocolate and PRN please   Question Answer Comment   Deliver with Lunch    Select supplement: Magic Cup        11/21/24 1106    11/21/24 1106  Oral nutritional supplements  Until discontinued        Comments: And PRN please   Question Answer Comment   Deliver with Dinner    Select supplement: Gelatein Plus        11/21/24 1106    11/21/24 1104  Oral nutritional supplements  Until discontinued        Comments: Chocolate please   Question Answer Comment   Deliver with Breakfast    Deliver with Dinner    Deliver with Lunch    Select supplement: Ensure Plus High Protein        11/21/24 1104    11/17/24 0020  May Participate in Room Service With Assistance  ( ROOM SERVICE MAY PARTICIPATE WITH ASSISTANCE)  Once        Question:  .  Answer:  Yes    11/17/24 0019                     Estimated Needs:   Total Energy Estimated Needs (kCal):  (1440-1745kcal (13-16kcal/kg of 109kg))     Total Protein Estimated Needs (g):  (76-95g (1.3-1.6g/kg IBW of 59kg))     Total Fluid Estimated Needs (mL):  (1mL/kcal/d or as per physician)           Nutrition Diagnosis   Malnutrition Diagnosis  Patient has Malnutrition Diagnosis: Yes  Diagnosis Status: Ongoing  Malnutrition Diagnosis: Severe malnutrition related to chronic disease or condition  As Evidenced by: <75% of estimated nutrient needs x1 month with resulting 7.6% weight loss x 1 month and 24% x3 months.  Additional Assessment Information: 11/21: Case discussed with NP, ST, and RN. NP agreeable to liberalize to regular with minced/moist level 5 in an attempt to offer more meal selections while on modified food textures and thus encourage oral intakes.    Nutrition Diagnosis  Patient has Nutrition Diagnosis: Yes  Diagnosis Status (1): Ongoing  Nutrition Diagnosis 1: Inadequate oral intake  Related to  (1): altered mental status  As Evidenced by (1): metabolic encephalopathy; dobbhoff out as pt pulled it out  however now on oral/modified diet.       Nutrition Interventions/Recommendations         Nutrition Prescription:  Individualized Nutrition Prescription Provided for : Diet: continue oral diet as ordered = regular/minced moist level 5/thin liquids with ONS        Nutrition Interventions:   Interventions: Meals and snacks, Medical food supplement  Meals and Snacks: Texture-modified diet  Goal: will consume 75% of meals  Enteral Intake: Other (Comment)  Goal: n/a  Medical Food Supplement: Commercial beverage  Goal: Chocolate Ensure Plus HP 3x/day for additional 1050kcal and 60g pro, Magic Cup 1x/day and PRN for additional 290kcal and 9g pro/4 oz as well as Gelatein Plus 1x/d and PRN for additional 160kcal and 20g pro.    Collaboration and Referral of Nutrition Care: Collaboration by nutrition professional with other providers  Goal: BRAIN Terry    Nutrition Education:   11/21: Met with pt and  at bedside. Pt nodded head with agreed with trying chocolate Ensure otherwise pt did not converse. No further preferences verbalized per pt or . 11/26: pt asleep and  not present.          Nutrition Monitoring and Evaluation   Food/Nutrient Related History Monitoring  Monitoring and Evaluation Plan: Energy intake, Fluid intake  Energy Intake: Estimated energy intake  Criteria: will meet >75% of estimated energy needs from meals and ONS  Fluid Intake: Estimated fluid intake  Criteria: encourage fluids    Body Composition/Growth/Weight History  Monitoring and Evaluation Plan: Weight  Weight: Measured weight  Criteria: maintain stable wt    Biochemical Data, Medical Tests and Procedures  Monitoring and Evaluation Plan: Electrolyte/renal panel, Glucose/endocrine profile  Electrolyte and Renal Panel: Sodium, Potassium, Phosphorus, Magnesium, Creatinine  Criteria: WNR  Glucose/Endocrine Profile: Glucose,  casual  Criteria: BG 70-180mg/dL    Nutrition Focused Physical Findings  Monitoring and Evaluation Plan: Digestive System, Skin  Digestive System: Diarrhea  Criteria: decrease diarrhea  Skin: Impaired wound healing  Criteria: promote healing         Time Spent (min): 30 minutes   Follow up 5-7 days  Last RD note 11/26/24

## 2024-11-26 NOTE — PROGRESS NOTES
"Inpatient Speech Language Pathology Treatment Note     Patient Name: Samuel Mims  MRN: [id number]  : 1975  Patient Room: [address] [place], [place] [address]  Today's Date: 24  Time Calculation  Start Time: 1230  Stop Time: 1250  Time Calculation (min): 20 min     PLAN:  Skilled speech therapy for dysphagia treatment continues to be warranted to provide training and instruction regarding the use of compensatory swallow strategies, for pt/caregiver education in order to reduce risk of aspiration, dehydration and malnutrition. , to assess tolerance of diet , to determine ability to upgrade diet after PO trials with SLP.    SLP TX Plan: Continue Plan of Care  SLP Frequency: 2x per week  Discussed POC: Patient  Discussed Risks/Benefits: Patient  Patient/Caregiver Agreeable: Yes  Continue skilled SLP at next level of care: Yes      Recommendations:   Solid Diet Recommendations: Minced & moist/ground (IDDSI Level 5)  Liquid Diet Recommendations: Thin (IDDSI Level 0)  Compensatory strategies: small bites/sips, alternate bites/sips, upright 90 degrees for intake   Medication administration: whole-one at a time    SLP Assessment:  Pt seen for dysphagia management this date. Noted continued difficulty understanding spoken language d/t bilateral hearing loss requiring written supports during session. Pt given written choice of puree or regular solids for PO trials. Pt self-administered 4 oz of puree with no overt s/s of penetration/aspiration. Pt had x1 delayed dry cough with 2 ounces thin liquids via straw. No change in vocal quality. Pt's lunch tray arrived after initial PO trials. Pt then consumed x5 ounces of minced and moist solids with adequate mastication and no oral residue noted. Pt family reported giving \"snacks\" such as chocolate and fruit snacks at bedside. Pt does have dentures at home, but normally wears them for appearances. Discussed possible diet upgrade to softer solids with use of dentures. Pt's  agreeable " to try diet advancement and bring dentures.    Subjective:  Pt. Seen at bedside for skilled dysphagia treatment.   Prior to Session Communication: Bedside nurse    Pain:  Ratin-10   Pt report: none reported  Action taken: none needed         Oxygen Status:   room air          Goals:   Established on2024  Pt will tolerate recommended diet with no overt s/s of difficulty or aspiration.     Progress: Patient tolerated PO trials of puree, thin liquids via straw, and MM5 solids without overt s/s of aspiration.     Status: Continue goal     Pt will tolerate advanced solid trials with no overt s/s of difficulty or aspiration to assess readiness for diet upgrade.      Progress: Patient seen during lunch meal-tolerating current diet. Discussed possible diet upgrade should  bring in dentures.      Status: Continue goal      Treatment Outcome:  SLP TX Intervention Outcome: Making Progress Towards Goals    Treatment Tolerance: Patient tolerated treatment well   Prognosis: Good   Barriers: Comorbidities, Cognition   Medical Staff Made Aware: Yes     SLP Inpatient Education:  Learner family, patient   Barriers to Learning Cognitive limitations, Communication limitations, Hearing problems   Method Verbal, Demonstration, Written materials provided and reviewed   Education - Topic ST provided patient education regarding possible diet advancement, and safe swallow strategies.   Outcome    1= partially meets; needs review

## 2024-11-26 NOTE — CARE PLAN
The clinical goals for the shift include patient mental status will improve this shift. Patient is slightly improving. Patient is alert this morning and able to express herself in a few words. However, patient continues to take medical devices off, including intravenous access.     Problem: Skin  Goal: Participates in plan/prevention/treatment measures  Outcome: Not Progressing  Goal: Prevent/manage excess moisture  Outcome: Not Progressing  Goal: Prevent/minimize sheer/friction injuries  Outcome: Not Progressing  Goal: Promote/optimize nutrition  Outcome: Not Progressing  Goal: Promote skin healing  Outcome: Not Progressing     Problem: Fall/Injury  Goal: Verbalize understanding of personal risk factors for fall in the hospital  Outcome: Not Progressing  Goal: Verbalize understanding of risk factor reduction measures to prevent injury from fall in the home  Outcome: Not Progressing     Problem: Arrythmia/Dysrhythmia  Goal: Promote self management  Outcome: Not Progressing    Problem: Safety - Medical Restraint  Goal: Free from restraint(s) (Restraint for Interference with Medical Device)  Outcome: Met    Problem: Nutrition  Goal: Nutrition support goals are met within 48 hrs  Outcome: Progressing  Goal: Nutrition support is meeting 75% of nutrient needs  Outcome: Progressing  Goal: Electrolytes WNL  Outcome: Progressing     Problem: Arrythmia/Dysrhythmia  Goal: Lab values return to normal range  Outcome: Met  Goal: Promote self management  Outcome: Not Progressing  Goal: Vital signs return to baseline  Outcome: Progressing

## 2024-11-26 NOTE — PROGRESS NOTES
"Samuel Mims is a 48 y.o. female on day 10 of admission presenting with Sepsis with encephalopathy without septic shock, due to unspecified organism (Multi).      Subjective   Samuel Mims is a 48 y.o. female on day 10 of admission presenting with Sepsis with encephalopathy without septic shock, due to unspecified organism (Multi).      Subjective      The patient has been diagnosed with Wernicke's encephalopathy and is currently on thiamine supplementation.     The patient had symptoms of restlessness, agitation, and difficulty sleeping. According to her , there was a brief period of improvement in her condition yesterday, but her symptoms have since deteriorated. Additionally, she has a history of bilateral hearing loss, which remains unresolved, and resolved double vision, which has fortunately resolved. Per  she has not  experienced any pain, sleep apnea or alcohol use.    For her restlessness and agitation, she used Atarax and buspirone have been prescribed; however, these medications appear to have been ineffective . An ENT specialist has evaluated her for hearing loss, but the underlying cause remains unidentified.    Further Evaluations and Referrals:  She had MRI under anesthesia which was reviewed by myself and was consistent with nonspecific sub-cortical white changes.             Objective   Physical exam:   Cranial nerves;  Decreased hearing, bilaterally  No nystagmus, no focal weakness, or ataxia.   Last Recorded Vitals  Blood pressure (!) 166/102, pulse 104, temperature 36.4 °C (97.5 °F), temperature source Temporal, resp. rate 11, height 1.676 m (5' 6\"), weight 106 kg (233 lb 7.5 oz), SpO2 98%.  Bilateral hearing loss, horizontal nystagmus. Coordination, and muscle strength are intact.      Relevant Results                    [place] Coma Scale  Best Eye Response: Spontaneous  Best Verbal Response: Confused  Best Motor Response: Follows commands  [place] Coma Scale Score: 14   "                           Assessment/Plan   This patient currently has cardiac telemetry ordered; if you would like to modify or discontinue the telemetry order, click here to go to the orders activity to modify/discontinue the order.  Assessment & Plan  Sepsis with encephalopathy without septic shock, due to unspecified organism (Multi)    Diarrhea    UTI (urinary tract infection)    Hypertension    Colitis    Sinus tachycardia    Prolonged Q-T interval on ECG    Clostridium difficile infection     Wernicke's Encephalopathy with nonspecific white matter changes noticed on MRI of the brain     - Plan:    - Continue thiamine supplementation.      - Follow up with psychiatry        - Referral to cognitive rehab, OT evaluation for driving safety, capacity to return to work .    Bilateral Hearing Loss    - Plan:    - Follow up on ENT consultation results.    - Consider hearing aids for sound amplification in the future.    - Continue to monitor hearing status.    No further neuro work up is needed.  Case was discussed with PCP and the .            I spent 40 minutes in the professional and overall care of this patient.      Muna Sal MD

## 2024-11-26 NOTE — PROGRESS NOTES
"Samuel Mims is a 48 y.o. female on day 9 of admission presenting with Sepsis with encephalopathy without septic shock, due to unspecified organism (Multi).      Subjective      The patient has been diagnosed with Wernicke's encephalopathy and is currently on thiamine supplementation.     The patient had symptoms of restlessness, agitation, and difficulty sleeping. According to her , there was a brief period of improvement in her condition yesterday, but her symptoms have since deteriorated. Additionally, she has a history of bilateral hearing loss, which remains unresolved, and resolved double vision, which has fortunately resolved. Per  she has not  experienced any pain, sleep apnea or alcohol use.    For her restlessness and agitation, she used Atarax and buspirone have been prescribed; however, these medications appear to have been ineffective . An ENT specialist has evaluated her for hearing loss, but the underlying cause remains unidentified.    Further Evaluations and Referrals:  She is scheduled to undergo an MRI under anesthesia to further investigate her condition. Additionally, she has been referred to psychiatry for a comprehensive evaluation and management plan for her restlessness and agitation.       Objective   Physical exam:   Last Recorded Vitals  Blood pressure 158/85, pulse 98, temperature 36.5 °C (97.7 °F), temperature source Oral, resp. rate 20, height 1.676 m (5' 6\"), weight 106 kg (233 lb 7.5 oz), SpO2 96%.  Bilateral hearing loss, horizontal nystagmus. Coordination, and muscle strength are intact.      Relevant Results                    [place] Coma Scale  Best Eye Response: Spontaneous  Best Verbal Response: Confused  Best Motor Response: Withdraws to pain  [place] Coma Scale Score: 12                             Assessment/Plan   This patient currently has cardiac telemetry ordered; if you would like to modify or discontinue the telemetry order, click here to go to the " orders activity to modify/discontinue the order.  Assessment & Plan  Sepsis with encephalopathy without septic shock, due to unspecified organism (Multi)    Diarrhea    UTI (urinary tract infection)    Hypertension    Colitis    Sinus tachycardia    Prolonged Q-T interval on ECG    Clostridium difficile infection     Wernicke's Encephalopathy    - Plan:    - Continue thiamine supplementation.    - Schedule MRI under anesthesia for today.    - Consult psychiatry for medication management and behavioral symptoms.    - Monitor for improvement in hearing and cognitive status.    Bilateral Hearing Loss    - Plan:    - Follow up on ENT consultation results.    - Consider hearing aids for sound amplification in the future.    - Continue to monitor hearing status.             I spent 40 minutes in the professional and overall care of this patient.      Muna Sal MD

## 2024-11-26 NOTE — PROGRESS NOTES
Attempted to call spouse regarding discharge planning. There was no answer and unable to leave a message due to no mailbox to leave message. Per sitter in room, spouse to return to hospital but not sure when. Will attempt to speak with spouse at another time.     1540- Spouse, Salvatore,  returned to hospital. Discussed discharge planning with spouse. Provided Salvatore with choice list for SNF's. Will check back with Salvatore regarding choices after holiday. CT team will follow patient for discharge needs.     1600-  Spouse Salvatore chose [facility]. Requested [facility] place referral for acceptance. Did ask that spouse choose a couple more facilities for coverage.  CT team will follow.

## 2024-11-26 NOTE — PROGRESS NOTES
Per Alcon Monzon, they spoke with pt's insurance and she does not have skilled coverage.  TCC to follow.

## 2024-11-26 NOTE — PROGRESS NOTES
Samuel Mims is a 48 y.o. female on day 10 of admission presenting with Sepsis with encephalopathy without septic shock, due to unspecified organism (Multi).    Subjective   MR done yesterday under anesthesia, pending results.  Patient very restless in bed, only able to answer she is in the hospital.       Objective     Last Recorded Vitals  BP (!) 124/113 (BP Location: Left arm, Patient Position: Lying)   Pulse 82   Temp 36.4 °C (97.5 °F) (Temporal)   Resp 11   Wt 106 kg (233 lb 7.5 oz)   SpO2 98%   Intake/Output last 3 Shifts:    Intake/Output Summary (Last 24 hours) at 11/26/2024 0746  Last data filed at 11/26/2024 0600  Gross per 24 hour   Intake 320 ml   Output 600 ml   Net -280 ml       Admission Weight  Weight: 127 kg (280 lb) (11/16/24 1755)    Daily Weight  11/23/24 : 106 kg (233 lb 7.5 oz)      Physical Exam:  General: Not in acute distress  HEENT: PERRLA, Left TM indicates recent otitis media, Right TM full of fluid.  Neck: Normal to inspection  Lungs: Clear to auscultation, work of breathing within normal limit  Cardiac: Regular rate and rhythm  Abdomen: Soft nontender, positive bowel sounds  : Exam deferred  Skin: Perianal erythema with clear demarcation.  Hematology: No petechia or excessive ecchymosis  Musculoskeletal: Without significant trauma, arms in restraints  Neurological: Alert and oriented X 0,  Unable to answer questions appropriately. Very restless.    Relevant Results  Scheduled medications  budesonide, 0.5 mg, nebulization, BID  [Held by provider] busPIRone, 10 mg, oral, BID  influenza, 0.5 mL, intramuscular, During hospitalization  formoterol, 20 mcg, nebulization, BID  heparin (porcine), 5,000 Units, subcutaneous, q8h SANTIAGO  lactulose, 20 g, oral, q4h  [Held by provider] losartan, 50 mg, oral, BID  metoprolol tartrate, 50 mg, oral, q12h  multivitamin with minerals, 1 tablet, oral, Daily  nystatin, , Topical, BID  pantoprazole, 40 mg, oral, BID AC  [Held by provider]  spironolactone, 12.5 mg, oral, Daily  thiamine, 100 mg, oral, Daily  vancomycin, 125 mg, oral, 4x daily      Continuous medications     PRN medications  PRN medications: acetaminophen **OR** acetaminophen, albuterol, [Held by provider] clonazePAM, hydrALAZINE, melatonin, metoprolol   Results for orders placed or performed during the hospital encounter of 11/16/24 (from the past 24 hours)   Magnesium   Result Value Ref Range    Magnesium 2.37 1.60 - 2.40 mg/dL   Comprehensive Metabolic Panel   Result Value Ref Range    Glucose 115 (H) 74 - 99 mg/dL    Sodium 142 136 - 145 mmol/L    Potassium 4.2 3.5 - 5.3 mmol/L    Chloride 109 (H) 98 - 107 mmol/L    Bicarbonate 20 (L) 21 - 32 mmol/L    Anion Gap 17 10 - 20 mmol/L    Urea Nitrogen 13 6 - 23 mg/dL    Creatinine 0.80 0.50 - 1.05 mg/dL    eGFR 90 >60 mL/min/1.73m*2    Calcium 9.1 8.6 - 10.3 mg/dL    Albumin 3.9 3.4 - 5.0 g/dL    Alkaline Phosphatase 62 33 - 110 U/L    Total Protein 6.4 6.4 - 8.2 g/dL    AST 36 9 - 39 U/L    Bilirubin, Total 0.9 0.0 - 1.2 mg/dL    ALT 50 (H) 7 - 45 U/L   CBC   Result Value Ref Range    WBC 5.6 4.4 - 11.3 x10*3/uL    nRBC 0.0 0.0 - 0.0 /100 WBCs    RBC 3.65 (L) 4.00 - 5.20 x10*6/uL    Hemoglobin 10.3 (L) 12.0 - 16.0 g/dL    Hematocrit 32.2 (L) 36.0 - 46.0 %    MCV 88 80 - 100 fL    MCH 28.2 26.0 - 34.0 pg    MCHC 32.0 32.0 - 36.0 g/dL    RDW 17.3 (H) 11.5 - 14.5 %    Platelets 222 150 - 450 x10*3/uL      Lab Results   Component Value Date    BLOODCULT No growth at 4 days -  FINAL REPORT 11/16/2024    BLOODCULT No growth at 4 days -  FINAL REPORT 11/16/2024    URINECULTURE (A) 11/16/2024     Multiple organisms present, probable contamination. Repeat culture if clinically indicated.            Assessment/Plan   Samuel Mims is a 48 y.o. female on day 10 of admission presenting with Sepsis with encephalopathy without septic shock, due to unspecified organism (Multi).  Principal Problem:    Sepsis with encephalopathy without septic  shock, due to unspecified organism (Multi)  Active Problems:    Diarrhea    UTI (urinary tract infection)    Hypertension    Colitis    Sinus tachycardia    Prolonged Q-T interval on ECG    Clostridium difficile infection     Metabolic encephalopathy- multifactorial, possible Wernicke's, possible hepatic encephalopathy   Possibly due to significant weight loss of 50+ pounds w/poor nutritional intake, vs PRES from poorly treated HTN.     Initial CT head with no acute changes. Urine Tox screen negative.  No elevated WBC count.  Ammonia mildly elevated. Blood cultures are negative X2.  Corpak inserted due to poor oral intake but removed when she became alert on 11/21.  On 11/19/24 MRI head of poor quality due to motion artifact but show increased signal within the medial thalami, periaqueductal region and possible increased signal within the caudate and cortex of the bilateral posterior frontal lobes.  Started on high dose IV Thiamine and MVI for possible Wernicke's, changed to oral Thiamine on 11/25.  Thiamine level low at 33.  IV Acyclovir discontinued as CSF negative for HSV infection.  On Lactulose for mildly elevated ammonia levels.  Had Lumbar Puncture on 11/20 and results negative for viruses and bacteria.  Some improvement in her alertness since 11/21/24, but not able to answer questions or follow directions.   MRI with anesthesia completed 11/25- results pending.  Consult psychiatry to assist with restlessness and insomnia.        Untreated C. Difficile Colitis  Per  she has not taken the oral Vancomycin prescribed at discharge on 11/2/24.   Stool for pathogens negative.  C. Diff PCR negative but since untreated this could be false negative.  On oral Vancomycin for 10 days, to be discontinued on 11/28.  CT a/p confirms she continues to have colitis.  Has diarrhea now due to oral lactulose.        HTN and Sinus Tachycardia  On oral Losartan 50 mg, Metoprolol tartrate 50 mg bid, Spironolactone 12.5  mg  Blood pressure mildly elevated and continues to be tachycardic off and on.     Asthma  Budesonide 0.5 mg and Formoterol nebs bid.     CHET   Buspirone 10 mg bid.  Klonopin on hold due to encephalopathy     GERD  Oral Pantoprazole 40 mg bid.     Recent Otitis Media  Ears show fluid probably causing some hearing impairment           Plan discussed with .  Care management to help with discharge planning,  is interested in  acute rehab facility.         Nell Green MD

## 2024-11-26 NOTE — CARE PLAN
Notified by nurse of tachycardia. Dr. Esquivel sent strip via secure chat. Patient had episode of atrial tachycardia. Give extra dose of metoprolol now.

## 2024-11-26 NOTE — CONSULTS
Reason for consult:  AMS    History Of Present Illness  Samuel Mims is a 48 y.o. female presenting with AMS.     Pt is poor historian sec to current mental status  Pt is pointing to her ears indicating that she does not hear properly  Per her  she was sitting in her urine for a couple days and had diarrhea on Thursday. Her son also reports that she has been confused and not eating or drinking for a couple days and they decided to bring her to the hospital today as she was not getting better. HPI and history obtained from the patient's , ED physician, and previous medical records as patient is lethargic and confused     Unable to obtain any past psych history or CD history    Past Medical History  She has a past medical history of Asthma, Clostridium difficile infection, GERD (gastroesophageal reflux disease), and Hypertension.    Surgical History  She has a past surgical history that includes Cholecystectomy; Esophagogastroduodenoscopy; and Colonoscopy.     Social History  She reports that she has quit smoking. Her smoking use included cigarettes. She started smoking about 4 years ago. She has a 4.9 pack-year smoking history. She has never used smokeless tobacco. She reports that she does not drink alcohol and does not use drugs.     Allergies  Tiotropium bromide, Ace inhibitors, Escitalopram, Hydrochlorothiazide, and House dust mite    Review of Systems    Psychiatric ROS - Adult  Unable to obtain details    Physical Exam      Mental Status Exam  General: Alert, oriented x 1  Appearance: stated age  Attitude: not cooperative  Behavior: decreased  Motor Activity: decreased  Speech: low in rate  Mood: anxious  Affect: flat  Thought Process:   Thought Content: unable to assess  Thought Perception: no  Cognition:   Insight: poor  Judgement: poor    Last Recorded Vitals  Blood pressure (!) 166/102, pulse 104, temperature 36.4 °C (97.5 °F), temperature source Temporal, resp. rate 11, height 1.676 m  "(5' 6\"), weight 106 kg (233 lb 7.5 oz), SpO2 98%.    Relevant Results  Results for orders placed or performed during the hospital encounter of 11/16/24 (from the past 96 hours)   CBC   Result Value Ref Range    WBC 6.7 4.4 - 11.3 x10*3/uL    nRBC 0.0 0.0 - 0.0 /100 WBCs    RBC 4.29 4.00 - 5.20 x10*6/uL    Hemoglobin 11.9 (L) 12.0 - 16.0 g/dL    Hematocrit 37.4 36.0 - 46.0 %    MCV 87 80 - 100 fL    MCH 27.7 26.0 - 34.0 pg    MCHC 31.8 (L) 32.0 - 36.0 g/dL    RDW 16.7 (H) 11.5 - 14.5 %    Platelets 203 150 - 450 x10*3/uL   Comprehensive Metabolic Panel   Result Value Ref Range    Glucose 109 (H) 74 - 99 mg/dL    Sodium 145 136 - 145 mmol/L    Potassium 3.6 3.5 - 5.3 mmol/L    Chloride 114 (H) 98 - 107 mmol/L    Bicarbonate 21 21 - 32 mmol/L    Anion Gap 14 10 - 20 mmol/L    Urea Nitrogen 16 6 - 23 mg/dL    Creatinine 0.99 0.50 - 1.05 mg/dL    eGFR 70 >60 mL/min/1.73m*2    Calcium 9.8 8.6 - 10.3 mg/dL    Albumin 4.0 3.4 - 5.0 g/dL    Alkaline Phosphatase 73 33 - 110 U/L    Total Protein 6.9 6.4 - 8.2 g/dL    AST 54 (H) 9 - 39 U/L    Bilirubin, Total 0.9 0.0 - 1.2 mg/dL    ALT 67 (H) 7 - 45 U/L   Magnesium   Result Value Ref Range    Magnesium 2.04 1.60 - 2.40 mg/dL   Comprehensive Metabolic Panel   Result Value Ref Range    Glucose 117 (H) 74 - 99 mg/dL    Sodium 146 (H) 136 - 145 mmol/L    Potassium 3.4 (L) 3.5 - 5.3 mmol/L    Chloride 113 (H) 98 - 107 mmol/L    Bicarbonate 22 21 - 32 mmol/L    Anion Gap 14 10 - 20 mmol/L    Urea Nitrogen 18 6 - 23 mg/dL    Creatinine 1.01 0.50 - 1.05 mg/dL    eGFR 68 >60 mL/min/1.73m*2    Calcium 9.8 8.6 - 10.3 mg/dL    Albumin 4.1 3.4 - 5.0 g/dL    Alkaline Phosphatase 75 33 - 110 U/L    Total Protein 6.9 6.4 - 8.2 g/dL    AST 44 (H) 9 - 39 U/L    Bilirubin, Total 0.9 0.0 - 1.2 mg/dL    ALT 65 (H) 7 - 45 U/L   CBC   Result Value Ref Range    WBC 6.0 4.4 - 11.3 x10*3/uL    nRBC 0.0 0.0 - 0.0 /100 WBCs    RBC 4.49 4.00 - 5.20 x10*6/uL    Hemoglobin 12.2 12.0 - 16.0 g/dL    Hematocrit " 40.3 36.0 - 46.0 %    MCV 90 80 - 100 fL    MCH 27.2 26.0 - 34.0 pg    MCHC 30.3 (L) 32.0 - 36.0 g/dL    RDW 16.9 (H) 11.5 - 14.5 %    Platelets 170 150 - 450 x10*3/uL   Magnesium   Result Value Ref Range    Magnesium 2.11 1.60 - 2.40 mg/dL   Comprehensive Metabolic Panel   Result Value Ref Range    Glucose 108 (H) 74 - 99 mg/dL    Sodium 148 (H) 136 - 145 mmol/L    Potassium 3.5 3.5 - 5.3 mmol/L    Chloride 115 (H) 98 - 107 mmol/L    Bicarbonate 25 21 - 32 mmol/L    Anion Gap 12 10 - 20 mmol/L    Urea Nitrogen 19 6 - 23 mg/dL    Creatinine 0.97 0.50 - 1.05 mg/dL    eGFR 72 >60 mL/min/1.73m*2    Calcium 9.3 8.6 - 10.3 mg/dL    Albumin 3.6 3.4 - 5.0 g/dL    Alkaline Phosphatase 64 33 - 110 U/L    Total Protein 6.0 (L) 6.4 - 8.2 g/dL    AST 29 9 - 39 U/L    Bilirubin, Total 0.6 0.0 - 1.2 mg/dL    ALT 49 (H) 7 - 45 U/L   CBC   Result Value Ref Range    WBC 6.4 4.4 - 11.3 x10*3/uL    nRBC 0.0 0.0 - 0.0 /100 WBCs    RBC 3.91 (L) 4.00 - 5.20 x10*6/uL    Hemoglobin 10.7 (L) 12.0 - 16.0 g/dL    Hematocrit 34.5 (L) 36.0 - 46.0 %    MCV 88 80 - 100 fL    MCH 27.4 26.0 - 34.0 pg    MCHC 31.0 (L) 32.0 - 36.0 g/dL    RDW 17.3 (H) 11.5 - 14.5 %    Platelets 207 150 - 450 x10*3/uL   Magnesium   Result Value Ref Range    Magnesium 2.03 1.60 - 2.40 mg/dL   Comprehensive Metabolic Panel   Result Value Ref Range    Glucose 124 (H) 74 - 99 mg/dL    Sodium 144 136 - 145 mmol/L    Potassium 3.5 3.5 - 5.3 mmol/L    Chloride 109 (H) 98 - 107 mmol/L    Bicarbonate 25 21 - 32 mmol/L    Anion Gap 14 10 - 20 mmol/L    Urea Nitrogen 18 6 - 23 mg/dL    Creatinine 0.95 0.50 - 1.05 mg/dL    eGFR 74 >60 mL/min/1.73m*2    Calcium 9.6 8.6 - 10.3 mg/dL    Albumin 3.7 3.4 - 5.0 g/dL    Alkaline Phosphatase 65 33 - 110 U/L    Total Protein 6.2 (L) 6.4 - 8.2 g/dL    AST 25 9 - 39 U/L    Bilirubin, Total 0.8 0.0 - 1.2 mg/dL    ALT 45 7 - 45 U/L   CBC   Result Value Ref Range    WBC 6.3 4.4 - 11.3 x10*3/uL    nRBC 0.0 0.0 - 0.0 /100 WBCs    RBC 3.88 (L)  4.00 - 5.20 x10*6/uL    Hemoglobin 10.8 (L) 12.0 - 16.0 g/dL    Hematocrit 34.1 (L) 36.0 - 46.0 %    MCV 88 80 - 100 fL    MCH 27.8 26.0 - 34.0 pg    MCHC 31.7 (L) 32.0 - 36.0 g/dL    RDW 17.4 (H) 11.5 - 14.5 %    Platelets 220 150 - 450 x10*3/uL   Osmolality   Result Value Ref Range    Osmolality, Serum 310 (H) 280 - 300 mOsm/kg   SST TOP   Result Value Ref Range    Extra Tube Hold for add-ons.    Magnesium   Result Value Ref Range    Magnesium 2.37 1.60 - 2.40 mg/dL   Comprehensive Metabolic Panel   Result Value Ref Range    Glucose 115 (H) 74 - 99 mg/dL    Sodium 142 136 - 145 mmol/L    Potassium 4.2 3.5 - 5.3 mmol/L    Chloride 109 (H) 98 - 107 mmol/L    Bicarbonate 20 (L) 21 - 32 mmol/L    Anion Gap 17 10 - 20 mmol/L    Urea Nitrogen 13 6 - 23 mg/dL    Creatinine 0.80 0.50 - 1.05 mg/dL    eGFR 90 >60 mL/min/1.73m*2    Calcium 9.1 8.6 - 10.3 mg/dL    Albumin 3.9 3.4 - 5.0 g/dL    Alkaline Phosphatase 62 33 - 110 U/L    Total Protein 6.4 6.4 - 8.2 g/dL    AST 36 9 - 39 U/L    Bilirubin, Total 0.9 0.0 - 1.2 mg/dL    ALT 50 (H) 7 - 45 U/L   CBC   Result Value Ref Range    WBC 5.6 4.4 - 11.3 x10*3/uL    nRBC 0.0 0.0 - 0.0 /100 WBCs    RBC 3.65 (L) 4.00 - 5.20 x10*6/uL    Hemoglobin 10.3 (L) 12.0 - 16.0 g/dL    Hematocrit 32.2 (L) 36.0 - 46.0 %    MCV 88 80 - 100 fL    MCH 28.2 26.0 - 34.0 pg    MCHC 32.0 32.0 - 36.0 g/dL    RDW 17.3 (H) 11.5 - 14.5 %    Platelets 222 150 - 450 x10*3/uL     MR brain w and wo IV contrast    Result Date: 11/26/2024  Interpreted By:  Han Moreno, STUDY: MR BRAIN W AND WO IV CONTRAST;  11/25/2024 3:02 pm   INDICATION: Signs/Symptoms:Abnormal MRI with motion artifacts.   COMPARISON: November 19.   ACCESSION NUMBER(S): [id number]   ORDERING CLINICIAN: MARYAM LINDO   TECHNIQUE: The brain was studied in the sagittal, axial and coronal planes utilizing FLAIR, T1 and T2 weighted images.     Following intravenous injection of gadolinium contrast, T1 weighted fat suppressed  multiplanar images were also performed.   FINDINGS: There is a normal-size ventricular system.  There are scattered foci of increased signal in the subcortical and periventricular white matter best appreciated on FLAIR images. These likely represent foci of demyelination of uncertain cause and significance and may be physiologic at this patient's stated age.   There is no evidence of intracranial mass or extra-axial collection.  The skull base, paranasal sinuses and orbital structures are unremarkable. Diffusion weighted images and associated ADC maps of the brain were unremarkable.  There is no evidence of diffusion restriction to suggest the presence of acute infarction. Gradient echo T2 weighted images fail to demonstrate hemosiderin deposition or other evidence of hemorrhage. Following intravenous injection of there is no abnormal enhancement. There is normal contrast opacification of the dural venous sinuses.       *Nonspecific white matter signal throughout both cerebral hemispheres can not be further characterized. *There is no evidence of mass, infarction or hemorrhage.   MACRO: none   Signed by: Han Moreno 11/26/2024 9:28 AM Dictation workstation:   [id number]    IR lumbar puncture therapeutic    Result Date: 11/20/2024  Interpreted By:  Fernando Betts, STUDY: IR LUMBAR PUNCTURE THERAPEUTIC;  11/20/2024 1:59 pm   INDICATION: Signs/Symptoms:encephalopathy- abnormal MRI brain.     COMPARISON: None.   ACCESSION NUMBER(S): [id number]   ORDERING CLINICIAN: MARYAM LINDO   TECHNIQUE: After discussion of risks, benefits, and alternatives, oral and written informed consent was obtained from the  of the patient by phone.. The patient was placed in prone position on exam table. A time out was performed prior to procedure confirming the identity of the patient, procedure to be performed, and allergies.   The L3-4 interspace was then marked under fluoroscopy. The patient was then prepped and draped in  sterile fashion. Local anesthesia was achieved with 1% lidocaine solution. A 20 gauge spinal needle was then introduced at the L3-L4 level under direct fluoroscopic guidance until return of CSF was demonstrated. 8 ml of clear CSF was collected in 4 tubes. The stylet was then replaced and the spinal needle was removed.  Hemostasis was achieved with direct pressure and the area was dressed with a Band-Aid. The patient tolerated procedure well without any immediate or clinically apparent complications.   The collected CSF was then sent to the lab for appropriate lab tests as ordered by the referring physician.   FINDINGS: A single periprocedural spot fluoroscopic image was provided for interpretation. Image quality is such that fine bony detail is obscured. A needle is seen to overlie the posterior elements of L4.       Fluoroscopic guided lumbar puncture.   MACRO: None   Signed by: Fernando Betts 11/20/2024 3:08 PM Dictation workstation:   [id number]    ECG 12 lead    Result Date: 11/20/2024  Sinus tachycardia Left axis deviation Left ventricular hypertrophy with repolarization abnormality Inferior infarct (cited on or before 27-AUG-2024) Anteroseptal infarct (cited on or before 27-AUG-2024) Abnormal ECG When compared with ECG of 27-AUG-2024 02:40, ST now depressed in Lateral leads Confirmed by Erin Gardner ([id number]) on 11/20/2024 7:29:17 AM    MR brain wo IV contrast    Result Date: 11/19/2024  Interpreted By:  Yoon Harding, STUDY: MR BRAIN WO IV CONTRAST;  11/19/2024 11:26 am   INDICATION: Signs/Symptoms:Ongoing encephalopathy.     COMPARISON: None.   ACCESSION NUMBER(S): [id number]   ORDERING CLINICIAN: MARYAM LINDO   TECHNIQUE: Axial T2, FLAIR, DWI,  and sagittal and coronal T1 weighted images of brain were acquired.  Axial GE weighted images were unable to be performed.   FINDINGS: The examination is markedly degraded by patient motion artifact.   CSF Spaces: The ventricles, sulci and basal cisterns are  within normal limits.   Parenchyma: No definite diffusion restriction abnormality to suggest acute infarct.   The axial T2 FLAIR weighted images are significantly degraded by patient motion artifact. However, within this limitation there appears to be abnormal increased signal within the medial thalami bilaterally, surrounding the aqueduct and possibly within the caudate heads.   There is questionable increased cortical signal on the FLAIR images within the bilateral frontal lobes (images 37-39, series 4).   There is a background of nonspecific patchy white matter signal which is presumed microangiopathy.   There is no mass effect or midline shift.   Cerebellar tonsils are above the foramen magnum. Pituitary and sella are not enlarged.   Paranasal Sinuses and Mastoids: Visualized paranasal sinuses and mastoid air cells are predominantly clear..       The examination is markedly degraded by patient motion artifact.   Within this limitation, there appears to be increased signal within the medial thalami, periaqueductal region and possible increased signal within the caudate and cortex of the bilateral posterior frontal lobes. Differential considerations include a toxic metabolic process, an encephalitis, Wernicke's encephalopathy or possibly an atypical appearance of posterior reversible encephalopathy syndrome.   Within the limitation of motion artifact no evidence of acute infarct, intracranial mass effect or midline shift.   Consider repeat examination with contrast when the patient is able to tolerate the MRI.   MACRO: None   Signed by: Yoon Harding 11/19/2024 1:33 PM Dictation workstation:   [id number]    CT head wo IV contrast    Result Date: 11/18/2024  Interpreted By:  Damon John, STUDY: CT HEAD WO IV CONTRAST;  11/18/2024 4:51 pm   INDICATION: Signs/Symptoms:AMS.   COMPARISON: 11/17/2024   ACCESSION NUMBER(S): [id number]   ORDERING CLINICIAN: SHERRIE PHIPPS   TECHNIQUE: Sequential trans axial images were  obtained  .   FINDINGS: INTRACRANIAL:   CORTICAL SULCI AND EXTRA-AXIAL SPACES:  Unremarkable.   VENTRICULAR SYSTEM:  Unremarkable without significant dilatation.   CEREBRAL PARENCHYMA:  Unremarkable without significant degenerative change.There is no evidence of definite subacute infarction, intracranial hemorrhage or mass.   EXTRACRANIAL: Visualized paranasal sinuses and mastoids are clear. The calvarium is intact.       Unremarkable exam. There has not been significant interval change from the prior exam.   Signed by: Damon John 11/18/2024 5:21 PM Dictation workstation:   [id number]    XR abdomen 1 view    Result Date: 11/18/2024  Interpreted By:  Fernando Betts, STUDY: XR ABDOMEN 1 VIEW;  11/18/2024 3:54 pm   INDICATION: Signs/Symptoms:KUB after corpak insertion.   COMPARISON: None.   ACCESSION NUMBER(S): [id number]   ORDERING CLINICIAN: SHERRIE PHIPPS   FINDINGS: Feeding tube tip in the proximal duodenum. Mild gaseous distention of the transverse colon.       Feeding tube tip in the proximal duodenum.   MACRO: None   Signed by: Fernando Betts 11/18/2024 3:58 PM Dictation workstation:   [id number]    ECG 12 Lead    Result Date: 11/18/2024  Sinus tachycardia Left anterior fascicular block Possible Anterolateral infarct (cited on or before 27-AUG-2024) Abnormal ECG When compared with ECG of 16-NOV-2024 17:49, (unconfirmed) Questionable change in initial forces of Septal leads ST less depressed in Lateral leads Confirmed by Alber Luevano ([id number]) on 11/18/2024 3:46:18 PM    CT head wo IV contrast    Result Date: 11/17/2024  Interpreted By:  Boogie Nance, STUDY: CT HEAD WO IV CONTRAST;  11/17/2024 10:04 pm   INDICATION: Signs/Symptoms:Altered mental status.   COMPARISON: 11/16/2024   ACCESSION NUMBER(S): [id number]   ORDERING CLINICIAN: CRYSTAL WEINBERG   TECHNIQUE: Contiguous axial images of the head were obtained without intravenous contrast.   FINDINGS: BRAIN PARENCHYMA:   The gray white matter differentiation  is preserved.  No mass effect or midline shift.   HEMORRHAGE:  No evidence of acute intracranial hemorrhage. VENTRICLES AND EXTRA-AXIAL SPACES:  The ventricles are within normal limits in size for brain volume.  No evidence of abnormal extraaxial fluid collection. EXTRACRANIAL SOFT TISSUES:  Within normal limits. PARANASAL SINUSES/MASTOIDS:  The visualized paranasal sinuses and mastoid air cells are clear and well pneumatized. CALVARIUM:  No evidence of depressed calvarial fracture.   OTHER FINDINGS:  None       No evidence of acute intracranial hemorrhage or mass effect.       MACRO: None   Signed by: Boogie Nance 11/17/2024 11:05 PM Dictation workstation:   [id number]    CT abdomen pelvis w IV contrast    Result Date: 11/16/2024  Interpreted By:  Boogie Nance, STUDY: CT ABDOMEN PELVIS W IV CONTRAST;  11/16/2024 7:54 pm   INDICATION: Signs/Symptoms:generalized ttp, sepsis.   COMPARISON: None.   ACCESSION NUMBER(S): [id number]   ORDERING CLINICIAN: ZAHRA MATTHEW   TECHNIQUE: Contiguous axial images of the abdomen and pelvis were obtained after the intravenous administration of  contrast. Coronal and sagittal reformatted images were obtained from the axial images.   FINDINGS: There is limited evaluation the lung bases. Mild basilar atelectasis. No pleural effusion.   Evaluation of the abdomen and pelvis is limited secondary to patient body habitus, motion, and beam hardening artifact secondary to inferior position of the patient's arms. Mild hypodensity near the falciform ligament may correspond to focal fatty infiltration. The gallbladder surgically absent.   The pancreas, spleen, and adrenal glands appear unremarkable.   Symmetric enhancement of the kidneys. No hydronephrosis.   No evidence of bowel obstruction. Fluid-filled segments of colon. There is underdistention versus mild wall thickening and submucosal fat deposition in loops of ileum in the right lower quadrant which may relate to chronic inflammatory  process.   Urinary bladder appears unremarkable.   Limited evaluation of the uterus and adnexa. Intrauterine device. 2.4 cm x 1.8 cm cystic lesion in the right adnexa.   Multilevel degenerative change of the lumbar spine.       Fluid-filled segments of colon; please correlate for diarrhea/colitis. There is underdistention versus mild wall thickening and submucosal fat deposition in loops of ileum in the right lower quadrant which may relate to chronic inflammatory process.       MACRO: None   Signed by: Boogie Nance 11/16/2024 8:16 PM Dictation workstation:   [id number]    CT head wo IV contrast    Result Date: 11/16/2024  Interpreted By:  Boogie Nance, STUDY: CT HEAD WO IV CONTRAST;  11/16/2024 7:54 pm   INDICATION: Signs/Symptoms:AMS.   COMPARISON: None   ACCESSION NUMBER(S): [id number]   ORDERING CLINICIAN: ZAHRA MATTHEW   TECHNIQUE: Contiguous axial images of the head were obtained without intravenous contrast.   FINDINGS: BRAIN PARENCHYMA:   The gray white matter differentiation is preserved.  No mass effect or midline shift.   HEMORRHAGE:  No evidence of acute intracranial hemorrhage. VENTRICLES AND EXTRA-AXIAL SPACES:  The ventricles are within normal limits in size for brain volume.  No evidence of abnormal extraaxial fluid collection. EXTRACRANIAL SOFT TISSUES:  Within normal limits. PARANASAL SINUSES/MASTOIDS:  The visualized paranasal sinuses and mastoid air cells are clear and well pneumatized. CALVARIUM:  No evidence of depressed calvarial fracture.   OTHER FINDINGS:  None       No evidence of acute intracranial hemorrhage or mass effect.       MACRO: None   Signed by: Boogie Nance 11/16/2024 8:11 PM Dictation workstation:   [id number]    NM gastric emptying solid    Result Date: 11/6/2024  Interpreted By:  Laila Lugo, STUDY: NM GASTRIC EMPTYING SOLID; 11/6/2024 2:00 pm   INDICATION: Signs/Symptoms:NAUSEA.   COMPARISON: None.   ACCESSION NUMBER(S): [id number]   ORDERING CLINICIAN: MAGNO  ALICIA   TECHNIQUE: DIVISION OF NUCLEAR MEDICINE GASTRIC EMPTYING QUANTIFICATION   The patient received an oral radiolabeled solid-phase meal utilizing 1.0 mCi of Tc-99m sulfur colloid in cooked egg. Sequential anterior and posterior images of the abdomen were then acquired over the next four hours. Computer quantification of gastric emptying (using geometric mean activity) was performed.   FINDINGS: The image series shows prompt antegrade transit of radiolabeled solids from stomach to small bowel. Computer quantification demonstrates gastric retention as follows: 45 %  at 1 hr    (normal max 90%) 23 %  at 2 hr    (normal max 60%) 12 %  at 3 hr    (normal max 30%) 1 %  at 4 hr    (normal max 10%)       1. Normal gastric emptying of solids without evidence of gastroparesis.   I personally reviewed the images/study. This study was interpreted at [place], [place].   MACRO: None   Signed by: Laila Lugo 11/6/2024 2:04 PM Dictation workstation:   [id number]    Flexible Sigmoidoscopy    Result Date: 10/29/2024  [facility] Patient Name: Samuel ORTIZ Procedure Date: 10/29/2024 8:47 AM MRN: [id number] YOB: 1975 Age: 48 Gender: Female Race: Unknown Attending MD: Jose Fernandez MD, [id number] Procedure:             Colonoscopy Referring MD:          PIPO JAMISON MD Providers:      Jose Fernandez MD Indications:           Abnormal CT of the GI tract, Weight loss Findings:              The perianal and digital rectal examinations were                        normal.                        Multiple small and large-mouthed diverticula were                        found in the sigmoid colon.                        The terminal ileum appeared normal. Biopsies were                        taken with a cold forceps for histology. Verification                        of patient identification for the specimen was done by                         the nurse using the patient's name, birth date and                        medical record number. Estimated blood loss was                        minimal.                        A 2 mm polyp was found in the ileocecal valve. The                        polyp was sessile. The polyp was removed with a cold                        biopsy forceps. Resection and retrieval were complete.                        Verification of patient identification for the                        specimen was done by the nurse using the patient's                        name, birth date and medical record number. Estimated                        blood loss was minimal.                        A 4 mm polyp was found in the cecum. The polyp was                        sessile. The polyp was removed with a cold snare.                        Resection and retrieval were complete. Verification of                        patient identification for the specimen was done by                        the nurse using the patient's name, birth date and                        medical record number. Estimated blood loss was                        minimal.                        Two sessile polyps were found in the ascending colon.                        The polyps were 5 to 7 mm in size. These polyps were                        removed with a cold snare. Resection and retrieval                        were complete. Verification of patient identification                        for the specimen was done by the nurse using the                        patient's name, birth date and medical record number.                        Estimated blood loss was minimal.                        The colon (entire examined portion) appeared otherwise                        normal. Biopsies were taken with a cold forceps for                        histology. Verification of patient identification for                        the specimen was done by the nurse using the  patient's                        name, birth date and medical record number. Estimated                        blood loss was minimal.                        Stool sample collected and submitted for C diff PCR                        and culture and fecal calprotectin.                        The retroflexed view of the distal rectum and anal                        verge was normal and showed no anal or rectal                        abnormalities. Patient Profile:       This is a 48 year old female. Refer to note in patient                        chart for documentation of history and physical. Impression:            - Diverticulosis in the sigmoid colon.                        - The examined portion of the ileum was normal.                        Biopsied.                        - One 2 mm polyp at the ileocecal valve, removed with                        a cold biopsy forceps. Resected and retrieved.                        - One 4 mm polyp in the cecum, removed with a cold                        snare. Resected and retrieved.                        - Two 5 to 7 mm polyps in the ascending colon, removed                        with a cold snare. Resected and retrieved.                        - The entire examined colon is otherwise normal.                        Biopsied.                        - The distal rectum and anal verge are normal on                        retroflexion view. Recommendation:        - Await pathology results.                        - Patient has a contact number available for                        emergencies. The signs and symptoms of potential                        delayed complications were discussed with the patient.                        Return to normal activities tomorrow. Written                        discharge instructions were provided to the patient.                        - High fiber diet.                        - Continue present medications.                        - Schedule  gastric emptying study as previously                        recommended.                        - Repeat colonoscopy in 3 years for surveillance. Medicines:             Propofol per Anesthesia Procedure:             Pre-Anesthesia Assessment:                        - Prior to the procedure, a History and Physical was                        performed, and patient medications and allergies were                        reviewed. The patient is competent. The risks and                        benefits of the procedure and the sedation options and                        risks were discussed with the patient. All questions                        were answered and informed consent was obtained.                        Patient identification and proposed procedure were                        verified by the physician and the nurse in the                        pre-procedure area in the procedure room. Mental                        Status Examination: alert and oriented. Airway                        Examination: normal oropharyngeal airway and neck                        mobility. Respiratory Examination: clear to                        auscultation. CV Examination: normal. Prophylactic                        Antibiotics: The patient does not require prophylactic                        antibiotics. Prior Anticoagulants: The patient has                        taken no anticoagulant or antiplatelet agents. ASA                        Grade Assessment: III - A patient with severe systemic                        disease. After reviewing the risks and benefits, the                        patient was deemed in satisfactory condition to                        undergo the procedure. The anesthesia plan was to use                        deep sedation / analgesia. Immediately prior to                        administration of medications, the patient was                        re-assessed for adequacy to receive sedatives. The                         heart rate, respiratory rate, oxygen saturations,                        blood pressure, adequacy of pulmonary ventilation, and                        response to care were monitored throughout the                        procedure. The physical status of the patient was                        re-assessed after the procedure.                        After I obtained informed consent, the scope was                        passed under direct vision. Throughout the procedure,                        the patient's blood pressure, pulse, and oxygen                        saturations were monitored continuously. Provation                        AI/GI Genius was used during withdrawal. The                        COLONOSCOPE was introduced through the anus and                        advanced to the terminal ileum. The colonoscopy was                        performed without difficulty. The patient tolerated                        the procedure well. The quality of the bowel                        preparation was good. The terminal ileum, ileocecal                        valve, appendiceal orifice, and rectum were                        photographed. The quality of the bowel preparation was                        evaluated using the BBPS ([place] Bowel Preparation                        Scale) with scores of: Right Colon = 2 (minor amount                        of residual staining, small fragments of stool and/or                        opaque liquid, but mucosa seen well), Transverse Colon                        = 3 (entire mucosa seen well with no residual                        staining, small fragments of stool or opaque liquid)                        and Left Colon = 3 (entire mucosa seen well with no                        residual staining, small fragments of stool or opaque                        liquid). The total BBPS score equals 8. Complications:         No immediate complications. Procedure Code(s):      --- Professional ---                        94884, Colonoscopy, flexible; with removal of                        tumor(s), polyp(s), or other lesion(s) by snare                        technique                        96251, 59, Colonoscopy, flexible; with biopsy, single                        or multiple Diagnosis Code(s):     --- Professional ---                        D12.0, Benign neoplasm of cecum                        D12.2, Benign neoplasm of ascending colon                        R63.4, Abnormal weight loss                        K57.30, Diverticulosis of large intestine without                        perforation or abscess without bleeding                        R93.3, Abnormal findings on diagnostic imaging of                        other parts of digestive tract CPT copyright 2021 [employer]. All rights reserved. The codes documented in this report are preliminary and upon  review may be revised to meet current compliance requirements. Jose Fernandez MD 10/29/2024 9:42:34 AM Number of Addenda: 0 Note Initiated On: 10/29/2024 8:47 AM         Assessment/Plan   Assessment & Plan  Sepsis with encephalopathy without septic shock, due to unspecified organism (Multi)    Diarrhea    UTI (urinary tract infection)    Hypertension    Colitis    Sinus tachycardia    Prolonged Q-T interval on ECG    Clostridium difficile infection      Delirium- unclear etiology       PRN haldol for agitation 5 mg q 6 hr  Risperdal 0.5 mg po at bedtime for 5 nights  Treat any organic cause of confusion aggressively  Will follow as needed  Medication Consent  Medication Consent:     Negro Kline MD

## 2024-11-27 LAB
ALBUMIN SERPL BCP-MCNC: 3.5 G/DL (ref 3.4–5)
ALP SERPL-CCNC: 54 U/L (ref 33–110)
ALT SERPL W P-5'-P-CCNC: 53 U/L (ref 7–45)
ANION GAP SERPL CALC-SCNC: 15 MMOL/L (ref 10–20)
AST SERPL W P-5'-P-CCNC: 36 U/L (ref 9–39)
BILIRUB SERPL-MCNC: 0.8 MG/DL (ref 0–1.2)
BUN SERPL-MCNC: 14 MG/DL (ref 6–23)
CALCIUM SERPL-MCNC: 8.8 MG/DL (ref 8.6–10.3)
CHLORIDE SERPL-SCNC: 108 MMOL/L (ref 98–107)
CO2 SERPL-SCNC: 22 MMOL/L (ref 21–32)
CREAT SERPL-MCNC: 0.81 MG/DL (ref 0.5–1.05)
EGFRCR SERPLBLD CKD-EPI 2021: 89 ML/MIN/1.73M*2
ERYTHROCYTE [DISTWIDTH] IN BLOOD BY AUTOMATED COUNT: 17.9 % (ref 11.5–14.5)
GLUCOSE SERPL-MCNC: 101 MG/DL (ref 74–99)
HCT VFR BLD AUTO: 34.4 % (ref 36–46)
HGB BLD-MCNC: 10.6 G/DL (ref 12–16)
MCH RBC QN AUTO: 28 PG (ref 26–34)
MCHC RBC AUTO-ENTMCNC: 30.8 G/DL (ref 32–36)
MCV RBC AUTO: 91 FL (ref 80–100)
NRBC BLD-RTO: 0 /100 WBCS (ref 0–0)
PLATELET # BLD AUTO: 148 X10*3/UL (ref 150–450)
POTASSIUM SERPL-SCNC: 3.5 MMOL/L (ref 3.5–5.3)
PROT SERPL-MCNC: 5.9 G/DL (ref 6.4–8.2)
RBC # BLD AUTO: 3.78 X10*6/UL (ref 4–5.2)
SODIUM SERPL-SCNC: 141 MMOL/L (ref 136–145)
WBC # BLD AUTO: 5.1 X10*3/UL (ref 4.4–11.3)

## 2024-11-27 PROCEDURE — 36415 COLL VENOUS BLD VENIPUNCTURE: CPT | Performed by: NURSE PRACTITIONER

## 2024-11-27 PROCEDURE — 92526 ORAL FUNCTION THERAPY: CPT | Mod: GN

## 2024-11-27 PROCEDURE — 80053 COMPREHEN METABOLIC PANEL: CPT | Performed by: NURSE PRACTITIONER

## 2024-11-27 PROCEDURE — 2500000001 HC RX 250 WO HCPCS SELF ADMINISTERED DRUGS (ALT 637 FOR MEDICARE OP): Performed by: NURSE PRACTITIONER

## 2024-11-27 PROCEDURE — 2060000001 HC INTERMEDIATE ICU ROOM DAILY

## 2024-11-27 PROCEDURE — 97530 THERAPEUTIC ACTIVITIES: CPT | Mod: GO

## 2024-11-27 PROCEDURE — 2500000001 HC RX 250 WO HCPCS SELF ADMINISTERED DRUGS (ALT 637 FOR MEDICARE OP): Performed by: FAMILY MEDICINE

## 2024-11-27 PROCEDURE — 2500000002 HC RX 250 W HCPCS SELF ADMINISTERED DRUGS (ALT 637 FOR MEDICARE OP, ALT 636 FOR OP/ED): Performed by: PSYCHIATRY & NEUROLOGY

## 2024-11-27 PROCEDURE — 97166 OT EVAL MOD COMPLEX 45 MIN: CPT | Mod: GO

## 2024-11-27 PROCEDURE — 2500000002 HC RX 250 W HCPCS SELF ADMINISTERED DRUGS (ALT 637 FOR MEDICARE OP, ALT 636 FOR OP/ED): Performed by: NURSE PRACTITIONER

## 2024-11-27 PROCEDURE — 94640 AIRWAY INHALATION TREATMENT: CPT

## 2024-11-27 PROCEDURE — 97110 THERAPEUTIC EXERCISES: CPT | Mod: GP,CQ

## 2024-11-27 PROCEDURE — 2500000001 HC RX 250 WO HCPCS SELF ADMINISTERED DRUGS (ALT 637 FOR MEDICARE OP)

## 2024-11-27 PROCEDURE — 85027 COMPLETE CBC AUTOMATED: CPT | Performed by: NURSE PRACTITIONER

## 2024-11-27 PROCEDURE — 97530 THERAPEUTIC ACTIVITIES: CPT | Mod: GP,CQ

## 2024-11-27 PROCEDURE — 2500000004 HC RX 250 GENERAL PHARMACY W/ HCPCS (ALT 636 FOR OP/ED): Performed by: NURSE PRACTITIONER

## 2024-11-27 RX ADMIN — LOSARTAN POTASSIUM 50 MG: 50 TABLET, FILM COATED ORAL at 09:02

## 2024-11-27 RX ADMIN — HEPARIN SODIUM 5000 UNITS: 5000 INJECTION INTRAVENOUS; SUBCUTANEOUS at 06:25

## 2024-11-27 RX ADMIN — HEPARIN SODIUM 5000 UNITS: 5000 INJECTION INTRAVENOUS; SUBCUTANEOUS at 14:22

## 2024-11-27 RX ADMIN — LOSARTAN POTASSIUM 50 MG: 50 TABLET, FILM COATED ORAL at 20:43

## 2024-11-27 RX ADMIN — METOPROLOL TARTRATE 50 MG: 50 TABLET, FILM COATED ORAL at 09:01

## 2024-11-27 RX ADMIN — PANTOPRAZOLE SODIUM 40 MG: 40 TABLET, DELAYED RELEASE ORAL at 06:25

## 2024-11-27 RX ADMIN — FORMOTEROL FUMARATE 20 MCG: 20 SOLUTION RESPIRATORY (INHALATION) at 19:34

## 2024-11-27 RX ADMIN — HEPARIN SODIUM 5000 UNITS: 5000 INJECTION INTRAVENOUS; SUBCUTANEOUS at 22:12

## 2024-11-27 RX ADMIN — BUDESONIDE 0.5 MG: 0.5 INHALANT RESPIRATORY (INHALATION) at 19:34

## 2024-11-27 RX ADMIN — LACTULOSE 20 G: 20 SOLUTION ORAL at 06:25

## 2024-11-27 RX ADMIN — PANTOPRAZOLE SODIUM 40 MG: 40 TABLET, DELAYED RELEASE ORAL at 16:23

## 2024-11-27 RX ADMIN — METOPROLOL TARTRATE 50 MG: 50 TABLET, FILM COATED ORAL at 20:43

## 2024-11-27 RX ADMIN — NYSTATIN: 100000 CREAM TOPICAL at 22:09

## 2024-11-27 RX ADMIN — THIAMINE HCL TAB 100 MG 100 MG: 100 TAB at 09:02

## 2024-11-27 RX ADMIN — LACTULOSE 20 G: 20 SOLUTION ORAL at 01:52

## 2024-11-27 RX ADMIN — SPIRONOLACTONE 12.5 MG: 25 TABLET ORAL at 09:01

## 2024-11-27 RX ADMIN — RISPERIDONE 0.5 MG: 0.5 TABLET, FILM COATED ORAL at 20:43

## 2024-11-27 RX ADMIN — LACTULOSE 20 G: 20 SOLUTION ORAL at 14:22

## 2024-11-27 RX ADMIN — LACTULOSE 20 G: 20 SOLUTION ORAL at 09:01

## 2024-11-27 RX ADMIN — NYSTATIN: 100000 CREAM TOPICAL at 09:09

## 2024-11-27 RX ADMIN — Medication 1 TABLET: at 09:02

## 2024-11-27 ASSESSMENT — COGNITIVE AND FUNCTIONAL STATUS - GENERAL
TOILETING: A LOT
DRESSING REGULAR UPPER BODY CLOTHING: A LITTLE
HELP NEEDED FOR BATHING: A LOT
PERSONAL GROOMING: A LITTLE
MOVING FROM LYING ON BACK TO SITTING ON SIDE OF FLAT BED WITH BEDRAILS: A LOT
MOVING TO AND FROM BED TO CHAIR: TOTAL
DRESSING REGULAR LOWER BODY CLOTHING: A LOT
HELP NEEDED FOR BATHING: A LOT
STANDING UP FROM CHAIR USING ARMS: A LOT
TURNING FROM BACK TO SIDE WHILE IN FLAT BAD: A LOT
MOBILITY SCORE: 9
MOBILITY SCORE: 16
EATING MEALS: A LITTLE
WALKING IN HOSPITAL ROOM: TOTAL
STANDING UP FROM CHAIR USING ARMS: A LOT
WALKING IN HOSPITAL ROOM: A LOT
PERSONAL GROOMING: A LITTLE
MOVING TO AND FROM BED TO CHAIR: A LOT
CLIMB 3 TO 5 STEPS WITH RAILING: A LOT
DAILY ACTIVITIY SCORE: 13
CLIMB 3 TO 5 STEPS WITH RAILING: TOTAL
DAILY ACTIVITIY SCORE: 15
DRESSING REGULAR LOWER BODY CLOTHING: TOTAL
TOILETING: TOTAL
DRESSING REGULAR UPPER BODY CLOTHING: A LITTLE
EATING MEALS: A LITTLE

## 2024-11-27 ASSESSMENT — ACTIVITIES OF DAILY LIVING (ADL): BATHING_ASSISTANCE: MAXIMAL

## 2024-11-27 ASSESSMENT — PAIN SCALES - GENERAL
PAINLEVEL_OUTOF10: 0 - NO PAIN

## 2024-11-27 ASSESSMENT — PAIN SCALES - WONG BAKER
WONGBAKER_NUMERICALRESPONSE: NO HURT
WONGBAKER_NUMERICALRESPONSE: NO HURT

## 2024-11-27 ASSESSMENT — PAIN - FUNCTIONAL ASSESSMENT
PAIN_FUNCTIONAL_ASSESSMENT: CPOT (CRITICAL CARE PAIN OBSERVATION TOOL)
PAIN_FUNCTIONAL_ASSESSMENT: 0-10
PAIN_FUNCTIONAL_ASSESSMENT: CPOT (CRITICAL CARE PAIN OBSERVATION TOOL)

## 2024-11-27 NOTE — PROGRESS NOTES
Samuel Mims is a 48 y.o. female on day 11 of admission presenting with Sepsis with encephalopathy without septic shock, due to unspecified organism (Multi).    Subjective   Patient alert today, states she can't hear.  But still unable to identify person, place or time.  She started eating breakfast with me in the room.  Per nurse she tried to get out of bed and the sitter and nurse had to place her back in bed.  Brief episode of atrial tach yesterday responded to Metoprolol.       Objective     Last Recorded Vitals  /80 (BP Location: Left arm, Patient Position: Lying)   Pulse 98   Temp 36.4 °C (97.5 °F) (Temporal)   Resp (!) 28   Wt 106 kg (233 lb 7.5 oz)   SpO2 95%   Intake/Output last 3 Shifts:    Intake/Output Summary (Last 24 hours) at 11/27/2024 0753  Last data filed at 11/26/2024 1930  Gross per 24 hour   Intake 120 ml   Output --   Net 120 ml       Admission Weight  Weight: 127 kg (280 lb) (11/16/24 1755)    Daily Weight  11/23/24 : 106 kg (233 lb 7.5 oz)      Physical Exam:  General: Not in acute distress  HEENT: PERRLA, Left TM indicates recent otitis media, Right TM full of fluid.  Neck: Normal to inspection  Lungs: Clear to auscultation, work of breathing within normal limit  Cardiac: Regular rate and rhythm  Abdomen: Soft nontender, positive bowel sounds  : Exam deferred  Skin: Perianal erythema with clear demarcation.  Hematology: No petechia or excessive ecchymosis  Musculoskeletal: Without significant trauma, arms in restraints  Neurological: Alert and oriented X place,  Unable to answer questions appropriately. Sat herself up and ate breakfast.    Relevant Results  Scheduled medications  budesonide, 0.5 mg, nebulization, BID  [Held by provider] busPIRone, 10 mg, oral, BID  influenza, 0.5 mL, intramuscular, During hospitalization  formoterol, 20 mcg, nebulization, BID  heparin (porcine), 5,000 Units, subcutaneous, q8h SANTIAGO  lactulose, 20 g, oral, q4h  losartan, 50 mg, oral,  BID  metoprolol tartrate, 50 mg, oral, q12h  multivitamin with minerals, 1 tablet, oral, Daily  nystatin, , Topical, BID  pantoprazole, 40 mg, oral, BID AC  risperiDONE, 0.5 mg, oral, Nightly  [Held by provider] spironolactone, 12.5 mg, oral, Daily  thiamine, 100 mg, oral, Daily      Continuous medications     PRN medications  PRN medications: acetaminophen **OR** acetaminophen, albuterol, [Held by provider] clonazePAM, hydrALAZINE, melatonin, metoprolol   Results for orders placed or performed during the hospital encounter of 11/16/24 (from the past 24 hours)   Comprehensive Metabolic Panel   Result Value Ref Range    Glucose 101 (H) 74 - 99 mg/dL    Sodium 141 136 - 145 mmol/L    Potassium 3.5 3.5 - 5.3 mmol/L    Chloride 108 (H) 98 - 107 mmol/L    Bicarbonate 22 21 - 32 mmol/L    Anion Gap 15 10 - 20 mmol/L    Urea Nitrogen 14 6 - 23 mg/dL    Creatinine 0.81 0.50 - 1.05 mg/dL    eGFR 89 >60 mL/min/1.73m*2    Calcium 8.8 8.6 - 10.3 mg/dL    Albumin 3.5 3.4 - 5.0 g/dL    Alkaline Phosphatase 54 33 - 110 U/L    Total Protein 5.9 (L) 6.4 - 8.2 g/dL    AST 36 9 - 39 U/L    Bilirubin, Total 0.8 0.0 - 1.2 mg/dL    ALT 53 (H) 7 - 45 U/L   CBC   Result Value Ref Range    WBC 5.1 4.4 - 11.3 x10*3/uL    nRBC 0.0 0.0 - 0.0 /100 WBCs    RBC 3.78 (L) 4.00 - 5.20 x10*6/uL    Hemoglobin 10.6 (L) 12.0 - 16.0 g/dL    Hematocrit 34.4 (L) 36.0 - 46.0 %    MCV 91 80 - 100 fL    MCH 28.0 26.0 - 34.0 pg    MCHC 30.8 (L) 32.0 - 36.0 g/dL    RDW 17.9 (H) 11.5 - 14.5 %    Platelets 148 (L) 150 - 450 x10*3/uL      Lab Results   Component Value Date    BLOODCULT No growth at 4 days -  FINAL REPORT 11/16/2024    BLOODCULT No growth at 4 days -  FINAL REPORT 11/16/2024    URINECULTURE (A) 11/16/2024     Multiple organisms present, probable contamination. Repeat culture if clinically indicated.       MR brain w and wo IV contrast    Result Date: 11/26/2024  Interpreted By:  Han Moreno, STUDY: MR BRAIN W AND WO IV CONTRAST;  11/25/2024  3:02 pm   INDICATION: Signs/Symptoms:Abnormal MRI with motion artifacts.   COMPARISON: November 19.   ACCESSION NUMBER(S): [id number]   ORDERING CLINICIAN: MARYAM LINDO   TECHNIQUE: The brain was studied in the sagittal, axial and coronal planes utilizing FLAIR, T1 and T2 weighted images.     Following intravenous injection of gadolinium contrast, T1 weighted fat suppressed multiplanar images were also performed.   FINDINGS: There is a normal-size ventricular system.  There are scattered foci of increased signal in the subcortical and periventricular white matter best appreciated on FLAIR images. These likely represent foci of demyelination of uncertain cause and significance and may be physiologic at this patient's stated age.   There is no evidence of intracranial mass or extra-axial collection.  The skull base, paranasal sinuses and orbital structures are unremarkable. Diffusion weighted images and associated ADC maps of the brain were unremarkable.  There is no evidence of diffusion restriction to suggest the presence of acute infarction. Gradient echo T2 weighted images fail to demonstrate hemosiderin deposition or other evidence of hemorrhage. Following intravenous injection of there is no abnormal enhancement. There is normal contrast opacification of the dural venous sinuses.       *Nonspecific white matter signal throughout both cerebral hemispheres can not be further characterized. *There is no evidence of mass, infarction or hemorrhage.   MACRO: none   Signed by: Han Moreno 11/26/2024 9:28 AM Dictation workstation:   [id number]            Assessment/Plan   Samuel Mims is a 48 y.o. female on day 11 of admission presenting with Sepsis with encephalopathy without septic shock, due to unspecified organism (Multi).  Principal Problem:    Sepsis with encephalopathy without septic shock, due to unspecified organism (Multi)  Active Problems:    Diarrhea    UTI (urinary tract infection)     Hypertension    Colitis    Sinus tachycardia    Prolonged Q-T interval on ECG    Clostridium difficile infection     Metabolic encephalopathy- multifactorial, possible Wernicke's, possible hepatic encephalopathy   Possibly due to significant weight loss of 50+ pounds w/poor nutritional intake, vs PRES from poorly treated HTN.     Initial CT head with no acute changes. Urine Tox screen negative.  No elevated WBC count.  Ammonia mildly elevated. Blood cultures are negative X2.  Corpak inserted due to poor oral intake but removed when she became alert on 11/21.  On 11/19/24 MRI head of poor quality due to motion artifact but show increased signal within the medial thalami, periaqueductal region and possible increased signal within the caudate and cortex of the bilateral posterior frontal lobes.  Started on high dose IV Thiamine and MVI for possible Wernicke's, changed to oral Thiamine on 11/25.  Thiamine level low at 33.  IV Acyclovir discontinued as CSF negative for HSV infection.  On Lactulose for mildly elevated ammonia levels.  Had Lumbar Puncture on 11/20 and results negative for viruses and bacteria.  Some improvement in her alertness since 11/21/24, but not able to answer questions or follow directions.  Sitter in room since 11/25 as fell out of bed.   MRI with anesthesia completed 11/25- results are non specific white matter changes.  Consult psychiatry recommended Haldol 5 mg q 6 hours prn for agitation and Risperdal 0.5 mg at bedtime for insomnia.        Untreated C. Difficile Colitis  Per  she has not taken the oral Vancomycin prescribed at discharge on 11/2/24.   Stool for pathogens negative.  C. Diff PCR negative but since untreated this could be false negative.  On oral Vancomycin for 10 days,  discontinued on 11/27.  CT a/p confirms she continues to have colitis.  Has diarrhea now due to oral lactulose.        HTN and Sinus Tachycardia  On oral Losartan 50 mg, Metoprolol tartrate 50 mg bid,  Spironolactone 12.5 mg  Blood pressure mildly elevated and continues to be tachycardic off and on.     Asthma  Budesonide 0.5 mg and Formoterol nebs bid.     CHET  Buspirone 10 mg bid.  Klonopin on hold due to encephalopathy     GERD  Oral Pantoprazole 40 mg bid.     Recent Otitis Media  Ears show fluid probably causing some hearing impairment.  Refer to ENT as out patient for possible hearing aids.    Discharge planning  Apparently her insurance does not cover skilled care.  So she will need to do PT and OT in an out patient facility. She will need cognitive rehab, OT evaluation for driving safety and capacity to return to work.  Will have  work on emergency medicaid application.           Plan discussed with .       Nell Green MD

## 2024-11-27 NOTE — PROGRESS NOTES
Subjective Data:  I47.1 Supraventricular tachycardia  I10.0 Hypertension  C52.9, A41.9 Colitis sepsis    Tomorrow my colleague Dr. Alvarez is on-call and he will be on-call this weekend as well if there are any cardiac issues    Overnight Events:    No further episodes of supraventricular tachycardia for which cardiology was reconsulted  Blood pressures remain normal  All consultants notes have been reviewed    Plans for [facility] in [facility] rehab  Patient presented with sepsis encephalopathy without septic shock seen by neurology this admission  Prolonged QTc improved  CT urine tox screen negative  MRI increase intensity medial thalamus bilateral posterior frontal lobes treated for possible Warnicke's encephalopathy      Past Medical History  She has a past medical history of Asthma, Clostridium difficile infection, GERD (gastroesophageal reflux disease), and Hypertension.     Surgical History  She has a past surgical history that includes Cholecystectomy; Esophagogastroduodenoscopy; and Colonoscopy.     Social History  She reports that she has quit smoking. Her smoking use included cigarettes. She started smoking about 4 years ago. She has a 4.9 pack-year smoking history. She has never used smokeless tobacco. She reports that she does not drink alcohol and does not use drugs.     Allergies  Tiotropium bromide, Ace inhibitors, Escitalopram, Hydrochlorothiazide, and House dust mite        Objective Data:  Last Recorded Vitals:  Vitals:    11/27/24 0429 11/27/24 0800 11/27/24 1200 11/27/24 1600   BP: 144/80 103/56 142/70 110/71   BP Location: Left arm Left arm     Patient Position: Lying Lying     Pulse: 98 108 80 100   Resp: (!) 28 20 13 16   Temp: 36.4 °C (97.5 °F) 36.9 °C (98.4 °F) 36.6 °C (97.9 °F) 36.7 °C (98.1 °F)   TempSrc: Temporal Temporal     SpO2: 95% 96% 100% 97%   Weight:       Height:           Last Labs:  CBC - 11/27/2024:  6:20 AM  5.1 10.6 148    34.4      CMP - 11/27/2024:  6:20 AM  8.8 5.9 36  "--- 0.8   3.5 3.5 53 54      PTT - 11/19/2024:  4:20 PM  1.2   13.3 30     TROPHS   Date/Time Value Ref Range Status   11/18/2024 02:05 PM 15 0 - 13 ng/L Final   08/27/2024 04:23 AM 3 0 - 13 ng/L Final   08/27/2024 03:06 AM <3 0 - 13 ng/L Final     BNP   Date/Time Value Ref Range Status   08/27/2024 03:06 AM 9 0 - 99 pg/mL Final     HGBA1C   Date/Time Value Ref Range Status   01/23/2021 10:24 PM 5.3 4.3 - 5.6 % Final     Comment:     American Diabetes Association guidelines indicate that patients with HgbA1c in   the range 5.7-6.4% are at increased risk for development of diabetes, and   intervention by lifestyle modification may be beneficial. HgbA1c greater or   equal to 6.5% is considered diagnostic of diabetes.      Last I/O:  I/O last 3 completed shifts:  In: 240 (2.3 mL/kg) [P.O.:240]  Out: 600 (5.7 mL/kg) [Urine:600 (0.2 mL/kg/hr)]  Weight: 105.9 kg     Past Cardiology Tests (Last 3 Years):  EKG:  ECG 12 Lead 11/18/2024      ECG 12 lead 11/16/2024      ECG 12 lead 08/27/2024    Echo:  No results found for this or any previous visit from the past 1095 days.    Ejection Fractions:  No results found for: \"EF\"  Cath:  No results found for this or any previous visit from the past 1095 days.    Stress Test:  No results found for this or any previous visit from the past 1095 days.    Cardiac Imaging:  No results found for this or any previous visit from the past 1095 days.      Inpatient Medications:  Scheduled medications   Medication Dose Route Frequency    budesonide  0.5 mg nebulization BID    [Held by provider] busPIRone  10 mg oral BID    influenza  0.5 mL intramuscular During hospitalization    formoterol  20 mcg nebulization BID    heparin (porcine)  5,000 Units subcutaneous q8h SANTIAGO    lactulose  20 g oral q4h    losartan  50 mg oral BID    metoprolol tartrate  50 mg oral q12h    multivitamin with minerals  1 tablet oral Daily    nystatin   Topical BID    pantoprazole  40 mg oral BID AC    risperiDONE  0.5 " mg oral Nightly    spironolactone  12.5 mg oral Daily    thiamine  100 mg oral Daily     PRN medications   Medication    acetaminophen    Or    acetaminophen    albuterol    [Held by provider] clonazePAM    hydrALAZINE    melatonin    metoprolol     Continuous Medications   Medication Dose Last Rate     Results for orders placed or performed during the hospital encounter of 11/16/24 (from the past 24 hours)   Comprehensive Metabolic Panel   Result Value Ref Range    Glucose 101 (H) 74 - 99 mg/dL    Sodium 141 136 - 145 mmol/L    Potassium 3.5 3.5 - 5.3 mmol/L    Chloride 108 (H) 98 - 107 mmol/L    Bicarbonate 22 21 - 32 mmol/L    Anion Gap 15 10 - 20 mmol/L    Urea Nitrogen 14 6 - 23 mg/dL    Creatinine 0.81 0.50 - 1.05 mg/dL    eGFR 89 >60 mL/min/1.73m*2    Calcium 8.8 8.6 - 10.3 mg/dL    Albumin 3.5 3.4 - 5.0 g/dL    Alkaline Phosphatase 54 33 - 110 U/L    Total Protein 5.9 (L) 6.4 - 8.2 g/dL    AST 36 9 - 39 U/L    Bilirubin, Total 0.8 0.0 - 1.2 mg/dL    ALT 53 (H) 7 - 45 U/L   CBC   Result Value Ref Range    WBC 5.1 4.4 - 11.3 x10*3/uL    nRBC 0.0 0.0 - 0.0 /100 WBCs    RBC 3.78 (L) 4.00 - 5.20 x10*6/uL    Hemoglobin 10.6 (L) 12.0 - 16.0 g/dL    Hematocrit 34.4 (L) 36.0 - 46.0 %    MCV 91 80 - 100 fL    MCH 28.0 26.0 - 34.0 pg    MCHC 30.8 (L) 32.0 - 36.0 g/dL    RDW 17.9 (H) 11.5 - 14.5 %    Platelets 148 (L) 150 - 450 x10*3/uL       Physical Exam:  Subjective:   Examination:   General Examination:   General Appearance: Obese confusion, well nourished, in no acute distress.   Head: normocephalic, atraumatic   Eyes: Anicteric sclera. Pupils are equally round and reactive to light.  Extraocular movements are intact.    Ears: normal   Oral: Cavity: mucosa moist.   Throat: clear.   Neck/Thyroid: neck supple, full range of motion, no cervical lymphadenopathy.   Skin: warm and dry, no suspicious lesions.    Heart: regular rate and rhythm, S1, S2 normal, no murmurs.   Lungs: clear to auscultation bilaterally.    Abdomen: soft, non-tender, non-distended, bowl sound present, normal.   Extremities: no clubbing, no cyanosis, no edema.   Neuro: non-focal, motor strength normal upper lower extremities, sensory exam intact.       Assessment/Plan      I47.1 Supraventricular tachycardia  I10.0 Hypertension  C52.9, A41.9 Colitis sepsis    Tomorrow my colleague Dr. Alvarez is on-call and he will be on-call this weekend as well if there are any cardiac issues    Code Status:  Full Code    I spent 40 minutes in the professional and overall care of this patient.        Gabriel Esquivel MD

## 2024-11-27 NOTE — PROGRESS NOTES
Inpatient Speech Language Pathology Treatment Note     Patient Name: Samuel Mims  MRN: [id number]  : 1975  Patient Room: [address] [place], [place] [address]A  Today's Date: 24  Time Calculation  Start Time: 1305  Stop Time: 1325  Time Calculation (min): 20 min       PLAN:  Skilled speech therapy for dysphagia treatment continues to be warranted to provide training and instruction regarding the use of compensatory swallow strategies, for pt/caregiver education in order to reduce risk of aspiration, dehydration and malnutrition. , to assess tolerance of diet , to determine ability to upgrade diet after PO trials with SLP.    SLP TX Plan: Continue Plan of Care  SLP Frequency: 2x per week  Discussed POC: Patient  Discussed Risks/Benefits: Patient  Patient/Caregiver Agreeable: Yes  Continue skilled SLP at next level of care: Yes    Recommendations:   Solid Diet Recommendations: Soft & bite sized/chopped (IDDSI Level 6)  Liquid Diet Recommendations: Thin (IDDSI Level 0)  Compensatory strategies: small bites/sips, upright 90 degrees for intake , slow rate, wear upper partials during mealtimes, and distant supervision for feeding  Medication administration: whole with thin liquid (one at a time)    SLP Assessment:  Patient seen for dysphagia management. She continues to tolerate minced and moist solids without difficulty, per family report. Pt's  brought upper partials for diet advancement assessment. Pt independently placed upper partials prior to po intake. Pt with adequate mastication and no oral residue with soft and bite sized solids. However, pt does demonstrate some impulsivity taking large bolus sizes putting her at increased of aspiration d/t cognitive status. Pt/ educated on swallow precautions with both verbalizing good understanding. Diet upgraded to soft and bite sized solids following safe swallow precautions; pt/family/RN notified and in-agreement     Subjective:  Pt. Seen at bedside for skilled  dysphagia treatment.   Prior to Session Communication: Bedside nurse  Pain:  Ratin-10   Pt report: none reported  Action taken: none needed       Oxygen Status:   nasal cannula      Goals:   Established on2024  Pt will tolerate recommended diet with no overt s/s of difficulty or aspiration.     Progress: Pt tolerating current diet of MM5 solids without difficulty consistently per family report. Patient had x1 immediate cough with thin liquids via straw. No further s/s of aspiration noted across trials.      Status: Continue goal     Pt will tolerate advanced solid trials with no overt s/s of difficulty or aspiration to assess readiness for diet upgrade.      Progress: pt ind placed upper partial prior to PO trials. pt tolerating x4 ounces of peaches/pears with adequate mastication and no oral residue. Diet advanced to SB6 solids; order placed this date. Family also reported pt tolerating trials of liu this morning with no difficulty. Family educated of aspiration risks d/t cognitive status.     Status: continue goal    Treatment Outcome:  SLP TX Intervention Outcome: Making Progress Towards Goals    Treatment Tolerance: Patient tolerated treatment well   Prognosis: Good   Barriers: Cognition, Comorbidities   Medical Staff Made Aware: Yes       SLP Inpatient Education:  Learner family, patient, RN   Barriers to Learning Acuteness of the illness, Cognitive limitations, Hearing problems   Method Verbal, Demonstration, Written materials provided and reviewed   Education - Topic ST provided patient education regarding swallow precautions, diet upgrade recommendations, and aspiration risks   Outcome    1= partially meets; needs review

## 2024-11-27 NOTE — PROGRESS NOTES
Pt does not have skilled coverage with her insurance.  Request to [facility] to send referral to  AR in [place]; they are out of network.  Called pt's insurance and confirmed that pt does not have skilled coverage, but does have acute rehab/IRF coverage.  They were able to identify one AR that accepts pt's insurance; [facility] in [place].      Request also given to HRS to screen pt for possible [place] Medicaid.  HRS spoke with pt's , and pt is likely eligible for medicaid based off of their incomes.  They are not able to apply for pt, since she does have insurance coverage.  They explained to pt's  how to apply and what documentation would be needed.    Met with pt's  at bedside.  He voiced understanding that pt does not have skilled coverage, but does for AR/IRF.  He voiced understanding that the closest AR is [facility] in [place]; he is agreement with a referral being sent.  Request to [facility] to send.  Also discussed HRS.  Pt is familiar with [place] Medicaid website, although he is concerned he will not have all the info.  He voiced understanding that he needs to apply in order to obtain medicaid pending for possible snf placement.

## 2024-11-27 NOTE — CONSULTS
Consults    I47.1 Supraventricular tachycardia  I10.0 Hypertension  C52.9, A41.9 Colitis sepsis    Additional oral IV metoprolol as needed if sustained supraventricular tachycardia consider IV Cardizem avoid antiarrhythmic agents for now monitor evaluate causes of hypertension    History Of Present Illness:    Samuel Mims is a 48 y.o. female presenting with with the above clinical issues.  Seen by my colleague Dr. Alvarez for cardiology on-call this past week had signed off.  Was on verapamil at home along with angiotensin receptor blocker was not receiving oral medications here had a brief episode of supraventricular tachycardia today for which cardiology is reconsulted    Past medical history of hypertension asthma GERD C. difficile colitis admitted with nausea vomiting diarrhea and recurrent C. difficile colitis.  Treated with Zosyn IV vancomycin by infectious disease.  At home had been on losartan 100 verapamil to 40 Aldactone 12.5 which had been held here during this hospital stay heart rates between 90 and 140 sinus tachycardia and today brief recurrent SVT.  She had previously been on Norvasc switched to verapamil and Aldactone in 2023 past echo showed EF of 60 with mild left atrial enlargement.  Dr. Alvarez had suggested an evaluation as to the etiology of her hypertension noted patient finished vancomycin November 18 history of IUD hypertension asthma UA with pyuria CT abdomen concerning for colitis drug screen negative CT head unremarkable I suggested we continue with oral metoprolol for now and reevaluate based on progression    When I was called this evening she was on losartan 50 metoprolol 50 every 12 and had a brief nonsustained episode of SVT Dr. Baltazar infectious disease     Last Recorded Vitals:  Vitals:    11/26/24 0506 11/26/24 0600 11/26/24 1036 11/26/24 1514   BP: (!) 124/113  (!) 166/102 (!) 153/96   BP Location: Left arm      Patient Position: Lying      Pulse: 108 82 104 102   Resp: 24 11   "23   Temp: 36.4 °C (97.5 °F)   36.6 °C (97.9 °F)   TempSrc: Temporal   Temporal   SpO2: 98%   96%   Weight:       Height:           Last Labs:  CBC - 11/26/2024:  3:44 AM  5.6 10.3 222    32.2      CMP - 11/26/2024:  3:44 AM  9.1 6.4 36 --- 0.9   3.5 3.9 50 62      PTT - 11/19/2024:  4:20 PM  1.2   13.3 30     Troponin I, High Sensitivity   Date/Time Value Ref Range Status   11/18/2024 02:05 PM 15 (H) 0 - 13 ng/L Final   08/27/2024 04:23 AM 3 0 - 13 ng/L Final   08/27/2024 03:06 AM <3 0 - 13 ng/L Final     BNP   Date/Time Value Ref Range Status   08/27/2024 03:06 AM 9 0 - 99 pg/mL Final     Hemoglobin A1C   Date/Time Value Ref Range Status   01/23/2021 10:24 PM 5.3 4.3 - 5.6 % Final     Comment:     American Diabetes Association guidelines indicate that patients with HgbA1c in   the range 5.7-6.4% are at increased risk for development of diabetes, and   intervention by lifestyle modification may be beneficial. HgbA1c greater or   equal to 6.5% is considered diagnostic of diabetes.      Last I/O:  I/O last 3 completed shifts:  In: 320 (3 mL/kg) [P.O.:120; IV Piggyback:200]  Out: 600 (5.7 mL/kg) [Urine:600 (0.2 mL/kg/hr)]  Weight: 105.9 kg     Past Cardiology Tests (Last 3 Years):  EKG:  ECG 12 Lead 11/18/2024      ECG 12 lead 11/16/2024      ECG 12 lead 08/27/2024    Echo:  No results found for this or any previous visit from the past 1095 days.    Ejection Fractions:  No results found for: \"EF\"  Cath:  No results found for this or any previous visit from the past 1095 days.    Stress Test:  No results found for this or any previous visit from the past 1095 days.    Cardiac Imaging:  No results found for this or any previous visit from the past 1095 days.      Past Medical History:  She has a past medical history of Asthma, Clostridium difficile infection, GERD (gastroesophageal reflux disease), and Hypertension.    Past Surgical History:  She has a past surgical history that includes Cholecystectomy; " Esophagogastroduodenoscopy; and Colonoscopy.      Social History:  She reports that she has quit smoking. Her smoking use included cigarettes. She started smoking about 4 years ago. She has a 4.9 pack-year smoking history. She has never used smokeless tobacco. She reports that she does not drink alcohol and does not use drugs.    Family History:  Family History   Problem Relation Name Age of Onset    Breast cancer Mother  63    Breast cancer Sister  63        Allergies:  Tiotropium bromide, Ace inhibitors, Escitalopram, Hydrochlorothiazide, and House dust mite    Inpatient Medications:  Scheduled medications   Medication Dose Route Frequency    budesonide  0.5 mg nebulization BID    [Held by provider] busPIRone  10 mg oral BID    influenza  0.5 mL intramuscular During hospitalization    formoterol  20 mcg nebulization BID    heparin (porcine)  5,000 Units subcutaneous q8h SANTIAGO    lactulose  20 g oral q4h    losartan  50 mg oral BID    metoprolol tartrate  50 mg oral q12h    metoprolol tartrate  50 mg oral Once    multivitamin with minerals  1 tablet oral Daily    nystatin   Topical BID    pantoprazole  40 mg oral BID AC    risperiDONE  0.5 mg oral Nightly    [Held by provider] spironolactone  12.5 mg oral Daily    thiamine  100 mg oral Daily     PRN medications   Medication    acetaminophen    Or    acetaminophen    albuterol    [Held by provider] clonazePAM    hydrALAZINE    melatonin    metoprolol     Continuous Medications   Medication Dose Last Rate     Outpatient Medications:  Current Outpatient Medications   Medication Instructions    albuterol 90 mcg/actuation inhaler 2 puffs, inhalation, Every 4 hours PRN    busPIRone (BUSPAR) 10 mg, oral, 2 times daily    clonazePAM (KLONOPIN) 0.5 mg, oral, Daily PRN    esomeprazole (NEXIUM) 40 mg, oral, 2 times daily, Do not open capsule.    fluticasone propion-salmeteroL (Advair) 115-21 mcg/actuation inhaler 2 puffs, inhalation, 2 times daily RT, Rinse mouth with water  after use to reduce aftertaste and incidence of candidiasis. Do not swallow.    losartan (COZAAR) 100 mg, oral, Daily    metoclopramide (REGLAN) 10 mg, oral, 4 times daily PRN, As needed for nausea/vomiting    ondansetron ODT (ZOFRAN-ODT) 4 mg, oral, Every 8 hours PRN    pantoprazole (PROTONIX) 40 mg, oral, Daily before breakfast, Do not crush, chew, or split.    spironolactone (ALDACTONE) 12.5 mg, oral, Daily    vancomycin (VANCOCIN) 125 mg, oral, 4 times daily, 11-5-24 x 14 days    verapamil SR (CALAN-SR) 240 mg, oral, Nightly, Do not crush or chew.     Results for orders placed or performed during the hospital encounter of 11/16/24 (from the past 24 hours)   Magnesium   Result Value Ref Range    Magnesium 2.37 1.60 - 2.40 mg/dL   Comprehensive Metabolic Panel   Result Value Ref Range    Glucose 115 (H) 74 - 99 mg/dL    Sodium 142 136 - 145 mmol/L    Potassium 4.2 3.5 - 5.3 mmol/L    Chloride 109 (H) 98 - 107 mmol/L    Bicarbonate 20 (L) 21 - 32 mmol/L    Anion Gap 17 10 - 20 mmol/L    Urea Nitrogen 13 6 - 23 mg/dL    Creatinine 0.80 0.50 - 1.05 mg/dL    eGFR 90 >60 mL/min/1.73m*2    Calcium 9.1 8.6 - 10.3 mg/dL    Albumin 3.9 3.4 - 5.0 g/dL    Alkaline Phosphatase 62 33 - 110 U/L    Total Protein 6.4 6.4 - 8.2 g/dL    AST 36 9 - 39 U/L    Bilirubin, Total 0.9 0.0 - 1.2 mg/dL    ALT 50 (H) 7 - 45 U/L   CBC   Result Value Ref Range    WBC 5.6 4.4 - 11.3 x10*3/uL    nRBC 0.0 0.0 - 0.0 /100 WBCs    RBC 3.65 (L) 4.00 - 5.20 x10*6/uL    Hemoglobin 10.3 (L) 12.0 - 16.0 g/dL    Hematocrit 32.2 (L) 36.0 - 46.0 %    MCV 88 80 - 100 fL    MCH 28.2 26.0 - 34.0 pg    MCHC 32.0 32.0 - 36.0 g/dL    RDW 17.3 (H) 11.5 - 14.5 %    Platelets 222 150 - 450 x10*3/uL         Physical Exam:  Subjective:   Examination:   General Examination:   General Appearance: Well developed, well nourished, in no acute distress.   Head: normocephalic, atraumatic   Eyes: Anicteric sclera. Pupils are equally round and reactive to light.  Extraocular  movements are intact.    Ears: normal   Oral: Cavity: mucosa moist.   Throat: clear.   Neck/Thyroid: neck supple, full range of motion, no cervical lymphadenopathy.   Skin: warm and dry, no suspicious lesions.    Heart: regular rate and rhythm, S1, S2 normal, no murmurs.   Lungs: clear to auscultation bilaterally.   Abdomen: soft, non-tender, non-distended, bowl sound present, normal.   Extremities: no clubbing, no cyanosis, no edema.   Neuro: non-focal, motor strength normal upper lower extremities, sensory exam intact.       Assessment/Plan     I47.1 Supraventricular tachycardia  I10.0 Hypertension  C52.9, A41.9 Colitis sepsis    Additional oral IV metoprolol as needed if sustained supraventricular tachycardia consider IV Cardizem avoid antiarrhythmic agents for now monitor evaluate causes of hypertension    Code Status:  Full Code    I spent 49 minutes in the professional and overall care of this patient.        Gabriel Esquivel MD

## 2024-11-27 NOTE — PROGRESS NOTES
Occupational Therapy    Evaluation    Patient Name: Samuel Mims  MRN: [id number]  Department: [facility]  Room: [address] [place] [address]  Today's Date: 11/27/2024  Time Calculation  Start Time: 1101  Stop Time: 1127  Time Calculation (min): 26 min        Assessment:  OT Assessment: Pt presenting w/ significant acute cognitive deficits impacting her functional ability, safety awareness, and ADL participation. Pt benefits from continued low intensity OT services to improve safety and independence.  End of Session Communication: Bedside nurse  End of Session Patient Position: Bed, 4 rail up, Alarm on  OT Assessment Results: Decreased ADL status, Decreased upper extremity range of motion, Decreased upper extremity strength, Decreased safe judgment during ADL, Decreased cognition, Decreased endurance, Decreased functional mobility, Decreased gross motor control, Decreased IADLs  Plan:  Treatment Interventions: ADL retraining, Functional transfer training, UE strengthening/ROM, Endurance training, Cognitive reorientation, Patient/family training, Equipment evaluation/education, Compensatory technique education  OT Frequency: 3 times per week  OT Discharge Recommendations: Moderate intensity level of continued care, 24 hr supervision due to cognition  OT Recommended Transfer Status: Dependent  OT - OK to Discharge: Yes (Pt ok to d/c once cleared by medical team)  Treatment Interventions: ADL retraining, Functional transfer training, UE strengthening/ROM, Endurance training, Cognitive reorientation, Patient/family training, Equipment evaluation/education, Compensatory technique education    Subjective   Current Problem:  1. Sepsis with encephalopathy without septic shock, due to unspecified organism (Multi)        2. Acute cystitis with hematuria        3. Cognitive changes  Referral to Occupational Therapy    CANCELED: Referral to Occupational Therapy        General:  General  Reason for Referral: Pt admitted 11/16 for AMS, dx w/ C-diff  3 weeks prior to this admission. Dx: Sepsis w/ encephalopathy w/o septic shock, acute cystitis w/ hematuria, +C-diff  Referred By: Nell Green MD  Past Medical History Relevant to Rehab: HTN, asthma, GERD, cholecystectomy  Co-Treatment: PT  Co-Treatment Reason: To enhance safety t/o ADL participation and functional mobility  Prior to Session Communication: Bedside nurse  Patient Position Received: Bed, 4 rail up, Alarm on  General Comment: Pt w/ sitter at bedside, overall nonverbal however, agreeable to therapy services this date  Precautions:  Medical Precautions: Fall precautions  Precautions Comment: +C-diff patient isolation precautions    Vital Sign (Past 2hrs)        Date/Time Vitals Session Patient Position Pulse Resp SpO2 BP MAP (mmHg)    11/27/24 1200 --  --  80  13  100 %  142/70  84                         Pain:  Pain Assessment  0-10 (Numeric) Pain Score: 0 - No pain    Objective   Cognition:  Orientation Level: Unable to assess  Safety/Judgement: Exceptions to WFL (Pt w/ significantly poor safety awareness)  Insight: Moderate  Impulsive: Severely  Planning: Reduced planning skills           Home Living:  Home Living Comments: Unknown 2/2 pt's current cognitive state, nonverbal and no purpopseful communication  Prior Function:  Prior Function Comments: Unknown 2/2 pt's current cognitive state, anticipate pt was fully IND at baseline  IADL History:     ADL:  Eating Assistance: Minimal  Grooming Assistance: Minimal  Bathing Assistance: Maximal  UE Dressing Assistance: Moderate  LE Dressing Assistance: Total  Toileting Assistance with Device: Total  Functional Assistance: Total  Activity Tolerance:  Endurance: Tolerates 10 - 20 min exercise with multiple rests  Bed Mobility/Transfers: Bed Mobility  Bed Mobility: Yes (Pt completes bed mobility supine <--> sitting EOB w/ Max A x2, rolling towards L<-->R w/ Min A and Max v/c's)    Despite Max v/c's to stay seated EOB, pt impulsively attempts to transfer  from bed to chair resulting in therapist having to safely lower pt to floor. Pt safely lifted from ground w/ x2 person assist and safely returned to EOB. RN made aware.    Transfers  Transfer: Yes (While seated EOB pt completes lateral scoot towards HOB w/ Max A x2, unsafe for bed to chair transfer this date 2/2 poor motor planning/control, poor safety awareness, impulsive behaviors, and overall poor tolerance for activity)     Strength:  Strength Comments: WFL       Extremities: RUE   RUE : Within Functional Limits and LUE   LUE: Within Functional Limits        Outcome Measures:[facility] Daily Activity  Putting on and taking off regular lower body clothing: Total  Bathing (including washing, rinsing, drying): A lot  Putting on and taking off regular upper body clothing: A little  Toileting, which includes using toilet, bedpan or urinal: Total  Taking care of personal grooming such as brushing teeth: A little  Eating Meals: A little  Daily Activity - Total Score: 13        Education Documentation  No documentation found.  Education Comments  No comments found.        OP EDUCATION:  Education  Individual(s) Educated: Patient    Goals:  Encounter Problems       Encounter Problems (Active)       OT Problem       PATIENT WILL MAINTAIN UPRIGHT BALANCE with feet apart SUPPORTED WITH minimal assist FOR 10 minutes (Progressing)       Start:  11/27/24    Expected End:  12/11/24            PATIENT WILL MAINTAIN DYNAMIC SEATED BALANCE DURING reaching across table (Progressing)       Start:  11/27/24    Expected End:  12/11/24            PATIENT WILL DEMONSTRATE INDEPENDENCE WITH UPPER BODY donning and doffing all UE clothes (Progressing)       Start:  11/27/24    Expected End:  12/11/24            PATIENT WILL WASH FACE WITH modified independent (Progressing)       Start:  11/27/24    Expected End:  12/11/24            PATIENT WILL sponge bathe WITH supervision (Progressing)       Start:  11/27/24    Expected End:  12/11/24

## 2024-11-27 NOTE — CARE PLAN
The patient's goals for the shift include The patient is too lethargic at this time to participate in her care plan    The clinical goals for the shift include Pt will remain HDS this shift.    Problem: Skin  Goal: Participates in plan/prevention/treatment measures  Outcome: Not Progressing  Goal: Prevent/manage excess moisture  Outcome: Not Progressing  Goal: Prevent/minimize sheer/friction injuries  Outcome: Not Progressing  Goal: Promote skin healing  Outcome: Not Progressing     Problem: Fall/Injury  Goal: Verbalize understanding of personal risk factors for fall in the hospital  Outcome: Not Progressing  Goal: Verbalize understanding of risk factor reduction measures to prevent injury from fall in the home  Outcome: Not Progressing    Problem: Pain  Goal: Takes deep breaths with improved pain control throughout the shift  Outcome: Met  Goal: Turns in bed with improved pain control throughout the shift  Outcome: Met     Problem: Nutrition  Goal: Nutrition support goals are met within 48 hrs  Outcome: Progressing  Goal: Nutrition support is meeting 75% of nutrient needs  Outcome: Progressing

## 2024-11-27 NOTE — CARE PLAN
The patient's goals for the shift include ANGEL    The clinical goals for the shift include pt will remain HDS through end of shift    Over the shift, the patient did make progress toward the following goals. Barriers to progression include acuteness of illness. Recommendations to address these barriers include monitor labs and vitals. Neuro checks as ordered.

## 2024-11-28 LAB
ALBUMIN SERPL BCP-MCNC: 3.6 G/DL (ref 3.4–5)
ALP SERPL-CCNC: 53 U/L (ref 33–110)
ALT SERPL W P-5'-P-CCNC: 48 U/L (ref 7–45)
AMMONIA PLAS-SCNC: 19 UMOL/L (ref 16–53)
ANION GAP SERPL CALC-SCNC: 13 MMOL/L (ref 10–20)
AST SERPL W P-5'-P-CCNC: 26 U/L (ref 9–39)
BACTERIA CSF CULT: NORMAL
BILIRUB SERPL-MCNC: 0.8 MG/DL (ref 0–1.2)
BUN SERPL-MCNC: 18 MG/DL (ref 6–23)
CALCIUM SERPL-MCNC: 9 MG/DL (ref 8.6–10.3)
CHLORIDE SERPL-SCNC: 106 MMOL/L (ref 98–107)
CO2 SERPL-SCNC: 27 MMOL/L (ref 21–32)
CREAT SERPL-MCNC: 0.9 MG/DL (ref 0.5–1.05)
EGFRCR SERPLBLD CKD-EPI 2021: 79 ML/MIN/1.73M*2
ERYTHROCYTE [DISTWIDTH] IN BLOOD BY AUTOMATED COUNT: 18.5 % (ref 11.5–14.5)
GLUCOSE SERPL-MCNC: 96 MG/DL (ref 74–99)
GRAM STN SPEC: NORMAL
GRAM STN SPEC: NORMAL
HCT VFR BLD AUTO: 32.2 % (ref 36–46)
HGB BLD-MCNC: 10.3 G/DL (ref 12–16)
MCH RBC QN AUTO: 28.4 PG (ref 26–34)
MCHC RBC AUTO-ENTMCNC: 32 G/DL (ref 32–36)
MCV RBC AUTO: 89 FL (ref 80–100)
NRBC BLD-RTO: 0 /100 WBCS (ref 0–0)
PLATELET # BLD AUTO: 236 X10*3/UL (ref 150–450)
POTASSIUM SERPL-SCNC: 3.6 MMOL/L (ref 3.5–5.3)
PROT SERPL-MCNC: 6 G/DL (ref 6.4–8.2)
RBC # BLD AUTO: 3.63 X10*6/UL (ref 4–5.2)
SODIUM SERPL-SCNC: 142 MMOL/L (ref 136–145)
WBC # BLD AUTO: 5.5 X10*3/UL (ref 4.4–11.3)

## 2024-11-28 PROCEDURE — 1200000002 HC GENERAL ROOM WITH TELEMETRY DAILY

## 2024-11-28 PROCEDURE — 82140 ASSAY OF AMMONIA: CPT

## 2024-11-28 PROCEDURE — 2500000002 HC RX 250 W HCPCS SELF ADMINISTERED DRUGS (ALT 637 FOR MEDICARE OP, ALT 636 FOR OP/ED): Performed by: NURSE PRACTITIONER

## 2024-11-28 PROCEDURE — 2500000001 HC RX 250 WO HCPCS SELF ADMINISTERED DRUGS (ALT 637 FOR MEDICARE OP): Performed by: NURSE PRACTITIONER

## 2024-11-28 PROCEDURE — 2500000001 HC RX 250 WO HCPCS SELF ADMINISTERED DRUGS (ALT 637 FOR MEDICARE OP): Performed by: FAMILY MEDICINE

## 2024-11-28 PROCEDURE — 85027 COMPLETE CBC AUTOMATED: CPT | Performed by: NURSE PRACTITIONER

## 2024-11-28 PROCEDURE — 36415 COLL VENOUS BLD VENIPUNCTURE: CPT

## 2024-11-28 PROCEDURE — 2500000002 HC RX 250 W HCPCS SELF ADMINISTERED DRUGS (ALT 637 FOR MEDICARE OP, ALT 636 FOR OP/ED): Performed by: PSYCHIATRY & NEUROLOGY

## 2024-11-28 PROCEDURE — 76937 US GUIDE VASCULAR ACCESS: CPT

## 2024-11-28 PROCEDURE — 80053 COMPREHEN METABOLIC PANEL: CPT | Performed by: NURSE PRACTITIONER

## 2024-11-28 PROCEDURE — 2500000004 HC RX 250 GENERAL PHARMACY W/ HCPCS (ALT 636 FOR OP/ED): Performed by: NURSE PRACTITIONER

## 2024-11-28 PROCEDURE — 2500000001 HC RX 250 WO HCPCS SELF ADMINISTERED DRUGS (ALT 637 FOR MEDICARE OP)

## 2024-11-28 PROCEDURE — 94640 AIRWAY INHALATION TREATMENT: CPT

## 2024-11-28 PROCEDURE — 2500000005 HC RX 250 GENERAL PHARMACY W/O HCPCS: Performed by: NURSE PRACTITIONER

## 2024-11-28 RX ORDER — POTASSIUM CHLORIDE 20 MEQ/1
40 TABLET, EXTENDED RELEASE ORAL ONCE
Status: COMPLETED | OUTPATIENT
Start: 2024-11-28 | End: 2024-11-28

## 2024-11-28 RX ADMIN — BUDESONIDE 0.5 MG: 0.5 INHALANT RESPIRATORY (INHALATION) at 09:03

## 2024-11-28 RX ADMIN — BUDESONIDE 0.5 MG: 0.5 INHALANT RESPIRATORY (INHALATION) at 20:24

## 2024-11-28 RX ADMIN — POTASSIUM CHLORIDE 40 MEQ: 1500 TABLET, EXTENDED RELEASE ORAL at 09:52

## 2024-11-28 RX ADMIN — RISPERIDONE 0.5 MG: 0.5 TABLET, FILM COATED ORAL at 20:40

## 2024-11-28 RX ADMIN — LOSARTAN POTASSIUM 50 MG: 50 TABLET, FILM COATED ORAL at 20:39

## 2024-11-28 RX ADMIN — HEPARIN SODIUM 5000 UNITS: 5000 INJECTION INTRAVENOUS; SUBCUTANEOUS at 05:12

## 2024-11-28 RX ADMIN — THIAMINE HCL TAB 100 MG 100 MG: 100 TAB at 09:52

## 2024-11-28 RX ADMIN — HEPARIN SODIUM 5000 UNITS: 5000 INJECTION INTRAVENOUS; SUBCUTANEOUS at 21:09

## 2024-11-28 RX ADMIN — METOPROLOL TARTRATE 50 MG: 50 TABLET, FILM COATED ORAL at 09:52

## 2024-11-28 RX ADMIN — PANTOPRAZOLE SODIUM 40 MG: 40 TABLET, DELAYED RELEASE ORAL at 17:59

## 2024-11-28 RX ADMIN — METOPROLOL TARTRATE 50 MG: 50 TABLET, FILM COATED ORAL at 20:40

## 2024-11-28 RX ADMIN — Medication 1 TABLET: at 09:52

## 2024-11-28 RX ADMIN — PANTOPRAZOLE SODIUM 40 MG: 40 TABLET, DELAYED RELEASE ORAL at 06:48

## 2024-11-28 RX ADMIN — Medication 3 MG: at 20:40

## 2024-11-28 RX ADMIN — FORMOTEROL FUMARATE 20 MCG: 20 SOLUTION RESPIRATORY (INHALATION) at 20:24

## 2024-11-28 RX ADMIN — HEPARIN SODIUM 5000 UNITS: 5000 INJECTION INTRAVENOUS; SUBCUTANEOUS at 15:00

## 2024-11-28 RX ADMIN — FORMOTEROL FUMARATE 20 MCG: 20 SOLUTION RESPIRATORY (INHALATION) at 09:03

## 2024-11-28 RX ADMIN — NYSTATIN: 100000 CREAM TOPICAL at 20:40

## 2024-11-28 ASSESSMENT — COGNITIVE AND FUNCTIONAL STATUS - GENERAL
HELP NEEDED FOR BATHING: A LITTLE
MOBILITY SCORE: 19
MOBILITY SCORE: 15
MOVING FROM LYING ON BACK TO SITTING ON SIDE OF FLAT BED WITH BEDRAILS: A LITTLE
TOILETING: A LITTLE
STANDING UP FROM CHAIR USING ARMS: A LITTLE
DRESSING REGULAR LOWER BODY CLOTHING: A LOT
DAILY ACTIVITIY SCORE: 18
TURNING FROM BACK TO SIDE WHILE IN FLAT BAD: A LITTLE
PERSONAL GROOMING: A LITTLE
WALKING IN HOSPITAL ROOM: A LOT
CLIMB 3 TO 5 STEPS WITH RAILING: TOTAL
PERSONAL GROOMING: A LITTLE
DRESSING REGULAR LOWER BODY CLOTHING: A LITTLE
DAILY ACTIVITIY SCORE: 14
HELP NEEDED FOR BATHING: A LOT
EATING MEALS: A LITTLE
DRESSING REGULAR UPPER BODY CLOTHING: A LOT
MOVING TO AND FROM BED TO CHAIR: A LITTLE
WALKING IN HOSPITAL ROOM: A LITTLE
DRESSING REGULAR UPPER BODY CLOTHING: A LITTLE
TOILETING: A LOT
STANDING UP FROM CHAIR USING ARMS: A LITTLE
MOVING TO AND FROM BED TO CHAIR: A LITTLE
EATING MEALS: A LITTLE
CLIMB 3 TO 5 STEPS WITH RAILING: A LOT

## 2024-11-28 ASSESSMENT — PAIN SCALES - GENERAL
PAINLEVEL_OUTOF10: 0 - NO PAIN

## 2024-11-28 ASSESSMENT — PAIN - FUNCTIONAL ASSESSMENT
PAIN_FUNCTIONAL_ASSESSMENT: 0-10
PAIN_FUNCTIONAL_ASSESSMENT: 0-10
PAIN_FUNCTIONAL_ASSESSMENT: CPOT (CRITICAL CARE PAIN OBSERVATION TOOL)

## 2024-11-28 ASSESSMENT — PAIN SCALES - WONG BAKER: WONGBAKER_NUMERICALRESPONSE: NO HURT

## 2024-11-28 NOTE — PROGRESS NOTES
[facility] has not responded in [facility].  Called admissions and left a message, inquiring if they use [facility] and have received the referral.  CT to follow up tomorrow.

## 2024-11-28 NOTE — NURSING NOTE
Pt. Transferred to [address] [place] [address]. Pt. Remains A and Ox3 with confusion why she is here. AM BP was 107/51 with .  Okay to give 50mg of metoprolol per med house. No pain.

## 2024-11-28 NOTE — CARE PLAN
The patient's goals for the shift include The patient is too lethargic at this time to participate in her care plan    The clinical goals for the shift include see care plan      Problem: Skin  Goal: Participates in plan/prevention/treatment measures  Outcome: Progressing  Flowsheets (Taken 11/27/2024 0801 by Jannette Gross RN)  Participates in plan/prevention/treatment measures:   Discuss with provider PT/OT consult   Elevate heels  Goal: Prevent/manage excess moisture  Outcome: Progressing  Flowsheets (Taken 11/27/2024 0801 by Jannette Gross, RN)  Prevent/manage excess moisture:   Cleanse incontinence/protect with barrier cream   Moisturize dry skin   Follow provider orders for dressing changes   Monitor for/manage infection if present  Goal: Prevent/minimize sheer/friction injuries  Outcome: Progressing  Flowsheets (Taken 11/28/2024 1713)  Prevent/minimize sheer/friction injuries:   HOB 30 degrees or less   Turn/reposition every 2 hours/use positioning/transfer devices  Goal: Promote/optimize nutrition  Outcome: Progressing  Flowsheets (Taken 11/27/2024 0801 by Jannette Gross RN)  Promote/optimize nutrition: Monitor/record intake including meals  Goal: Promote skin healing  Outcome: Progressing  Flowsheets (Taken 11/28/2024 1713)  Promote skin healing: Assess skin/pad under line(s)/device(s)     Problem: Discharge Planning  Goal: Discharge to home or other facility with appropriate resources  Outcome: Progressing     Problem: Fall/Injury  Goal: Verbalize understanding of personal risk factors for fall in the hospital  Outcome: Progressing  Goal: Verbalize understanding of risk factor reduction measures to prevent injury from fall in the home  Outcome: Progressing  Goal: Use assistive devices by end of the shift  Outcome: Progressing  Goal: Pace activities to prevent fatigue by end of the shift  Outcome: Progressing     Problem: Pain  Goal: Walks with improved pain control throughout the  shift  Outcome: Progressing  Goal: Performs ADL's with improved pain control throughout shift  Outcome: Progressing     Problem: Arrythmia/Dysrhythmia  Goal: Promote self management  Outcome: Progressing  Goal: Serial ECG will return to baseline  Outcome: Progressing  Goal: Vital signs return to baseline  Outcome: Progressing     Problem: Nutrition  Goal: Nutrition support goals are met within 48 hrs  Outcome: Progressing  Goal: Nutrition support is meeting 75% of nutrient needs  Outcome: Progressing  Goal: Tube feed tolerance  Outcome: Progressing  Goal: BG  mg/dL  Outcome: Progressing  Goal: Electrolytes WNL  Outcome: Progressing     EOS note: pt remains oriented x2 but sleepy. Neurocchecks remain unchanged. Pt is able to feed herself. Does not follow all commands well due to hearing impairment but does respond appropriately when commands are written on paper. Continues to have safety sitter at bedside as patient was restless and pulling at IV/gown/telemetry. Pt is refusing to keep tele on at this time but was NSR. VSS. No reports of pain. Resting comfortably at this time    Pt resting at this time. Bed is in lowest position and locked with alarm on. Call light and personal possesions are within reach.

## 2024-11-28 NOTE — PROGRESS NOTES
Physical Therapy                 Therapy Communication Note    Patient Name: Samuel Mims  MRN: [id number]  Department: [facility] 3 N  Room: [facility]3008-A  Today's Date: 11/28/2024     Discipline: Physical Therapy    Missed Visit Reason:  Pt asleep and would not wake up. Attempted multiple times, pt not waking up each time    Missed Time: Attempt

## 2024-11-28 NOTE — PROGRESS NOTES
Physical Therapy    Physical Therapy Treatment    Patient Name: Samuel Mims  MRN: [id number]  Today's Date: 11/27/2024  Time Calculation  Start Time: 1055  Stop Time: 1125  Time Calculation (min): 30 min     2112/2112-A         11/27/24 1055   PT  Visit   PT Received On 11/27/24   General   Reason for Referral impaired cognition/safety awareness   Referred By Nell Green   Past Medical History Relevant to Rehab Samuel Mims is a 48 y.o. female presenting to [facility] on 11/16/2024 with pertinent medical history of HTN, asthma, GERD, IUD (placed 17 years ago per ), and c-diff (9/2024) who presents to the ED with confusion, increased sleeping, decreased oral intake, nausea with intermittent vomiting, and diarrhea on Thursday from home.  ED physician, and previous medical records as patient is lethargic and confused.  Of note, the patient was hospitalized on 11/1-11/2/24 with hypokalemia, vomiting, and c-diff (tx with oral vancomycin 11/5-11/18/24).    CT abdomen/pelvis: Fluid-filled segments of colon, correlate with diarrhea/colitis.  CT head: No acute intercranial hemorrhage or mass effect.  Patient admitted for sepsis with encephalopathy without septic shock, UTI, HTN, colitis, sinus tachycardia, and diarrhea.     Hypertension.   Surgical History  She has a past surgical history that includes Cholecystectomy; Esophagogastroduodenoscopy; and Colonoscopy.   Co-Treatment OT   Co-Treatment Reason Pt. safetey   Prior to Session Communication Bedside nurse   Patient Position Received Bed, 4 rail up;Alarm on  (sitter at bedside)      Precautions   Medical Precautions Fall precautions. Impulsive.   Precautions Comment (+) Cdiff, contact plus precautions   Pain Assessment   0-10 (Numeric) Pain Score 0 - No pain   Cognition   Orientation Level Unable to assess  (limted conversation.)   Insight Moderate   Processing Speed Delayed   Postural Control   Posture Comment Slumpted posture. Decrease ability to maintain  midline position   Static Sitting Balance   Static Sitting-Balance Support Bilateral upper extremity supported   Static Sitting-Level of Assistance Minimum assistance;Moderate assistance   Therapeutic Exercise   Therapeutic Exercise Performed Yes   Therapeutic Exercise Activity 1 Supine ex's B LE Assisted ROM that became AROM- AP, Heel Slides, Hip abd/add x10. Pt moved in legs unison, not able to performed on side alone even with cues.   Bed Mobility   Bed Mobility Yes   Bed Mobility 1   Bed Mobility 1 Supine to sitting;Sitting to supine   Level of Assistance 1 Maximum assistance  (2)   Bed Mobility Comments 1 Rolling and supine to sit with ,Max x2.. Pt was unsteand in sitting. Assist to prevent loss of balance. Rolling right and left to change linen. OT assisted with wasing pt.   Transfers   Transfer Yes   Transfer 1   Transfer From 1 Pt. was sitting on the EOB but only toes  were touching the floor. I asked to pt, to scoot forward when she started to stand  up.   Pt. assist to sit back on the bed.  Pt. attempted to lay back down. Pt. was sitting near the foot of the bed. Attempted to move the head of the bed with assist x2 When pt. Started moving she stood and tried to sit in the chair. The chair was locked but moved away she partially on the chair but could not remain standing. She was safely lowered to the floor. Pt  was assisted with max x2 to stand  and sit on the bed. She was returned to bed. Nurse was informed.    Activity Tolerance   Endurance Tolerates less than 10 min exercise, no significant change in vital signs   PT Assessment   PT Assessment Results Decreased strength;Decreased range of motion;Impaired balance;Decreased mobility;Decreased cognition;Impaired judgement;Decreased safety awareness   Rehab Prognosis Fair   End of Session Communication Bedside nurse   End of Session Patient Position Bed, 4 rail up;Alarm on  (sitter at bedside)   PT Plan   PT Plan Ongoing PT   PT Frequency Daily   PT  Discharge Recommendations Moderate intensity level of continued care   Equipment Recommended upon Discharge   (LRAD)       Assessment/Plan   PT Assessment  PT Assessment Results: Decreased strength, Decreased range of motion, Impaired balance, Decreased mobility, Decreased cognition, Impaired judgement, Decreased safety awareness  Rehab Prognosis: Fair  End of Session Communication: Bedside nurse  End of Session Patient Position: Bed, 4 rail up, Alarm on (sitter at bedside)     PT Plan  Treatment/Interventions: Bed mobility, Transfer training, Gait training, Balance training, Neuromuscular re-education, Strengthening, Endurance training, Range of motion, Therapeutic exercise, Therapeutic activity, Home exercise program  PT Plan: Ongoing PT  PT Frequency: Daily  PT Discharge Recommendations: Moderate intensity level of continued care  Equipment Recommended upon Discharge:  (LRAD)  PT Recommended Transfer Status: Assist x2  PT - OK to Discharge: Yes    Outcome Measures:  [facility] Basic Mobility  Turning from your back to your side while in a flat bed without using bedrails: A lot  Moving from lying on your back to sitting on the side of a flat bed without using bedrails: A lot  Moving to and from bed to chair (including a wheelchair): Total  Standing up from a chair using your arms (e.g. wheelchair or bedside chair): A lot  To walk in hospital room: Total  Climbing 3-5 steps with railing: Total  Basic Mobility - Total Score: 9                             EDUCATION:     Education Documentation  Precautions, taught by Oscar Ornelas PTA at 11/27/2024  4:11 PM.  Learner: Patient  Readiness: Acceptance  Method: Explanation  Response: Needs Reinforcement    Home Exercise Program, taught by Oscar Ornelas PTA at 11/27/2024  4:11 PM.  Learner: Patient  Readiness: Acceptance  Method: Explanation  Response: Needs Reinforcement    Mobility Training, taught by Oscar Ornelas PTA at 11/27/2024  4:11 PM.  Learner: Patient  Readiness:  Acceptance  Method: Explanation  Response: Needs Reinforcement    Education Comments  No comments found.        GOALS:  Encounter Problems       Encounter Problems (Active)       PT Problem       Pt will be able to perform all bed mobility tasks with Mod I.  (Progressing)       Start:  11/26/24    Expected End:  12/10/24            Pt will perform all transfers with CGA and LRAD with proper safety mechanics.   (Progressing)       Start:  11/26/24    Expected End:  12/10/24            Pt will ambulate 100 ft with CGA using LRAD for improved functional independence.  (Progressing)       Start:  11/26/24    Expected End:  12/10/24               Pain - Adult

## 2024-11-28 NOTE — CARE PLAN
The patient's goals for the shift include The patient is too lethargic at this time to participate in her care plan    The clinical goals for the shift include pt will remain HDS through end of shift    PATIENT REMAINED AWAKE MOST OF SHIFT, PULLING OF GOWN AND TELEMETRY. PATIENT INC OF URINE MIXED WITH LOOSE STOOL AT TIMES. DIVYA CARE PROVIDED SEVERAL TIMES T/O SHIFT. BED LINENS CHANGED AND RX OINTMENT AND BARRIER CREAM APPLIED TO AREA FOR MA SD. PATIENT VITAL SIGNS REMAINED STABLE. PATIENT IS VERY [place] BUT IS ABLE TO COMMUNICATE APPROPRIATELY BY USING PEN AND PAPER AT BEDSIDE. PATIENT REMAINED ALERT X 1-2 AT INTERVALS, PATIENT KEPT TRYING TO GET OOB TO GO GET HER CHILDREN (2). PATIENT REORIENTATED, REPOSITIONED AND OFFERED FLUID AND ETC T/O SHIFT. EVENING, 0200 AND 0600 LACTULOSE HELD D/T STOOL OUTPUT AND PENDING AMMONIA LEVEL THIS AM. SITTER REMAINS AT BEDSIDE. BED ALARM ENGAGED. PATIENT CONSTANTLY MOVES AROUND IN BED REMOVING BLANKETS AND ETC. VSS.

## 2024-11-28 NOTE — PROGRESS NOTES
Samuel Mims is a 48 y.o. female on day 12 of admission presenting with Sepsis with encephalopathy without septic shock, due to unspecified organism (Multi).    Subjective   Pt still very cognitively impaired still.  states she is very restless in bed.    Her insurance does cover acute rehab but not SNF.   asked to apply for medicaid for possible SNF placement in the future.       Objective     Last Recorded Vitals  /70   Pulse 100   Temp 36 °C (96.8 °F)   Resp 20   Wt 107 kg (236 lb 12.4 oz)   SpO2 98%   Intake/Output last 3 Shifts:    Intake/Output Summary (Last 24 hours) at 11/28/2024 0937  Last data filed at 11/28/2024 0925  Gross per 24 hour   Intake 720 ml   Output --   Net 720 ml       Admission Weight  Weight: 127 kg (280 lb) (11/16/24 1755)    Daily Weight  11/28/24 : 107 kg (236 lb 12.4 oz)      Physical Exam:  General: Not in acute distress  HEENT: PERRLA, Left TM indicates recent otitis media, Right TM full of fluid.  Neck: Normal to inspection  Lungs: Clear to auscultation, work of breathing within normal limit  Cardiac: Regular rate and rhythm  Abdomen: Soft nontender, positive bowel sounds  : Exam deferred  Skin: Perianal erythema with clear demarcation.  Hematology: No petechia or excessive ecchymosis  Musculoskeletal: Without significant trauma, arms in restraints  Neurological: Alert and oriented X place,  Unable to answer questions appropriately.     Relevant Results  Scheduled medications  budesonide, 0.5 mg, nebulization, BID  [Held by provider] busPIRone, 10 mg, oral, BID  influenza, 0.5 mL, intramuscular, During hospitalization  formoterol, 20 mcg, nebulization, BID  heparin (porcine), 5,000 Units, subcutaneous, q8h SANTIAGO  lactulose, 20 g, oral, q4h  losartan, 50 mg, oral, BID  metoprolol tartrate, 50 mg, oral, q12h  multivitamin with minerals, 1 tablet, oral, Daily  nystatin, , Topical, BID  pantoprazole, 40 mg, oral, BID AC  potassium chloride CR, 40 mEq, oral,  Once  risperiDONE, 0.5 mg, oral, Nightly  spironolactone, 12.5 mg, oral, Daily  thiamine, 100 mg, oral, Daily      Continuous medications     PRN medications  PRN medications: acetaminophen **OR** acetaminophen, albuterol, [Held by provider] clonazePAM, hydrALAZINE, melatonin, metoprolol   Results for orders placed or performed during the hospital encounter of 11/16/24 (from the past 24 hours)   Comprehensive Metabolic Panel   Result Value Ref Range    Glucose 96 74 - 99 mg/dL    Sodium 142 136 - 145 mmol/L    Potassium 3.6 3.5 - 5.3 mmol/L    Chloride 106 98 - 107 mmol/L    Bicarbonate 27 21 - 32 mmol/L    Anion Gap 13 10 - 20 mmol/L    Urea Nitrogen 18 6 - 23 mg/dL    Creatinine 0.90 0.50 - 1.05 mg/dL    eGFR 79 >60 mL/min/1.73m*2    Calcium 9.0 8.6 - 10.3 mg/dL    Albumin 3.6 3.4 - 5.0 g/dL    Alkaline Phosphatase 53 33 - 110 U/L    Total Protein 6.0 (L) 6.4 - 8.2 g/dL    AST 26 9 - 39 U/L    Bilirubin, Total 0.8 0.0 - 1.2 mg/dL    ALT 48 (H) 7 - 45 U/L   CBC   Result Value Ref Range    WBC 5.5 4.4 - 11.3 x10*3/uL    nRBC 0.0 0.0 - 0.0 /100 WBCs    RBC 3.63 (L) 4.00 - 5.20 x10*6/uL    Hemoglobin 10.3 (L) 12.0 - 16.0 g/dL    Hematocrit 32.2 (L) 36.0 - 46.0 %    MCV 89 80 - 100 fL    MCH 28.4 26.0 - 34.0 pg    MCHC 32.0 32.0 - 36.0 g/dL    RDW 18.5 (H) 11.5 - 14.5 %    Platelets 236 150 - 450 x10*3/uL   Ammonia   Result Value Ref Range    Ammonia 19 16 - 53 umol/L      Lab Results   Component Value Date    BLOODCULT No growth at 4 days -  FINAL REPORT 11/16/2024    BLOODCULT No growth at 4 days -  FINAL REPORT 11/16/2024    URINECULTURE (A) 11/16/2024     Multiple organisms present, probable contamination. Repeat culture if clinically indicated.       MR brain w and wo IV contrast    *Nonspecific white matter signal throughout both cerebral hemispheres can not be further characterized. *There is no evidence of mass, infarction or hemorrhage.   MACRO: none   Signed by: Han Moreno 11/26/2024 9:28 AM Dictation  workstation:   [id number]         Assessment/Plan   Samuel Mims is a 48 y.o. female on day 12 of admission presenting with Sepsis with encephalopathy without septic shock, due to unspecified organism (Multi).  Principal Problem:    Sepsis with encephalopathy without septic shock, due to unspecified organism (Multi)  Active Problems:    Diarrhea    UTI (urinary tract infection)    Hypertension    Colitis    Sinus tachycardia    Prolonged Q-T interval on ECG    Clostridium difficile infection    Metabolic encephalopathy- multifactorial, possible Wernicke's, possible hepatic encephalopathy   Possibly due to significant weight loss of 50+ pounds w/poor nutritional intake, vs PRES from poorly treated HTN.     Initial CT head with no acute changes. Urine Tox screen negative.  No elevated WBC count.  Ammonia mildly elevated at 59. Blood cultures are negative X2. On Lactulose for mildly elevated ammonia levels.  Corpak inserted due to poor oral intake but removed when she became alert on 11/21.  On 11/19/24 MRI head of poor quality due to motion artifact but show increased signal within the medial thalami, periaqueductal region and possible increased signal within the caudate and cortex of the bilateral posterior frontal lobes.  Started on high dose IV Thiamine and MVI for possible Wernicke's, changed to oral Thiamine on 11/25.  Thiamine level low at 33.  IV Acyclovir discontinued as CSF negative for HSV infection.  Had Lumbar Puncture on 11/20 and results negative for viruses and bacteria.  Some improvement in her alertness since 11/21/24, but not able to answer questions or follow directions.  Sitter in room since 11/25 as fell out of bed.  MRI with anesthesia completed 11/25- results are non specific white matter changes.  Consult psychiatry recommended Haldol 5 mg q 6 hours prn for agitation and Risperdal 0.5 mg at bedtime for insomnia, currently only on Risperdal.  Ammonia level has dropped from 59 to 19 on  11/28/24 so will discontinue the Lactulose.        Untreated C. Difficile Colitis  Per  she has not taken the oral Vancomycin prescribed at discharge on 11/2/24.   Stool for pathogens negative.  C. Diff PCR negative but since untreated this could be false negative.  Treated with oral Vancomycin for 10 days,  discontinued on 11/27.  CT a/p confirms she continues to have colitis.  Loose stools/diarrhea due to lactulose.        HTN and Sinus Tachycardia  On oral Losartan 50 mg, Metoprolol tartrate 50 mg bid, Spironolactone 12.5 mg  Blood pressure mildly elevated and continues to be tachycardic off and on.     Asthma  Budesonide 0.5 mg and Formoterol nebs bid.     CHET  Buspirone 10 mg bid on hold.  Klonopin on hold due to encephalopathy     GERD  Oral Pantoprazole 40 mg bid.     Recent Otitis Media  Ears show fluid probably causing some hearing impairment.  Refer to ENT as out patient for possible hearing aids.     Discharge planning  Apparently her insurance does not cover skilled care but will cover acute rehab and there is a facility in [place] that will take her insurance.  She will need cognitive rehab, PT and OT evaluation for driving safety and capacity to return to work.  Asked  to apply for medicaid for possible SNF placement if needed in future.           Plan discussed with .         Nell Green MD

## 2024-11-28 NOTE — DISCHARGE SUMMARY
Discharge Diagnosis  Wernicke's Encephalopathy due to rapid weight loss of 50+ pounds from acute C. Difficile colitis.    Issues Requiring Follow-Up  Removal of IUD  Referral for hearing test for possible hearing aids.    Discharge Meds     Medication List      ASK your doctor about these medications     albuterol 90 mcg/actuation inhaler   busPIRone 10 mg tablet; Commonly known as: Buspar   clonazePAM 0.5 mg tablet; Commonly known as: KlonoPIN   esomeprazole 40 mg DR capsule; Commonly known as: NexIUM   fluticasone propion-salmeteroL 115-21 mcg/actuation inhaler; Commonly   known as: Advair   losartan 100 mg tablet; Commonly known as: Cozaar   metoclopramide 10 mg tablet; Commonly known as: Reglan; Take 1 tablet   (10 mg) by mouth 4 times a day as needed (nausea/vomiting) for up to 20   doses. As needed for nausea/vomiting   ondansetron ODT 4 mg disintegrating tablet; Commonly known as:   Zofran-ODT; Take 1 tablet (4 mg) by mouth every 8 hours if needed for   nausea or vomiting.   pantoprazole 40 mg EC tablet; Commonly known as: ProtoNix   spironolactone 25 mg tablet; Commonly known as: Aldactone   vancomycin 125 mg capsule; Commonly known as: Vancocin   verapamil  mg ER tablet; Commonly known as: Calan-SR       Test Results Pending At Discharge  Pending Labs       Order Current Status    Extra Urine Gray Tube Collected (11/20/24 1345)    Urinalysis with Reflex Culture and Microscopic Collected (11/20/24 1345)    Arginine vasopressin hormone In process    CSF Culture/Smear Preliminary result    Fungal Culture/Smear Preliminary result            Hospital Course    Patient well known to me with medical issues including HTN, Asthma, GERD and Morbid Obesity.  Was treated for severe C. Difficile colitis with 45 pound weight loss over one month period of time and was admitted 11/1-2/24 for associated hypokalemia and vomiting. Sent home on 10 days of oral Vancomycin but per  never took the pills as the bottle  "of Vancomyin is still full. Her  states that she \"has been not with it\" and never returned to work.  She has been laying around in bed, not doing housework, she c/o ringing in the ears since discharge on 11/2/24.  The last time she ate was a muffin on 11/11/24.  He is not sure if she has had diarrhea but on 11/14 she stayed in the bathroom for most of the day.    Finally Patient brought in by family due to confusion, and worsening lethargy on 11/16/24.  Work up in ED showed no fever, normal WBC count, normal CMP, normal lactate, negative urine toxicology, sinus tachycardia, elevated BP as non compliant with oral antihypertensives for a few days prior to admission, CT head normal, CT a/p fluid filled segments of colon with possible colitis.       Pt admitted for presumed sepsis with encephalopathy and started on IV fluids, IV Flagyl, Vancomycin and cefepime.  Consulted ID physician Dr Stubbs's consult recommending to continue with IV Vancomycin and oral Vancomycin, change Cefepime and Flagyl to Zosyn.  Neuro Consult felt that this confusion was due to metabolic encephalopathy.    The following is her course in the hospital:  Metabolic encephalopathy- multifactorial, possible Wernicke's, possible hepatic encephalopathy   Possibly due to significant weight loss of 50+ pounds w/poor nutritional intake, less likely PRES from poorly treated HTN.     Initial CT head with no acute changes. Urine Tox screen negative.  No elevated WBC count.  Ammonia mildly elevated at 59. Blood cultures are negative X2. On Lactulose for mildly elevated ammonia levels.  Corpak inserted due to poor oral intake but removed when she became alert on 11/21.  On 11/19/24 MRI head of poor quality due to motion artifact but show increased signal within the medial thalami, periaqueductal region and possible increased signal within the caudate and cortex of the bilateral posterior frontal lobes.  Started on high dose IV Thiamine and MVI for " possible Wernicke's, changed to oral Thiamine on 11/25.  Thiamine level low at 33.  IV Acyclovir discontinued as CSF negative for HSV infection.  Had Lumbar Puncture on 11/20 and results negative for viruses and bacteria.  Some improvement in her alertness since 11/21/24, but not able to answer questions or follow directions.  Sitter in room since 11/25 as fell out of bed.  MRI with anesthesia completed 11/25- results are non specific white matter changes.  Consult psychiatry recommended Haldol 5 mg q 6 hours prn for agitation and Risperdal 0.5 mg at bedtime for insomnia for 5 nights.  Ammonia level has dropped from 59 to 19 on 11/28/24 so the Lactulose discontinued.  On 11/29 she fell out of bed fortunately sitter was at bedside and broke the fall.  She continues to be episodes of lucidity alternating with drowsiness and confusion, showing severe cognitive impairment.  Very restless requiring Haldol 1 mg every 6 hours to calm her down. Depakote 125 mg bid added for restlessness and anxiety.     Untreated C. Difficile Colitis  Per  she has not taken the oral Vancomycin prescribed at discharge on 11/2/24.   Stool for pathogens negative.  C. Diff PCR negative but since untreated this could be false negative.  Treated with oral Vancomycin for 10 days,  discontinued on 11/27.  CT a/p confirms she continues to have colitis.  Loose stools/diarrhea due to lactulose.  Lactulose discontinued when ammonia levels dropped from 59 to 19 on 11/28/24.        HTN and Sinus Tachycardia  On oral Losartan 50 mg, Metoprolol tartrate 100 mg qam and  50 mg qpm, Spironolactone 12.5 mg  Blood pressure mildly elevated and continued to be tachycardic off and on.     Asthma  Budesonide 0.5 mg and Formoterol nebs bid while in hospital     CHET  Buspirone 10 mg bid on hold.  Klonopin on hold due to encephalopathy     GERD  Oral Pantoprazole 40 mg bid.     Recent Otitis Media  Ears show fluid probably causing some hearing  impairment.  Refer to ENT as out patient for possible hearing aids.    Cellulitis right leg  Oral Cephalexin 500 mg four times a day for 7 days.     Discharge planning  Per  unable to find accepting acute rehab facility with her insurance.  She will need cognitive rehab, PT and OT evaluation for driving safety and capacity to return to work.  Diet: continue texture modified diet with soft & bite sized foods/ thin liquids with ONS   Patient was accepted for SNF placement at [facility] in [facility].    Pertinent Physical Exam At Time of Discharge  Physical Exam  General: Not in acute distress  HEENT: PERRLA, Left TM indicates recent otitis media, Right TM full of fluid.  Neck: Normal to inspection  Lungs: Clear to auscultation, work of breathing within normal limit  Cardiac: Regular rate and rhythm  Abdomen: Soft nontender, positive bowel sounds  : Exam deferred  Skin: Perianal erythema with clear demarcation.  RLE- erythema of calm. No breaks in skin.  Hematology: No petechia or excessive ecchymosis  Musculoskeletal: Without significant trauma, arms in restraints  Neurological: Alert and oriented X place,  Unable to answer questions appropriately.     Outpatient Follow-Up  Follow up with Dr Green 7 days after discharge from SNF      Nell Green MD

## 2024-11-28 NOTE — CARE PLAN
The patient's goals for the shift include The patient is too lethargic at this time to participate in her care plan    The clinical goals for the shift include pt will remain HDS through end of shift

## 2024-11-29 LAB
ALBUMIN SERPL BCP-MCNC: 3.6 G/DL (ref 3.4–5)
ALP SERPL-CCNC: 51 U/L (ref 33–110)
ALT SERPL W P-5'-P-CCNC: 50 U/L (ref 7–45)
ANION GAP SERPL CALC-SCNC: 14 MMOL/L (ref 10–20)
AST SERPL W P-5'-P-CCNC: 31 U/L (ref 9–39)
BILIRUB SERPL-MCNC: 0.9 MG/DL (ref 0–1.2)
BUN SERPL-MCNC: 21 MG/DL (ref 6–23)
CALCIUM SERPL-MCNC: 9.2 MG/DL (ref 8.6–10.3)
CHLORIDE SERPL-SCNC: 106 MMOL/L (ref 98–107)
CO2 SERPL-SCNC: 25 MMOL/L (ref 21–32)
CREAT SERPL-MCNC: 0.84 MG/DL (ref 0.5–1.05)
EGFRCR SERPLBLD CKD-EPI 2021: 85 ML/MIN/1.73M*2
ERYTHROCYTE [DISTWIDTH] IN BLOOD BY AUTOMATED COUNT: 18.4 % (ref 11.5–14.5)
GLUCOSE SERPL-MCNC: 105 MG/DL (ref 74–99)
HCT VFR BLD AUTO: 33.4 % (ref 36–46)
HGB BLD-MCNC: 10.5 G/DL (ref 12–16)
MCH RBC QN AUTO: 28.2 PG (ref 26–34)
MCHC RBC AUTO-ENTMCNC: 31.4 G/DL (ref 32–36)
MCV RBC AUTO: 90 FL (ref 80–100)
NRBC BLD-RTO: 0 /100 WBCS (ref 0–0)
PLATELET # BLD AUTO: 206 X10*3/UL (ref 150–450)
POTASSIUM SERPL-SCNC: 3.8 MMOL/L (ref 3.5–5.3)
PROT SERPL-MCNC: 6.1 G/DL (ref 6.4–8.2)
RBC # BLD AUTO: 3.73 X10*6/UL (ref 4–5.2)
SODIUM SERPL-SCNC: 141 MMOL/L (ref 136–145)
WBC # BLD AUTO: 4.9 X10*3/UL (ref 4.4–11.3)

## 2024-11-29 PROCEDURE — 2500000004 HC RX 250 GENERAL PHARMACY W/ HCPCS (ALT 636 FOR OP/ED): Performed by: NURSE PRACTITIONER

## 2024-11-29 PROCEDURE — 2500000001 HC RX 250 WO HCPCS SELF ADMINISTERED DRUGS (ALT 637 FOR MEDICARE OP): Performed by: NURSE PRACTITIONER

## 2024-11-29 PROCEDURE — 85027 COMPLETE CBC AUTOMATED: CPT | Performed by: NURSE PRACTITIONER

## 2024-11-29 PROCEDURE — 1200000002 HC GENERAL ROOM WITH TELEMETRY DAILY

## 2024-11-29 PROCEDURE — 2500000001 HC RX 250 WO HCPCS SELF ADMINISTERED DRUGS (ALT 637 FOR MEDICARE OP)

## 2024-11-29 PROCEDURE — 2500000001 HC RX 250 WO HCPCS SELF ADMINISTERED DRUGS (ALT 637 FOR MEDICARE OP): Performed by: FAMILY MEDICINE

## 2024-11-29 PROCEDURE — 84075 ASSAY ALKALINE PHOSPHATASE: CPT | Performed by: NURSE PRACTITIONER

## 2024-11-29 PROCEDURE — 2500000002 HC RX 250 W HCPCS SELF ADMINISTERED DRUGS (ALT 637 FOR MEDICARE OP, ALT 636 FOR OP/ED): Performed by: NURSE PRACTITIONER

## 2024-11-29 PROCEDURE — 2500000005 HC RX 250 GENERAL PHARMACY W/O HCPCS: Performed by: NURSE PRACTITIONER

## 2024-11-29 PROCEDURE — 92507 TX SP LANG VOICE COMM INDIV: CPT | Mod: GN | Performed by: SPEECH-LANGUAGE PATHOLOGIST

## 2024-11-29 PROCEDURE — 36415 COLL VENOUS BLD VENIPUNCTURE: CPT | Performed by: NURSE PRACTITIONER

## 2024-11-29 PROCEDURE — 92523 SPEECH SOUND LANG COMPREHEN: CPT | Mod: GN | Performed by: SPEECH-LANGUAGE PATHOLOGIST

## 2024-11-29 PROCEDURE — 2500000002 HC RX 250 W HCPCS SELF ADMINISTERED DRUGS (ALT 637 FOR MEDICARE OP, ALT 636 FOR OP/ED): Performed by: PSYCHIATRY & NEUROLOGY

## 2024-11-29 PROCEDURE — 94640 AIRWAY INHALATION TREATMENT: CPT

## 2024-11-29 RX ORDER — HALOPERIDOL 5 MG/1
5 TABLET ORAL ONCE
Status: COMPLETED | OUTPATIENT
Start: 2024-11-29 | End: 2024-11-29

## 2024-11-29 RX ORDER — DIAZEPAM 5 MG/ML
2 INJECTION, SOLUTION INTRAMUSCULAR; INTRAVENOUS ONCE
Status: COMPLETED | OUTPATIENT
Start: 2024-11-30 | End: 2024-11-30

## 2024-11-29 RX ADMIN — METOPROLOL TARTRATE 50 MG: 50 TABLET, FILM COATED ORAL at 08:16

## 2024-11-29 RX ADMIN — HEPARIN SODIUM 5000 UNITS: 5000 INJECTION INTRAVENOUS; SUBCUTANEOUS at 13:32

## 2024-11-29 RX ADMIN — HEPARIN SODIUM 5000 UNITS: 5000 INJECTION INTRAVENOUS; SUBCUTANEOUS at 21:06

## 2024-11-29 RX ADMIN — METOPROLOL TARTRATE 7.5 MG: 5 INJECTION INTRAVENOUS at 17:40

## 2024-11-29 RX ADMIN — HEPARIN SODIUM 5000 UNITS: 5000 INJECTION INTRAVENOUS; SUBCUTANEOUS at 05:32

## 2024-11-29 RX ADMIN — RISPERIDONE 0.5 MG: 0.5 TABLET, FILM COATED ORAL at 20:46

## 2024-11-29 RX ADMIN — BUDESONIDE 0.5 MG: 0.5 INHALANT RESPIRATORY (INHALATION) at 08:11

## 2024-11-29 RX ADMIN — Medication 3 MG: at 20:46

## 2024-11-29 RX ADMIN — HALOPERIDOL 5 MG: 5 TABLET ORAL at 20:46

## 2024-11-29 RX ADMIN — PANTOPRAZOLE SODIUM 40 MG: 40 TABLET, DELAYED RELEASE ORAL at 16:03

## 2024-11-29 RX ADMIN — METOPROLOL TARTRATE 50 MG: 50 TABLET, FILM COATED ORAL at 20:46

## 2024-11-29 RX ADMIN — PANTOPRAZOLE SODIUM 40 MG: 40 TABLET, DELAYED RELEASE ORAL at 05:32

## 2024-11-29 RX ADMIN — SPIRONOLACTONE 12.5 MG: 25 TABLET ORAL at 08:08

## 2024-11-29 RX ADMIN — LOSARTAN POTASSIUM 50 MG: 50 TABLET, FILM COATED ORAL at 20:46

## 2024-11-29 RX ADMIN — Medication 1 TABLET: at 08:09

## 2024-11-29 RX ADMIN — LOSARTAN POTASSIUM 50 MG: 50 TABLET, FILM COATED ORAL at 08:09

## 2024-11-29 RX ADMIN — THIAMINE HCL TAB 100 MG 100 MG: 100 TAB at 08:09

## 2024-11-29 RX ADMIN — FORMOTEROL FUMARATE 20 MCG: 20 SOLUTION RESPIRATORY (INHALATION) at 08:11

## 2024-11-29 ASSESSMENT — PAIN SCALES - GENERAL
PAINLEVEL_OUTOF10: 0 - NO PAIN
PAINLEVEL_OUTOF10: 0 - NO PAIN

## 2024-11-29 NOTE — PROGRESS NOTES
11/29/24 1527   Discharge Planning   Home or Post Acute Services Post acute facilities (Rehab/SNF/etc)   Type of Post Acute Facility Services Rehab;Skilled nursing   Expected Discharge Disposition SNF     We have not been able to finding an accepting Acute Rehab referral. Multiple referrals have been sent.  updated. He is still working on the medicaid application for the Pending number. He needs additional  information from pt's employer, but they are closed until Monday. He is hoping to have the medicaid application completed by Monday and will call SW with the pending number. SW to follow.    Patient has safety precautions in place bed in the lowest position, bed alarm on, and call light within reach. Plan of care ongoing. No further concerns as of present.    Problem: Patient Centered Care  Goal: Patient preferences are identified and integrated in the patient's plan of care  Description: Interventions:    - Provide timely, complete, and accurate information to patient/family  - Incorporate patient and family knowledge, values, beliefs, and cultural backgrounds into the planning and delivery of care  - Encourage patient/family to participate in care and decision-making at the level they choose  - Honor patient and family perspectives and choices  Outcome: Progressing     Problem: PAIN - ADULT  Goal: Verbalizes/displays adequate comfort level or patient's stated pain goal  Description: INTERVENTIONS:  - Encourage pt to monitor pain and request assistance  - Assess pain using appropriate pain scale  - Administer analgesics based on type and severity of pain and evaluate response  - Implement non-pharmacological measures as appropriate and evaluate response  - Consider cultural and social influences on pain and pain management  - Manage/alleviate anxiety  - Utilize distraction and/or relaxation techniques  - Monitor for opioid side effects  - Notify MD/LIP if interventions unsuccessful or patient reports new pain  - Anticipate increased pain with activity and pre-medicate as appropriate  Outcome: Progressing     Problem: SAFETY ADULT - FALL  Goal: Free from fall injury  Description: INTERVENTIONS:  - Assess pt frequently for physical needs  - Identify cognitive and physical deficits and behaviors that affect risk of falls.  - [place] fall precautions as indicated by assessment.  - Educate pt/family on patient safety including physical limitations  - Instruct pt to call for assistance with activity based on assessment  - Modify environment to reduce risk of injury  - Provide assistive devices as  appropriate  - Consider OT/PT consult to assist with strengthening/mobility  - Encourage toileting schedule  Outcome: Progressing     Problem: DISCHARGE PLANNING  Goal: Discharge to home or other facility with appropriate resources  Description: INTERVENTIONS:  - Identify barriers to discharge w/pt and caregiver  - Include patient/family/discharge partner in discharge planning  - Arrange for needed discharge resources and transportation as appropriate  - Identify discharge learning needs (meds, wound care, etc)  - Arrange for interpreters to assist at discharge as needed  - Consider post-discharge preferences of patient/family/discharge partner  - Complete POLST form as appropriate  - Assess patient's ability to be responsible for managing their own health  - Refer to Case Management Department for coordinating discharge planning if the patient needs post-hospital services based on physician/LIP order or complex needs related to functional status, cognitive ability or social support system  Outcome: Progressing     Problem: GASTROINTESTINAL - ADULT  Goal: Achieves appropriate nutritional intake (bariatric)  Description: INTERVENTIONS:  - Monitor for over-consumption  - Identify factors contributing to increased intake, treat as appropriate  - Monitor I&O, WT and lab values  - Obtain nutritional consult as needed  - Evaluate psychosocial factors contributing to over-consumption  Outcome: Progressing     Problem: SKIN/TISSUE INTEGRITY - ADULT  Goal: Skin integrity remains intact  Description: INTERVENTIONS  - Assess and document risk factors for pressure ulcer development  - Assess and document skin integrity  - Monitor for areas of redness and/or skin breakdown  - Initiate interventions, skin care algorithm/standards of care as needed  Outcome: Progressing  Goal: Incision(s), wounds(s) or drain site(s) healing without S/S of infection  Description: INTERVENTIONS:  - Assess and document risk factors for pressure ulcer  development  - Assess and document skin integrity  - Assess and document dressing/incision, wound bed, drain sites and surrounding tissue  - Implement wound care per orders  - Initiate isolation precautions as appropriate  - Initiate Pressure Ulcer prevention bundle as indicated  Outcome: Progressing

## 2024-11-29 NOTE — PROGRESS NOTES
11/29/24 1034   Discharge Planning   Home or Post Acute Services Post acute facilities (Rehab/SNF/etc)   Type of Post Acute Facility Services Rehab   Expected Discharge Disposition Rehab   Does the patient need discharge transport arranged? Yes   RoundTrip coordination needed? Yes   Has discharge transport been arranged? No     Both AR's are OON. Unsure of which facilities that are in network. Previous LSW spoke with insurance and was told [facility] was in network, but stated they are not. Requested DSC to send blanket AR referral to all within 20 mile radius to see which can accept insurance and then follow up with patient/ with options.     1555: [facility] states they are in network with patient's insurance. Acceptance is pending review and patient's progress to be able to participate in 3 hours of therapy 5 days a week.

## 2024-11-29 NOTE — NURSING NOTE
CODE SPOT  Code spot was called at 0416. Sitter was at bedside. Pt tried getting up and sitter told her to stay in bed.  Pt was redirectable but forgetful.  Patient then threw herself out of bed onto the sitter who was then lowered to the ground. Pt did not hit her head. Pt has been A&Ox1. VS were stable except HR of 124.  Bed alarm was turned off because sitter was right next to her the whole time, and pt kept setting off alarm to readjust. Pt did not seem to be in pain. NP came to bedside to assess. No CT needed. Pt was able to get back into bed with assistance.

## 2024-11-29 NOTE — PROGRESS NOTES
Speech-Language Pathology    SLP Adult Inpatient [place] Evaluation    Patient Name: Samuel Mims  MRN: [id number]  Today's Date: 11/29/2024   Time Calculation  Start Time: 1653  Stop Time: 1737  Time Calculation (min): 44 min         Current Problem:   1. Sepsis with encephalopathy without septic shock, due to unspecified organism (Multi)        2. Acute cystitis with hematuria        3. Cognitive changes  Referral to Occupational Therapy    CANCELED: Referral to Occupational Therapy        SLP Assessment:   Patient presents with moderate to severe cognitive communication impairment, characterized by deficits in the following areas: Orientation, immediate/delayed recall, auditory/thought/verbal processing, and sustained/alternating attention to task. Receptive language ability was variant for purposes of completing basic tasks. As length/complexity of information increased, impairment in STM adversely affected patient's ability to retain presented information. Expressive language ability was variant with respect to meeting patient's basic daily communication needs.   Tangential language was prevalent throughout this evaluation (as well as paraphasic errors), with moderate to maximal cues required to redirect to task.   Oral reading/reading comprehension and written language ability were relative strengths (as compared to auditory comprehension/verbal expression). Extensive verbal reiteration of directives required, along with presentation of written models of same. Patient noted to be responding verbally in error, requiring both SLP and patient to resort to writing messages, then orally reading same. Ultimately, such communication techniques were useful in facilitating auditory comprehension and expressive language.  ST is recommended for improvement of cognitive communication skills.     SLP Plan:   Skilled SLP is warranted for improvement of cognitive communication ability.   SLP TX Plan:  Continue Plan of Care    Plan: Skilled SLP    Frequency: 4x per week   Duration: 2 weeks      Treatment/Interventions: Cognitive communication function; Pt/caregiver education   SLP Discharge Recommendations: Continue skilled SLP services at the next level of care   Next Treatment Priority: Orientation/recall; facilitative memory techniques; assess receptive/expressive language (including reading comprehension/written language)  Discussed POC: Patient, Caregiver   Patient/Caregiver Agreeable: Yes      Objective    Goals (established on 11/29/2024):    Patient will improve orientation to baseline given minimal to moderate verbal cues.    Progress: Patient able to state self/person without cues. Given forced choice cues, patient able to indicate place. Patient able to indicate year without cues, and given minimal to moderate semantic cues, patient could state month/day of week.   2. Patient will express wants/needs/preferences/opinions in 90% of trials utilizing multiple communication modalities (speech, gesture, etc.) as needed.    Progress: Patient with nice use of verbal/written descriptors in event of impaired word retrieval ability.  3. Further cognitive/communication assessment (i.e., receptive/expressive language, reading comprehension/written language, recall of paragraph level information, sequencing, safety awareness/judgment/problem solving, and abstract/math reasoning) to be completed with development of goals as appropriate.    Not Targeted this date     Subjective:   Patient was seen bedside with sitter present. Patient was pleasant and cooperative.     General Visit Information:   Reason for Referral: Patient's speech/language and voice evaluation was ordered due to concern for impaired cognitive linguistic function.     Chart Reviewed: Yes    Past Medical History Relevant to Rehab: HTN, asthma, GERD, IUD placement, c-diff. Patient presented to the ED with confusion and decreased oral intake. Per  ", she was sitting in her urine for a couple days; son reports she has been confused and not eating or drinking. Patient was not getting better. Patient admitted for sepsis with encephalopathy without septic shock, UTI, HTN, and sinus tachycardia. Of note, per EMR, patient was previously admitted on 24. Subsequent to her hospital stay, she did not take her prescribed medication d/t nausea/not keeping it down. Patient has had ringing in the ears since D/C on 24. Further PMH (during last stay and current): CHET, Wernicke's encephalopathy, bilateral hearing loss, hypokalemia, vomiting. Prior to first admission, patient drove and worked.   Patient Seen During This Visit: Yes    Total Number of Visits : 1    Prior to Session Communication: Bedside nurse    LU Hastings reports that patient has been impulsive and threw herself off of the bed. Things have to written down as patient is very [place].  Prognosis: Fair    Treatment Provided: Yes   Strengths: Motivation    Barriers: Cognitive communication skills; decreased hearing acuity    Education Provided: Yes      Relevant Imaging:    CT brain attack head wo IV contrast   Result Date: 2024   INDICATION: Signs/Symptoms: Stroke Evaluation.     FINDINGS: No evidence of acute cortical infarct or intracranial hemorrhage.       Cognition:   Assessment of orientation was conducted. Patient was not oriented to self/person, but could state . Although patient unable to state age, she was able to describe it well (\"I'm about to turn 49.\") Patient could not state place, situation, day/week, month, or specific date/year. Patient did initiate writing the correct year. Pt unable to state meals she had this date, nor could she state room number or floor.          The BIMS (Brief Interview for Mental Status) was conducted. Patient received a score of 7 out of 15. Patient could imitate 2 words that had just been presented, and knew the year. Patient DNK the month or " day/week. Patient could recall 0 out of 3 words presented at the beginning of this assessment (despite provision of a cue).     Tangential language noted throughout this evaluation, along with paraphasic errors. Patient required minimal to moderate verbal cues to redirect to task.   Further speech/language/cognitive therapy is indicated at this time with focus on improvement of patient's cognitive communication skills.      Pain:    Pain Assessment: 0-10     Pain Score: 0      Motor Speech Production:    WFL    Voice:   WFL    Education:   Learner patient; LPN    Barriers to Learning  acuteness of illness barrier; cognitive communication limitations; decreased hearing acuity  Method  demonstration; verbal; visual    Education - Topic  SLP provided patient education regarding role of SLP, purpose of assessment, clinical impressions, goals of treatment, and plan of care. Patient verbalized questionable full comprehension, consistent with cognitive communication status. Education will be reinforced. ST further coordinated with RN regarding recommendations and precautions per this assessment, with RN verbalizing understanding.   Outcome  needs review/reinforcement

## 2024-11-29 NOTE — SIGNIFICANT EVENT
"Code Spot Note:   Code spot called at approximately 0416.  Per nursing staff, patient was attempting to get up out of bed.  S there is currently a sitter at the bedside who attempted to redirect the patient and initially was successful, however patient remains forgetful and per RN \"she threw herself out of bed onto the sitter who caught her\" and patient was safely lowered to the ground.  She did not sustain any head trauma or other injuries.  Patient remains AO x 1-2 and confused. She was safely assisted back into bed and upon assessment is resting comfortably. Vital signs were Temp 96.6 F, , RR 18, /29, SpO2 98% on room air. CT head not ordered at this time as the fall was witnessed by staff and there was no reported head injury.     Plan:  - Maintain sitter at bedside  - High fall risk - maintain fall precautions  - Notify provider for any new change in mental status  "

## 2024-11-29 NOTE — CARE PLAN
The patient's goals for the shift include The patient is too lethargic at this time to participate in her care plan    The clinical goals for the shift include remain free of falls and injury      Problem: Skin  Goal: Participates in plan/prevention/treatment measures  Outcome: Progressing  Goal: Prevent/manage excess moisture  Outcome: Progressing  Goal: Prevent/minimize sheer/friction injuries  Outcome: Progressing  Goal: Promote/optimize nutrition  Outcome: Progressing  Goal: Promote skin healing  Outcome: Progressing     Problem: Discharge Planning  Goal: Discharge to home or other facility with appropriate resources  Outcome: Progressing     Problem: Fall/Injury  Goal: Verbalize understanding of personal risk factors for fall in the hospital  Outcome: Progressing  Goal: Verbalize understanding of risk factor reduction measures to prevent injury from fall in the home  Outcome: Progressing  Goal: Use assistive devices by end of the shift  Outcome: Progressing  Goal: Pace activities to prevent fatigue by end of the shift  Outcome: Progressing     Problem: Pain  Goal: Walks with improved pain control throughout the shift  Outcome: Progressing  Goal: Performs ADL's with improved pain control throughout shift  Outcome: Progressing     Problem: Arrythmia/Dysrhythmia  Goal: Promote self management  Outcome: Progressing  Goal: Serial ECG will return to baseline  Outcome: Progressing  Goal: Vital signs return to baseline  Outcome: Progressing     Problem: Nutrition  Goal: Nutrition support goals are met within 48 hrs  Outcome: Progressing  Goal: Nutrition support is meeting 75% of nutrient needs  Outcome: Progressing  Goal: Tube feed tolerance  Outcome: Progressing  Goal: BG  mg/dL  Outcome: Progressing  Goal: Electrolytes WNL  Outcome: Progressing

## 2024-11-29 NOTE — PROGRESS NOTES
"Samuel Mims is a 48 y.o. female on day 13 of admission presenting with Sepsis with encephalopathy without septic shock, due to unspecified organism (Multi).    Subjective   Patient had an episode of \"throwing herself out of bed onto the sitter who caught her\", she was safely lowered to the ground, no injuries were noted.  She continues to be at high risk for falls due to on going encephalopathy with confusion.   This morning she recognized me for the first time, and knows she is in the hospital.    Objective     Last Recorded Vitals  /77   Pulse (!) 118   Temp 36.4 °C (97.5 °F) (Temporal)   Resp 18   Wt 108 kg (238 lb 15.7 oz)   SpO2 99%   Intake/Output last 3 Shifts:    Intake/Output Summary (Last 24 hours) at 11/29/2024 0830  Last data filed at 11/28/2024 1722  Gross per 24 hour   Intake 960 ml   Output --   Net 960 ml       Admission Weight  Weight: 127 kg (280 lb) (11/16/24 1755)    Daily Weight  11/29/24 : 108 kg (238 lb 15.7 oz)      Physical Exam:  General: Not in acute distress  HEENT: PERRLA, Left TM indicates recent otitis media, Right TM full of fluid.  Neck: Normal to inspection  Lungs: Clear to auscultation, work of breathing within normal limit  Cardiac: Regular rate and rhythm  Abdomen: Soft nontender, positive bowel sounds  : Exam deferred  Skin: Perianal erythema with clear demarcation.  Hematology: No petechia or excessive ecchymosis  Musculoskeletal: Without significant trauma, arms in restraints  Neurological: Alert and oriented X place, and place. Otherwise unable to answer questions appropriately.     Relevant Results  Scheduled medications  budesonide, 0.5 mg, nebulization, BID  [Held by provider] busPIRone, 10 mg, oral, BID  influenza, 0.5 mL, intramuscular, During hospitalization  formoterol, 20 mcg, nebulization, BID  heparin (porcine), 5,000 Units, subcutaneous, q8h SANTIAGO  losartan, 50 mg, oral, BID  metoprolol tartrate, 50 mg, oral, q12h  multivitamin with minerals, 1 " tablet, oral, Daily  nystatin, , Topical, BID  pantoprazole, 40 mg, oral, BID AC  risperiDONE, 0.5 mg, oral, Nightly  spironolactone, 12.5 mg, oral, Daily  thiamine, 100 mg, oral, Daily      Continuous medications     PRN medications  PRN medications: acetaminophen **OR** acetaminophen, albuterol, [Held by provider] clonazePAM, hydrALAZINE, melatonin, metoprolol   Results for orders placed or performed during the hospital encounter of 11/16/24 (from the past 24 hours)   Comprehensive Metabolic Panel   Result Value Ref Range    Glucose 105 (H) 74 - 99 mg/dL    Sodium 141 136 - 145 mmol/L    Potassium 3.8 3.5 - 5.3 mmol/L    Chloride 106 98 - 107 mmol/L    Bicarbonate 25 21 - 32 mmol/L    Anion Gap 14 10 - 20 mmol/L    Urea Nitrogen 21 6 - 23 mg/dL    Creatinine 0.84 0.50 - 1.05 mg/dL    eGFR 85 >60 mL/min/1.73m*2    Calcium 9.2 8.6 - 10.3 mg/dL    Albumin 3.6 3.4 - 5.0 g/dL    Alkaline Phosphatase 51 33 - 110 U/L    Total Protein 6.1 (L) 6.4 - 8.2 g/dL    AST 31 9 - 39 U/L    Bilirubin, Total 0.9 0.0 - 1.2 mg/dL    ALT 50 (H) 7 - 45 U/L   CBC   Result Value Ref Range    WBC 4.9 4.4 - 11.3 x10*3/uL    nRBC 0.0 0.0 - 0.0 /100 WBCs    RBC 3.73 (L) 4.00 - 5.20 x10*6/uL    Hemoglobin 10.5 (L) 12.0 - 16.0 g/dL    Hematocrit 33.4 (L) 36.0 - 46.0 %    MCV 90 80 - 100 fL    MCH 28.2 26.0 - 34.0 pg    MCHC 31.4 (L) 32.0 - 36.0 g/dL    RDW 18.4 (H) 11.5 - 14.5 %    Platelets 206 150 - 450 x10*3/uL      Lab Results   Component Value Date    BLOODCULT No growth at 4 days -  FINAL REPORT 11/16/2024    BLOODCULT No growth at 4 days -  FINAL REPORT 11/16/2024    URINECULTURE (A) 11/16/2024     Multiple organisms present, probable contamination. Repeat culture if clinically indicated.         *Nonspecific white matter signal throughout both cerebral hemispheres can not be further characterized. *There is no evidence of mass, infarction or hemorrhage.   MACRO: none   Signed by: Han Moreno 11/26/2024 9:28 AM Dictation  workstation:   [id number]         Assessment/Plan   Samuel Mims is a 48 y.o. female on day 13 of admission presenting with Sepsis with encephalopathy without septic shock, due to unspecified organism (Multi).  Principal Problem:    Sepsis with encephalopathy without septic shock, due to unspecified organism (Multi)  Active Problems:    Diarrhea    UTI (urinary tract infection)    Hypertension    Colitis    Sinus tachycardia    Prolonged Q-T interval on ECG    Clostridium difficile infection    Metabolic encephalopathy- multifactorial, possible Wernicke's, possible hepatic encephalopathy   Possibly due to significant weight loss of 50+ pounds w/poor nutritional intake, vs PRES from poorly treated HTN.     Initial CT head with no acute changes. Urine Tox screen negative.  No elevated WBC count.  Ammonia mildly elevated at 59. Blood cultures are negative X2. On Lactulose for mildly elevated ammonia levels.  Corpak inserted due to poor oral intake but removed when she became alert on 11/21.  On 11/19/24 MRI head of poor quality due to motion artifact but show increased signal within the medial thalami, periaqueductal region and possible increased signal within the caudate and cortex of the bilateral posterior frontal lobes.  Started on high dose IV Thiamine and MVI for possible Wernicke's, changed to oral Thiamine on 11/25.  Thiamine level low at 33.  IV Acyclovir discontinued as CSF negative for HSV infection.  Had Lumbar Puncture on 11/20 and results negative for viruses and bacteria.  Some improvement in her alertness since 11/21/24, but not able to answer questions or follow directions.  Sitter in room since 11/25 as fell out of bed.  MRI with anesthesia completed 11/25- results are non specific white matter changes.  Consult psychiatry recommended Haldol 5 mg q 6 hours prn for agitation and Risperdal 0.5 mg at bedtime for insomnia, currently only on Risperdal.  Ammonia level has dropped from 59 to 19 on  11/28/24 so the Lactulose discontinued.  On 11/29 she fell out of bed fortunately sitter was at bedside and broke the fall.        Untreated C. Difficile Colitis  Per  she has not taken the oral Vancomycin prescribed at discharge on 11/2/24.   Stool for pathogens negative.  C. Diff PCR negative but since untreated this could be false negative.  Treated with oral Vancomycin for 10 days,  discontinued on 11/27.  CT a/p confirms she continues to have colitis.  Loose stools/diarrhea due to lactulose.        HTN and Sinus Tachycardia  On oral Losartan 50 mg, Metoprolol tartrate 50 mg bid, Spironolactone 12.5 mg  Blood pressure mildly elevated and continues to be tachycardic off and on.     Asthma  Budesonide 0.5 mg and Formoterol nebs bid.     CHET  Buspirone 10 mg bid on hold.  Klonopin on hold due to encephalopathy     GERD  Oral Pantoprazole 40 mg bid.     Recent Otitis Media  Ears show fluid probably causing some hearing impairment.  Refer to ENT as out patient for possible hearing aids.     Discharge planning  Apparently her insurance does not cover skilled care but will cover acute rehab and there is a facility in [place] that will take her insurance.  She will need cognitive rehab, PT and OT evaluation for driving safety and capacity to return to work.  Asked  to apply for medicaid for possible SNF placement if needed in future.           Plan discussed with .         Nell Green MD

## 2024-11-29 NOTE — CARE PLAN
Pt had no acute changes noted. Pt neuro checks remains unchanged. Pt has sitter at bedside. Pt family was at bedside and was updated on pt care. Pt safety been maintained.     1747: Pt was given IV Metoprolol this shift. Provider was notified.      1806: Pt stated that she does not fell well. Vital signs was obtained. Provider was notified.

## 2024-11-30 LAB
ALBUMIN SERPL BCP-MCNC: 3.5 G/DL (ref 3.4–5)
ALP SERPL-CCNC: 52 U/L (ref 33–110)
ALT SERPL W P-5'-P-CCNC: 50 U/L (ref 7–45)
ANION GAP SERPL CALC-SCNC: 13 MMOL/L (ref 10–20)
AST SERPL W P-5'-P-CCNC: 28 U/L (ref 9–39)
BILIRUB SERPL-MCNC: 0.8 MG/DL (ref 0–1.2)
BUN SERPL-MCNC: 22 MG/DL (ref 6–23)
CALCIUM SERPL-MCNC: 9 MG/DL (ref 8.6–10.3)
CHLORIDE SERPL-SCNC: 107 MMOL/L (ref 98–107)
CO2 SERPL-SCNC: 23 MMOL/L (ref 21–32)
CREAT SERPL-MCNC: 0.81 MG/DL (ref 0.5–1.05)
EGFRCR SERPLBLD CKD-EPI 2021: 89 ML/MIN/1.73M*2
ERYTHROCYTE [DISTWIDTH] IN BLOOD BY AUTOMATED COUNT: 18.3 % (ref 11.5–14.5)
GLUCOSE SERPL-MCNC: 111 MG/DL (ref 74–99)
HCT VFR BLD AUTO: 32.4 % (ref 36–46)
HGB BLD-MCNC: 10.4 G/DL (ref 12–16)
MCH RBC QN AUTO: 28.3 PG (ref 26–34)
MCHC RBC AUTO-ENTMCNC: 32.1 G/DL (ref 32–36)
MCV RBC AUTO: 88 FL (ref 80–100)
NRBC BLD-RTO: 0 /100 WBCS (ref 0–0)
PLATELET # BLD AUTO: 221 X10*3/UL (ref 150–450)
POTASSIUM SERPL-SCNC: 3.8 MMOL/L (ref 3.5–5.3)
PROT SERPL-MCNC: 5.9 G/DL (ref 6.4–8.2)
RBC # BLD AUTO: 3.68 X10*6/UL (ref 4–5.2)
SODIUM SERPL-SCNC: 139 MMOL/L (ref 136–145)
WBC # BLD AUTO: 5.6 X10*3/UL (ref 4.4–11.3)

## 2024-11-30 PROCEDURE — 97530 THERAPEUTIC ACTIVITIES: CPT | Mod: GP,CQ

## 2024-11-30 PROCEDURE — 36415 COLL VENOUS BLD VENIPUNCTURE: CPT | Performed by: NURSE PRACTITIONER

## 2024-11-30 PROCEDURE — 2500000001 HC RX 250 WO HCPCS SELF ADMINISTERED DRUGS (ALT 637 FOR MEDICARE OP): Performed by: NURSE PRACTITIONER

## 2024-11-30 PROCEDURE — 2500000001 HC RX 250 WO HCPCS SELF ADMINISTERED DRUGS (ALT 637 FOR MEDICARE OP): Performed by: FAMILY MEDICINE

## 2024-11-30 PROCEDURE — 2500000002 HC RX 250 W HCPCS SELF ADMINISTERED DRUGS (ALT 637 FOR MEDICARE OP, ALT 636 FOR OP/ED): Performed by: PSYCHIATRY & NEUROLOGY

## 2024-11-30 PROCEDURE — 80053 COMPREHEN METABOLIC PANEL: CPT | Performed by: NURSE PRACTITIONER

## 2024-11-30 PROCEDURE — 85027 COMPLETE CBC AUTOMATED: CPT | Performed by: NURSE PRACTITIONER

## 2024-11-30 PROCEDURE — 97530 THERAPEUTIC ACTIVITIES: CPT | Mod: GO,CO

## 2024-11-30 PROCEDURE — 2500000004 HC RX 250 GENERAL PHARMACY W/ HCPCS (ALT 636 FOR OP/ED)

## 2024-11-30 PROCEDURE — 2500000002 HC RX 250 W HCPCS SELF ADMINISTERED DRUGS (ALT 637 FOR MEDICARE OP, ALT 636 FOR OP/ED): Performed by: NURSE PRACTITIONER

## 2024-11-30 PROCEDURE — 1100000001 HC PRIVATE ROOM DAILY

## 2024-11-30 PROCEDURE — 2500000001 HC RX 250 WO HCPCS SELF ADMINISTERED DRUGS (ALT 637 FOR MEDICARE OP)

## 2024-11-30 PROCEDURE — 2500000004 HC RX 250 GENERAL PHARMACY W/ HCPCS (ALT 636 FOR OP/ED): Performed by: NURSE PRACTITIONER

## 2024-11-30 RX ORDER — HALOPERIDOL 5 MG/ML
5 INJECTION INTRAMUSCULAR ONCE
Status: ACTIVE | OUTPATIENT
Start: 2024-11-30

## 2024-11-30 RX ADMIN — METOPROLOL TARTRATE 50 MG: 50 TABLET, FILM COATED ORAL at 21:09

## 2024-11-30 RX ADMIN — LOSARTAN POTASSIUM 50 MG: 50 TABLET, FILM COATED ORAL at 21:09

## 2024-11-30 RX ADMIN — DIAZEPAM 2 MG: 10 INJECTION, SOLUTION INTRAMUSCULAR; INTRAVENOUS at 00:34

## 2024-11-30 RX ADMIN — Medication 1 TABLET: at 08:00

## 2024-11-30 RX ADMIN — HEPARIN SODIUM 5000 UNITS: 5000 INJECTION INTRAVENOUS; SUBCUTANEOUS at 06:09

## 2024-11-30 RX ADMIN — PANTOPRAZOLE SODIUM 40 MG: 40 TABLET, DELAYED RELEASE ORAL at 07:54

## 2024-11-30 RX ADMIN — THIAMINE HCL TAB 100 MG 100 MG: 100 TAB at 08:01

## 2024-11-30 RX ADMIN — SPIRONOLACTONE 12.5 MG: 25 TABLET ORAL at 08:01

## 2024-11-30 RX ADMIN — PANTOPRAZOLE SODIUM 40 MG: 40 TABLET, DELAYED RELEASE ORAL at 18:25

## 2024-11-30 RX ADMIN — METOPROLOL TARTRATE 50 MG: 50 TABLET, FILM COATED ORAL at 08:15

## 2024-11-30 RX ADMIN — NYSTATIN: 100000 CREAM TOPICAL at 23:38

## 2024-11-30 RX ADMIN — RISPERIDONE 0.5 MG: 0.5 TABLET, FILM COATED ORAL at 21:09

## 2024-11-30 RX ADMIN — HEPARIN SODIUM 5000 UNITS: 5000 INJECTION INTRAVENOUS; SUBCUTANEOUS at 21:11

## 2024-11-30 RX ADMIN — LOSARTAN POTASSIUM 50 MG: 50 TABLET, FILM COATED ORAL at 08:00

## 2024-11-30 ASSESSMENT — COGNITIVE AND FUNCTIONAL STATUS - GENERAL
MOBILITY SCORE: 19
STANDING UP FROM CHAIR USING ARMS: A LITTLE
TOILETING: A LITTLE
DAILY ACTIVITIY SCORE: 11
EATING MEALS: A LITTLE
WALKING IN HOSPITAL ROOM: A LITTLE
DRESSING REGULAR UPPER BODY CLOTHING: A LOT
HELP NEEDED FOR BATHING: A LOT
MOVING TO AND FROM BED TO CHAIR: A LOT
DRESSING REGULAR LOWER BODY CLOTHING: A LITTLE
DAILY ACTIVITIY SCORE: 14
HELP NEEDED FOR BATHING: A LITTLE
DAILY ACTIVITIY SCORE: 18
TURNING FROM BACK TO SIDE WHILE IN FLAT BAD: A LOT
PERSONAL GROOMING: TOTAL
HELP NEEDED FOR BATHING: A LOT
STANDING UP FROM CHAIR USING ARMS: A LITTLE
DRESSING REGULAR LOWER BODY CLOTHING: A LOT
WALKING IN HOSPITAL ROOM: TOTAL
MOVING FROM LYING ON BACK TO SITTING ON SIDE OF FLAT BED WITH BEDRAILS: A LOT
DRESSING REGULAR UPPER BODY CLOTHING: A LOT
CLIMB 3 TO 5 STEPS WITH RAILING: TOTAL
EATING MEALS: A LITTLE
DRESSING REGULAR UPPER BODY CLOTHING: A LITTLE
STANDING UP FROM CHAIR USING ARMS: A LOT
PERSONAL GROOMING: A LITTLE
MOVING TO AND FROM BED TO CHAIR: A LITTLE
EATING MEALS: A LITTLE
CLIMB 3 TO 5 STEPS WITH RAILING: A LOT
MOVING TO AND FROM BED TO CHAIR: A LITTLE
MOBILITY SCORE: 19
MOBILITY SCORE: 10
TOILETING: A LOT
PERSONAL GROOMING: A LITTLE
WALKING IN HOSPITAL ROOM: A LITTLE
TOILETING: A LOT
DRESSING REGULAR LOWER BODY CLOTHING: TOTAL
CLIMB 3 TO 5 STEPS WITH RAILING: A LOT

## 2024-11-30 ASSESSMENT — ACTIVITIES OF DAILY LIVING (ADL): EFFECT OF PAIN ON DAILY ACTIVITIES: .

## 2024-11-30 ASSESSMENT — PAIN - FUNCTIONAL ASSESSMENT
PAIN_FUNCTIONAL_ASSESSMENT: 0-10
PAIN_FUNCTIONAL_ASSESSMENT: WONG-BAKER FACES
PAIN_FUNCTIONAL_ASSESSMENT: 0-10

## 2024-11-30 ASSESSMENT — PAIN SCALES - GENERAL
PAINLEVEL_OUTOF10: 0 - NO PAIN

## 2024-11-30 ASSESSMENT — PAIN SCALES - WONG BAKER
WONGBAKER_NUMERICALRESPONSE: NO HURT
WONGBAKER_NUMERICALRESPONSE: NO HURT

## 2024-11-30 NOTE — PROGRESS NOTES
Samuel Mims is a 48 y.o. female on day 14 of admission presenting with Sepsis with encephalopathy without septic shock, due to unspecified organism (Multi).    Subjective   Pt very drowsy this am, she received Valium last night.  Per nursing notes she is not cooperating at all, very impulsive, wants to jump out of bed.       Objective     Last Recorded Vitals  /89 (BP Location: Right arm, Patient Position: Lying)   Pulse 99   Temp 36.6 °C (97.9 °F) (Temporal)   Resp 16   Wt 108 kg (238 lb 15.7 oz)   SpO2 99%   Intake/Output last 3 Shifts:    Intake/Output Summary (Last 24 hours) at 11/30/2024 1208  Last data filed at 11/30/2024 0900  Gross per 24 hour   Intake 240 ml   Output --   Net 240 ml       Admission Weight  Weight: 127 kg (280 lb) (11/16/24 1755)    Daily Weight  11/29/24 : 108 kg (238 lb 15.7 oz)      Physical Exam:  General: Not in acute distress  HEENT: PERRLA, Left TM indicates recent otitis media, Right TM full of fluid.  Neck: Normal to inspection  Lungs: Clear to auscultation, work of breathing within normal limit  Cardiac: Regular rate and rhythm  Abdomen: Soft nontender, positive bowel sounds  : Exam deferred  Skin: Perianal erythema with clear demarcation.  Hematology: No petechia or excessive ecchymosis  Musculoskeletal: Without significant trauma, arms in restraints  Neurological: Alert and oriented X place, and place. Otherwise unable to answer questions appropriately. Very drowsy this morning.    Relevant Results  Scheduled medications  budesonide, 0.5 mg, nebulization, BID  influenza, 0.5 mL, intramuscular, During hospitalization  formoterol, 20 mcg, nebulization, BID  haloperidol lactate, 5 mg, intramuscular, Once  heparin (porcine), 5,000 Units, subcutaneous, q8h SANTIAGO  losartan, 50 mg, oral, BID  metoprolol tartrate, 50 mg, oral, q12h  multivitamin with minerals, 1 tablet, oral, Daily  nystatin, , Topical, BID  pantoprazole, 40 mg, oral, BID AC  risperiDONE, 0.5 mg, oral,  Nightly  spironolactone, 12.5 mg, oral, Daily  thiamine, 100 mg, oral, Daily      Continuous medications     PRN medications  PRN medications: acetaminophen **OR** acetaminophen, albuterol, [Held by provider] clonazePAM, hydrALAZINE, melatonin, metoprolol   Results for orders placed or performed during the hospital encounter of 11/16/24 (from the past 24 hours)   Comprehensive Metabolic Panel   Result Value Ref Range    Glucose 111 (H) 74 - 99 mg/dL    Sodium 139 136 - 145 mmol/L    Potassium 3.8 3.5 - 5.3 mmol/L    Chloride 107 98 - 107 mmol/L    Bicarbonate 23 21 - 32 mmol/L    Anion Gap 13 10 - 20 mmol/L    Urea Nitrogen 22 6 - 23 mg/dL    Creatinine 0.81 0.50 - 1.05 mg/dL    eGFR 89 >60 mL/min/1.73m*2    Calcium 9.0 8.6 - 10.3 mg/dL    Albumin 3.5 3.4 - 5.0 g/dL    Alkaline Phosphatase 52 33 - 110 U/L    Total Protein 5.9 (L) 6.4 - 8.2 g/dL    AST 28 9 - 39 U/L    Bilirubin, Total 0.8 0.0 - 1.2 mg/dL    ALT 50 (H) 7 - 45 U/L   CBC   Result Value Ref Range    WBC 5.6 4.4 - 11.3 x10*3/uL    nRBC 0.0 0.0 - 0.0 /100 WBCs    RBC 3.68 (L) 4.00 - 5.20 x10*6/uL    Hemoglobin 10.4 (L) 12.0 - 16.0 g/dL    Hematocrit 32.4 (L) 36.0 - 46.0 %    MCV 88 80 - 100 fL    MCH 28.3 26.0 - 34.0 pg    MCHC 32.1 32.0 - 36.0 g/dL    RDW 18.3 (H) 11.5 - 14.5 %    Platelets 221 150 - 450 x10*3/uL      Lab Results   Component Value Date    BLOODCULT No growth at 4 days -  FINAL REPORT 11/16/2024    BLOODCULT No growth at 4 days -  FINAL REPORT 11/16/2024    URINECULTURE (A) 11/16/2024     Multiple organisms present, probable contamination. Repeat culture if clinically indicated.            Assessment/Plan   Samuel A Mims is a 48 y.o. female on day 14 of admission presenting with Sepsis with encephalopathy without septic shock, due to unspecified organism (Multi).  Principal Problem:    Sepsis with encephalopathy without septic shock, due to unspecified organism (Multi)  Active Problems:    Diarrhea    UTI (urinary tract infection)     Hypertension    Colitis    Sinus tachycardia    Prolonged Q-T interval on ECG    Clostridium difficile infection    Metabolic encephalopathy- multifactorial, possible Wernicke's, possible hepatic encephalopathy   Possibly due to significant weight loss of 50+ pounds w/poor nutritional intake, vs PRES from poorly treated HTN.     Initial CT head with no acute changes. Urine Tox screen negative.  No elevated WBC count.  Ammonia mildly elevated at 59. Blood cultures are negative X2. On Lactulose for mildly elevated ammonia levels.  Corpak inserted due to poor oral intake but removed when she became alert on 11/21.  On 11/19/24 MRI head of poor quality due to motion artifact but show increased signal within the medial thalami, periaqueductal region and possible increased signal within the caudate and cortex of the bilateral posterior frontal lobes.  Started on high dose IV Thiamine and MVI for possible Wernicke's, changed to oral Thiamine on 11/25.  Thiamine level low at 33.  IV Acyclovir discontinued as CSF negative for HSV infection.  Had Lumbar Puncture on 11/20 and results negative for viruses and bacteria.  Some improvement in her alertness since 11/21/24, but not able to answer questions or follow directions.  Sitter in room since 11/25 as fell out of bed.  MRI with anesthesia completed 11/25- results are non specific white matter changes.  Consult psychiatry recommended Haldol 5 mg q 6 hours prn for agitation and Risperdal 0.5 mg at bedtime for insomnia, currently only on Risperdal.  Ammonia level has dropped from 59 to 19 on 11/28/24 so the Lactulose discontinued.  On 11/29 she fell out of bed fortunately sitter was at bedside and broke the fall.  She continues to be episodes of lucidity alternating with drowsiness and confusion, showing severe cognitive impairment.        Untreated C. Difficile Colitis  Per  she has not taken the oral Vancomycin prescribed at discharge on 11/2/24.   Stool for pathogens  negative.  C. Diff PCR negative but since untreated this could be false negative.  Treated with oral Vancomycin for 10 days,  discontinued on 11/27.  CT a/p confirms she continues to have colitis.  Loose stools/diarrhea due to lactulose.        HTN and Sinus Tachycardia  On oral Losartan 50 mg, Metoprolol tartrate 50 mg bid, Spironolactone 12.5 mg  Blood pressure mildly elevated and continues to be tachycardic off and on.     Asthma  Budesonide 0.5 mg and Formoterol nebs bid.     CHET  Buspirone 10 mg bid on hold.  Klonopin on hold due to encephalopathy     GERD  Oral Pantoprazole 40 mg bid.     Recent Otitis Media  Ears show fluid probably causing some hearing impairment.  Refer to ENT as out patient for possible hearing aids.     Discharge planning  Per  unable to find accepting acute rehab facility with her insurance.  She will need cognitive rehab, PT and OT evaluation for driving safety and capacity to return to work.   has a Medicaid number now so hopefully we can find an acute rehab facility that accepts medicaid.        Plan discussed with .        Nell Green MD

## 2024-11-30 NOTE — PROGRESS NOTES
Occupational Therapy    OT Treatment    Patient Name: Samuel Mims  MRN: [id number]  Department: [facility] 3 N  Room: [facility]3008-A  Today's Date: 11/30/2024  Time Calculation  Start Time: 1029  Stop Time: 1040  Time Calculation (min): 11 min      Assessment:  End of Session Communication: Bedside nurse, PCT/TANIYA/MARGAUX (1:1 sitter)  End of Session Patient Position: Up in chair, Alarm on     Plan:  Treatment Interventions: ADL retraining, Functional transfer training, UE strengthening/ROM, Endurance training, Cognitive reorientation, Patient/family training, Equipment evaluation/education, Compensatory technique education  OT Frequency: 3 times per week  Treatment Interventions: ADL retraining, Functional transfer training, UE strengthening/ROM, Endurance training, Cognitive reorientation, Patient/family training, Equipment evaluation/education, Compensatory technique education    Subjective   Previous Visit Info:  OT Last Visit  OT Received On: 11/30/24  General:  General  Reason for Referral: Sepsis w/ encephalopathy w/o septic shock, acute cystitis w/ hematuria, +C-diff  Co-Treatment: PT  Co-Treatment Reason: safety  Prior to Session Communication: Bedside nurse, PCT/TANIYA/MARGAUX (1:1 sitter)  Patient Position Received: Bed, 3 rail up  Precautions:  Medical Precautions: Fall precautions, Infection precautions    Pain:  Pain Assessment  0-10 (Numeric) Pain Score: 0 - No pain    Objective    Cognition:  Cognition  Overall Cognitive Status: Impaired  Orientation Level: Unable to assess  Following Commands: Follows one step commands with repetition (less then 10%)  Safety Judgment: Decreased awareness of need for safety precautions  Problem Solving: Assistance required to implement solutions  Cognition Comments: patient has flat affect and requires max verbal cues for safety and attending to task  Insight: Moderate  Impulsive: Severely     Functional Standing Tolerance:  Functional Standing Tolerance Comments: patient stood for a  total of 1 mins this date with poor- balance 2 ue support  Bed Mobility/Transfers: Bed Mobility  Bed Mobility: Yes  Bed Mobility 1  Bed Mobility 1: Supine to sitting  Level of Assistance 1: Maximum assistance, +2    Transfer 1  Technique 1: Sit to stand, Stand to sit  Transfer Device 1: Gait belt  Transfer Level of Assistance 1:  (attempted side stepping however knee buckling, dep x2 pivot to chair with arm in arm and use of gait belt)    Sitting Balance:  Dynamic Sitting Balance  Dynamic Sitting-Level of Assistance:  (fair/fair-)  Standing Balance:  Dynamic Standing Balance  Dynamic Standing-Level of Assistance:  (poor-)    Outcome Measures:[facility] Daily Activity  Putting on and taking off regular lower body clothing: Total  Bathing (including washing, rinsing, drying): A lot  Putting on and taking off regular upper body clothing: A lot  Toileting, which includes using toilet, bedpan or urinal: A lot  Taking care of personal grooming such as brushing teeth: Total  Eating Meals: A little  Daily Activity - Total Score: 11    OP EDUCATION:  Education  Individual(s) Educated: Patient  Education Provided: Symptom management, Ergonomics and postural realignment, Fall precautons, Risk and benefits of OT discussed with patient or other, POC discussed and agreed upon  Patient/Caregiver Demonstrated Understanding: yes  Plan of Care Discussed and Agreed Upon: yes    Goals:  Encounter Problems       Encounter Problems (Active)       OT Problem       PATIENT WILL MAINTAIN UPRIGHT BALANCE with feet apart SUPPORTED WITH minimal assist FOR 10 minutes (Progressing)       Start:  11/27/24    Expected End:  12/11/24            PATIENT WILL MAINTAIN DYNAMIC SEATED BALANCE DURING reaching across table (Progressing)       Start:  11/27/24    Expected End:  12/11/24            PATIENT WILL DEMONSTRATE INDEPENDENCE WITH UPPER BODY donning and doffing all UE clothes (Progressing)       Start:  11/27/24    Expected End:  12/11/24             PATIENT WILL WASH FACE WITH modified independent (Progressing)       Start:  11/27/24    Expected End:  12/11/24            PATIENT WILL sponge bathe WITH supervision (Progressing)       Start:  11/27/24    Expected End:  12/11/24

## 2024-11-30 NOTE — PROGRESS NOTES
Physical Therapy    Physical Therapy    Physical Therapy Treatment    Patient Name: Samuel Mims  MRN: [id number]  Today's Date: 11/30/2024  Time Calculation  Start Time: 1028  Stop Time: 1040  Time Calculation (min): 12 min     3008/3008-A    Assessment/Plan   PT Assessment  PT Assessment Results: Decreased strength, Decreased endurance, Impaired balance, Decreased mobility  Rehab Prognosis: Good  End of Session Patient Position: Up in chair  PT Plan  Inpatient/Swing Bed or Outpatient: Inpatient  PT Plan  Treatment/Interventions: Bed mobility, Transfer training, Gait training, Balance training, Neuromuscular re-education, Strengthening, Endurance training, Range of motion, Therapeutic exercise, Therapeutic activity, Home exercise program  PT Plan: Ongoing PT  PT Frequency: Daily  PT Discharge Recommendations: Moderate intensity level of continued care  Equipment Recommended upon Discharge:  (LRAD)  PT Recommended Transfer Status: Assist x2  PT - OK to Discharge: Yes     11/30/24 1028   PT  Visit   PT Received On 11/30/24   Response to Previous Treatment Patient with no complaints from previous session.   General   Reason for Referral Pt admitted 11/16 for AMS, dx w/ C-diff 3 weeks prior to this admission. Dx: Sepsis w/ encephalopathy w/o septic shock, acute cystitis w/ hematuria, +C-diff   Referred By Nell Green MD   Prior to Session Communication Bedside nurse   Patient Position Received Bed, 3 rail up   General Comment Patient with sitter,  Attempted x 2 prior to patient agreeing to see PT   Precautions   Medical Precautions Fall precautions   Pain Assessment   Pain Assessment 0-10   0-10 (Numeric) Pain Score 0 - No pain   Effect of Pain on Daily Activities .   General Observation   General Observation Patient is groggy   Therapeutic Exercise   Therapeutic Exercise Performed Yes   Therapeutic Exercise Activity 1 AP, x 10 LAQ x 10 each B   Bed Mobility   Bed Mobility Yes   Bed Mobility 1   Bed Mobility 1  Supine to sitting   Level of Assistance 1 Maximum assistance  (x2)   Bed Mobility 2   Bed Mobility  2 Sitting to supine   Level of Assistance 2 Maximum assistance  (x2)   Transfer 1   Technique 1 Sit to stand;Stand to sit   Transfer Level of Assistance 1 Maximum assistance;Dependent  (x2)   Trials/Comments 1 Patient was able to stand with Max a x 2  was dependent to pivot to chair Leaning R Heavy transfer   PT Assessment   PT Assessment Results Decreased strength;Decreased endurance;Impaired balance;Decreased mobility   Rehab Prognosis Good   End of Session Patient Position Up in chair   Outpatient Education   Individual(s) Educated Patient   Education Provided Fall Risk   PT Plan   Inpatient/Swing Bed or Outpatient Inpatient     Outcome Measures:  [facility] Basic Mobility  Turning from your back to your side while in a flat bed without using bedrails: A lot  Moving from lying on your back to sitting on the side of a flat bed without using bedrails: A lot  Moving to and from bed to chair (including a wheelchair): A lot  Standing up from a chair using your arms (e.g. wheelchair or bedside chair): A lot  To walk in hospital room: Total  Climbing 3-5 steps with railing: Total  Basic Mobility - Total Score: 10                             EDUCATION:  Outpatient Education  Individual(s) Educated: Patient  Education Provided: Fall Risk  Education Documentation  No documentation found.  Education Comments  No comments found.        GOALS:  Encounter Problems       Encounter Problems (Active)       PT Problem       Pt will be able to perform all bed mobility tasks with Mod I.  (Progressing)       Start:  11/26/24    Expected End:  12/10/24            Pt will perform all transfers with CGA and LRAD with proper safety mechanics.   (Progressing)       Start:  11/26/24    Expected End:  12/10/24            Pt will ambulate 100 ft with CGA using LRAD for improved functional independence.  (Progressing)       Start:  11/26/24     Expected End:  12/10/24               Pain - Adult

## 2024-11-30 NOTE — CARE PLAN
The patient's goals for the shift include The patient is too lethargic at this time to participate in her care plan    The clinical goals for the shift include see POC

## 2024-11-30 NOTE — NURSING NOTE
Patient mostly nonverbal throughout shift. Patient only answered one yes/no question. Patient refuses to cooperate in care, refuses to wear tele, refuses [place] and has tried multiple times to rip IV out and throw herself out of bed. Patient rolls over quickly and throws legs over side of bed. Patient refused night meds at first but then did agree to take them crushed in applesauce. Patient incontinent throughout night in bed and uncooperative. Patient had 1 time dose of IV valium  with no relief. Patient did have dose of IM Haldol ordered but did not need yet. Sitter remains at bedside.

## 2024-12-01 VITALS
HEART RATE: 116 BPM | TEMPERATURE: 97.7 F | SYSTOLIC BLOOD PRESSURE: 130 MMHG | RESPIRATION RATE: 18 BRPM | HEIGHT: 66 IN | DIASTOLIC BLOOD PRESSURE: 75 MMHG | OXYGEN SATURATION: 98 % | BODY MASS INDEX: 38.69 KG/M2 | WEIGHT: 240.74 LBS

## 2024-12-01 LAB
ALBUMIN SERPL BCP-MCNC: 3.6 G/DL (ref 3.4–5)
ALP SERPL-CCNC: 53 U/L (ref 33–110)
ALT SERPL W P-5'-P-CCNC: 49 U/L (ref 7–45)
ANION GAP SERPL CALC-SCNC: 13 MMOL/L (ref 10–20)
AST SERPL W P-5'-P-CCNC: 26 U/L (ref 9–39)
BILIRUB SERPL-MCNC: 0.7 MG/DL (ref 0–1.2)
BUN SERPL-MCNC: 19 MG/DL (ref 6–23)
CALCIUM SERPL-MCNC: 9.3 MG/DL (ref 8.6–10.3)
CHLORIDE SERPL-SCNC: 104 MMOL/L (ref 98–107)
CO2 SERPL-SCNC: 25 MMOL/L (ref 21–32)
CREAT SERPL-MCNC: 0.84 MG/DL (ref 0.5–1.05)
EGFRCR SERPLBLD CKD-EPI 2021: 85 ML/MIN/1.73M*2
ERYTHROCYTE [DISTWIDTH] IN BLOOD BY AUTOMATED COUNT: 18.1 % (ref 11.5–14.5)
GLUCOSE SERPL-MCNC: 112 MG/DL (ref 74–99)
HCT VFR BLD AUTO: 31.7 % (ref 36–46)
HGB BLD-MCNC: 9.9 G/DL (ref 12–16)
MCH RBC QN AUTO: 28 PG (ref 26–34)
MCHC RBC AUTO-ENTMCNC: 31.2 G/DL (ref 32–36)
MCV RBC AUTO: 90 FL (ref 80–100)
NRBC BLD-RTO: 0 /100 WBCS (ref 0–0)
PLATELET # BLD AUTO: 212 X10*3/UL (ref 150–450)
POTASSIUM SERPL-SCNC: 3.7 MMOL/L (ref 3.5–5.3)
PROT SERPL-MCNC: 6.1 G/DL (ref 6.4–8.2)
RBC # BLD AUTO: 3.53 X10*6/UL (ref 4–5.2)
SODIUM SERPL-SCNC: 138 MMOL/L (ref 136–145)
WBC # BLD AUTO: 5.1 X10*3/UL (ref 4.4–11.3)

## 2024-12-01 PROCEDURE — 1100000001 HC PRIVATE ROOM DAILY

## 2024-12-01 PROCEDURE — 2500000001 HC RX 250 WO HCPCS SELF ADMINISTERED DRUGS (ALT 637 FOR MEDICARE OP): Performed by: FAMILY MEDICINE

## 2024-12-01 PROCEDURE — 85027 COMPLETE CBC AUTOMATED: CPT | Performed by: NURSE PRACTITIONER

## 2024-12-01 PROCEDURE — 36415 COLL VENOUS BLD VENIPUNCTURE: CPT | Performed by: NURSE PRACTITIONER

## 2024-12-01 PROCEDURE — 2500000004 HC RX 250 GENERAL PHARMACY W/ HCPCS (ALT 636 FOR OP/ED): Performed by: NURSE PRACTITIONER

## 2024-12-01 PROCEDURE — 2500000002 HC RX 250 W HCPCS SELF ADMINISTERED DRUGS (ALT 637 FOR MEDICARE OP, ALT 636 FOR OP/ED): Performed by: NURSE PRACTITIONER

## 2024-12-01 PROCEDURE — 94640 AIRWAY INHALATION TREATMENT: CPT

## 2024-12-01 PROCEDURE — 2500000005 HC RX 250 GENERAL PHARMACY W/O HCPCS: Performed by: NURSE PRACTITIONER

## 2024-12-01 PROCEDURE — 2500000001 HC RX 250 WO HCPCS SELF ADMINISTERED DRUGS (ALT 637 FOR MEDICARE OP): Performed by: NURSE PRACTITIONER

## 2024-12-01 PROCEDURE — 2500000001 HC RX 250 WO HCPCS SELF ADMINISTERED DRUGS (ALT 637 FOR MEDICARE OP)

## 2024-12-01 PROCEDURE — 80053 COMPREHEN METABOLIC PANEL: CPT | Performed by: NURSE PRACTITIONER

## 2024-12-01 RX ORDER — HALOPERIDOL 1 MG/1
1 TABLET ORAL EVERY 6 HOURS PRN
Status: ACTIVE | OUTPATIENT
Start: 2024-12-01

## 2024-12-01 RX ADMIN — LOSARTAN POTASSIUM 50 MG: 50 TABLET, FILM COATED ORAL at 08:22

## 2024-12-01 RX ADMIN — FORMOTEROL FUMARATE 20 MCG: 20 SOLUTION RESPIRATORY (INHALATION) at 07:46

## 2024-12-01 RX ADMIN — BUDESONIDE 0.5 MG: 0.5 INHALANT RESPIRATORY (INHALATION) at 21:16

## 2024-12-01 RX ADMIN — PANTOPRAZOLE SODIUM 40 MG: 40 TABLET, DELAYED RELEASE ORAL at 15:50

## 2024-12-01 RX ADMIN — LOSARTAN POTASSIUM 50 MG: 50 TABLET, FILM COATED ORAL at 21:25

## 2024-12-01 RX ADMIN — THIAMINE HCL TAB 100 MG 100 MG: 100 TAB at 08:22

## 2024-12-01 RX ADMIN — Medication 1 TABLET: at 08:22

## 2024-12-01 RX ADMIN — METOPROLOL TARTRATE 50 MG: 50 TABLET, FILM COATED ORAL at 21:25

## 2024-12-01 RX ADMIN — PANTOPRAZOLE SODIUM 40 MG: 40 TABLET, DELAYED RELEASE ORAL at 06:17

## 2024-12-01 RX ADMIN — HEPARIN SODIUM 5000 UNITS: 5000 INJECTION INTRAVENOUS; SUBCUTANEOUS at 06:17

## 2024-12-01 RX ADMIN — HEPARIN SODIUM 5000 UNITS: 5000 INJECTION INTRAVENOUS; SUBCUTANEOUS at 22:18

## 2024-12-01 RX ADMIN — NYSTATIN: 100000 CREAM TOPICAL at 08:23

## 2024-12-01 RX ADMIN — BUDESONIDE 0.5 MG: 0.5 INHALANT RESPIRATORY (INHALATION) at 07:47

## 2024-12-01 RX ADMIN — FORMOTEROL FUMARATE 20 MCG: 20 SOLUTION RESPIRATORY (INHALATION) at 21:16

## 2024-12-01 RX ADMIN — SPIRONOLACTONE 12.5 MG: 25 TABLET ORAL at 08:22

## 2024-12-01 RX ADMIN — METOPROLOL TARTRATE 50 MG: 50 TABLET, FILM COATED ORAL at 08:22

## 2024-12-01 RX ADMIN — Medication 3 MG: at 21:25

## 2024-12-01 ASSESSMENT — COGNITIVE AND FUNCTIONAL STATUS - GENERAL
DRESSING REGULAR LOWER BODY CLOTHING: A LOT
TOILETING: A LOT
CLIMB 3 TO 5 STEPS WITH RAILING: A LOT
DAILY ACTIVITIY SCORE: 14
CLIMB 3 TO 5 STEPS WITH RAILING: A LOT
STANDING UP FROM CHAIR USING ARMS: A LITTLE
WALKING IN HOSPITAL ROOM: A LOT
DRESSING REGULAR UPPER BODY CLOTHING: A LOT
MOVING FROM LYING ON BACK TO SITTING ON SIDE OF FLAT BED WITH BEDRAILS: A LITTLE
HELP NEEDED FOR BATHING: A LOT
TOILETING: A LOT
EATING MEALS: A LITTLE
WALKING IN HOSPITAL ROOM: A LITTLE
MOVING TO AND FROM BED TO CHAIR: A LITTLE
STANDING UP FROM CHAIR USING ARMS: A LITTLE
DAILY ACTIVITIY SCORE: 14
MOBILITY SCORE: 19
PERSONAL GROOMING: A LITTLE
MOBILITY SCORE: 15
EATING MEALS: A LITTLE
DRESSING REGULAR UPPER BODY CLOTHING: A LOT
HELP NEEDED FOR BATHING: A LOT
TURNING FROM BACK TO SIDE WHILE IN FLAT BAD: A LOT
PERSONAL GROOMING: A LITTLE
MOVING TO AND FROM BED TO CHAIR: A LITTLE
DRESSING REGULAR LOWER BODY CLOTHING: A LOT

## 2024-12-01 ASSESSMENT — PAIN SCALES - GENERAL
PAINLEVEL_OUTOF10: 0 - NO PAIN

## 2024-12-01 ASSESSMENT — PAIN - FUNCTIONAL ASSESSMENT
PAIN_FUNCTIONAL_ASSESSMENT: 0-10

## 2024-12-01 ASSESSMENT — PAIN SCALES - WONG BAKER: WONGBAKER_NUMERICALRESPONSE: NO HURT

## 2024-12-01 NOTE — NURSING NOTE
EOS: Patient rested well throughout the night. Patient had no complaints of pain. Safety has been maintained. Patient is stable at the time of writing.

## 2024-12-01 NOTE — CARE PLAN
The patient's goals for the shift include:    Problem: Skin  Goal: Prevent/manage excess moisture  Outcome: Progressing     Problem: Skin  Goal: Prevent/minimize sheer/friction injuries  Outcome: Progressing     Problem: Skin  Goal: Promote skin healing  Outcome: Progressing       The clinical goals for the shift include see plan of care.

## 2024-12-01 NOTE — CARE PLAN
The patient's goals for the shift include     The clinical goals for the shift include see plan of care.  Assumed care at 0700. Pt is alert, but selectively answers questions. Neuro is unchanged from prior neur assessments. Safety sitter at bedside. Alarms on and audible. Pt removed tele.  VSS. No complaints of pain at this time.     1052 pts  at bedside.

## 2024-12-01 NOTE — PROGRESS NOTES
Samuel Mims is a 48 y.o. female on day 15 of admission presenting with Sepsis with encephalopathy without septic shock, due to unspecified organism (Multi).    Subjective   Patient very alert today.  Restless in bed.  Seen with her best friend from childhood.  However she continues to be very confused, unable to answer questions appropriately.        Objective     Last Recorded Vitals  BP (!) 143/105   Pulse 109   Temp 36.1 °C (97 °F) (Temporal)   Resp 16   Wt 109 kg (240 lb 11.9 oz)   SpO2 100%   Intake/Output last 3 Shifts:    Intake/Output Summary (Last 24 hours) at 12/1/2024 1531  Last data filed at 12/1/2024 1000  Gross per 24 hour   Intake 480 ml   Output --   Net 480 ml       Admission Weight  Weight: 127 kg (280 lb) (11/16/24 1755)    Daily Weight  12/01/24 : 109 kg (240 lb 11.9 oz)      Physical Exam:  General: Not in acute distress  HEENT: PERRLA, Left TM indicates recent otitis media, Right TM full of fluid.  Neck: Normal to inspection  Lungs: Clear to auscultation, work of breathing within normal limit  Cardiac: Regular rate and rhythm  Abdomen: Soft nontender, positive bowel sounds  : Exam deferred  Skin: Perianal erythema with clear demarcation.  Hematology: No petechia or excessive ecchymosis  Musculoskeletal: Without significant trauma.  Neurological: Alert and oriented X person and place. Otherwise unable to answer questions appropriately.     Relevant Results  Scheduled medications  budesonide, 0.5 mg, nebulization, BID  influenza, 0.5 mL, intramuscular, During hospitalization  formoterol, 20 mcg, nebulization, BID  haloperidol lactate, 5 mg, intramuscular, Once  heparin (porcine), 5,000 Units, subcutaneous, q8h SANTIAGO  losartan, 50 mg, oral, BID  metoprolol tartrate, 50 mg, oral, q12h  multivitamin with minerals, 1 tablet, oral, Daily  nystatin, , Topical, BID  pantoprazole, 40 mg, oral, BID AC  spironolactone, 12.5 mg, oral, Daily  thiamine, 100 mg, oral, Daily      Continuous  medications     PRN medications  PRN medications: acetaminophen **OR** acetaminophen, albuterol, [Held by provider] clonazePAM, hydrALAZINE, melatonin, metoprolol   Results for orders placed or performed during the hospital encounter of 11/16/24 (from the past 24 hours)   Comprehensive Metabolic Panel   Result Value Ref Range    Glucose 112 (H) 74 - 99 mg/dL    Sodium 138 136 - 145 mmol/L    Potassium 3.7 3.5 - 5.3 mmol/L    Chloride 104 98 - 107 mmol/L    Bicarbonate 25 21 - 32 mmol/L    Anion Gap 13 10 - 20 mmol/L    Urea Nitrogen 19 6 - 23 mg/dL    Creatinine 0.84 0.50 - 1.05 mg/dL    eGFR 85 >60 mL/min/1.73m*2    Calcium 9.3 8.6 - 10.3 mg/dL    Albumin 3.6 3.4 - 5.0 g/dL    Alkaline Phosphatase 53 33 - 110 U/L    Total Protein 6.1 (L) 6.4 - 8.2 g/dL    AST 26 9 - 39 U/L    Bilirubin, Total 0.7 0.0 - 1.2 mg/dL    ALT 49 (H) 7 - 45 U/L   CBC   Result Value Ref Range    WBC 5.1 4.4 - 11.3 x10*3/uL    nRBC 0.0 0.0 - 0.0 /100 WBCs    RBC 3.53 (L) 4.00 - 5.20 x10*6/uL    Hemoglobin 9.9 (L) 12.0 - 16.0 g/dL    Hematocrit 31.7 (L) 36.0 - 46.0 %    MCV 90 80 - 100 fL    MCH 28.0 26.0 - 34.0 pg    MCHC 31.2 (L) 32.0 - 36.0 g/dL    RDW 18.1 (H) 11.5 - 14.5 %    Platelets 212 150 - 450 x10*3/uL      Lab Results   Component Value Date    BLOODCULT No growth at 4 days -  FINAL REPORT 11/16/2024    BLOODCULT No growth at 4 days -  FINAL REPORT 11/16/2024    URINECULTURE (A) 11/16/2024     Multiple organisms present, probable contamination. Repeat culture if clinically indicated.            Assessment/Plan   Samuel Mims is a 48 y.o. female on day 15 of admission presenting with Sepsis with encephalopathy without septic shock, due to unspecified organism (Multi).  Principal Problem:    Sepsis with encephalopathy without septic shock, due to unspecified organism (Multi)  Active Problems:    Diarrhea    UTI (urinary tract infection)    Hypertension    Colitis    Sinus tachycardia    Prolonged Q-T interval on ECG     Clostridium difficile infection    Metabolic encephalopathy- multifactorial, possible Wernicke's, possible hepatic encephalopathy   Possibly due to significant weight loss of 50+ pounds w/poor nutritional intake, vs PRES from poorly treated HTN.     Initial CT head with no acute changes. Urine Tox screen negative.  No elevated WBC count.  Ammonia mildly elevated at 59. Blood cultures are negative X2. On Lactulose for mildly elevated ammonia levels.  Corpak inserted due to poor oral intake but removed when she became alert on 11/21.  On 11/19/24 MRI head of poor quality due to motion artifact but show increased signal within the medial thalami, periaqueductal region and possible increased signal within the caudate and cortex of the bilateral posterior frontal lobes.  Started on high dose IV Thiamine and MVI for possible Wernicke's, changed to oral Thiamine on 11/25.  Thiamine level low at 33.  IV Acyclovir discontinued as CSF negative for HSV infection.  Had Lumbar Puncture on 11/20 and results negative for viruses and bacteria.  Some improvement in her alertness since 11/21/24, but not able to answer questions or follow directions.  Sitter in room since 11/25 as fell out of bed.  MRI with anesthesia completed 11/25- results are non specific white matter changes.  Consult psychiatry recommended Haldol 5 mg q 6 hours prn for agitation and Risperdal 0.5 mg at bedtime for insomnia, currently only on Risperdal.  Ammonia level has dropped from 59 to 19 on 11/28/24 so the Lactulose discontinued.  On 11/29 she fell out of bed fortunately sitter was at bedside and broke the fall.  She continues to be episodes of lucidity alternating with drowsiness and confusion, showing severe cognitive impairment.        Untreated C. Difficile Colitis  Per  she has not taken the oral Vancomycin prescribed at discharge on 11/2/24.   Stool for pathogens negative.  C. Diff PCR negative but since untreated this could be false  negative.  Treated with oral Vancomycin for 10 days,  discontinued on 11/27.  CT a/p confirms she continues to have colitis.  Loose stools/diarrhea due to lactulose.  Lactulose discontinued when ammonia levels dropped from 59 to 19 on 11/28/24.        HTN and Sinus Tachycardia  On oral Losartan 50 mg, Metoprolol tartrate 50 mg bid, Spironolactone 12.5 mg  Blood pressure mildly elevated and continues to be tachycardic off and on.     Asthma  Budesonide 0.5 mg and Formoterol nebs bid.     CHET  Buspirone 10 mg bid on hold.  Klonopin on hold due to encephalopathy     GERD  Oral Pantoprazole 40 mg bid.     Recent Otitis Media  Ears show fluid probably causing some hearing impairment.  Refer to ENT as out patient for possible hearing aids.     Discharge planning  Per  unable to find accepting acute rehab facility with her insurance.  She will need cognitive rehab, PT and OT evaluation for driving safety and capacity to return to work.   has a Medicaid number now so hopefully we can find an acute rehab facility that accepts medicaid.        Plan discussed with friend at bedside.    Nell Green MD

## 2024-12-02 ENCOUNTER — HOSPITAL ENCOUNTER (OUTPATIENT)
Dept: CARDIOLOGY | Facility: HOSPITAL | Age: 49
Discharge: HOME | End: 2024-12-02
Payer: COMMERCIAL

## 2024-12-02 LAB
ANION GAP SERPL CALC-SCNC: 13 MMOL/L (ref 10–20)
BUN SERPL-MCNC: 18 MG/DL (ref 6–23)
CALCIUM SERPL-MCNC: 9.3 MG/DL (ref 8.6–10.3)
CHLORIDE SERPL-SCNC: 103 MMOL/L (ref 98–107)
CO2 SERPL-SCNC: 25 MMOL/L (ref 21–32)
CREAT SERPL-MCNC: 0.85 MG/DL (ref 0.5–1.05)
EGFRCR SERPLBLD CKD-EPI 2021: 84 ML/MIN/1.73M*2
ERYTHROCYTE [DISTWIDTH] IN BLOOD BY AUTOMATED COUNT: 18.1 % (ref 11.5–14.5)
FUNGUS SPEC CULT: NORMAL
FUNGUS SPEC FUNGUS STN: NORMAL
GLUCOSE SERPL-MCNC: 144 MG/DL (ref 74–99)
HCT VFR BLD AUTO: 31.6 % (ref 36–46)
HGB BLD-MCNC: 10.2 G/DL (ref 12–16)
MCH RBC QN AUTO: 29.2 PG (ref 26–34)
MCHC RBC AUTO-ENTMCNC: 32.3 G/DL (ref 32–36)
MCV RBC AUTO: 91 FL (ref 80–100)
NRBC BLD-RTO: 0 /100 WBCS (ref 0–0)
PLATELET # BLD AUTO: 248 X10*3/UL (ref 150–450)
POTASSIUM SERPL-SCNC: 3.9 MMOL/L (ref 3.5–5.3)
RBC # BLD AUTO: 3.49 X10*6/UL (ref 4–5.2)
SODIUM SERPL-SCNC: 137 MMOL/L (ref 136–145)
VASOPRESSIN SERPL-MCNC: 2 PG/ML (ref 0–6.9)
WBC # BLD AUTO: 6.5 X10*3/UL (ref 4.4–11.3)

## 2024-12-02 PROCEDURE — 85027 COMPLETE CBC AUTOMATED: CPT | Performed by: NURSE PRACTITIONER

## 2024-12-02 PROCEDURE — 2500000002 HC RX 250 W HCPCS SELF ADMINISTERED DRUGS (ALT 637 FOR MEDICARE OP, ALT 636 FOR OP/ED): Performed by: NURSE PRACTITIONER

## 2024-12-02 PROCEDURE — 92507 TX SP LANG VOICE COMM INDIV: CPT | Mod: GN | Performed by: SPEECH-LANGUAGE PATHOLOGIST

## 2024-12-02 PROCEDURE — 36415 COLL VENOUS BLD VENIPUNCTURE: CPT | Performed by: NURSE PRACTITIONER

## 2024-12-02 PROCEDURE — 2500000001 HC RX 250 WO HCPCS SELF ADMINISTERED DRUGS (ALT 637 FOR MEDICARE OP)

## 2024-12-02 PROCEDURE — 2500000001 HC RX 250 WO HCPCS SELF ADMINISTERED DRUGS (ALT 637 FOR MEDICARE OP): Performed by: NURSE PRACTITIONER

## 2024-12-02 PROCEDURE — 2500000005 HC RX 250 GENERAL PHARMACY W/O HCPCS: Performed by: NURSE PRACTITIONER

## 2024-12-02 PROCEDURE — 92526 ORAL FUNCTION THERAPY: CPT | Mod: GN | Performed by: SPEECH-LANGUAGE PATHOLOGIST

## 2024-12-02 PROCEDURE — 76937 US GUIDE VASCULAR ACCESS: CPT

## 2024-12-02 PROCEDURE — 2500000004 HC RX 250 GENERAL PHARMACY W/ HCPCS (ALT 636 FOR OP/ED): Performed by: NURSE PRACTITIONER

## 2024-12-02 PROCEDURE — 1100000001 HC PRIVATE ROOM DAILY

## 2024-12-02 PROCEDURE — 93005 ELECTROCARDIOGRAM TRACING: CPT

## 2024-12-02 PROCEDURE — 2500000001 HC RX 250 WO HCPCS SELF ADMINISTERED DRUGS (ALT 637 FOR MEDICARE OP): Performed by: FAMILY MEDICINE

## 2024-12-02 PROCEDURE — 80048 BASIC METABOLIC PNL TOTAL CA: CPT | Performed by: NURSE PRACTITIONER

## 2024-12-02 PROCEDURE — 94640 AIRWAY INHALATION TREATMENT: CPT

## 2024-12-02 RX ORDER — DIVALPROEX SODIUM 125 MG/1
125 CAPSULE, COATED PELLETS ORAL EVERY 12 HOURS SCHEDULED
Status: DISCONTINUED | OUTPATIENT
Start: 2024-12-02 | End: 2024-12-03

## 2024-12-02 RX ORDER — RISPERIDONE 1 MG/1
1 TABLET ORAL EVERY 8 HOURS PRN
Status: DISCONTINUED | OUTPATIENT
Start: 2024-12-02 | End: 2024-12-03

## 2024-12-02 ASSESSMENT — COGNITIVE AND FUNCTIONAL STATUS - GENERAL
STANDING UP FROM CHAIR USING ARMS: A LOT
WALKING IN HOSPITAL ROOM: A LOT
EATING MEALS: A LITTLE
CLIMB 3 TO 5 STEPS WITH RAILING: A LOT
DAILY ACTIVITIY SCORE: 14
TURNING FROM BACK TO SIDE WHILE IN FLAT BAD: A LITTLE
PERSONAL GROOMING: A LITTLE
MOVING TO AND FROM BED TO CHAIR: A LITTLE
DRESSING REGULAR UPPER BODY CLOTHING: A LOT
PERSONAL GROOMING: A LITTLE
MOVING FROM LYING ON BACK TO SITTING ON SIDE OF FLAT BED WITH BEDRAILS: A LITTLE
CLIMB 3 TO 5 STEPS WITH RAILING: A LOT
HELP NEEDED FOR BATHING: A LOT
TOILETING: A LOT
MOVING TO AND FROM BED TO CHAIR: A LITTLE
DRESSING REGULAR LOWER BODY CLOTHING: A LOT
DRESSING REGULAR LOWER BODY CLOTHING: A LOT
TOILETING: A LOT
DRESSING REGULAR UPPER BODY CLOTHING: A LOT
MOBILITY SCORE: 16
TURNING FROM BACK TO SIDE WHILE IN FLAT BAD: A LITTLE
STANDING UP FROM CHAIR USING ARMS: A LOT
WALKING IN HOSPITAL ROOM: A LOT
HELP NEEDED FOR BATHING: A LOT
EATING MEALS: A LITTLE
DAILY ACTIVITIY SCORE: 14
MOBILITY SCORE: 15

## 2024-12-02 ASSESSMENT — PAIN SCALES - GENERAL
PAINLEVEL_OUTOF10: 0 - NO PAIN

## 2024-12-02 NOTE — NURSING NOTE
"1945 - Pt. Sitting up at foot of bed, setting the bed alarm off.   Pt. Is saying nonsense comments including \"Are they going to through you out of college?\".  Pt. Found to be heavily soiled with stool and is also making touching the feces and staring at it on her hands.  Bed bath provided with soap and water.  New gown, linen and bed pad changed.   Bed alarm on/audible.      2140 - Pt. Completed breathing treatment with RT.  Found to be again soiled with excrement.  Pt. Also pulled out her IV as it was found in the bed with her.   Partial bed change performed.   Attempt to replace tele stickers, however, pt. Attempts to grab hand.   Unable to redirect at this time.      2210 -  Messaged to inform of no IV access.  Reply for Anabelle AGUSTIN NP regarding necessity of needing an IV.   No current IV medication ordered.  NP responds \"She's a full code in a  hospital, yes she needs an IV\"    0100 - Pt. Moving self to bottom of bed setting off bed alarm.   Pt. States \"Sorry I'm disoriented right now\".   Attempt to reorient pt and tend to needs at this time.  Denies needs.  Repositioned self to L side with bed alarm on/audible.      0116 - PRN Haldol given for restlessness.  Pt. Attempting to climb out of bed and not appropriately following commands.   Pt. Is naked and touching her vagina and rectum.   Sitter at bedside at this time.      EOS note:  Unable to completely establish orientation based on pt. Responses.   Pt. Only following prompts with repetition.   Returned to be with minimal assist.   Pills taken crushed with applesauce.  Pt. Incontinent of bowel and bladder.   Secure chat sent to GABINO, Anabelle Coates regarding current Cdiff precautions.   No order to discontinue however, negative samples recorded already.   Pt. Refusing tele.  US start IV placed at RUE.  Fall precautions in place.    "

## 2024-12-02 NOTE — PROGRESS NOTES
Samuel Mims is a 48 y.o. female on day 16 of admission presenting with Sepsis with encephalopathy without septic shock, due to unspecified organism (Multi).    Subjective   Had a very restless evening and required Haldol 1 mg every 6 hours to calm her down.   Unfortunately this morning she is sleeping too soundly to wake up from the Haldol she received yesterday.    Objective     Last Recorded Vitals  /81 (BP Location: Right arm)   Pulse 99   Temp 35.7 °C (96.3 °F) (Temporal)   Resp 16   Wt 109 kg (240 lb 11.9 oz)   SpO2 100%   Intake/Output last 3 Shifts:    Intake/Output Summary (Last 24 hours) at 12/2/2024 0740  Last data filed at 12/1/2024 1000  Gross per 24 hour   Intake 240 ml   Output --   Net 240 ml       Admission Weight  Weight: 127 kg (280 lb) (11/16/24 1755)    Daily Weight  12/01/24 : 109 kg (240 lb 11.9 oz)      Physical Exam:  General: Not in acute distress  HEENT: PERRLA, Left TM indicates recent otitis media, Right TM full of fluid.  Neck: Normal to inspection  Lungs: Clear to auscultation, work of breathing within normal limit  Cardiac: Regular rate and rhythm  Abdomen: Soft nontender, positive bowel sounds  : Exam deferred  Skin: Perianal erythema with clear demarcation.  Hematology: No petechia or excessive ecchymosis  Musculoskeletal: Without significant trauma.  Neurological: Sleeping and unable to assess.    Relevant Results  Scheduled medications  budesonide, 0.5 mg, nebulization, BID  influenza, 0.5 mL, intramuscular, During hospitalization  formoterol, 20 mcg, nebulization, BID  haloperidol lactate, 5 mg, intramuscular, Once  heparin (porcine), 5,000 Units, subcutaneous, q8h SANTIAGO  losartan, 50 mg, oral, BID  metoprolol tartrate, 50 mg, oral, q12h  multivitamin with minerals, 1 tablet, oral, Daily  nystatin, , Topical, BID  pantoprazole, 40 mg, oral, BID AC  spironolactone, 12.5 mg, oral, Daily  thiamine, 100 mg, oral, Daily      Continuous medications     PRN  medications  PRN medications: acetaminophen **OR** acetaminophen, albuterol, [Held by provider] clonazePAM, haloperidol, hydrALAZINE, melatonin, metoprolol   No results found for this or any previous visit (from the past 24 hours).   Lab Results   Component Value Date    BLOODCULT No growth at 4 days -  FINAL REPORT 11/16/2024    BLOODCULT No growth at 4 days -  FINAL REPORT 11/16/2024    URINECULTURE (A) 11/16/2024     Multiple organisms present, probable contamination. Repeat culture if clinically indicated.            Assessment/Plan   Samuel Mims is a 48 y.o. female on day 16 of admission presenting with Sepsis with encephalopathy without septic shock, due to unspecified organism (Multi).  Principal Problem:    Sepsis with encephalopathy without septic shock, due to unspecified organism (Multi)  Active Problems:    Diarrhea    UTI (urinary tract infection)    Hypertension    Colitis    Sinus tachycardia    Prolonged Q-T interval on ECG    Clostridium difficile infection    Metabolic encephalopathy- multifactorial, possible Wernicke's, possible hepatic encephalopathy   Possibly due to significant weight loss of 50+ pounds w/poor nutritional intake, vs PRES from poorly treated HTN.     Initial CT head with no acute changes. Urine Tox screen negative.  No elevated WBC count.  Ammonia mildly elevated at 59. Blood cultures are negative X2. On Lactulose for mildly elevated ammonia levels.  Corpak inserted due to poor oral intake but removed when she became alert on 11/21.  On 11/19/24 MRI head of poor quality due to motion artifact but show increased signal within the medial thalami, periaqueductal region and possible increased signal within the caudate and cortex of the bilateral posterior frontal lobes.  Started on high dose IV Thiamine and MVI for possible Wernicke's, changed to oral Thiamine on 11/25.  Thiamine level low at 33.  IV Acyclovir discontinued as CSF negative for HSV infection.  Had Lumbar  Puncture on 11/20 and results negative for viruses and bacteria.  Some improvement in her alertness since 11/21/24, but not able to answer questions or follow directions.  Sitter in room since 11/25 as fell out of bed.  MRI with anesthesia completed 11/25- results are non specific white matter changes.  Consult psychiatry recommended Haldol 5 mg q 6 hours prn for agitation and Risperdal 0.5 mg at bedtime for insomnia for 5 nights.  Ammonia level has dropped from 59 to 19 on 11/28/24 so the Lactulose discontinued.  On 11/29 she fell out of bed fortunately sitter was at bedside and broke the fall.  She continues to be episodes of lucidity alternating with drowsiness and confusion, showing severe cognitive impairment.  Very restless requiring Haldol 1 mg every 6 hours to calm her down.     Untreated C. Difficile Colitis  Per  she has not taken the oral Vancomycin prescribed at discharge on 11/2/24.   Stool for pathogens negative.  C. Diff PCR negative but since untreated this could be false negative.  Treated with oral Vancomycin for 10 days,  discontinued on 11/27.  CT a/p confirms she continues to have colitis.  Loose stools/diarrhea due to lactulose.  Lactulose discontinued when ammonia levels dropped from 59 to 19 on 11/28/24.        HTN and Sinus Tachycardia  On oral Losartan 50 mg, Metoprolol tartrate 50 mg bid, Spironolactone 12.5 mg  Blood pressure mildly elevated and continues to be tachycardic off and on.     Asthma  Budesonide 0.5 mg and Formoterol nebs bid.     CHET  Buspirone 10 mg bid on hold.  Klonopin on hold due to encephalopathy     GERD  Oral Pantoprazole 40 mg bid.     Recent Otitis Media  Ears show fluid probably causing some hearing impairment.  Refer to ENT as out patient for possible hearing aids.     Discharge planning  Per  unable to find accepting acute rehab facility with her insurance.  She will need cognitive rehab, PT and OT evaluation for driving safety and capacity to  return to work.   has a Medicaid number now so hopefully we can find an acute rehab facility that accepts medicaid.  Patient waiting for placement at this time.           Plan discussed with patient at bedside        Nell Green MD

## 2024-12-02 NOTE — PROGRESS NOTES
12/02/24 1215   Discharge Planning   Type of Residence Skilled nursing facility   Home or Post Acute Services Post acute facilities (Rehab/SNF/etc)   Type of Post Acute Facility Services Skilled nursing   Expected Discharge Disposition SNF   Intensity of Service   Intensity of Service >30 min     Net with pt and her .  did receive medicaid pending# [id number] AP# [id number] Confirmation # [id number]. Facility list was provided.  will review and provided facility choices. SW to follow.     12:40  provided several facility options. Request sent to [facility] to send out SNF referral. SW will follow for an accepting facility.     3:16 [facility] accepting, but will need to talk with family first.  is aware. Awaiting accepting. No other facility is willing to accept at this time.

## 2024-12-02 NOTE — PROGRESS NOTES
Speech-Language Pathology    SLP Adult Inpatient Treatment     Patient Name: Samuel Mims  MRN: [id number]  Today's Date: 12/2/2024  Time Calculation  Start Time: 1832  Stop Time: 1854  Time Calculation (min): 22 min         Current Problem:   1. Sepsis with encephalopathy without septic shock, due to unspecified organism (Multi)        2. Acute cystitis with hematuria        3. Cognitive changes  Referral to Occupational Therapy    CANCELED: Referral to Occupational Therapy        Swallow/Speech Recommendations:   Solid Diet Recommendation: Soft & bite sized/chopped (IDDSI Level 6)   Liquid Diet Recommendation: Thin liquids (IDDSI Level 0)   Medication Administration: Whole with thin liquid (one at a time)   Compensatory Strategies: Small bites, small, single sips thin liquid via cup, slow rate during PO/sips, upright 90 degrees for intake, slow rate, wear upper partials during mealtimes, and distant supervision for feeding  Continue dysphagia and cognitive communication therapies.      Therapeutic Swallow/Speech Interventions: Patient Education; PO Trials; Compensatory Speech and Swallow Strategies     Patient was engaged in skilled speech therapy targeting dysphagia and cognitive communication skills.  SLP TX Intervention Outcome: Making Progress Towards Goals  SLP Assessment Results: Dysphagia; Cognitive communication impairment  Prognosis: Good    Swallow Assessment:   Patient seen this date in follow-up to clinical swallow evaluation completed 11/17/24. Patient was able to participate in PO trials, with occasional reiteration of directives required 2/2 decreased hearing acuity. Patient consumed PO trials of regular and thin liquid consistencies (latter via cup). Patient with no S/S oral dysphagia, however, noted S/S pharyngeal dysphagia (throat clearing and coughing) with thin liquids. There were no other S/S pharyngeal dysphagia evident. It should be noted patient was distractible, requiring redirection to  "task, and review of safe swallow strategies (i.e., small, single sips thin liquid via cup, slow rate during PO). The former strategy was added to the above recommendations. Assessment of diet upgrade (i.e., to regular consistency) was completed. Although patient with no apparent S/S oropharyngeal dysphagia, patient distractible and not always able to employ use of safe swallow strategies. Patient is appropriate to continue previously recommended diet of soft & bite sized/chopped (IDDSI Level 6) and thin liquids (IDDSI Level 0).     Speech Assessment: Patient seen this date in follow-up to [id number] evaluation completed 11/29/24. Focus of session was on improvement of orientation, environmental awareness and use of external memory aids, as well as further cognitive communication assessment in the area of reading comprehension.  Patient was oriented to self/person. Despite not knowing place, patient able to provide a place in the same general category (\"nursing home\") without cues. Patient reoriented. Patient also partially able to describe reason for hospitalization. Patient did know day/week, month and year, which is an improvement (although patient continues to be unable to indicate specific date). Pt could not indicate meals this date (no change from evaluation), nor room number/floor. Given minimal to moderate verbal/visual cues (i.e., to refer to whiteboard), patient able to orally read room number (although could not deduce floor). Assessed reading comprehension of 2 step commands (and ability to follow same), and completion of written sentences given a choice of 4 potential responses-100% accurate. The plan will be to assess reading comprehension at paragraph level.    ST to continue to follow during inpatient stay.  Treatment Tolerance: Patient tolerated treatment well  Strengths: Motivation  Barriers: Cognition, Comorbidities  Education Provided: Yes    Plan:  Treatment/Interventions: " Dysphagia and Cognitive communication management  SLP TX Plan: Continue Plan of Care  SLP Plan: Skilled SLP  SLP Frequency: 2x per week (swallow); 4x/week (cognitive communication)  Duration: 2 weeks  SLP Discharge Recommendations: Continue skilled SLP services at the next level of care  Next Treatment Priority: Diet tolerance/Advance diet as tolerated, Cognitive communication therapy with ongoing assessment of skills  Discussed POC: Patient, Caregiver  Patient/Caregiver Agreeable: Yes     Subjective:   Patient seen bedside for dysphagia and speech therapies. Patient was assisted into an upright position for PO trials. Patient was pleasant and cooperative. It should be noted patient is [place]. Tangential language significantly decreased compared to ST eval conducted 11/29/24, with much less need for verbal/visual redirection to task. Vocal quality was WFL.       Oxygen Status:    Room air      General Visit Information:   Patient underwent a swallow and speech/language/cognitive evaluation on  11/17/24 and 11/29/24 respectively. ST for dysphagia and cognitive communication impairment was recommended. Current diet level is: Soft & bite sized/chopped (IDDSI Level 6) and Thin liquids (IDDSI Level 0).   BRAIN Escobar report no issues with communication and swallowing.    Pain:  Pain Assessment: 0-10   Pain Score: 0     Plan:  ST to continue to follow during inpatient stay for ongoing assessment of diet tolerance and cognitive communication skills, generalization of safe swallow and speech strategies (for the latter, facilitative memory techniques), with development of goals as appropriate.     Treatment/Interventions: Dysphagia; Cognitive communication skill management; Patient/Caregiver Education, PO trials; Compensatory Swallow/Speech Techniques  SLP Frequency: 2x per week-dysphagia therapy; 4x per week-cognitive communication therapy     Objective    Swallowing Goals (established on 11/17/2024):   Patient will tolerate  "recommended diet with no overt s/s of difficulty or aspiration.        Progressing. Patient consumed 3 boluses of regular consistency with no S/S dysphagia, however, patient with S/S pharyngeal dysphagia given single boluses thin liquid via cup, characterized by throat clearing and coughing. Patient with no S/S dysphagia given thin liquids and minimal to moderate cues to take single, small sips.   2. Patient will tolerate advanced solid trials with no overt s/s of difficulty or aspiration to assess readiness for diet upgrade.         Progressing. Patient's ability to consume regular consistency trials (and thin liquids) is noted above. Continue current diet level (Soft & bite sized/chopped (IDDSI Level 6) and Thin liquids (IDDSI Level 0) with above safe swallow strategies.    Therapeutic Swallow Intervention: PO Trials, Patient/Caregiver Education, Compensatory Swallow Strategies    Speech Goals (established on 11/29/2024):    Patient will improve orientation to baseline given minimal to moderate verbal cues.    Progress:  Patient demonstrated the following improvement in orientation (with no cues needed): Patient is oriented to day/week, and year. Despite not knowing place, patient is now better able to provide response in the same general category (\"nursing home.\") Given minimal to moderate verbal/visual cues (i.e., to refer to whiteboard), patient able to orally read room number (although could not deduce floor). Given forced choice cues, patient able to indicate place.   2. Patient will express wants/needs/preferences/opinions in 90% of trials utilizing multiple communication modalities (speech, gesture, etc.) as needed.    Patient demonstrated preference of desired PO consistencies by choosing thin liquids verbally with minimal cues.   3. Further cognitive/communication assessment (i.e., receptive/expressive language, reading comprehension, paragraph level written language, recall of paragraph level " information, sequencing, safety awareness/judgment/problem solving, and abstract/math reasoning to be completed with development of goals as appropriate.    Patient no longer requires written stimulus questions, rather, patient able to complete tasks with verbal presentation only. Assessed reading comprehension of 2 step commands (and ability to follow same), and completion of written sentences given a choice of 4 potential responses-100% accurate. The plan will be to assess reading comprehension-paragraph level-above goal reflects this change.      Therapeutic Speech Intervention: Patient/Caregiver Education, Compensatory Speech Strategies, Ongoing assessment of cognitive communication skills     Education:  Learner  patient; RN   Barriers to Learning Acuteness of illness barrier; cognitive communication limitations; decreased hearing acuity  Method  demonstration; verbal; visual   Education - Topic  SLP provided patient education regarding role of SLP, purpose of assessment, clinical impressions, goals of treatment, and plan of care. Patient verbalized questionable full comprehension, consistent with cognitive communication status. Education will be reinforced. ST further coordinated with RN regarding recommendations and precautions per this assessment, with RN verbalizing understanding.  Outcome needs review/reinforcement

## 2024-12-02 NOTE — NURSING NOTE
Pt calm and cooperative through shift. Pt repositioned every two hours. Pt had Bm this shift and tolerated diet. Q shift neuro WNL

## 2024-12-03 PROCEDURE — 92507 TX SP LANG VOICE COMM INDIV: CPT | Mod: GN | Performed by: STUDENT IN AN ORGANIZED HEALTH CARE EDUCATION/TRAINING PROGRAM

## 2024-12-03 PROCEDURE — 2500000001 HC RX 250 WO HCPCS SELF ADMINISTERED DRUGS (ALT 637 FOR MEDICARE OP): Performed by: NURSE PRACTITIONER

## 2024-12-03 PROCEDURE — 36415 COLL VENOUS BLD VENIPUNCTURE: CPT | Performed by: NURSE PRACTITIONER

## 2024-12-03 PROCEDURE — 82525 ASSAY OF COPPER: CPT | Performed by: NURSE PRACTITIONER

## 2024-12-03 PROCEDURE — 2500000004 HC RX 250 GENERAL PHARMACY W/ HCPCS (ALT 636 FOR OP/ED): Performed by: NURSE PRACTITIONER

## 2024-12-03 PROCEDURE — 2500000001 HC RX 250 WO HCPCS SELF ADMINISTERED DRUGS (ALT 637 FOR MEDICARE OP): Performed by: FAMILY MEDICINE

## 2024-12-03 PROCEDURE — 2500000005 HC RX 250 GENERAL PHARMACY W/O HCPCS: Performed by: NURSE PRACTITIONER

## 2024-12-03 PROCEDURE — 97530 THERAPEUTIC ACTIVITIES: CPT | Mod: GP,CQ

## 2024-12-03 PROCEDURE — 83825 ASSAY OF MERCURY: CPT | Performed by: NURSE PRACTITIONER

## 2024-12-03 PROCEDURE — 92526 ORAL FUNCTION THERAPY: CPT | Mod: GN | Performed by: SPEECH-LANGUAGE PATHOLOGIST

## 2024-12-03 PROCEDURE — 97535 SELF CARE MNGMENT TRAINING: CPT | Mod: GO,CO

## 2024-12-03 PROCEDURE — 1100000001 HC PRIVATE ROOM DAILY

## 2024-12-03 PROCEDURE — 2500000001 HC RX 250 WO HCPCS SELF ADMINISTERED DRUGS (ALT 637 FOR MEDICARE OP)

## 2024-12-03 PROCEDURE — 2500000002 HC RX 250 W HCPCS SELF ADMINISTERED DRUGS (ALT 637 FOR MEDICARE OP, ALT 636 FOR OP/ED): Performed by: NURSE PRACTITIONER

## 2024-12-03 RX ORDER — DIVALPROEX SODIUM 125 MG/1
250 CAPSULE, COATED PELLETS ORAL EVERY 12 HOURS SCHEDULED
Status: DISCONTINUED | OUTPATIENT
Start: 2024-12-03 | End: 2024-12-04 | Stop reason: HOSPADM

## 2024-12-03 RX ORDER — METOPROLOL TARTRATE 50 MG/1
50 TABLET ORAL ONCE
Status: COMPLETED | OUTPATIENT
Start: 2024-12-03 | End: 2024-12-03

## 2024-12-03 RX ORDER — METOPROLOL TARTRATE 100 MG/1
100 TABLET ORAL DAILY
Status: DISCONTINUED | OUTPATIENT
Start: 2024-12-04 | End: 2024-12-04 | Stop reason: HOSPADM

## 2024-12-03 RX ORDER — METOPROLOL TARTRATE 50 MG/1
50 TABLET ORAL NIGHTLY
Status: DISCONTINUED | OUTPATIENT
Start: 2024-12-03 | End: 2024-12-04 | Stop reason: HOSPADM

## 2024-12-03 ASSESSMENT — ACTIVITIES OF DAILY LIVING (ADL): HOME_MANAGEMENT_TIME_ENTRY: 17

## 2024-12-03 ASSESSMENT — PAIN - FUNCTIONAL ASSESSMENT
PAIN_FUNCTIONAL_ASSESSMENT: 0-10
PAIN_FUNCTIONAL_ASSESSMENT: 0-10

## 2024-12-03 ASSESSMENT — COGNITIVE AND FUNCTIONAL STATUS - GENERAL
MOVING TO AND FROM BED TO CHAIR: A LITTLE
STANDING UP FROM CHAIR USING ARMS: A LOT
HELP NEEDED FOR BATHING: A LOT
DRESSING REGULAR UPPER BODY CLOTHING: A LOT
EATING MEALS: A LITTLE
TURNING FROM BACK TO SIDE WHILE IN FLAT BAD: A LITTLE
TOILETING: TOTAL
STANDING UP FROM CHAIR USING ARMS: A LOT
DRESSING REGULAR LOWER BODY CLOTHING: TOTAL
PERSONAL GROOMING: A LITTLE
TURNING FROM BACK TO SIDE WHILE IN FLAT BAD: A LITTLE
DAILY ACTIVITIY SCORE: 12
EATING MEALS: A LITTLE
STANDING UP FROM CHAIR USING ARMS: TOTAL
HELP NEEDED FOR BATHING: A LOT
PERSONAL GROOMING: A LITTLE
HELP NEEDED FOR BATHING: A LOT
DRESSING REGULAR LOWER BODY CLOTHING: A LOT
CLIMB 3 TO 5 STEPS WITH RAILING: A LOT
CLIMB 3 TO 5 STEPS WITH RAILING: TOTAL
MOBILITY SCORE: 11
PERSONAL GROOMING: A LOT
DAILY ACTIVITIY SCORE: 14
DRESSING REGULAR UPPER BODY CLOTHING: A LOT
TURNING FROM BACK TO SIDE WHILE IN FLAT BAD: A LITTLE
DRESSING REGULAR UPPER BODY CLOTHING: A LOT
EATING MEALS: A LITTLE
MOVING TO AND FROM BED TO CHAIR: A LOT
WALKING IN HOSPITAL ROOM: TOTAL
MOBILITY SCORE: 16
MOBILITY SCORE: 12
WALKING IN HOSPITAL ROOM: A LOT
MOVING TO AND FROM BED TO CHAIR: TOTAL
CLIMB 3 TO 5 STEPS WITH RAILING: TOTAL
MOVING FROM LYING ON BACK TO SITTING ON SIDE OF FLAT BED WITH BEDRAILS: A LITTLE
DRESSING REGULAR LOWER BODY CLOTHING: A LOT
TOILETING: A LOT
TOILETING: TOTAL
WALKING IN HOSPITAL ROOM: TOTAL
DAILY ACTIVITIY SCORE: 12

## 2024-12-03 ASSESSMENT — PAIN SCALES - GENERAL
PAINLEVEL_OUTOF10: 0 - NO PAIN

## 2024-12-03 ASSESSMENT — PAIN SCALES - WONG BAKER: WONGBAKER_NUMERICALRESPONSE: NO HURT

## 2024-12-03 NOTE — PROGRESS NOTES
Physical Therapy    Physical Therapy Treatment    Patient Name: Samuel Mims  MRN: [id number]  Today's Date: 12/3/2024  Time Calculation  Start Time: 0856  Stop Time: 0932  Time Calculation (min): 36 min     3008/3008-A    Assessment/Plan   End of Session Communication: Bedside nurse, PCT/NA/MARGAUX  Assessment Comment: Pt presents with fair effort throughout todays session. Cognition and difficulty understand/comprehending cues limiting proper performance and sequencing of tasks. Poor standing tolerance, unable to weight shift to take small side steps towards HOB. Improving seated tolerance and transfers this date. Call light and all needs in reach.  End of Session Patient Position: Alarm on, Bed, 3 rail up  PT Plan  Inpatient/Swing Bed or Outpatient: Inpatient          General Visit Information:      General  Co-Treatment: OT  Co-Treatment Reason: Due to pt's significant debility for optimal safety and therapy session  Prior to Session Communication: Bedside nurse, PCT/TANIYA/MARGAUX  Patient Position Received: Bed, 3 rail up, Alarm on  Preferred Learning Style: written, verbal (pt has periodic episodes of difficulty hearing, required some written instruction and some verbal. Demonstrations also assisted pt in understanding task at hand.)  General Comment: Pt agreeable to therapy this date.  Subjective     Precautions:  Precautions  Hearing/Visual Limitations: [place]  Medical Precautions: Fall precautions  Precautions Comment: (+) Cdiff, contact plus precautions    Vital Signs:     Objective     Pain:  Pain Assessment  Pain Assessment: 0-10  0-10 (Numeric) Pain Score: 0 - No pain    Cognition:  Cognition  Overall Cognitive Status: Impaired  Arousal/Alertness: Inconsistent responses to stimuli  Orientation Level: Disoriented X4  Following Commands: Follows one step commands with repetition  Safety Judgment: Decreased awareness of need for assistance  Problem Solving: Assistance required to implement solutions  Cognition  Comments: flat affect, max VC/ demonstration for tasks.  Memory: Exceptions to WFL  Problem Solving: Exceptions to WFL  Safety/Judgement: Exceptions to WFL  Insight: Moderate  Impulsive: Moderately  Processing Speed: Delayed      Treatments:  Therapeutic Exercise  Therapeutic Exercise Performed: Yes  Therapeutic Exercise Activity 1: attempted seated, BLE LAQ 10x with inability to understand instruction or performance of therex. Attempted TC and PROM to assist in movement desired; however pt unable to reciprocate.     Balance/Neuromuscular Re-Education  Balance/Neuromuscular Re-Education Activity Performed: Yes  Balance/Neuromuscular Re-Education Activity 1: Pt performed static standing balance with arm in arm two therapist assist, MaxA x2 for trunk stabilization and bilat knee block to prevent buckling.  Pt tolerated ~1 minute standing before impulsively sitting.  Balance/Neuromuscular Re-Education Activity 2: Pt performed seated EOB reaching activity x3 BUE CGA for trunk stabilization however second therapist requird due to pt very impulsive with lateral leaning and desire to lay back down. Pt required MaxVC/TC to respond to task at hand and maintain attention.  Bed Mobility  Bed Mobility: Yes  Bed Mobility 1  Bed Mobility 1: Supine to sitting  Level of Assistance 1: Minimum assistance  Bed Mobility Comments 1: Pt performed supine<>EOB [place] with HOB elevated 70 degrees use of bedrails to lift trunk. Pt demonstrates ability to walk BLE off edge of bed.  Bed Mobility 2  Bed Mobility  2: Sitting to supine  Level of Assistance 2: Minimum assistance  Bed Mobility Comments 2: Pt performed EOB<>supine [place] for controlling descent of trunk and bringing BLEs into bed.  Bed Mobility 3  Bed Mobility 3: Scooting  Level of Assistance 3: Minimum assistance  Bed Mobility Comments 3: Pt required [place] for trunk stabilization during scooting EOB, Max VCs and demo provided in attempt to advise pt of task at hand. Improved performance  and carry over from demonstration.  Bed Mobility 4  Bed Mobility 4: Rolling right, Rolling left  Level of Assistance 4: Minimum assistance  Bed Mobility Comments 4: Pt performed rolling R<>L with use of bed rail [place] x3 each direction for personal hygiene. Follow single step command for performance of task.     Transfers  Transfer: Yes  Transfers 2  Transfer From 2: Bed to  Transfer to 2: Stand  Technique 2: Stand to sit, Sit to stand  Transfer Device 2: Gait belt (arm in arm with therapist)  Transfer Level of Assistance 2: Maximum assistance, +2, Maximum verbal cues, Maximum tactile cues  Trials/Comments 2: Pt performed STS x2 from EOB<>HHA MaxA x2 and bilat knee block required.. Max TC/VC required for sequencing. Poor standing tolerance with impulsive tendency to go back into seated in position.          Outcome Measures:     [facility] Basic Mobility  Turning from your back to your side while in a flat bed without using bedrails: A little  Moving from lying on your back to sitting on the side of a flat bed without using bedrails: A little  Moving to and from bed to chair (including a wheelchair): Total  Standing up from a chair using your arms (e.g. wheelchair or bedside chair): A lot  To walk in hospital room: Total  Climbing 3-5 steps with railing: Total  Basic Mobility - Total Score: 11                                      Education Documentation  Precautions, taught by Hailey Johnson PTA at 12/3/2024  1:01 PM.  Learner: Patient  Readiness: Acceptance  Method: Explanation, Demonstration  Response: Verbalizes Understanding, Needs Reinforcement    Body Mechanics, taught by Hailey Johnson PTA at 12/3/2024  1:01 PM.  Learner: Patient  Readiness: Acceptance  Method: Explanation, Demonstration  Response: Verbalizes Understanding, Needs Reinforcement    Home Exercise Program, taught by Hailey Johnson PTA at 12/3/2024  1:01 PM.  Learner: Patient  Readiness: Acceptance  Method: Explanation, Demonstration  Response:  Verbalizes Understanding, Needs Reinforcement    Mobility Training, taught by Hailey Johnson PTA at 12/3/2024  1:01 PM.  Learner: Patient  Readiness: Acceptance  Method: Explanation, Demonstration  Response: Verbalizes Understanding, Needs Reinforcement    Education Comments  No comments found.           EDUCATION:  Outpatient Education  Individual(s) Educated: Patient  Education Provided: Fall Risk  Encounter Problems       Encounter Problems (Active)       PT Problem       Pt will be able to perform all bed mobility tasks with Mod I.  (Progressing)       Start:  11/26/24    Expected End:  12/10/24            Pt will perform all transfers with CGA and LRAD with proper safety mechanics.   (Progressing)       Start:  11/26/24    Expected End:  12/10/24            Pt will ambulate 100 ft with CGA using LRAD for improved functional independence.  (Not Progressing)       Start:  11/26/24    Expected End:  12/10/24               Pain - Adult

## 2024-12-03 NOTE — PROGRESS NOTES
Physical Therapy                 Therapy Communication Note    Patient Name: Samuel Mims  MRN: [id number]  Department: [address]  Room: [facility]3008-A  Today's Date: 12/3/2024     Discipline: Physical Therapy    Comment:  POC adjusted according to pt's lack of progress during hospital stay    Assessment/Plan   PT Plan  Treatment/Interventions: Bed mobility, Transfer training, Gait training, Balance training, Neuromuscular re-education, Strengthening, Endurance training, Range of motion, Therapeutic exercise, Therapeutic activity, Home exercise program  PT Plan: Ongoing PT  PT Frequency: 4 times per week  PT Discharge Recommendations: Moderate intensity level of continued care  Equipment Recommended upon Discharge:  (LRAD)  PT Recommended Transfer Status: Assist x2  PT - OK to Discharge: Yes

## 2024-12-03 NOTE — CARE PLAN
The patient's goals for the shift include The patient is too lethargic at this time to participate in her care plan    The clinical goals for the shift include see care plan    Problem: Skin  Goal: Participates in plan/prevention/treatment measures  Outcome: Progressing  Goal: Prevent/manage excess moisture  Outcome: Progressing  Goal: Prevent/minimize sheer/friction injuries  Outcome: Progressing  Goal: Promote/optimize nutrition  Outcome: Progressing  Goal: Promote skin healing  Outcome: Progressing     Problem: Discharge Planning  Goal: Discharge to home or other facility with appropriate resources  Outcome: Progressing     Problem: Fall/Injury  Goal: Verbalize understanding of personal risk factors for fall in the hospital  Outcome: Progressing  Goal: Verbalize understanding of risk factor reduction measures to prevent injury from fall in the home  Outcome: Progressing  Goal: Use assistive devices by end of the shift  Outcome: Progressing  Goal: Pace activities to prevent fatigue by end of the shift  Outcome: Progressing  Goal: Not fall by end of shift  Outcome: Progressing     Problem: Pain  Goal: Walks with improved pain control throughout the shift  Outcome: Progressing  Goal: Performs ADL's with improved pain control throughout shift  Outcome: Progressing     Problem: Arrythmia/Dysrhythmia  Goal: Promote self management  Outcome: Progressing  Goal: Serial ECG will return to baseline  Outcome: Progressing  Goal: Vital signs return to baseline  Outcome: Progressing     Problem: Nutrition  Goal: Nutrition support goals are met within 48 hrs  Outcome: Progressing  Goal: Nutrition support is meeting 75% of nutrient needs  Outcome: Progressing  Goal: Tube feed tolerance  Outcome: Progressing  Goal: BG  mg/dL  Outcome: Progressing  Goal: Electrolytes WNL  Outcome: Progressing

## 2024-12-03 NOTE — PROGRESS NOTES
Nutrition Follow Up Assessment:   Nutrition Assessment    Reason for Assessment: Provider consult order    Nutrition Note:  Samuel Mims is a 48 y.o. female presenting 11/16 confusion, increased sleeping, decreased oral intake, nausea with intermittent vomiting, and diarrhea. Patient was hospitalized on 10/31-11/2/24 with hypokalemia, vomiting, and c-diff (tx with oral vancomycin 11/5-11/18/24); it was reported that pt family found multiple vancomycin pills at home--likely pt not taking medication. Work up revealing r/o continued C diff, metabolic encephalopathy (NH3 58umol/L), and UTI.  Cardiology involved in pt care due to prolonged QT interval, HTN, and sinus tachycardia; ID and Neurology also involved in pt care.  ST eval 11/17 suggesting minced moist foods level 5 with thin liquids and 1:1 feeding however 11/18, pt only responsive to pain therefore to place dobhoff for medications and to start enteral nutrition.     11/21/2024: Re-consult, follow up: Chart reviewed and events noted. Pt more awake/passed ST eval 11/20 for minced moist level 5/thin liquids with 1:1 feeding; pt pulled out corpak on 11/20.  Per GI, suspect hepatic encephalopathy possibly worsened acutely by inflammatory or infectious process--on lactulose and FMS in place since 11/20--noting 1.55L out 11/19-11/20AM.  Per Neurology, pt on high dose thiamine; LP results and MRI pending.     Follow up 11/26/24: Psych, Neurology and Speech Therapy on  consult. SLP completed bedside skilled dysphagia treatment today and recommended continued minced/moist diet w/thin liquids.  Per SLP notes, possible diet advancement pending dentures being brought to hospital. Per chart notes, pt w/fall out of bed on 11/25. MRI completed 11/25. Pt remains in precautions for cdiff. Pt remains w/hearing difficulty. Etiology of encephalopathy unknown.    Per meal documentation: 11/24 50%L and D, 100%B; 11/21 25%B; 11/26 aide noted 75% of breakfast consumed.     Follow  "up 12/3/24:  Diet advanced to soft & bite sized level 6 with thin liquids on 11/27 after pt's  brought up partial dentures for diet advancement assessment.  Lactulose discontinued after ammonia levels dropped on 11/28.  Pt pending SNF acceptance.  Pt seen resting this morning with nursing in room.  Intakes have been 100% when pt has been alert enough to put in her dentures and eat.  100% noted for two out of 3 meals yesterday.     Past Medical History   has a past medical history of Asthma, Clostridium difficile infection, GERD (gastroesophageal reflux disease), and Hypertension.IUD (placed 17 years ago per ), and c-diff (9/2024)   Surgical History   has a past surgical history that includes Cholecystectomy; Esophagogastroduodenoscopy; and Colonoscopy.        Nutrition History:  Energy Intake: Poor < 50 %  Food and Nutrient History: 11/21: Per  at bedside, pt ate ~25% of breakfast. 11/18: Pt from home with ; per nurse, pt only responsive to pain today; + encephalopathy. Family reported pt with very poor oral intakes PTA--did not take all antibiotic for recent C diff dx.  Per archive MNT note, pt reported last admit she was only able to tolerate tea/water/Gatorade x3 weeks. Spoke with pt  at bedside whom verfied above.  reports the last time pt had solid food was Monday, 11/11/2024, \"a small chocolate chip muffin.\"  Vitamin/Herbal Supplement Use: none reported  Food Allergies/Intolerances:  None  GI Symptoms: Diarrhea, however most recent bm noted as soft  Oral Problems: Chewing difficulty and Swallowing difficulty       Current Diet: Adult diet Regular; Soft and bite sized 6; Thin 0; Disposable tray (Isolation)    Anthropometrics:  Height: 167.6 cm (5' 6\")   Weight: 109 kg (240 lb 11.9 oz)   BMI (Calculated): 38.88             Weight Change %:  Significant Weight Loss: Yes  Interpretation of Weight Loss: >7.5% in 3 months  Wt Readings from Last 10 Encounters:   12/01/24 109 " kg (240 lb 11.9 oz)   10/31/24 113 kg (250 lb)   10/17/24 118 kg (260 lb)   10/10/24 123 kg (272 lb)   08/27/24 143 kg (315 lb)   08/13/24 143 kg (315 lb)        0-10 (Numeric) Pain Score: 0 - No pain      Nutrition Significant Labs:  Renal Lab Trend:   Results from last 7 days   Lab Units 12/02/24  0959 12/01/24  0636 11/30/24  0710 11/29/24  0526   POTASSIUM mmol/L 3.9 3.7 3.8 3.8   SODIUM mmol/L 137 138 139 141   EGFR mL/min/1.73m*2 84 85 89 85   BUN mg/dL 18 19 22 21   CREATININE mg/dL 0.85 0.84 0.81 0.84      Nutrition Specific Medications:  Scheduled medications  budesonide, 0.5 mg, nebulization, BID  divalproex sprinkle, 125 mg, oral, q12h SANTIAGO  influenza, 0.5 mL, intramuscular, During hospitalization  formoterol, 20 mcg, nebulization, BID  haloperidol lactate, 5 mg, intramuscular, Once  heparin (porcine), 5,000 Units, subcutaneous, q8h SANTIAGO  losartan, 50 mg, oral, BID  metoprolol tartrate, 50 mg, oral, q12h  multivitamin with minerals, 1 tablet, oral, Daily  nystatin, , Topical, BID  pantoprazole, 40 mg, oral, BID AC  spironolactone, 12.5 mg, oral, Daily  thiamine, 100 mg, oral, Daily      Nutrition Focused Physical Exam Findings:  defer: not appropriate at this time, skin pale in appearance  Edema:  Edema: +2 mild  Edema Location: BUE, BLE non-pitting  Physical Findings:  Skin: Positive (pale, sacrum red/blanchable, dry, left buttock wound)    I/O:   Last BM Date: 12/02/24; Stool Appearance: Soft (12/02/24 2051)     Estimated Needs:   Total Energy Estimated Needs (kCal):  (1440-1745kcal (13-16kcal/kg of 109kg))  Total Protein Estimated Needs (g):  (76-95g (1.3-1.6g/kg IBW of 59kg))  Total Fluid Estimated Needs (mL):  (1mL/kcal/d or as per physician)          Nutrition Diagnosis   Malnutrition Diagnosis  Patient has Malnutrition Diagnosis: Yes  Diagnosis Status: Ongoing  Malnutrition Diagnosis: Severe malnutrition related to chronic disease or condition  As Evidenced by: <75% of estimated nutrient needs x1  month with resulting 7.6% weight loss x 1 month and 24% x3 months.  Additional Assessment Information: 11/21: Case discussed with NP, ST, and RN. NP agreeable to liberalize to regular with minced/moist level 5 in an attempt to offer more meal selections while on modified food textures and thus encourage oral intakes.    Nutrition Diagnosis  Patient has Nutrition Diagnosis: Yes  Diagnosis Status (1): Ongoing  Nutrition Diagnosis 1: Inadequate oral intake  Related to (1): altered mental status  As Evidenced by (1): metabolic encephalopathy; dobbhoff out as pt pulled it out  however now on oral/modified diet.       Nutrition Interventions/Recommendations         Nutrition Prescription:  Individualized Nutrition Prescription Provided for : Diet: continue texture modified diet with soft & bite sized foods/ thin liquids with ONS        Nutrition Interventions:   Food and/or Nutrient Delivery Interventions  Interventions: Medical food supplement  Meals and Snacks: Texture-modified diet  Goal: will consume 75% of meals  Enteral Intake: Other (Comment)  Goal: n/a  Medical Food Supplement: Commercial beverage  Goal: Continue chocolate Ensure Plus HP 3x/day for additional 1050kcal and 60g pro, Magic Cup 1x/day and PRN for additional 290kcal and 9g pro/4 oz as well as Gelatein Plus 1x/d and PRN for additional 160kcal and 20g pro    Coordination of Nutrition Care by a Nutrition Professional  Collaboration and Referral of Nutrition Care: Collaboration by nutrition professional with other providers  Goal: BRAIN Pelaez       Nutrition Monitoring and Evaluation   Food/Nutrient Related History Monitoring  Monitoring and Evaluation Plan: Energy intake, Fluid intake  Energy Intake: Estimated energy intake  Criteria: will meet >75% of estimated energy needs from meals and ONS  Fluid Intake: Estimated fluid intake  Criteria: encourage fluids    Body Composition/Growth/Weight History  Monitoring and Evaluation Plan: Weight  Weight: Measured  weight  Criteria: maintain stable wt    Biochemical Data, Medical Tests and Procedures  Monitoring and Evaluation Plan: Electrolyte/renal panel, Glucose/endocrine profile  Electrolyte and Renal Panel: Sodium, Potassium, Phosphorus, Magnesium, Creatinine  Criteria: WNR  Glucose/Endocrine Profile: Glucose, casual  Criteria: BG 70-180mg/dL    Nutrition Focused Physical Findings  Monitoring and Evaluation Plan: Digestive System, Skin  Digestive System: Diarrhea  Criteria: decrease diarrhea  Skin: Impaired wound healing  Criteria: promote healing       Time Spent/Follow-up Reminder:   Time Spent (min): 30 minutes  Last Date of Nutrition Visit: 12/03/24  Nutrition Follow-Up Needed?: 5-7 days  Follow up Comment: STARR

## 2024-12-03 NOTE — PROGRESS NOTES
Occupational Therapy    OT Treatment    Patient Name: Samuel Mims  MRN: [id number]  Today's Date: 12/3/2024  Time Calculation  Start Time: 0857  Stop Time: 0932  Time Calculation (min): 35 min       3008/3008-A    Assessment:  OT Assessment: Pt presenting w/ significant acute cognitive deficits impacting her functional ability, safety awareness, and ADL participation. Pt benefits from continued OT services to improve safety and independence.  Evaluation/Treatment Tolerance: Patient limited by fatigue  Medical Staff Made Aware: Yes  End of Session Communication: Bedside nurse, PCT/NA/CTA (1:1 sitter)  End of Session Patient Position: Bed, 3 rail up, Alarm on  OT Assessment Results: Decreased ADL status, Decreased upper extremity range of motion, Decreased upper extremity strength, Decreased safe judgment during ADL, Decreased cognition, Decreased endurance, Decreased functional mobility, Decreased gross motor control, Decreased IADLs  Evaluation/Treatment Tolerance: Patient limited by fatigue  Medical Staff Made Aware: Yes    Plan:  Treatment Interventions: ADL retraining, Functional transfer training  OT Frequency: 3 times per week  OT Discharge Recommendations: Moderate intensity level of continued care, 24 hr supervision due to cognition  Treatment Interventions: ADL retraining, Functional transfer training  Subjective     Outcome Measures:[facility] Daily Activity  Putting on and taking off regular lower body clothing: Total  Bathing (including washing, rinsing, drying): A lot  Putting on and taking off regular upper body clothing: A lot  Toileting, which includes using toilet, bedpan or urinal: Total  Taking care of personal grooming such as brushing teeth: A little  Eating Meals: A little  Daily Activity - Total Score: 12       12/03/24 0857   OT Last Visit   OT Received On 12/03/24   General   Reason for Referral impaired ADL's   Co-Treatment PT   Co-Treatment Reason safety   Prior to Session Communication  Bedside nurse  (1:1 sitter)   Patient Position Received Bed, 3 rail up   Preferred Learning Style verbal;visual;written   General Comment Pt agreeable to therapy. Pt [place]. Therapist writing on paper to communicate. Pt at times could hear conversation.   Precautions   Medical Precautions Fall precautions;Infection precautions   Cognition   Orientation Level Disoriented to place;Disoriented to time;Disoriented to situation   Grooming   Grooming Level of Assistance Minimal verbal cues;Visual aid cues;Tactile cues   Grooming Where Assessed Edge of bed   Grooming Comments Pt washed face and brushed teeth. Max A to brush hiar due to matted hair   Toileting   Toileting Level of Assistance Dependent   Where Assessed Bed level   Toileting Comments Therapist noticed soiled pad upon stance. 3rd person to complete loretta care in stance . Pt brought back to bed adn rolled in bed for finishing loretta care.   Bed Mobility   Bed Mobility   (min A to EOB, Max A of 2 people to get back into bed.)   Transfers   Transfer   (STS at max A of 2 people, 3 trials, arm in arm)   Static Sitting Balance   Static Sitting-Level of Assistance   (CGA at EOB)   Dynamic Sitting Balance   Dynamic Sitting-Comments Mod A for 2 scoots alopngside EOB. Pt required visual demonstration for activity.   IP OT Assessment   OT Assessment Pt presenting w/ significant acute cognitive deficits impacting her functional ability, safety awareness, and ADL participation. Pt benefits from continued OT services to improve safety and independence.   Evaluation/Treatment Tolerance Patient limited by fatigue   Medical Staff Made Aware Yes   End of Session Communication Bedside nurse;PCT/NA/CTA  (1:1 sitter)   End of Session Patient Position Bed, 3 rail up;Alarm on   OT Assessment   OT Assessment Results Decreased ADL status;Decreased upper extremity range of motion;Decreased upper extremity strength;Decreased safe judgment during ADL;Decreased cognition;Decreased  endurance;Decreased functional mobility;Decreased gross motor control;Decreased IADLs   Education   Individual(s) Educated Patient   Education Provided Fall precautons   Patient Response to Education Patient/Caregiver Verbalized Understanding of Information   Education Comment Pt would benefit from continued reinforcement   Inpatient Plan   Treatment Interventions ADL retraining;Functional transfer training   OT Frequency 3 times per week   OT Discharge Recommendations Moderate intensity level of continued care;24 hr supervision due to cognition           Goals:  Encounter Problems       Encounter Problems (Active)       OT Problem       PATIENT WILL MAINTAIN UPRIGHT BALANCE with feet apart SUPPORTED WITH minimal assist FOR 10 minutes (Progressing)       Start:  11/27/24    Expected End:  12/11/24            PATIENT WILL MAINTAIN DYNAMIC SEATED BALANCE DURING reaching across table (Progressing)       Start:  11/27/24    Expected End:  12/11/24            PATIENT WILL DEMONSTRATE INDEPENDENCE WITH UPPER BODY donning and doffing all UE clothes (Progressing)       Start:  11/27/24    Expected End:  12/11/24            PATIENT WILL WASH FACE WITH modified independent (Progressing)       Start:  11/27/24    Expected End:  12/11/24            PATIENT WILL sponge bathe WITH supervision (Progressing)       Start:  11/27/24    Expected End:  12/11/24

## 2024-12-03 NOTE — PROGRESS NOTES
Speech-Language Pathology    SLP Adult Inpatient  Speech-Language Pathology Treatment     Patient Name: Samuel Mims  MRN: [id number]  Today's Date: 12/3/2024  Time Calculation  Start Time: 1156  Stop Time: 1215  Time Calculation (min): 19 min         Current Problem:   1. Cognitive changes  Referral to Occupational Therapy    Referral to Occupational Therapy    Referral to Physical Therapy    Referral to Speech Therapy    Referral to Primary Care - [facility]    CANCELED: Referral to Occupational Therapy      2. Sepsis with encephalopathy without septic shock, due to unspecified organism (Multi)        3. Acute cystitis with hematuria          Assessment:  Patient was seen for cognitive communication intervention this date. Dysphagia goals were not addressed this visit. The patient demonstrated improved orientation and benefited from written communication. Responses from the patient were written instead of spoken. The patient was able to demonstrate comprehension of sentence level material regarding concrete an abstract concepts. The patient was able to generate abstract category items but did not immediately realize the location and function of her safety button. Pt did not require redirection to complete therapeutic tasks but either fell asleep or was on her phone in between tasks. Recommend further assessment of functional problem solving and safety. Encouraged patient's spouse to consider a hearing evaluation as soon as they can. Spouse informed about a Pocket Talker as a consideration to enhance communication in the meantime. ST will continue to follow.    Baseline Assessment:  Oxygen: room air          Plan:  SLP Plan: Skilled SLP Skilled speech therapy is warranted in order to provide training and instruction regarding the use of compensatory communication strategies and pt/caregiver education in order to improve cognitive communication functioning in the current setting.  SLP Frequency: 4x per week    Duration: 2 weeks   Next Treatment Priority: dysphagia treatment   Discussed POC: Patient (SO)   Discussed Risks/Benefits: Yes   Patient/Caregiver Agreeable: Yes   SLP Discharge Recommendations: unable to determine at this time, refer to subsequent notes    Goals:  Swallowing Goals (established on 11/17/2024):   Patient will tolerate recommended diet with no overt s/s of difficulty or aspiration.        Progressing. Goal not addressed this visit  2. Patient will tolerate advanced solid trials with no overt s/s of difficulty or aspiration to assess readiness for diet upgrade.         Progressing. Goal not addressed this visit     Therapeutic Swallow Intervention: PO Trials, Patient/Caregiver Education, Compensatory Swallow Strategies     Speech Goals (established on 11/29/2024):    Patient will improve orientation to baseline given minimal to moderate verbal cues.    Progress:  Pt oriented to person, place and year.   2. Patient will express wants/needs/preferences/opinions in 90% of trials utilizing multiple communication modalities (speech, gesture, etc.) as needed.    Pt benefits from written communication due to hearing loss. The patient prefers to write her responses despite motor speech skills grossly WFL because she feels it's important to have documentation of what she is communicating. Spouse educated on a Pocket Talker as a supplement for communication until hearing is assessed and aids available.  3. Further cognitive/communication assessment (i.e., receptive/expressive language, reading comprehension, paragraph level written language, recall of paragraph level information, sequencing, safety awareness/judgment/problem solving, and abstract/math reasoning to be completed with development of goals as appropriate.    -Following written directions (sentence level)   -2 components, concrete - 100%   -2 components, concrete & abstract - 100%   -2 components, abstract - 88%  -Generative naming, abstract (goal  of 3 items): 83%, mild perseveration noted  -Pt unable to identify use of call light to request assistance. Call button reinforced  -When asked if she is having any difficulty with meals, the patient wrote she is paranoid something bad is going to happen. Spouse inferred this was related to financial matters not eating  -When given a written prompt with encouragement to speak responses instead of writing them, the patient stated she wanted her responses recorded for reference later    Subjective:  Patient's spouse, Salvatore, was present for the session. Spouse reports patient is responding to texts but not in a way that she would typically respond.     Pain:  Pain Assessment  Pain Assessment: 0-10  0-10 (Numeric) Pain Score: 0 - No pain     Education:  Learner:  Patient, Significant other  Barriers to Learning: Cognitive limitations   Method: Verbal  Education - Topic: ST provided patient education regarding role of ST, purpose of assessment, clinical impressions, goals of treatment, and plan of care. Patient verbalized full comprehension, consistent with cognitive status. Education will be reinforced. ST further coordinated with RN regarding recommendations and precautions per this assessment, with RN verbalizing understanding.  Outcome:  Needs review/reinforcement

## 2024-12-03 NOTE — CARE PLAN
The patient's goals for the shift include The patient is too lethargic at this time to participate in her care plan    The clinical goals for the shift include see care plan      Problem: Skin  Goal: Participates in plan/prevention/treatment measures  Outcome: Progressing  Flowsheets (Taken 12/3/2024 0151 by Marley Cheatham LPN)  Participates in plan/prevention/treatment measures: Elevate heels  Goal: Prevent/manage excess moisture  Outcome: Progressing  Flowsheets (Taken 12/3/2024 1726)  Prevent/manage excess moisture: Moisturize dry skin  Goal: Prevent/minimize sheer/friction injuries  Outcome: Progressing  Flowsheets (Taken 12/3/2024 1726)  Prevent/minimize sheer/friction injuries: Increase activity/out of bed for meals  Goal: Promote/optimize nutrition  Outcome: Progressing  Flowsheets (Taken 12/3/2024 1726)  Promote/optimize nutrition:   Monitor/record intake including meals   Assist with feeding  Goal: Promote skin healing  Outcome: Progressing  Flowsheets (Taken 12/3/2024 1726)  Promote skin healing: Assess skin/pad under line(s)/device(s)     Problem: Discharge Planning  Goal: Discharge to home or other facility with appropriate resources  Outcome: Progressing  Flowsheets (Taken 12/3/2024 1726)  Discharge to home or other facility with appropriate resources:   Identify barriers to discharge with patient and caregiver   Identify discharge learning needs (meds, wound care, etc)     Problem: Fall/Injury  Goal: Verbalize understanding of personal risk factors for fall in the hospital  Outcome: Progressing  Goal: Verbalize understanding of risk factor reduction measures to prevent injury from fall in the home  Outcome: Progressing  Goal: Use assistive devices by end of the shift  Outcome: Progressing  Goal: Pace activities to prevent fatigue by end of the shift  Outcome: Progressing  Goal: Not fall by end of shift  Outcome: Progressing     Problem: Pain  Goal: Walks with improved pain control throughout the  shift  Outcome: Progressing  Goal: Performs ADL's with improved pain control throughout shift  Outcome: Progressing     Problem: Arrythmia/Dysrhythmia  Goal: Promote self management  Outcome: Progressing  Goal: Serial ECG will return to baseline  Outcome: Progressing  Goal: Vital signs return to baseline  Outcome: Progressing     Problem: Nutrition  Goal: Nutrition support goals are met within 48 hrs  Outcome: Progressing  Goal: Nutrition support is meeting 75% of nutrient needs  Outcome: Progressing  Goal: Tube feed tolerance  Outcome: Progressing  Goal: BG  mg/dL  Outcome: Progressing  Goal: Electrolytes WNL  Outcome: Progressing     EOS note: Pt oriented x1-3, but very drowsy at times. Worked with therapy today to sit at edge of bed. Following commands better but still very forgetful. Able to be reoriented and communicates well using pen and paper. Family visited at bedside. Had BM today. HR remains tachy, oral metoprolol given at lunch for 1x dose and will continue x2 a day. Continuing to monitor vitals. Refuses to wear tele    Pt resting at this time. Bed is in lowest position and locked with alarm on. Call light and personal possesions are within reach.

## 2024-12-03 NOTE — PROGRESS NOTES
Physical Therapy                 Therapy Communication Note    Patient Name: Samuel Mims  MRN: [id number]  Today's Date: 12/2/2024     Discipline: Physical Therapy    Room 3008    Missed Time:   Missed Visit Reason:     Comment:         12/02/24 1630   General   PT Missed Visit Yes   Missed Visit Reason Patient refused    Attempted to see pt. at this time but pt. declined to participate.

## 2024-12-03 NOTE — NURSING NOTE
2100-Pt is very restless, haldol and melatonin given at bedtime without effectiveness. She is confused and impulsive.She remains very restless with big movements in the bed,throwing her legs up the wall,and extremities over siderails. Bed alarm on,call bell in reach  2230-sitter at bedside,pt remains very restless and fidgety in bed.   0600-slept poorly overnight. She remains very restless/fidgety. She is inc of b/b.She was started on depakote. Sitter at bedside,bed alarm on,call bell in reach.

## 2024-12-03 NOTE — PROGRESS NOTES
12/03/24 1109   Discharge Planning   Home or Post Acute Services Post acute facilities (Rehab/SNF/etc)   Type of Post Acute Facility Services Skilled nursing   Expected Discharge Disposition SNF     Need new SNF choices, None of the previous choices were able to accept. Spoke with  and gave several more choices. He is open to most facilities, he just do not want either [facility]. Request send to [facility] to send out additional referrals. SW to follow.    2:15 Spoke with pt's , Salvatore, and reviewed the accepting facilities. His FOC is [facility]. Facility updated on FOC. Pt will need to be sitter free for 24 hr before she can be discharged. SW to follow.

## 2024-12-04 VITALS
DIASTOLIC BLOOD PRESSURE: 74 MMHG | OXYGEN SATURATION: 100 % | SYSTOLIC BLOOD PRESSURE: 116 MMHG | BODY MASS INDEX: 38.18 KG/M2 | TEMPERATURE: 97.5 F | RESPIRATION RATE: 18 BRPM | HEIGHT: 66 IN | WEIGHT: 237.6 LBS | HEART RATE: 96 BPM

## 2024-12-04 PROCEDURE — 94640 AIRWAY INHALATION TREATMENT: CPT

## 2024-12-04 PROCEDURE — 2500000004 HC RX 250 GENERAL PHARMACY W/ HCPCS (ALT 636 FOR OP/ED): Performed by: NURSE PRACTITIONER

## 2024-12-04 PROCEDURE — 92507 TX SP LANG VOICE COMM INDIV: CPT | Mod: GN

## 2024-12-04 PROCEDURE — 2500000002 HC RX 250 W HCPCS SELF ADMINISTERED DRUGS (ALT 637 FOR MEDICARE OP, ALT 636 FOR OP/ED): Performed by: NURSE PRACTITIONER

## 2024-12-04 PROCEDURE — 2500000001 HC RX 250 WO HCPCS SELF ADMINISTERED DRUGS (ALT 637 FOR MEDICARE OP): Performed by: FAMILY MEDICINE

## 2024-12-04 PROCEDURE — 2500000001 HC RX 250 WO HCPCS SELF ADMINISTERED DRUGS (ALT 637 FOR MEDICARE OP)

## 2024-12-04 PROCEDURE — 2500000001 HC RX 250 WO HCPCS SELF ADMINISTERED DRUGS (ALT 637 FOR MEDICARE OP): Performed by: NURSE PRACTITIONER

## 2024-12-04 RX ORDER — CEPHALEXIN 500 MG/1
500 CAPSULE ORAL EVERY 12 HOURS SCHEDULED
Qty: 5 CAPSULE | Refills: 0 | Status: SHIPPED | OUTPATIENT
Start: 2024-12-04 | End: 2024-12-04

## 2024-12-04 RX ORDER — CEPHALEXIN 500 MG/1
500 CAPSULE ORAL EVERY 6 HOURS SCHEDULED
Qty: 9 CAPSULE | Refills: 0 | Status: SHIPPED | OUTPATIENT
Start: 2024-12-04 | End: 2024-12-07

## 2024-12-04 RX ORDER — CEPHALEXIN 500 MG/1
500 CAPSULE ORAL EVERY 12 HOURS SCHEDULED
Status: DISCONTINUED | OUTPATIENT
Start: 2024-12-04 | End: 2024-12-04 | Stop reason: DRUGHIGH

## 2024-12-04 RX ORDER — METOPROLOL TARTRATE 100 MG/1
100 TABLET ORAL DAILY
Qty: 30 TABLET | Refills: 5 | Status: SHIPPED | OUTPATIENT
Start: 2024-12-04

## 2024-12-04 RX ORDER — LANOLIN ALCOHOL/MO/W.PET/CERES
100 CREAM (GRAM) TOPICAL DAILY
Qty: 30 TABLET | Refills: 12 | Status: SHIPPED | OUTPATIENT
Start: 2024-12-04

## 2024-12-04 RX ORDER — DIVALPROEX SODIUM 125 MG/1
250 CAPSULE, COATED PELLETS ORAL EVERY 12 HOURS SCHEDULED
Qty: 120 CAPSULE | Refills: 0 | Status: SHIPPED | OUTPATIENT
Start: 2024-12-04 | End: 2025-01-03

## 2024-12-04 RX ORDER — ESOMEPRAZOLE MAGNESIUM 40 MG/1
40 CAPSULE, DELAYED RELEASE ORAL
Qty: 30 CAPSULE | Refills: 5 | Status: SHIPPED | OUTPATIENT
Start: 2024-12-04

## 2024-12-04 RX ORDER — METOPROLOL TARTRATE 50 MG/1
50 TABLET ORAL NIGHTLY
Qty: 30 TABLET | Refills: 5 | Status: SHIPPED | OUTPATIENT
Start: 2024-12-04

## 2024-12-04 RX ORDER — HALOPERIDOL 1 MG/1
1 TABLET ORAL EVERY 6 HOURS PRN
Qty: 120 TABLET | Refills: 1 | Status: SHIPPED | OUTPATIENT
Start: 2024-12-04

## 2024-12-04 RX ORDER — NYSTATIN 100000 U/G
CREAM TOPICAL 2 TIMES DAILY
Qty: 100 G | Refills: 1 | Status: SHIPPED | OUTPATIENT
Start: 2024-12-04

## 2024-12-04 RX ORDER — TALC
3 POWDER (GRAM) TOPICAL NIGHTLY PRN
Qty: 30 TABLET | Refills: 0 | Status: SHIPPED | OUTPATIENT
Start: 2024-12-04

## 2024-12-04 RX ORDER — CEPHALEXIN 500 MG/1
500 CAPSULE ORAL 4 TIMES DAILY
Qty: 28 CAPSULE | Refills: 0 | Status: SHIPPED | OUTPATIENT
Start: 2024-12-04 | End: 2024-12-11

## 2024-12-04 RX ORDER — MULTIVIT-MIN/IRON FUM/FOLIC AC 7.5 MG-4
1 TABLET ORAL DAILY
Qty: 30 TABLET | Refills: 11 | Status: SHIPPED | OUTPATIENT
Start: 2024-12-04

## 2024-12-04 RX ORDER — CEPHALEXIN 500 MG/1
500 CAPSULE ORAL EVERY 6 HOURS SCHEDULED
Status: DISCONTINUED | OUTPATIENT
Start: 2024-12-04 | End: 2024-12-04 | Stop reason: HOSPADM

## 2024-12-04 ASSESSMENT — COGNITIVE AND FUNCTIONAL STATUS - GENERAL
DRESSING REGULAR LOWER BODY CLOTHING: A LOT
DRESSING REGULAR UPPER BODY CLOTHING: A LOT
TOILETING: A LOT
MOBILITY SCORE: 15
WALKING IN HOSPITAL ROOM: TOTAL
STANDING UP FROM CHAIR USING ARMS: A LITTLE
PERSONAL GROOMING: A LOT
CLIMB 3 TO 5 STEPS WITH RAILING: TOTAL
TURNING FROM BACK TO SIDE WHILE IN FLAT BAD: A LITTLE
MOVING TO AND FROM BED TO CHAIR: A LITTLE
HELP NEEDED FOR BATHING: A LOT
DAILY ACTIVITIY SCORE: 14

## 2024-12-04 ASSESSMENT — PAIN SCALES - GENERAL
PAINLEVEL_OUTOF10: 0 - NO PAIN
PAINLEVEL_OUTOF10: 0 - NO PAIN

## 2024-12-04 ASSESSMENT — PAIN - FUNCTIONAL ASSESSMENT: PAIN_FUNCTIONAL_ASSESSMENT: 0-10

## 2024-12-04 NOTE — PROGRESS NOTES
Speech-Language Pathology    SLP Adult Inpatient Treatment     Patient Name: Samuel Mims  MRN: [id number]  Today's Date: 12/3/2024  Time Calculation  Start Time: 1723  Stop Time: 1738  Time Calculation (min): 15 min      Current Problem:   1. Cognitive changes  Referral to Occupational Therapy    Referral to Occupational Therapy    Referral to Physical Therapy    Referral to Speech Therapy    Referral to Primary Care - [facility]    CANCELED: Referral to Occupational Therapy      2. Sepsis with encephalopathy without septic shock, due to unspecified organism (Multi)        3. Acute cystitis with hematuria          Swallow Recommendations:   Soft & bite sized/chopped (IDDSI Level 6)   Thin liquids (IDDSI Level 0)   Medication Administration: Whole with thin liquid (one at a time)   Compensatory Strategies: Small bites, small, single sips thin liquid via cup, slow rate during PO/sips, upright 90 degrees for intake, slow rate, wear upper partials during mealtimes, and distant supervision for feeding  Continue dysphagia therapy.      Therapeutic Swallow Intervention: Patient/caregiver education; PO Trials; Compensatory Swallow Strategies     Patient was engaged in skilled speech therapy targeting dysphagia.  SLP TX Intervention Outcome: Making Progress Towards Goals  SLP Assessment Results: Dysphagia  Prognosis: Good     Swallow Assessment:   Patient seen this date in follow-up to clinical swallow evaluation completed 11/17/24. Patient was able to participate in PO trials, with occasional reiteration of directives required 2/2 decreased hearing acuity. Following oral care, discussed above compensatory swallow techniques. Patient able to state 3 safe swallow strategies without cues. Patient required forced choice cues for 3 of the strategies. Verbal models were provided for the remainder. Patient self fed PO trials of puree, regular and thin liquid consistencies (latter via straw). Patient with no S/S oral  "dysphagia, however, noted S/S pharyngeal dysphagia (throat clearing) following regular consistency x2. Patient with no apparent S/S pharyngeal dysphagia with thin liquids, even with consecutive sips. There were no other S/S pharyngeal dysphagia evident. Patient with improved sustained attention to task this date (as compared to previous ST session), allowing for self feeding to take place. Patient also with improved awareness of S/S dysphagia, offering, \"I shouldn't have eaten the candies so quickly\" without cues.   Patient is appropriate to continue previously recommended diet of soft & bite sized/chopped (IDDSI Level 6) and thin liquids (IDDSI Level 0).   ST to continue to follow during inpatient stay.  Treatment Tolerance: Patient tolerated treatment well  Strengths: Motivation  Barriers: Cognition, Comorbidities  Education Provided: Yes     Plan:  Treatment/Interventions: Dysphagia management  SLP TX Plan: Continue Plan of Care  SLP Plan: Skilled SLP  SLP Frequency: 2x per week  Duration: 2 weeks  SLP Discharge Recommendations: Continue skilled SLP services at the next level of care  Next Treatment Priority: Diet tolerance/Advance diet as tolerated  Discussed POC: Patient, Caregiver  Patient/Caregiver Agreeable: Yes      Subjective:   Patient received bedside for dysphagia therapy. Upon approach, noted that patient had chocolate candies, each with an outer shell, on bedside table.  Patient required only partial assistance to assume an upright position for PO trials, with initiation of same as able. Patient was pleasant and cooperative. Patient is [place]. Vocal quality was WFL.       Oxygen Status:    Room air      General Visit Information:   Patient underwent a clinical swallow evaluation on 11/17/24. ST for dysphagia was recommended. Current diet level is: Soft & bite sized/chopped (IDDSI Level 6) and Thin liquids (IDDSI Level 0).   BRAIN Catherine reports patient coughed with crushed meds in puree this date. " "    Pain:  Pain Assessment: 0-10   Pain Score: 0     Plan:  ST to continue to follow during inpatient stay for ongoing assessment of diet tolerance/potential for advancing, and generalization of safe swallow strategies.     Treatment/Interventions: Dysphagia management; Patient/Caregiver Education, PO trials; Compensatory Swallow Techniques  SLP Frequency: 2x per week-dysphagia therapy; 4x per week-cognitive communication therapy     Objective    Swallowing Goals (established on 11/17/2024):   Patient will tolerate recommended diet with no overt s/s of difficulty or aspiration.        Not targeted this date.  2. Patient will tolerate advanced solid trials with no overt s/s of difficulty or aspiration to assess readiness for diet upgrade.         Progressing. Patient with throat clearing x2 while consuming regular consistency candies that had been on bedside table, with notable improved recognition of same, offering, \"I need to slow down.\" Patient with no other S/S dysphagia throughout this session. Continue current diet level (Soft & bite sized/chopped (IDDSI Level 6) and Thin liquids (IDDSI Level 0) with above safe swallow strategies.     Therapeutic Swallow Intervention: PO Trials, Patient/Caregiver Education, Compensatory Swallow Strategies     Education:  Learner  patient; RN   Barriers to Learning Acuteness of illness barrier; cognitive linguistic limitations; decreased hearing acuity  Method  demonstration; verbal; visual   Education - Topic  SLP provided patient education regarding role of SLP, purpose of assessment, clinical impressions, goals of treatment, and plan of care. Patient verbalized questionable full comprehension, consistent with cognitive communication status. Education will be reinforced. ST further coordinated with RN regarding recommendations and precautions per this assessment, with RN verbalizing understanding.  Outcome needs review/reinforcement                    "

## 2024-12-04 NOTE — NURSING NOTE
1100 report [facility] Bhavna Busby

## 2024-12-04 NOTE — PROGRESS NOTES
"Inpatient Speech Language Pathology Treatment Note     Patient Name: Samuel Mims  MRN: [id number]  : 1975  Patient Room: [address] [place], [place] [address]  Today's Date: 24  Time Calculation  Start Time: 1305  Stop Time: 1325  Time Calculation (min): 20 min     PLAN:  Skilled speech therapy for Speech/language and cognitive linguistic treatment continues to be warranted in order to improve patients abilities to safely communicate wants/needs, engage socially and safely complete ADLs.     SLP TX Plan: Discharge from Speech Therapy  SLP Frequency: 4x per week  Discussed POC: Patient  Discussed Risks/Benefits: Patient  Patient/Caregiver Agreeable: Yes  Continue skilled SLP at next level of care: Yes      SLP Assessment:  Patient seen for cognitive tx this date. Dysphagia tx not addressed. Patient continues to demonstrate improvement in orientation with exception of location (mod verbal cueing for accuracy). Patient with noted short-term difficulty with short-term memory unable to recall being in the hospital for the last three weeks stating \"I was just at home a couple days ago\". Patient benefits from written communication due to hearing loss, utilizing spoken language throughout this session when cued she can speak instead of writing everything out. Patient completing structured moderate level reasoning/divergent tasks with independence, but demonstrates lack of insight into her deficits and poor safety awareness putting her at increased risk of subsequent falls. Recommend further skilled services at the next level of care targeting swallowing and cognitive therapy to improve safety, independence with ADL's, and enhance communication to get her basic wants/needs met.     Subjective:  Pt. Seen at bedside for skilled speech language treatment.   Prior to Session Communication: Bedside nurse    Pain:  Ratin-10   Pt report: 0  Action taken: none needed       Oxygen Status:   room air        Goals:   Goals:  Swallowing " Goals (established on 11/17/2024):   Patient will tolerate recommended diet with no overt s/s of difficulty or aspiration.    Progress: did not address    2. Patient will tolerate advanced solid trials with no overt s/s of difficulty or aspiration to assess readiness for diet upgrade.   Progress: did not address     Therapeutic Swallow Intervention: PO Trials, Patient/Caregiver Education, Compensatory Swallow Strategies     Speech Goals (established on 11/29/2024):    Patient will improve orientation to baseline given minimal to moderate verbal cues.     Pt oriented to person, date with independent use of phone for day, time, correct state (incorrect city).     Status: Continue     2. Patient will express wants/needs/preferences/opinions in 90% of trials utilizing multiple communication modalities (speech, gesture, etc.) as needed.      Patient utilizing verbal communication this date to complete all cognitive tasks given written cues. Patient participating in communication with  regarding discharge to skilled nursing facility. She shared that she was just at home and unable to recall being in the hospital for the last three weeks.     Status: Continue    3. Further cognitive/communication assessment (i.e., receptive/expressive language, reading comprehension, paragraph level written language, recall of paragraph level information, sequencing, safety awareness/judgment/problem solving, and abstract/math reasoning to be completed with development of goals as appropriate.      -reasoning: provided 3 things you would do if her wallet were lost- 100% ind    -divergent naming: added 5 members to abstract categories- 73% inc to 100% given mod verbal cues    -categorical organization   -concrete - 100% ind   -abstract- 100% ind    Status: Continue    Treatment Outcome:  SLP TX Intervention Outcome: Making Progress Towards Goals    Treatment Tolerance: Patient tolerated treatment well   Prognosis: Good   Barriers:  Comorbidities   Medical Staff Made Aware: Yes         SLP Inpatient Education:  Learner family, patient   Barriers to Learning Acuteness of the illness, Hearing problems   Method Verbal, Demonstration, Written materials provided and reviewed   Education - Topic ST provided patient education regarding discharge recommendations, cognitive communication strategies   Outcome    1= partially meets; needs review

## 2024-12-04 NOTE — CARE PLAN
The patient's goals for the shift include The patient is too lethargic at this time to participate in her care plan    The clinical goals for the shift include   Problem: Skin  Goal: Participates in plan/prevention/treatment measures  Outcome: Progressing  Goal: Prevent/manage excess moisture  Outcome: Progressing  Goal: Prevent/minimize sheer/friction injuries  Outcome: Progressing  Flowsheets (Taken 12/4/2024 9402)  Prevent/minimize sheer/friction injuries:   Use pull sheet   HOB 30 degrees or less  Goal: Promote/optimize nutrition  Outcome: Progressing  Goal: Promote skin healing  Outcome: Progressing     Problem: Discharge Planning  Goal: Discharge to home or other facility with appropriate resources  Outcome: Progressing     Problem: Fall/Injury  Goal: Verbalize understanding of personal risk factors for fall in the hospital  Outcome: Progressing  Goal: Verbalize understanding of risk factor reduction measures to prevent injury from fall in the home  Outcome: Progressing  Goal: Use assistive devices by end of the shift  Outcome: Progressing  Goal: Pace activities to prevent fatigue by end of the shift  Outcome: Progressing  Goal: Not fall by end of shift  Outcome: Progressing     Problem: Pain  Goal: Walks with improved pain control throughout the shift  Outcome: Progressing  Goal: Performs ADL's with improved pain control throughout shift  Outcome: Progressing     Problem: Arrythmia/Dysrhythmia  Goal: Promote self management  Outcome: Progressing  Goal: Serial ECG will return to baseline  Outcome: Progressing  Goal: Vital signs return to baseline  Outcome: Progressing     Problem: Nutrition  Goal: Nutrition support goals are met within 48 hrs  Outcome: Progressing  Goal: Nutrition support is meeting 75% of nutrient needs  Outcome: Progressing  Goal: Tube feed tolerance  Outcome: Progressing  Goal: BG  mg/dL  Outcome: Progressing  Goal: Electrolytes WNL  Outcome: Progressing

## 2024-12-04 NOTE — PROGRESS NOTES
Samuel Mims is a 48 y.o. female on day 18 of admission presenting with Sepsis with encephalopathy without septic shock, due to unspecified organism (Multi).    Subjective   Pt had a calm night.  She took her meds.  She continues to be confused and thought she was having a miscarriage and needed an OB.  She phoned her  at 4 am asking him to come and stay with her.  He noticed the calf of her right leg is red. She denies pain, but she is unable to follow the conversation.       Objective     Last Recorded Vitals  /90 (BP Location: Right arm, Patient Position: Lying)   Pulse (!) 113 Comment: nurse aware  Temp 36.3 °C (97.3 °F) (Temporal)   Resp 16   Wt 108 kg (237 lb 9.6 oz)   SpO2 94%   Intake/Output last 3 Shifts:    Intake/Output Summary (Last 24 hours) at 12/4/2024 0750  Last data filed at 12/3/2024 0900  Gross per 24 hour   Intake 240 ml   Output --   Net 240 ml       Admission Weight  Weight: 127 kg (280 lb) (11/16/24 1755)    Daily Weight  12/04/24 : 108 kg (237 lb 9.6 oz)      Physical Exam:  General: Not in acute distress  HEENT: PERRLA, Left TM indicates recent otitis media, Right TM full of fluid.  Neck: Normal to inspection  Lungs: Clear to auscultation, work of breathing within normal limit  Cardiac: Regular rate and rhythm  Abdomen: Soft nontender, positive bowel sounds  : Exam deferred  Skin: Perianal erythema with clear demarcation.  Hematology: No petechia or excessive ecchymosis  Musculoskeletal: Without significant trauma.  Neurological:  Alert to person and place.     Relevant Results  Scheduled medications  budesonide, 0.5 mg, nebulization, BID  divalproex sprinkle, 250 mg, oral, q12h SANTIAGO  influenza, 0.5 mL, intramuscular, During hospitalization  formoterol, 20 mcg, nebulization, BID  haloperidol lactate, 5 mg, intramuscular, Once  heparin (porcine), 5,000 Units, subcutaneous, q8h SANTIAGO  losartan, 50 mg, oral, BID  metoprolol tartrate, 100 mg, oral, Daily  metoprolol  tartrate, 50 mg, oral, Nightly  multivitamin with minerals, 1 tablet, oral, Daily  nystatin, , Topical, BID  pantoprazole, 40 mg, oral, BID AC  spironolactone, 12.5 mg, oral, Daily  thiamine, 100 mg, oral, Daily      Continuous medications     PRN medications  PRN medications: acetaminophen **OR** acetaminophen, albuterol, haloperidol, hydrALAZINE, melatonin, metoprolol   No results found for this or any previous visit (from the past 24 hours).   Lab Results   Component Value Date    BLOODCULT No growth at 4 days -  FINAL REPORT 11/16/2024    BLOODCULT No growth at 4 days -  FINAL REPORT 11/16/2024    URINECULTURE (A) 11/16/2024     Multiple organisms present, probable contamination. Repeat culture if clinically indicated.            Assessment/Plan   Samuel Mims is a 48 y.o. female on day 18 of admission presenting with Sepsis with encephalopathy without septic shock, due to unspecified organism (Multi).  Principal Problem:    Sepsis with encephalopathy without septic shock, due to unspecified organism (Multi)  Active Problems:    Diarrhea    UTI (urinary tract infection)    Hypertension    Colitis    Sinus tachycardia    Prolonged Q-T interval on ECG    Clostridium difficile infection    Metabolic encephalopathy- multifactorial, possible Wernicke's, possible hepatic encephalopathy   Possibly due to significant weight loss of 50+ pounds w/poor nutritional intake, less likely PRES from poorly treated HTN.     Initial CT head with no acute changes. Urine Tox screen negative.  No elevated WBC count.  Ammonia mildly elevated at 59. Blood cultures are negative X2. On Lactulose for mildly elevated ammonia levels.  Corpak inserted due to poor oral intake but removed when she became alert on 11/21.  On 11/19/24 MRI head of poor quality due to motion artifact but show increased signal within the medial thalami, periaqueductal region and possible increased signal within the caudate and cortex of the bilateral posterior  frontal lobes.  Started on high dose IV Thiamine and MVI for possible Wernicke's, changed to oral Thiamine on 11/25.  Thiamine level low at 33.  IV Acyclovir discontinued as CSF negative for HSV infection.  Had Lumbar Puncture on 11/20 and results negative for viruses and bacteria.  Some improvement in her alertness since 11/21/24, but not able to answer questions or follow directions.  Sitter in room since 11/25 as fell out of bed.  MRI with anesthesia completed 11/25- results are non specific white matter changes.  Consult psychiatry recommended Haldol 5 mg q 6 hours prn for agitation and Risperdal 0.5 mg at bedtime for insomnia for 5 nights.  Ammonia level has dropped from 59 to 19 on 11/28/24 so the Lactulose discontinued.  On 11/29 she fell out of bed fortunately sitter was at bedside and broke the fall.  She continues to be episodes of lucidity alternating with drowsiness and confusion, showing severe cognitive impairment.  Very restless requiring Haldol 1 mg every 6 hours to calm her down. Depakote 125 mg bid added for restlessness and anxiety.     Untreated C. Difficile Colitis  Per  she has not taken the oral Vancomycin prescribed at discharge on 11/2/24.   Stool for pathogens negative.  C. Diff PCR negative but since untreated this could be false negative.  Treated with oral Vancomycin for 10 days,  discontinued on 11/27.  CT a/p confirms she continues to have colitis.  Loose stools/diarrhea due to lactulose.  Lactulose discontinued when ammonia levels dropped from 59 to 19 on 11/28/24.        HTN and Sinus Tachycardia  On oral Losartan 50 mg, Metoprolol tartrate 100 mg qam and  50 mg qpm, Spironolactone 12.5 mg  Blood pressure mildly elevated and continues to be tachycardic off and on.     Asthma  Budesonide 0.5 mg and Formoterol nebs bid.     CHET  Buspirone 10 mg bid on hold.  Klonopin on hold due to encephalopathy     GERD  Oral Pantoprazole 40 mg bid.     Recent Otitis Media  Ears show fluid  probably causing some hearing impairment.  Refer to ENT as out patient for possible hearing aids.    Cellulitis right leg  Oral Cephalexin 500 mg twice a day for 5 days.     Discharge planning  Per  unable to find accepting acute rehab facility with her insurance.  She will need cognitive rehab, PT and OT evaluation for driving safety and capacity to return to work.  Diet: continue texture modified diet with soft & bite sized foods/ thin liquids with ONS   Patient was accepted for SNF placement at [facility] in [facility].           Plan discussed with patient and  at bedside     Nell Green MD

## 2024-12-04 NOTE — CARE PLAN
The clinical goals for the shift include care plan        Problem: Skin  Goal: Participates in plan/prevention/treatment measures  Outcome: Not Progressing  Goal: Prevent/manage excess moisture  Outcome: Not Progressing     Problem: Fall/Injury  Goal: Use assistive devices by end of the shift  Outcome: Not Progressing     Patient stable this shift. A&ox3 currently. Denies any pain or discomfort. Incontinent of urine this shift cleaned up by staff. Discharging to [facility] via physician ambulance   at bedside during discharge

## 2024-12-04 NOTE — NURSING NOTE
Pt rested quietly through night. All meds taken without difficulty. Pt confused through night, thinking she is having a miscarriage & asking for an OB DR. Re-directed easily. Call light in reach, bed alarm on & audible. Safety maintained.

## 2024-12-04 NOTE — PROGRESS NOTES
12/04/24 1044   Discharge Planning   Home or Post Acute Services Post acute facilities (Rehab/SNF/etc)   Type of Post Acute Facility Services Skilled nursing   Expected Discharge Disposition SNF   Has discharge transport been arranged? Yes   What time is the transport expected? 1230   Intensity of Service   Intensity of Service >30 min     Pt cleared for discharge to [facility] today at 12:30.  aware of discharge and time and in agreement with the plan. Bedside nurse and facility aware of discharge and time. Nursing will call report prior to discharge.

## 2024-12-05 LAB
ARSENIC BLD-MCNC: <10 UG/L
COPPER SERPL-MCNC: 97.1 UG/DL (ref 80–155)
LEAD BLDV-MCNC: <2 UG/DL
MERCURY BLD-MCNC: <2.5 UG/L

## 2024-12-06 LAB
ATRIAL RATE: 121 BPM
P AXIS: 34 DEGREES
P OFFSET: 200 MS
P ONSET: 150 MS
PR INTERVAL: 132 MS
Q ONSET: 216 MS
QRS COUNT: 20 BEATS
QRS DURATION: 78 MS
QT INTERVAL: 322 MS
QTC CALCULATION(BAZETT): 457 MS
QTC FREDERICIA: 406 MS
R AXIS: -53 DEGREES
T AXIS: 45 DEGREES
T OFFSET: 377 MS
VENTRICULAR RATE: 121 BPM

## 2024-12-09 LAB
FUNGUS SPEC CULT: NORMAL
FUNGUS SPEC FUNGUS STN: NORMAL

## 2025-02-17 ENCOUNTER — APPOINTMENT (OUTPATIENT)
Dept: OTOLARYNGOLOGY | Facility: CLINIC | Age: 50
End: 2025-02-17
Payer: COMMERCIAL

## 2025-06-06 ENCOUNTER — APPOINTMENT (OUTPATIENT)
Dept: OTOLARYNGOLOGY | Facility: CLINIC | Age: 50
End: 2025-06-06
Payer: MEDICAID

## 2025-06-06 ENCOUNTER — CLINICAL SUPPORT (OUTPATIENT)
Dept: AUDIOLOGY | Facility: CLINIC | Age: 50
End: 2025-06-06
Payer: COMMERCIAL

## 2025-06-06 DIAGNOSIS — H90.3 BILATERAL SENSORINEURAL HEARING LOSS: Primary | ICD-10-CM

## 2025-06-06 PROCEDURE — 92567 TYMPANOMETRY: CPT | Performed by: AUDIOLOGIST

## 2025-06-06 PROCEDURE — 92557 COMPREHENSIVE HEARING TEST: CPT | Performed by: AUDIOLOGIST

## 2025-06-06 PROCEDURE — 99203 OFFICE O/P NEW LOW 30 MIN: CPT | Performed by: OTOLARYNGOLOGY

## 2025-06-06 NOTE — PROGRESS NOTES
Mrs. Mims, age 49, was seen today for a hearing evaluation during her ENT visit with Dr. Boogie.  She reported concern for declined hearing, left greater than right since November 2024 when she was being treated for C. Diff and subsequently was diagnosed with Wernicke's encephalitis.  She reported difficulty with understanding and processing speech.    Results:  Otoscopy revealed clear ear canals and tympanic membranes were visualized bilaterally.  Tympanometry revealed normal, Type A tympanograms, indicating normal ear canal volume, peak pressure and compliance bilaterally.   Audiometric thresholds revealed a mild sensorineural hearing loss in her right ear and a mild to moderate sensorineural hearing loss in her left ear.  Word recognition scores were fair bilaterally.    Recommendations:  Follow-up with PCP, Dr. Green, as medically directed.  Follow-up with ENT, Dr. Boogie, as medically directed.  Retest hearing as directed in conjunction with otologic care.

## 2025-06-06 NOTE — PROGRESS NOTES
Subjective   Patient ID: Samuel Mims is a 49 y.o. female who presents for Hearing Loss.    HPI  New patient being seen for hearing loss. Hearing loss first noticed about 7 months ago, when she was diagnosed with Wernicke's Encephalitis r/t treatment for Cdiff infection. She feels that hearing in left ear is worse than in right. Finds herself frequently asking people to repeat themselves, having difficulty understanding words in conversation. Denies tinnitus, dizziness, vertigo. Recently had hearing test at outside facility and was told that she was 1 decibel off from qualifying for hearing aids based on Medicaid's requirements.     All remaining head neck inquiry otherwise negative.     Review of Systems   Constitutional: Negative.    HENT: Negative.     Respiratory: Negative.     Cardiovascular: Negative.    Neurological: Negative.      Physical Exam  General appearance: No acute distress. Normal facies. Symmetric facial movement. No gross lesions of the face are noted.  Ears:  The external ear structures appear normal. The ear canals patent and the tympanic membranes are intact without evidence of air-fluid levels, retraction, or congenital defects.    Nose:  Anterior rhinoscopy notes essentially a midline nasal septum. Examination is noted for normal healthy mucosal membranes without any evidence of lesions, polyps, or exudate.   Throat/Oral mucosa:  The tongue is normally mobile. There are no lesions on the gingiva, buccal, or oral mucosa. There are no oral cavity masses.  Neck:  The neck is negative for mass lymphadenopathy. The trachea and parotid are clear. The thyroid bed is grossly unremarkable. The salivary gland structures are grossly unremarkable.    Audiogram:  Audiometric thresholds revealed a mild sensorineural hearing loss in her right ear and a mild to moderate sensorineural hearing loss in her left ear.  Word recognition scores were fair bilaterally (72% bilaterally). Tympanometry revealed  normal, Type A tympanograms, indicating normal ear canal volume, peak pressure and compliance bilaterally.     Assessment/Plan   Bilateral sensorineural hearing loss. Suspect that some of her difficulty with hearing and speech is related to her Wernicke's encephalopathy, which she does feel is improving somewhat. I would like to see her back for re-examination and Audiogram in 6 months and in the meantime, would recommend that she work on reading aloud at home.

## 2025-07-02 ENCOUNTER — EVALUATION (OUTPATIENT)
Dept: OCCUPATIONAL THERAPY | Facility: CLINIC | Age: 50
End: 2025-07-02
Payer: COMMERCIAL

## 2025-07-02 DIAGNOSIS — Z78.9 IMPAIRED MOBILITY AND ADLS: Primary | ICD-10-CM

## 2025-07-02 DIAGNOSIS — R53.1 GENERALIZED WEAKNESS: ICD-10-CM

## 2025-07-02 DIAGNOSIS — Z74.09 IMPAIRED MOBILITY AND ADLS: Primary | ICD-10-CM

## 2025-07-02 PROCEDURE — 97166 OT EVAL MOD COMPLEX 45 MIN: CPT | Mod: GO | Performed by: OCCUPATIONAL THERAPIST

## 2025-07-02 NOTE — PROGRESS NOTES
Occupational Therapy    Evaluation    Patient Name: Samuel Mims  MRN: [id number]  Department: [address]    Today's Date: 7/2/2025           Assessment:   Samuel Mims presents to occupational therapy with complaint of decreased mobility, weakness, memory changes, and fatigue all limiting independence with ADLs, functional mobility, IADLs, and leisure activities. Standardized testing and measures administered today reveal that the patient has multiple impairments in body structures and functions, activity limitations, and participation restrictions. The patient has personal factors and comorbidities that may serve as barriers affecting the plan of care, including wernicke's encephalopathy. Skilled OT services are warranted in order to realize measurable change in the above outcome measures and achieve improvements in patient's functional status and individual goals. Pt verbalized understanding and is in agreement with goals and plan of care.       Plan:   1x/wk for 5 visits        Subjective   Current Problem:  1. Impaired mobility and ADLs        2. Generalized weakness            Samuel Mims arrives to OT w/ spouse present with chief complaint of memory deficits secondary to wernicke's encephalophathy.    Pt was hospitalized from 11/1-11/2 for C-diff, which was treated with oral vancomycin.  Pt returned to ED 11/5 due to increased confusion, decreased oral intake, vomiting and diarrhea. Pt found to have Wernicke's Encephalopathy.    Pt was discharged to SNF from hospital, in which she stills resides from evening to morning for medication management.    Pt endorses decreased mobility, weakness, memory changes, fatigue and feeling in a fog since events.     Per hospital note:     Samuel Mims is a 48 y.o. female on day 18 of admission presenting with Sepsis with encephalopathy without septic shock, due to unspecified organism (Multi).  Principal Problem:    Sepsis with encephalopathy without  septic shock, due to unspecified organism (Multi)  Active Problems:    Diarrhea    UTI (urinary tract infection)    Hypertension    Colitis    Sinus tachycardia    Prolonged Q-T interval on ECG    Clostridium difficile infection     Metabolic encephalopathy- multifactorial, possible Wernicke's, possible hepatic encephalopathy   Possibly due to significant weight loss of 50+ pounds w/poor nutritional intake, less likely PRES from poorly treated HTN.     Initial CT head with no acute changes. Urine Tox screen negative.  No elevated WBC count.  Ammonia mildly elevated at 59. Blood cultures are negative X2. On Lactulose for mildly elevated ammonia levels.  Corpak inserted due to poor oral intake but removed when she became alert on 11/21.  On 11/19/24 MRI head of poor quality due to motion artifact but show increased signal within the medial thalami, periaqueductal region and possible increased signal within the caudate and cortex of the bilateral posterior frontal lobes.  Started on high dose IV Thiamine and MVI for possible Wernicke's, changed to oral Thiamine on 11/25.  Thiamine level low at 33.  IV Acyclovir discontinued as CSF negative for HSV infection.  Had Lumbar Puncture on 11/20 and results negative for viruses and bacteria.  Some improvement in her alertness since 11/21/24, but not able to answer questions or follow directions.  Sitter in room since 11/25 as fell out of bed.  MRI with anesthesia completed 11/25- results are non specific white matter changes.  Consult psychiatry recommended Haldol 5 mg q 6 hours prn for agitation and Risperdal 0.5 mg at bedtime for insomnia for 5 nights.  Ammonia level has dropped from 59 to 19 on 11/28/24 so the Lactulose discontinued.  On 11/29 she fell out of bed fortunately sitter was at bedside and broke the fall.  She continues to be episodes of lucidity alternating with drowsiness and confusion, showing severe cognitive impairment.  Very restless requiring Haldol 1 mg  every 6 hours to calm her down. Depakote 125 mg bid added for restlessness and anxiety.     Untreated C. Difficile Colitis  Per  she has not taken the oral Vancomycin prescribed at discharge on 11/2/24.   Stool for pathogens negative.  C. Diff PCR negative but since untreated this could be false negative.  Treated with oral Vancomycin for 10 days,  discontinued on 11/27.  CT a/p confirms she continues to have colitis.  Loose stools/diarrhea due to lactulose.  Lactulose discontinued when ammonia levels dropped from 59 to 19 on 11/28/24.    Precautions:   Fall risk     Pain:   0/10    Objective   Cognition:   Short term memory deficits    Delayed processing noted in dialogue     MOCA: 22/30  -deficits on language subtests and delayed recall (0/6)      Home Living:   Pt spends evenings > mornings at SNF for medication management.  Pt is home with spouse during the day.     Pt is in ranch style home when not at SNF     ADL:  Pt is able to dress self   Pt's spouse assist with bathing      Activity Tolerance:   Increased fatigue with activity    Requires naps throughout the day     Bed Mobility/Transfers:   Pt reports one fall in the hospital in November and another fall in December at SNF     Strength:   MMT WNL      :   R-46 #   L-40#     Lateral:   R-22#  L-20#     Tripod:   R-14#  L-13#     Coordination:    9 hole peg:   R- 38 seconds   L- 39 seconds     Hand Function:   Pt is right hand dominant     OP EDUCATION:  OT educated pt and spouse on role of OT in outpatient setting.  OT developed goals and POC with pt and spouse.    Goals:  Pt will increase dynamic standing balance to “good” to maximize independence with standing ADLs/IADLs and reduce risk of falls   Pt will demo independence with HEP  Pt will demo and verbalize use of energy conservation/work simplification techniques to increase activity tolerance will completing ADL/IADL tasks.   Pt will increase B  strength by 5-10# to increase  independence with ADLs/IADLs (opening jars, medicine caps, etcs)  Pt will complete IADL simulation task with CGA assist and tolerate 10 minutes of standing task  Pt will demonstrate ability to partake in bathing simulation with Mod I to maximize independence with ADLs

## 2025-07-07 ENCOUNTER — EVALUATION (OUTPATIENT)
Dept: SPEECH THERAPY | Facility: CLINIC | Age: 50
End: 2025-07-07
Payer: COMMERCIAL

## 2025-07-07 DIAGNOSIS — R41.89 COGNITIVE CHANGES: ICD-10-CM

## 2025-07-07 DIAGNOSIS — R41.841 COGNITIVE COMMUNICATION DEFICIT: Primary | ICD-10-CM

## 2025-07-07 PROCEDURE — 92523 SPEECH SOUND LANG COMPREHEN: CPT | Mod: GN | Performed by: STUDENT IN AN ORGANIZED HEALTH CARE EDUCATION/TRAINING PROGRAM

## 2025-07-07 ASSESSMENT — PAIN - FUNCTIONAL ASSESSMENT: PAIN_FUNCTIONAL_ASSESSMENT: 0-10

## 2025-07-07 ASSESSMENT — PAIN SCALES - GENERAL: PAINLEVEL_OUTOF10: 0 - NO PAIN

## 2025-07-07 NOTE — PROGRESS NOTES
Speech-Language Pathology    SLP Adult Outpatient Speech-Language Cognition    Patient Name: Samuel Mims  MRN: [id number]  Today's Date: 7/7/2025   Time Calculation  Start Time: 1100  Stop Time: 1153  Time Calculation (min): 53 min         Current Problem:   1. Cognitive communication deficit [R41.841]        2. Cognitive changes  Referral to Speech Therapy        Assessment  Patient presents with mild dysarthria characterized by blended word boundaries and imprecise articulation. Additionally, the patient presents with reduced speed of processing and memory impairments. Composite score on a standardized test fell with normal limits however the memory subtest score was considered moderate impairment. The patient had difficulty recalling basic information about her sons, including their ages and if they still lived at home. The patient may benefit from skilled speech therapy services to improve her ability to recall personal and short term information and resume independence with ADLs and IADLs as she prepares to return home from her skilled nursing facility.    SLP Assessment  SLP Assessment Results: Motor Speech Deficits, Memory deficits  Prognosis: Good  Treatment Provided: No  Strengths: Motivation, Family/Caregiver Support    Plan:  Plan  Inpatient/Swing Bed or Outpatient: Outpatient  Treatment/Interventions: Cognitive communication functioning, Communication functioning, Oral motor exercises  SLP Plan: Skilled SLP  SLP Frequency: 1x per week  Duration: 12 weeks  SLP Discharge Recommendations: Continue home program upon D/C  Next Treatment Priority: comp strats; memory tasks  Discussed POC: Patient, Caregiver/family  Discussed Risks/Benefits: Yes  Patient/Caregiver Agreeable: Yes    Subjective & Reason for Referral:  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 She was tearful when discussing prior level of functioning. She is anxious to start cooking again. She actively uses her smart phone to  text.    Cognition:     Cognitive Linguistic Quick Test - CLQT  Personal Facts 8/8 WNL   Symbol Cancellation 12/12 WNL   Confrontational Naming 10/10 WNL   Clock Drawing 13/13 WNL   Story Retelling 6/10 9/18 items recalled; 3/3 Y/N questions   Symbol Trails 10/10 WNL   Generative Naming 6/9 *18 animals; 9/m/ words in 1 min   Design Memory 3/6 Errors d/t responding after time limit; answers were correct   Mazes 8/8 WNL   Design Generation 6/13 **6 originals; 0 incorrect; 0 copies; 0 perseverations     Attention 184/215 WNL   Memory 128/185 Moderate   Executive Function 30/40 WNL   Language 30/37 WNL   Visuospatial Skills 86/105 WNL   Clock Drawing 13/13 WNL   COMPOSITE 3.6 WNL     *Occasional perseverations noted  **Patient aborted the task with one minute left    Motor Speech:  Motor Speech Production  Assessments Used: Malin passage  Oral Motor : WFL  Diadochokinetic Rate: Impaired (8 reps of buttercup in 5 seconds; WFL = 10 reps)  Motor Speech Disorder: Dysarthria    Baseline articulation disorder with phoneme /s/. This was not the primary reason for reduced intelligibility. Increased rate of speech and imprecise articulation were primary characteristics. Intelligibility improved with cued slow rate of speech.     Language (Receptive and Expressive):  No reported difficulty with reading, writing, speaking or auditory comprehension from both patient and spouse. No impairments observed during the evaluation.  ]  Goals:   Patient will:  Increase speech intelligibility at the sentence level with use of recommended strategies (reduced rate, increased volume, over-articulation) in 90% of opportunities.  Establish Date:  7/7/2025      Timeframe: 3 months   Re-Eval date: 10/5/2025       Status: Progressing  Comments:    Recall intelligibility-enhancing strategies given minimal cues during treatment session.  Establish Date:  7/7/2025      Timeframe: 3 months   Re-Eval date: 10/5/2025       Status:  Progressing  Comments:    Complete short term memory tasks to 90% with min cues.  Establish Date:  7/7/2025      Timeframe: 3 months   Re-Eval date: 10/5/2025       Status: Progressing  Comments:    Complete sustained/selective/alternating attention tasks to 90% with min cues.  Establish Date:  7/7/2025      Timeframe: 3 months   Re-Eval date: 10/5/2025       Status: Progressing  Comments:    LTG:  Patient will develop functional, cognitive-linguistic-based skills and utilize compensatory strategies to communicate wants and needs effectively, maintain safety during ADL’s and resume independence in a functional living environment.    General Visit Information  General Information  Chart Reviewed: Yes  Arrival: Accompanied by: __ (spouse, Salvatore)  Past Medical History Relevant to Rehab: Pt was hospitalized from 11/1-11/2 for C-diff, which was treated with oral vancomycin.  Pt returned to ED 11/5 due to increased confusion, decreased oral intake, vomiting and diarrhea. Pt found to have Wernicke's Encephalopathy. Pt was discharged to SNF from hospital, in which she stills resides from evening to morning for medication management. Pt endorses memory and speech changes, and feeling in a fog since events.  Certification Period Start Date: 07/07/25  Certification Period End Date: 10/05/25    Pain  Pain Assessment  Pain Assessment: 0-10  0-10 (Numeric) Pain Score: 0 - No pain    Adult OP Education  Adult Outpatient Education  Individual(s) Educated: Patient, Spouse  Patient/Caregiver Demonstrated Understanding: yes  Plan of Care Discussed and Agreed Upon: yes  Patient Response to Education: Patient/Caregiver Verbalized Understanding of Information

## 2025-07-09 ENCOUNTER — TREATMENT (OUTPATIENT)
Dept: OCCUPATIONAL THERAPY | Facility: CLINIC | Age: 50
End: 2025-07-09
Payer: COMMERCIAL

## 2025-07-09 DIAGNOSIS — R53.1 GENERALIZED WEAKNESS: ICD-10-CM

## 2025-07-09 DIAGNOSIS — R29.898 LEG HEAVINESS: ICD-10-CM

## 2025-07-09 DIAGNOSIS — Z78.9 IMPAIRED MOBILITY AND ADLS: Primary | ICD-10-CM

## 2025-07-09 DIAGNOSIS — Z74.09 IMPAIRED MOBILITY AND ADLS: Primary | ICD-10-CM

## 2025-07-09 DIAGNOSIS — I89.0 LYMPHEDEMA OF BOTH LOWER EXTREMITIES: ICD-10-CM

## 2025-07-09 PROCEDURE — 97535 SELF CARE MNGMENT TRAINING: CPT | Mod: GO | Performed by: OCCUPATIONAL THERAPIST

## 2025-07-09 ASSESSMENT — ACTIVITIES OF DAILY LIVING (ADL): HOME_MANAGEMENT_TIME_ENTRY: 54

## 2025-07-09 NOTE — PROGRESS NOTES
"Occupational Therapy    Reassessment     Patient Name: Samuel Mims  MRN: [id number]  Department: [address]    Today's Date: 7/9/2025           Assessment:  Pt with visible lymphedema in BLES.  Pt with several classifications; full and firm tissue texture, presence of papillomas, hyperpigmentation, and a positive stemmers sign.    In addition pt reports tightness in legs and increased heaviness impacting mobility.    Pt would benefit from skilled OT services and lymphedema therapy to further reduce lymphatic swelling in BLES to maximize independence with ADLs, IADLs, and functional mobility.     Plan:     1-2x/wk for 8-12 weeks   -Complete Decongestive Therapy to BLEs   -Manual Therapy, Therapeutic Exercise, Self Care/ADL     Subjective   Current Problem:  1. Impaired mobility and ADLs        2. Generalized weakness        3. Lymphedema of both lower extremities        4. Leg heaviness          Pt's spouse expressed concern for BLE lymphedema in pt's legs.  OT assessed legs; noted visible lymphedema.     Patient with complaint of BLE lymphedema, leg tightness, tenderness, skin texture changes and generalized weakness all impacting independence with ADLs, IADLs, functional mobility, work and leisure tasks.     Pt reports she has always had leg swelling, however they never bothered her too much until recently.  Pt's spouse reports her swelling really worsened a few months ago with hospitalization. Pt associates pain with swelling in BLEs; describes pain as tight.  Patient reports swelling does not improve with elevation and worsens with prolonged sitting or standing. Patient has not received lymphedema therapy in the past.  Patient does not wear compression garments at this time.       Precautions:   Fall risk     Pain:   4/10 - \"The legs feel tight\"     Objective     R Superior border of patella (SBP) 61.3   R 10cm above SBP  R 20cm above SBP  R 10cm below SBP 54.7  R 20cm below SBP 60.6   R 30cm below SBP " 52.5   R 35cm below SBP 46.3   R Ankle lobule 38.0   R Ankle 39.3   R Forefoot 29.5     L Superior border of patella (SBP) 63.0   L10cm above SBP  L 20cm above SBP  L 10cm below SBP 57.8   L 20cm below SBP 62.5  L 30cm below SBP 52.6   L 35cm below SBP 47.6   L Ankle lobule 38.8   L Ankle 38.0   L Forefoot  29.5     Lymphedema assessment:   Stemmers sign +  Papillomas present lower leg   LLE >RLE   Full and firm tissue texture   Hyperpigmentation noted   Fullness at feet and toes     Hand Function:   Pt is right hand dominant     Treatment:     Self Care/ADL    OT educated pt and spouse on anatomy / physiology of the lymphatic system.  OT educated pt on complete decongestive therapy (CDT) for lymphedema treatment. OT developed goals and plan of care with patient.     OT assessed skin and took circumferential measurements    OT initiated short stretch bandaging home program to decongest BLEs.  OT educated pt's spouse on donning techniques of bandages; spouse expressed carryover     OT applied short stretch bandages to BLES from feet to knees as follow:   -4” stockinette base layer, 1-8cm and 1-10cm short stretch bandage. - Comfortable fit achieved.  OT reviewed post bandaging precautions, handout issued. -Pt expressed teach back of education       Goals:  -Demonstrate decreased swelling and softened tissue texture in BLEs upon visual inspection and palpation to increase safe functional mobility, ADLS. IADLS, work and leisure activities.  -Decrease circumferential measurements lower extremity by .5cm to 3.0cm.  -Demonstrate increased knowledge with lymphedema skin care precautions to reduce the risk of infection and exacerbation.  -Demonstrate independence with the self manual lymph drainage (MLD) to enhance lymphatic flow and decongestion of B trunk and  lower extremities.  -Demonstrate independence with self bandaging/compression techniques and independent understanding of the principles and theory of lymphatic  compression.               -Be fit with and demonstrate good tolerance to  lower extremity compression garment when the patient is stable, at maximal decongestion.  -Demonstrate independence with donning/doffing garment and adherence to wear schedule, adaptive techniques as needed.  -Decrease pain, tightness lower extremities.  -Improve bilateral LE functional ROM and strength as needed for safe functional mobility.  -Demonstrate independence with home exercise program and compliance with lymphedema exercise precautions.

## 2025-07-14 ENCOUNTER — APPOINTMENT (OUTPATIENT)
Dept: SPEECH THERAPY | Facility: CLINIC | Age: 50
End: 2025-07-14
Payer: COMMERCIAL

## 2025-07-14 DIAGNOSIS — R41.841 COGNITIVE COMMUNICATION DEFICIT: Primary | ICD-10-CM

## 2025-07-17 ENCOUNTER — TREATMENT (OUTPATIENT)
Dept: SPEECH THERAPY | Facility: CLINIC | Age: 50
End: 2025-07-17
Payer: COMMERCIAL

## 2025-07-17 DIAGNOSIS — R41.841 COGNITIVE COMMUNICATION DEFICIT: Primary | ICD-10-CM

## 2025-07-17 PROCEDURE — 92507 TX SP LANG VOICE COMM INDIV: CPT | Mod: GN | Performed by: STUDENT IN AN ORGANIZED HEALTH CARE EDUCATION/TRAINING PROGRAM

## 2025-07-17 ASSESSMENT — PAIN - FUNCTIONAL ASSESSMENT: PAIN_FUNCTIONAL_ASSESSMENT: 0-10

## 2025-07-17 ASSESSMENT — PAIN SCALES - GENERAL: PAINLEVEL_OUTOF10: 0 - NO PAIN

## 2025-07-17 NOTE — PROGRESS NOTES
Speech-Language Pathology    SLP Adult Inpatient  Speech-Language Pathology Treatment     Patient Name: Samuel Mims  MRN: [id number]  Today's Date: 7/17/2025  Time Calculation  Start Time: 1035  Stop Time: 1120  Time Calculation (min): 45 min         Current Problem:   1. Cognitive communication deficit [R41.841]  Follow Up In Speech Therapy        SLP Assessment:  SLP Assessment  SLP TX Intervention Outcome: Making Progress Towards Goals  SLP Assessment Results: Motor Speech Deficits, Cognitive Deficits  Prognosis: Good  Strengths: Motivation, Family/Caregiver Support  Barriers: Cognition    The patient was actively engaged in therapeutic skills targeting attention and memory. The patient was able to complete the remember the card task to 98% and the picture matching task to 88%. She required cues to use the recommended strategies to match the pictures with as few of errors as possible. Education was provided to initiate a home exercise program to enhance the therapeutic process. The spouse recently brought the patient home from the SNF. The patient was observed asking x2 if she lived at home. The spouse reports the patient refers to the journal he kept about all of the events that led up to her hospitalization and afterward. The spouse added that the patient wakes up each day without a clear recollection of why she feels the way she does and will re-read the journal daily. Both patient and spouse were encouraged to identify opportunities for the patient to be more active in household tasks given her limited mobility.    Plan:  Plan  Inpatient/Swing Bed or Outpatient: Outpatient  Treatment/Interventions: Cognitive communication functioning  SLP Plan: Skilled SLP  SLP Frequency: 1x per week  Duration: 12 weeks  SLP Discharge Recommendations: Continue home program upon D/C  Discussed POC: Patient, Caregiver/family  Discussed Risks/Benefits: Yes  Patient/Caregiver Agreeable: Yes    Visit Information:  General  Past  "Medical History Relevant to Rehab: Pt was hospitalized from 11/1-11/2 for C-diff, which was treated with oral vancomycin.  Pt returned to ED 11/5 due to increased confusion, decreased oral intake, vomiting and diarrhea. Pt found to have Wernicke's Encephalopathy. Pt was discharged to SNF from hospital, in which she stills resides from evening to morning for medication management. Pt endorses memory and speech changes, and feeling in a fog since events.  Certification Period Start Date: 07/14/25  Certification Period End Date: 08/15/25  Number of Authorized Treatments : 4  Total Number of Visits : 1    Subjective:  Patient reports she feels \"foggy\". She is now keeping a calendar by her bed.    Interventions:           Cognitive Linguistics  Cognitive Linguistics Interventions: Short term memory               Objective Data & Progress:  Goals:   Patient will:  Increase speech intelligibility at the sentence level with use of recommended strategies (reduced rate, increased volume, over-articulation) in 90% of opportunities.  Establish Date:  7/7/2025      Timeframe: 3 months   Re-Eval date: 10/5/2025       Status: Progressing  Comments:    -Patient was 100% intelligible this date    Recall intelligibility-enhancing strategies given minimal cues during treatment session.  Establish Date:  7/7/2025      Timeframe: 3 months   Re-Eval date: 10/5/2025       Status: Progressing  Comments:  -Patient was unable to recall any strategies. Further education provided.     Complete short term memory tasks to 90% with min cues.  Establish Date:  7/7/2025      Timeframe: 3 months   Re-Eval date: 10/5/2025       Status: Progressing  Comments:   -Given a grid of 6 pairs of matching pictures, the patient utilized repetition and a novel strategy to systematically turn over pictures in an organized manner to find the matches with as few tries possible; accuracy for level 2/5  - 88%   -Remember the right card - level 1/3 - 98%    Complete " sustained/selective/alternating attention tasks to 90% with min cues.  Establish Date:  7/7/2025      Timeframe: 3 months   Re-Eval date: 10/5/2025       Status: Progressing  Comments:    -Patient required no redirection for any of the memory tasks     LTG:  Patient will develop functional, cognitive-linguistic-based skills and utilize compensatory strategies to communicate wants and needs effectively, maintain safety during ADL’s and resume independence in a functional living environment.    Pain:  Pain Assessment  Pain Assessment: 0-10  0-10 (Numeric) Pain Score: 0 - No pain     Outpatient Education:  Adult Outpatient Education  Individual(s) Educated: Patient, Spouse  Patient/Caregiver Demonstrated Understanding: yes  Plan of Care Discussed and Agreed Upon: yes  Patient Response to Education: Patient/Caregiver Verbalized Understanding of Information

## 2025-07-22 ENCOUNTER — TREATMENT (OUTPATIENT)
Dept: OCCUPATIONAL THERAPY | Facility: CLINIC | Age: 50
End: 2025-07-22
Payer: COMMERCIAL

## 2025-07-22 DIAGNOSIS — Z78.9 IMPAIRED MOBILITY AND ADLS: Primary | ICD-10-CM

## 2025-07-22 DIAGNOSIS — I89.0 LYMPHEDEMA OF BOTH LOWER EXTREMITIES: ICD-10-CM

## 2025-07-22 DIAGNOSIS — R29.898 LEG HEAVINESS: ICD-10-CM

## 2025-07-22 DIAGNOSIS — R53.1 GENERALIZED WEAKNESS: ICD-10-CM

## 2025-07-22 DIAGNOSIS — Z74.09 IMPAIRED MOBILITY AND ADLS: Primary | ICD-10-CM

## 2025-07-22 PROCEDURE — 97140 MANUAL THERAPY 1/> REGIONS: CPT | Mod: GO

## 2025-07-22 PROCEDURE — 97535 SELF CARE MNGMENT TRAINING: CPT | Mod: GO

## 2025-07-22 ASSESSMENT — ACTIVITIES OF DAILY LIVING (ADL): HOME_MANAGEMENT_TIME_ENTRY: 35

## 2025-07-22 NOTE — PROGRESS NOTES
"Occupational Therapy  Occupational Therapy Treatment Note    Patient Name Samuel Mims   MRN: [id number]  : 1975  Today's Date: 2025  Time Calculation  Start Time: 1327  Stop Time: 1430  Time Calculation (min): 63 min    Visit #: 2    Insurance: [employer] (primary), Medicaid (secondary)  Authorization required: Yes  Medicaid Certification dates: 25 - 10/7/25  2025 [employer]: $20 COPAY, 0% COINS, $500 DED  20 OT VS MAKI YR, AUTH REQ AFTER EVAL  AUTH 5 VS 7/3-2025     Therapy Diagnoses:   1. Impaired mobility and ADLs        2. Generalized weakness  Follow Up In Occupational Therapy      3. Lymphedema of both lower extremities        4. Leg heaviness          Assessment:  OT introduced MLD (manual lymphatic drainage) BLE sequence to patient this date, but did not initiate MLD home program as patient with limited carryover; OT will teach pt's spouse BLE sequence at future tx session as spouse was unable to be present for this portion of tx session today. OT provided MLD to RLE with softened tissue texture noted post treatment. OT also initiated vasopneumatic pump use to LLE with good tolerance by pt and softened tissue texture noted post-tx. Despite exercise, elevation, and compression, BLE swelling persists, and patient will benefit from use of a vasopneumatic pump at home to manage swelling. OT will continue to assess.     Plan:  1-2x/wk for 8-12 weeks   -Complete Decongestive Therapy to BLEs   -Manual Therapy, Therapeutic Exercise, Self Care/ADL     Subjective   Pt's , Salvatore, reports he has been wrapping her legs nearly everyday (to just below knee), and pt wears them for a few hours (sometimes overnight), but they tend to slide down/get loose quickly.     Precautions:  Moderate Fall risk, ambulates with AD     Pain:  0/10; pt reports \"heaviness\" of both legs, does not rate.     Objective   Lymphedema assessment:   Stemmers sign + erma  Papillomas present lower legs (RLE worse)  LLE >RLE "   Full and firm tissue texture   Hyperpigmentation noted (most significant at erma feet)  Fullness at feet and toes     Circumferential Measurements:  *Measurements not taken today.  *Measurements from 7/9/25  R Superior border of patella (SBP) 61.3   R 10cm above SBP  R 20cm above SBP  R 10cm below SBP 54.7  R 20cm below SBP 60.6   R 30cm below SBP 52.5   R 35cm below SBP 46.3   R Ankle lobule 38.0   R Ankle 39.3   R Forefoot 29.5     L Superior border of patella (SBP) 63.0   L10cm above SBP  L 20cm above SBP  L 10cm below SBP 57.8   L 20cm below SBP 62.5  L 30cm below SBP 52.6   L 35cm below SBP 47.6   L Ankle lobule 38.8   L Ankle 38.0   L Forefoot  29.5     Treatment:     Manual Therapy:  28 minutes  OT introduced MLD (manual lymphatic drainage) BLE sequence to pt - will teach pt's spouse her sequence at future tx session as spouse was unable to be present for this portion of tx session today, and pt with limited carryover.   OT initiated MLD sequence with diaphragmatic breathing and opening of lymph nodes to promote lymphatic circulation.   OT provided MLD to B trunk and RLE.  Softened tissue texture noted post tx.   While providing MLD to RLE, OT applied vasopneumatic pump to LLE at 35mmHg. Softened tissue texture noted post tx. Spouse expressed interest in obtaining for home use.     Self Care Home Management: 35 minutes  OT assessed skin, noted changes.   OT re-educated on short stretch bandaging home program to decongest BLEs. OT reinforced donning techniques of bandages with pt's spouse; spouse expressed carryover.  OT applied short stretch bandages to BLES from feet to knees as follows: 4” stockinette base layer, 1-8cm and 1-10cm short stretch bandage. TG- F applied just below knee to help keep bandages secured - Comfortable fit achieved.  OT reviewed post bandaging precautions. Pt and Pt's spouse expressed teach-back of education.  OT provided skin care precaution education. OT then provided pt's  spouse with skin care precautions handout and briefly reviewed.     Education: lymphedema skin care precautions, MLD sequence, reinforced lymphatic system education    Goals:  -Demonstrate decreased swelling and softened tissue texture in BLEs upon visual inspection and palpation to increase safe functional mobility, ADLS. IADLS, work and leisure activities.  -Decrease circumferential measurements lower extremity by .5cm to 3.0cm.  -Demonstrate increased knowledge with lymphedema skin care precautions to reduce the risk of infection and exacerbation.  -Demonstrate independence with the self manual lymph drainage (MLD) to enhance lymphatic flow and decongestion of B trunk and  lower extremities.  -Demonstrate independence with self bandaging/compression techniques and independent understanding of the principles and theory of lymphatic compression.               -Be fit with and demonstrate good tolerance to  lower extremity compression garment when the patient is stable, at maximal decongestion.  -Demonstrate independence with donning/doffing garment and adherence to wear schedule, adaptive techniques as needed.  -Decrease pain, tightness lower extremities.  -Improve bilateral LE functional ROM and strength as needed for safe functional mobility.  -Demonstrate independence with home exercise program and compliance with lymphedema exercise precautions.

## 2025-07-28 ENCOUNTER — TREATMENT (OUTPATIENT)
Dept: SPEECH THERAPY | Facility: CLINIC | Age: 50
End: 2025-07-28
Payer: COMMERCIAL

## 2025-07-28 DIAGNOSIS — R41.841 COGNITIVE COMMUNICATION DEFICIT: Primary | ICD-10-CM

## 2025-07-28 PROCEDURE — 92507 TX SP LANG VOICE COMM INDIV: CPT | Mod: GN | Performed by: STUDENT IN AN ORGANIZED HEALTH CARE EDUCATION/TRAINING PROGRAM

## 2025-07-28 ASSESSMENT — PAIN SCALES - GENERAL: PAINLEVEL_OUTOF10: 0 - NO PAIN

## 2025-07-28 ASSESSMENT — PAIN - FUNCTIONAL ASSESSMENT: PAIN_FUNCTIONAL_ASSESSMENT: 0-10

## 2025-07-28 NOTE — PROGRESS NOTES
Speech-Language Pathology    SLP Adult Inpatient  Speech-Language Pathology Treatment     Patient Name: Samuel Mims  MRN: [id number]  Today's Date: 7/28/2025  Time Calculation  Start Time: 1007  Stop Time: 1046  Time Calculation (min): 39 min         Current Problem:   1. Cognitive communication deficit [R41.841]  Follow Up In Speech Therapy        SLP Assessment:  SLP Assessment  SLP TX Intervention Outcome: Making Progress Towards Goals  SLP Assessment Results: Motor Speech Deficits  Prognosis: Good  Strengths: Motivation, Family/Caregiver Support  Barriers: Cognition    The patient was actively engaged in therapeutic skills targeting attention, memory, planning and sequencing through a novel card game. Patient required min cues for basic rules for game of play. Sequencing was completed appropriately. Patient has not initiated any attempt to participate in tasks that she used to do before her hospitalization within her physical limitations. Continue with current plan.    Plan:  Plan  Inpatient/Swing Bed or Outpatient: Outpatient  Treatment/Interventions: Cognitive communication functioning  SLP Plan: Skilled SLP  SLP Frequency: 1x per week  Duration: 12 weeks  SLP Discharge Recommendations: Continue home program upon D/C  Discussed POC: Patient, Caregiver/family  Discussed Risks/Benefits: Yes  Patient/Caregiver Agreeable: Yes    Visit Information:  General  Past Medical History Relevant to Rehab: Pt was hospitalized from 11/1-11/2 for C-diff, which was treated with oral vancomycin.  Pt returned to ED 11/5 due to increased confusion, decreased oral intake, vomiting and diarrhea. Pt found to have Wernicke's Encephalopathy. Pt was discharged to SNF from hospital, in which she stills resides from evening to morning for medication management. Pt endorses memory and speech changes, and feeling in a fog since events.  Certification Period Start Date: 07/14/25  Certification Period End Date: 08/15/25  Number of  "Authorized Treatments : 4  Total Number of Visits : 2    Subjective:  Patient reports she felt like like some of the mental \"fog\" has lifted.     Interventions:           Cognitive Linguistics  Cognitive Linguistics Interventions: Short term memory, Problem solving, Attention, Sequencing               Objective Data & Progress:  Goals:   Patient will:  Increase speech intelligibility at the sentence level with use of recommended strategies (reduced rate, increased volume, over-articulation) in 90% of opportunities.  Establish Date:  7/7/2025      Timeframe: 3 months   Re-Eval date: 10/5/2025       Status: Progressing  Comments: -Patient was 100% intelligible this date    Recall intelligibility-enhancing strategies given minimal cues during treatment session.  Establish Date:  7/7/2025      Timeframe: 3 months   Re-Eval date: 10/5/2025       Status: Progressing  Comments:  Goal not addressed this date    Previous:  -Patient was unable to recall any strategies. Further education provided.     Complete short term memory tasks to 90% with min cues.  Establish Date:  7/7/2025      Timeframe: 3 months   Re-Eval date: 10/5/2025       Status: Progressing  Comments:   -Patient educated on a moderately complex, novel card game ([place] in [place]) requiring working memory, selective attention, planning, organization and sequencing skills. Following initial instruction and a practice game, the patient required min-mod cues for game play. Patient was provided written instructions for home exercise program (HEP). The patient demonstrated improved knowledge of the game for a second round with only occasional cues required.    Previous:   -Given a grid of 6 pairs of matching pictures, the patient utilized repetition and a novel strategy to systematically turn over pictures in an organized manner to find the matches with as few tries possible; accuracy for level 2/5  - 88%   -Remember the right card - level 1/3 - 98%    Complete " sustained/selective/alternating attention tasks to 90% with min cues.  Establish Date:  7/7/2025      Timeframe: 3 months   Re-Eval date: 10/5/2025       Status: Progressing  Comments:    -Patient required no redirection for any of the memory tasks     LTG:  Patient will develop functional, cognitive-linguistic-based skills and utilize compensatory strategies to communicate wants and needs effectively, maintain safety during ADL’s and resume independence in a functional living environment.    Pain:  Pain Assessment  Pain Assessment: 0-10  0-10 (Numeric) Pain Score: 0 - No pain     Outpatient Education:  Adult Outpatient Education  Individual(s) Educated: Patient, Spouse  Patient/Caregiver Demonstrated Understanding: yes  Plan of Care Discussed and Agreed Upon: yes  Patient Response to Education: Patient/Caregiver Verbalized Understanding of Information

## 2025-08-05 ENCOUNTER — TREATMENT (OUTPATIENT)
Dept: OCCUPATIONAL THERAPY | Facility: CLINIC | Age: 50
End: 2025-08-05
Payer: COMMERCIAL

## 2025-08-05 DIAGNOSIS — Z78.9 IMPAIRED MOBILITY AND ADLS: Primary | ICD-10-CM

## 2025-08-05 DIAGNOSIS — R29.898 LEG HEAVINESS: ICD-10-CM

## 2025-08-05 DIAGNOSIS — R53.1 GENERALIZED WEAKNESS: ICD-10-CM

## 2025-08-05 DIAGNOSIS — Z74.09 IMPAIRED MOBILITY AND ADLS: Primary | ICD-10-CM

## 2025-08-05 DIAGNOSIS — I89.0 LYMPHEDEMA OF BOTH LOWER EXTREMITIES: ICD-10-CM

## 2025-08-05 PROCEDURE — 97535 SELF CARE MNGMENT TRAINING: CPT | Mod: GO

## 2025-08-05 PROCEDURE — 97140 MANUAL THERAPY 1/> REGIONS: CPT | Mod: GO

## 2025-08-05 ASSESSMENT — ACTIVITIES OF DAILY LIVING (ADL): HOME_MANAGEMENT_TIME_ENTRY: 20

## 2025-08-07 ENCOUNTER — TREATMENT (OUTPATIENT)
Dept: SPEECH THERAPY | Facility: CLINIC | Age: 50
End: 2025-08-07
Payer: COMMERCIAL

## 2025-08-07 DIAGNOSIS — R41.841 COGNITIVE COMMUNICATION DEFICIT: Primary | ICD-10-CM

## 2025-08-07 PROCEDURE — 92507 TX SP LANG VOICE COMM INDIV: CPT | Mod: GN | Performed by: STUDENT IN AN ORGANIZED HEALTH CARE EDUCATION/TRAINING PROGRAM

## 2025-08-07 ASSESSMENT — PAIN SCALES - GENERAL: PAINLEVEL_OUTOF10: 0 - NO PAIN

## 2025-08-07 ASSESSMENT — PAIN - FUNCTIONAL ASSESSMENT: PAIN_FUNCTIONAL_ASSESSMENT: 0-10

## 2025-08-07 NOTE — PROGRESS NOTES
"Speech-Language Pathology    SLP Adult Inpatient  Speech-Language Pathology Treatment     Patient Name: Samuel Mims  MRN: [id number]  Today's Date: 8/7/2025  Time Calculation  Start Time: 1302  Stop Time: 1347  Time Calculation (min): 45 min         Current Problem:   1. Cognitive communication deficit [R41.841]  Follow Up In Speech Therapy        SLP Assessment:  SLP Assessment  SLP TX Intervention Outcome: Making Progress Towards Goals  SLP Assessment Results: Motor Speech Deficits  Prognosis: Good  Strengths: Family/Caregiver Support  Barriers: Cognition, Motivation    Patient participated in tasks targeting selective and sustained attention this date. She was able to complete a sequencing task with background distraction with 100% accuracy but admitted she wasn't reading paragraph level material well enough to achieve high accuracy when asked multiple choice question about the information. The patient referred to her \"coming out of a fog\" as if she doesn't remember \"part 1\" and now she is in \"part 2\" and is trying to catch up. The patient is unable to remember that she lives at home and not the nursing home even though she has been home for about a month. This raises the question of whether she is able to form new memories and will be addressed in future sessions. A daily template was provided for the patient to more actively participate in her day and promote recall of notable or positive events each day. More environmental supports may need to be in place and will also be addressed in future sessions. Continue with current plan.    Plan:  Plan  Inpatient/Swing Bed or Outpatient: Outpatient  Treatment/Interventions: Cognitive communication functioning  SLP Plan: Skilled SLP  SLP Frequency: 1x per week  Duration: 12 weeks  SLP Discharge Recommendations: Continue home program upon D/C  Discussed POC: Patient, Caregiver/family  Discussed Risks/Benefits: Yes  Patient/Caregiver Agreeable: Yes    Visit " Information:  General  Past Medical History Relevant to Rehab: Pt was hospitalized from 11/1-11/2 for C-diff, which was treated with oral vancomycin.  Pt returned to ED 11/5 due to increased confusion, decreased oral intake, vomiting and diarrhea. Pt found to have Wernicke's Encephalopathy. Pt was discharged to SNF from hospital, in which she stills resides from evening to morning for medication management. Pt endorses memory and speech changes, and feeling in a fog since events.  Certification Period Start Date: 07/14/25  Certification Period End Date: 08/15/25  Number of Authorized Treatments : 4  Total Number of Visits : 3    Subjective:  The patient reported she cannot tell you one thing that she did the day before and this was repeated several times during the session. The spouse reports the patient will read random information, papers, etc at home and she will scroll through Facebook or texts to review events she can't remember. No other memory supports are being utilized in the home. She sleeps in the recliner as getting in and out of bed is difficult but still asks every day if she lives in the nursing home or at home. Her son was hospitalized recently and is back home but she still asks if he is in the hospital despite seeing him in the home. Pt endorses using a paper calendar before when the kids were little.     Interventions:           Cognitive Linguistics  Cognitive Linguistics Interventions: Short term memory               Objective Data & Progress:  Goals:   Patient will:  Increase speech intelligibility at the sentence level with use of recommended strategies (reduced rate, increased volume, over-articulation) in 90% of opportunities.  Establish Date:  7/7/2025      Timeframe: 3 months   Re-Eval date: 10/5/2025       Status: Progressing  Comments: GOAL MET    Previous:  -Patient was 100% intelligible this date    Recall intelligibility-enhancing strategies given minimal cues during treatment  session.  Establish Date:  7/7/2025      Timeframe: 3 months   Re-Eval date: 10/5/2025       Status: Progressing  Comments:  -Patient is not able to recall learned strategies    Previous:  -Patient was unable to recall any strategies. Further education provided.     Complete short term memory tasks to 90% with min cues.  Establish Date:  7/7/2025      Timeframe: 3 months   Re-Eval date: 10/5/2025       Status: Progressing  Comments:  -Patient relies on texts from her  about her appointments. SLP provided a daily worksheet for the patient to actively participate in her day by populating day/date, checklists for ADLs/IADLs, daily appointments and opportunities to reflect on something positive from the day and any important or early appointments the next day. Page may be further customized after using the provided template at home.     Previous:  -Patient educated on a moderately complex, novel card game ([place] in [place]) requiring working memory, selective attention, planning, organization and sequencing skills. Following initial instruction and a practice game, the patient required min-mod cues for game play. Patient was provided written instructions for home exercise program (HEP). The patient demonstrated improved knowledge of the game for a second round with only occasional cues required.   -Given a grid of 6 pairs of matching pictures, the patient utilized repetition and a novel strategy to systematically turn over pictures in an organized manner to find the matches with as few tries possible; accuracy for level 2/5  - 88%   -Remember the right card - level 1/3 - 98%    Complete sustained/selective/alternating attention tasks to 90% with min cues.  Establish Date:  7/7/2025      Timeframe: 3 months   Re-Eval date: 10/5/2025       Status: Progressing  Comments:    -Pt completed a sequencing task with background distraction: 100%   -Paragraph reading task (lvl 2/4): 50%, patient reported she didn't read  some of the passages very well    Previous:  -Patient required no redirection for any of the memory tasks     LTG:  Patient will develop functional, cognitive-linguistic-based skills and utilize compensatory strategies to communicate wants and needs effectively, maintain safety during ADL’s and resume independence in a functional living environment.    Pain:  Pain Assessment  Pain Assessment: 0-10  0-10 (Numeric) Pain Score: 0 - No pain     Outpatient Education:  Adult Outpatient Education  Individual(s) Educated: Patient, Spouse  Patient/Caregiver Demonstrated Understanding: yes  Plan of Care Discussed and Agreed Upon: yes  Patient Response to Education: Patient/Caregiver Verbalized Understanding of Information

## 2025-08-11 ENCOUNTER — APPOINTMENT (OUTPATIENT)
Dept: SPEECH THERAPY | Facility: CLINIC | Age: 50
End: 2025-08-11
Payer: COMMERCIAL

## 2025-08-11 DIAGNOSIS — R41.841 COGNITIVE COMMUNICATION DEFICIT: Primary | ICD-10-CM

## 2025-08-14 ENCOUNTER — TREATMENT (OUTPATIENT)
Dept: OCCUPATIONAL THERAPY | Facility: CLINIC | Age: 50
End: 2025-08-14
Payer: COMMERCIAL

## 2025-08-14 DIAGNOSIS — Z78.9 IMPAIRED MOBILITY AND ADLS: Primary | ICD-10-CM

## 2025-08-14 DIAGNOSIS — Z74.09 IMPAIRED MOBILITY AND ADLS: Primary | ICD-10-CM

## 2025-08-14 DIAGNOSIS — I89.0 LYMPHEDEMA OF BOTH LOWER EXTREMITIES: ICD-10-CM

## 2025-08-14 DIAGNOSIS — R53.1 GENERALIZED WEAKNESS: ICD-10-CM

## 2025-08-14 DIAGNOSIS — R29.898 LEG HEAVINESS: ICD-10-CM

## 2025-08-14 PROCEDURE — 97535 SELF CARE MNGMENT TRAINING: CPT | Mod: GO

## 2025-08-14 ASSESSMENT — ACTIVITIES OF DAILY LIVING (ADL): HOME_MANAGEMENT_TIME_ENTRY: 43

## 2025-08-19 ENCOUNTER — TREATMENT (OUTPATIENT)
Dept: OCCUPATIONAL THERAPY | Facility: CLINIC | Age: 50
End: 2025-08-19
Payer: COMMERCIAL

## 2025-08-19 DIAGNOSIS — R29.898 LEG HEAVINESS: ICD-10-CM

## 2025-08-19 DIAGNOSIS — R53.1 GENERALIZED WEAKNESS: ICD-10-CM

## 2025-08-19 DIAGNOSIS — Z78.9 IMPAIRED MOBILITY AND ADLS: Primary | ICD-10-CM

## 2025-08-19 DIAGNOSIS — Z74.09 IMPAIRED MOBILITY AND ADLS: Primary | ICD-10-CM

## 2025-08-19 DIAGNOSIS — I89.0 LYMPHEDEMA OF BOTH LOWER EXTREMITIES: ICD-10-CM

## 2025-08-19 PROCEDURE — 97140 MANUAL THERAPY 1/> REGIONS: CPT | Mod: GO

## 2025-08-19 PROCEDURE — 97535 SELF CARE MNGMENT TRAINING: CPT | Mod: GO

## 2025-08-19 ASSESSMENT — ACTIVITIES OF DAILY LIVING (ADL): HOME_MANAGEMENT_TIME_ENTRY: 32

## 2025-09-04 ENCOUNTER — TREATMENT (OUTPATIENT)
Dept: OCCUPATIONAL THERAPY | Facility: CLINIC | Age: 50
End: 2025-09-04
Payer: COMMERCIAL

## 2025-09-04 DIAGNOSIS — R53.1 GENERALIZED WEAKNESS: ICD-10-CM

## 2025-09-04 DIAGNOSIS — R29.898 LEG HEAVINESS: ICD-10-CM

## 2025-09-04 DIAGNOSIS — I89.0 LYMPHEDEMA OF BOTH LOWER EXTREMITIES: ICD-10-CM

## 2025-09-04 DIAGNOSIS — Z78.9 IMPAIRED MOBILITY AND ADLS: Primary | ICD-10-CM

## 2025-09-04 DIAGNOSIS — Z74.09 IMPAIRED MOBILITY AND ADLS: Primary | ICD-10-CM

## 2025-09-04 PROCEDURE — 97535 SELF CARE MNGMENT TRAINING: CPT | Mod: GO | Performed by: OCCUPATIONAL THERAPIST

## 2025-09-04 ASSESSMENT — ACTIVITIES OF DAILY LIVING (ADL): HOME_MANAGEMENT_TIME_ENTRY: 55

## 2025-09-18 ENCOUNTER — APPOINTMENT (OUTPATIENT)
Dept: NEUROLOGY | Facility: CLINIC | Age: 50
End: 2025-09-18
Payer: COMMERCIAL

## 2025-12-05 ENCOUNTER — APPOINTMENT (OUTPATIENT)
Dept: OTOLARYNGOLOGY | Facility: CLINIC | Age: 50
End: 2025-12-05
Payer: COMMERCIAL